# Patient Record
Sex: MALE | Race: WHITE | NOT HISPANIC OR LATINO | Employment: OTHER | URBAN - METROPOLITAN AREA
[De-identification: names, ages, dates, MRNs, and addresses within clinical notes are randomized per-mention and may not be internally consistent; named-entity substitution may affect disease eponyms.]

---

## 2017-01-01 ENCOUNTER — APPOINTMENT (OUTPATIENT)
Dept: LAB | Facility: HOSPITAL | Age: 72
End: 2017-01-01

## 2017-01-01 ENCOUNTER — APPOINTMENT (OUTPATIENT)
Dept: ONCOLOGY | Facility: HOSPITAL | Age: 72
End: 2017-01-01

## 2017-01-01 ENCOUNTER — APPOINTMENT (OUTPATIENT)
Dept: GENERAL RADIOLOGY | Facility: HOSPITAL | Age: 72
End: 2017-01-01

## 2017-01-01 ENCOUNTER — APPOINTMENT (OUTPATIENT)
Dept: CARDIOLOGY | Facility: HOSPITAL | Age: 72
End: 2017-01-01
Attending: HOSPITALIST

## 2017-01-01 ENCOUNTER — APPOINTMENT (OUTPATIENT)
Dept: CT IMAGING | Facility: HOSPITAL | Age: 72
End: 2017-01-01

## 2017-01-01 ENCOUNTER — APPOINTMENT (OUTPATIENT)
Dept: GENERAL RADIOLOGY | Facility: HOSPITAL | Age: 72
End: 2017-01-01
Attending: INTERNAL MEDICINE

## 2017-01-01 ENCOUNTER — APPOINTMENT (OUTPATIENT)
Dept: ULTRASOUND IMAGING | Facility: HOSPITAL | Age: 72
End: 2017-01-01

## 2017-01-01 ENCOUNTER — HOSPITAL ENCOUNTER (INPATIENT)
Facility: HOSPITAL | Age: 72
LOS: 23 days | Discharge: HOSPICE | End: 2018-01-18
Attending: EMERGENCY MEDICINE | Admitting: HOSPITALIST

## 2017-01-01 ENCOUNTER — HOSPITAL ENCOUNTER (INPATIENT)
Facility: HOSPITAL | Age: 72
LOS: 12 days | Discharge: HOSPICE | End: 2017-06-14
Attending: INTERNAL MEDICINE | Admitting: INTERNAL MEDICINE

## 2017-01-01 VITALS
DIASTOLIC BLOOD PRESSURE: 70 MMHG | OXYGEN SATURATION: 96 % | RESPIRATION RATE: 16 BRPM | BODY MASS INDEX: 39.01 KG/M2 | HEART RATE: 88 BPM | WEIGHT: 304 LBS | HEIGHT: 74 IN | SYSTOLIC BLOOD PRESSURE: 136 MMHG | TEMPERATURE: 98.5 F

## 2017-01-01 VITALS
SYSTOLIC BLOOD PRESSURE: 106 MMHG | WEIGHT: 304.9 LBS | HEIGHT: 74 IN | OXYGEN SATURATION: 95 % | HEART RATE: 89 BPM | DIASTOLIC BLOOD PRESSURE: 52 MMHG | TEMPERATURE: 99.2 F | BODY MASS INDEX: 39.13 KG/M2 | RESPIRATION RATE: 12 BRPM

## 2017-01-01 DIAGNOSIS — N49.2 CELLULITIS, SCROTUM: Primary | ICD-10-CM

## 2017-01-01 DIAGNOSIS — J90 PLEURAL EFFUSION ON LEFT: ICD-10-CM

## 2017-01-01 DIAGNOSIS — R73.9 HYPERGLYCEMIA: ICD-10-CM

## 2017-01-01 LAB
ABO + RH BLD: NORMAL
ABO + RH BLD: NORMAL
ALBUMIN SERPL-MCNC: 2.7 G/DL (ref 3.5–5.2)
ALBUMIN/GLOB SERPL: 0.5 G/DL
ALP SERPL-CCNC: 205 U/L (ref 39–117)
ALT SERPL W P-5'-P-CCNC: 14 U/L (ref 1–41)
ANION GAP SERPL CALCULATED.3IONS-SCNC: 10 MMOL/L
ANION GAP SERPL CALCULATED.3IONS-SCNC: 10.3 MMOL/L
ANION GAP SERPL CALCULATED.3IONS-SCNC: 11.1 MMOL/L
ANION GAP SERPL CALCULATED.3IONS-SCNC: 11.5 MMOL/L
ANION GAP SERPL CALCULATED.3IONS-SCNC: 11.5 MMOL/L
ANION GAP SERPL CALCULATED.3IONS-SCNC: 12.2 MMOL/L
ANION GAP SERPL CALCULATED.3IONS-SCNC: 13 MMOL/L
ANION GAP SERPL CALCULATED.3IONS-SCNC: 13.2 MMOL/L
ANION GAP SERPL CALCULATED.3IONS-SCNC: 9.8 MMOL/L
APPEARANCE FLD: CLEAR
APTT PPP: 38.9 SECONDS (ref 22.7–35.4)
AST SERPL-CCNC: 31 U/L (ref 1–40)
BACTERIA SPEC AEROBE CULT: ABNORMAL
BACTERIA SPEC AEROBE CULT: NORMAL
BACTERIA SPEC AEROBE CULT: NORMAL
BACTERIA UR QL AUTO: ABNORMAL /HPF
BASOPHILS # BLD AUTO: 0.02 10*3/MM3 (ref 0–0.2)
BASOPHILS # BLD AUTO: 0.03 10*3/MM3 (ref 0–0.2)
BASOPHILS # BLD AUTO: 0.05 10*3/MM3 (ref 0–0.2)
BASOPHILS NFR BLD AUTO: 0.2 % (ref 0–1.5)
BASOPHILS NFR BLD AUTO: 0.3 % (ref 0–1.5)
BASOPHILS NFR BLD AUTO: 0.3 % (ref 0–1.5)
BASOPHILS NFR BLD AUTO: 0.4 % (ref 0–1.5)
BASOPHILS NFR BLD AUTO: 0.5 % (ref 0–1.5)
BH BB BLOOD EXPIRATION DATE: NORMAL
BH BB BLOOD EXPIRATION DATE: NORMAL
BH BB BLOOD TYPE BARCODE: 6200
BH BB BLOOD TYPE BARCODE: 8400
BH BB DISPENSE STATUS: NORMAL
BH BB DISPENSE STATUS: NORMAL
BH BB PRODUCT CODE: NORMAL
BH BB PRODUCT CODE: NORMAL
BH BB UNIT NUMBER: NORMAL
BH BB UNIT NUMBER: NORMAL
BH CV LOW VAS LEFT COMMON FEMORAL SPONT: 1
BH CV LOW VAS LEFT DISTAL FEMORAL SPONT: 1
BH CV LOW VAS LEFT POPLITEAL SPONT: 1
BH CV LOW VAS RIGHT COMMON FEMORAL SPONT: 1
BH CV LOW VAS RIGHT DISTAL FEMORAL SPONT: 1
BH CV LOW VAS RIGHT MID FEMORAL SPONT: 1
BH CV LOW VAS RIGHT POPLITEAL SPONT: 1
BH CV LOW VAS RIGHT PROXIMAL FEMORAL SPONT: 1
BH CV LOWER VASCULAR LEFT COMMON FEMORAL AUGMENT: NORMAL
BH CV LOWER VASCULAR LEFT COMMON FEMORAL COMPETENT: NORMAL
BH CV LOWER VASCULAR LEFT COMMON FEMORAL COMPRESS: NORMAL
BH CV LOWER VASCULAR LEFT COMMON FEMORAL SPONT: NORMAL
BH CV LOWER VASCULAR LEFT DISTAL FEMORAL COMPRESS: NORMAL
BH CV LOWER VASCULAR LEFT DISTAL FEMORAL SPONT: NORMAL
BH CV LOWER VASCULAR LEFT DISTAL FEMORAL THROMBUS: NORMAL
BH CV LOWER VASCULAR LEFT GASTRONEMIUS COMPRESS: NORMAL
BH CV LOWER VASCULAR LEFT GREATER SAPH AK COMPRESS: NORMAL
BH CV LOWER VASCULAR LEFT GREATER SAPH BK COMPRESS: NORMAL
BH CV LOWER VASCULAR LEFT LESSER SAPH COMPRESS: NORMAL
BH CV LOWER VASCULAR LEFT MID FEMORAL AUGMENT: NORMAL
BH CV LOWER VASCULAR LEFT MID FEMORAL COMPETENT: NORMAL
BH CV LOWER VASCULAR LEFT MID FEMORAL COMPRESS: NORMAL
BH CV LOWER VASCULAR LEFT MID FEMORAL SPONT: NORMAL
BH CV LOWER VASCULAR LEFT MID FEMORAL THROMBUS: NORMAL
BH CV LOWER VASCULAR LEFT PERONEAL COMPRESS: NORMAL
BH CV LOWER VASCULAR LEFT POPLITEAL AUGMENT: NORMAL
BH CV LOWER VASCULAR LEFT POPLITEAL COMPETENT: NORMAL
BH CV LOWER VASCULAR LEFT POPLITEAL COMPRESS: NORMAL
BH CV LOWER VASCULAR LEFT POPLITEAL SPONT: NORMAL
BH CV LOWER VASCULAR LEFT POPLITEAL THROMBUS: NORMAL
BH CV LOWER VASCULAR LEFT POSTERIOR TIBIAL COMPRESS: NORMAL
BH CV LOWER VASCULAR LEFT PROXIMAL FEMORAL COMPRESS: NORMAL
BH CV LOWER VASCULAR LEFT SAPHENOFEMORAL JUNCTION COMPRESS: NORMAL
BH CV LOWER VASCULAR LEFT SAPHENOFEMORAL JUNCTION SPONT: NORMAL
BH CV LOWER VASCULAR RIGHT COMMON FEMORAL AUGMENT: NORMAL
BH CV LOWER VASCULAR RIGHT COMMON FEMORAL COMPETENT: NORMAL
BH CV LOWER VASCULAR RIGHT COMMON FEMORAL COMPRESS: NORMAL
BH CV LOWER VASCULAR RIGHT COMMON FEMORAL SPONT: NORMAL
BH CV LOWER VASCULAR RIGHT DISTAL FEMORAL AUGMENT: NORMAL
BH CV LOWER VASCULAR RIGHT DISTAL FEMORAL COMPETENT: NORMAL
BH CV LOWER VASCULAR RIGHT DISTAL FEMORAL COMPRESS: NORMAL
BH CV LOWER VASCULAR RIGHT DISTAL FEMORAL SPONT: NORMAL
BH CV LOWER VASCULAR RIGHT GASTRONEMIUS COMPRESS: NORMAL
BH CV LOWER VASCULAR RIGHT GREATER SAPH AK COMPRESS: NORMAL
BH CV LOWER VASCULAR RIGHT GREATER SAPH BK COMPRESS: NORMAL
BH CV LOWER VASCULAR RIGHT LESSER SAPH COMPRESS: NORMAL
BH CV LOWER VASCULAR RIGHT MID FEMORAL AUGMENT: NORMAL
BH CV LOWER VASCULAR RIGHT MID FEMORAL COMPETENT: NORMAL
BH CV LOWER VASCULAR RIGHT MID FEMORAL COMPRESS: NORMAL
BH CV LOWER VASCULAR RIGHT MID FEMORAL SPONT: NORMAL
BH CV LOWER VASCULAR RIGHT MID FEMORAL THROMBUS: NORMAL
BH CV LOWER VASCULAR RIGHT PERONEAL COMPRESS: NORMAL
BH CV LOWER VASCULAR RIGHT POPLITEAL AUGMENT: NORMAL
BH CV LOWER VASCULAR RIGHT POPLITEAL COMPETENT: NORMAL
BH CV LOWER VASCULAR RIGHT POPLITEAL COMPRESS: NORMAL
BH CV LOWER VASCULAR RIGHT POPLITEAL SPONT: NORMAL
BH CV LOWER VASCULAR RIGHT POSTERIOR TIBIAL COMPRESS: NORMAL
BH CV LOWER VASCULAR RIGHT PROXIMAL FEMORAL COMPRESS: NORMAL
BH CV LOWER VASCULAR RIGHT PROXIMAL FEMORAL SPONT: NORMAL
BH CV LOWER VASCULAR RIGHT PROXIMAL FEMORAL THROMBUS: NORMAL
BH CV LOWER VASCULAR RIGHT SAPHENOFEMORAL JUNCTION COMPRESS: NORMAL
BH CV LOWER VASCULAR RIGHT SAPHENOFEMORAL JUNCTION SPONT: NORMAL
BILIRUB SERPL-MCNC: 3.3 MG/DL (ref 0.1–1.2)
BILIRUB UR QL STRIP: NEGATIVE
BUN BLD-MCNC: 16 MG/DL (ref 8–23)
BUN BLD-MCNC: 20 MG/DL (ref 8–23)
BUN BLD-MCNC: 38 MG/DL (ref 8–23)
BUN BLD-MCNC: 38 MG/DL (ref 8–23)
BUN BLD-MCNC: 46 MG/DL (ref 8–23)
BUN BLD-MCNC: 46 MG/DL (ref 8–23)
BUN BLD-MCNC: 47 MG/DL (ref 8–23)
BUN BLD-MCNC: 48 MG/DL (ref 8–23)
BUN BLD-MCNC: 56 MG/DL (ref 8–23)
BUN/CREAT SERPL: 25 (ref 7–25)
BUN/CREAT SERPL: 28.6 (ref 7–25)
BUN/CREAT SERPL: 38.9 (ref 7–25)
BUN/CREAT SERPL: 43 (ref 7–25)
BUN/CREAT SERPL: 43.1 (ref 7–25)
BUN/CREAT SERPL: 48 (ref 7–25)
BUN/CREAT SERPL: 48.1 (ref 7–25)
BUN/CREAT SERPL: 48.7 (ref 7–25)
BUN/CREAT SERPL: 49.5 (ref 7–25)
CALCIUM SPEC-SCNC: 7.8 MG/DL (ref 8.6–10.5)
CALCIUM SPEC-SCNC: 7.9 MG/DL (ref 8.6–10.5)
CALCIUM SPEC-SCNC: 7.9 MG/DL (ref 8.6–10.5)
CALCIUM SPEC-SCNC: 8.1 MG/DL (ref 8.6–10.5)
CALCIUM SPEC-SCNC: 8.3 MG/DL (ref 8.6–10.5)
CALCIUM SPEC-SCNC: 8.3 MG/DL (ref 8.6–10.5)
CALCIUM SPEC-SCNC: 8.6 MG/DL (ref 8.6–10.5)
CHLORIDE SERPL-SCNC: 87 MMOL/L (ref 98–107)
CHLORIDE SERPL-SCNC: 87 MMOL/L (ref 98–107)
CHLORIDE SERPL-SCNC: 88 MMOL/L (ref 98–107)
CHLORIDE SERPL-SCNC: 88 MMOL/L (ref 98–107)
CHLORIDE SERPL-SCNC: 90 MMOL/L (ref 98–107)
CHLORIDE SERPL-SCNC: 91 MMOL/L (ref 98–107)
CHLORIDE SERPL-SCNC: 95 MMOL/L (ref 98–107)
CHLORIDE SERPL-SCNC: 96 MMOL/L (ref 98–107)
CHLORIDE SERPL-SCNC: 98 MMOL/L (ref 98–107)
CLARITY UR: ABNORMAL
CO2 SERPL-SCNC: 20.8 MMOL/L (ref 22–29)
CO2 SERPL-SCNC: 21.5 MMOL/L (ref 22–29)
CO2 SERPL-SCNC: 25.8 MMOL/L (ref 22–29)
CO2 SERPL-SCNC: 27 MMOL/L (ref 22–29)
CO2 SERPL-SCNC: 27 MMOL/L (ref 22–29)
CO2 SERPL-SCNC: 28.2 MMOL/L (ref 22–29)
CO2 SERPL-SCNC: 28.5 MMOL/L (ref 22–29)
CO2 SERPL-SCNC: 29.7 MMOL/L (ref 22–29)
CO2 SERPL-SCNC: 30.9 MMOL/L (ref 22–29)
COLOR FLD: YELLOW
COLOR UR: ABNORMAL
CREAT BLD-MCNC: 0.64 MG/DL (ref 0.76–1.27)
CREAT BLD-MCNC: 0.7 MG/DL (ref 0.76–1.27)
CREAT BLD-MCNC: 0.78 MG/DL (ref 0.76–1.27)
CREAT BLD-MCNC: 0.79 MG/DL (ref 0.76–1.27)
CREAT BLD-MCNC: 0.93 MG/DL (ref 0.76–1.27)
CREAT BLD-MCNC: 1 MG/DL (ref 0.76–1.27)
CREAT BLD-MCNC: 1.07 MG/DL (ref 0.76–1.27)
CREAT BLD-MCNC: 1.09 MG/DL (ref 0.76–1.27)
CREAT BLD-MCNC: 1.44 MG/DL (ref 0.76–1.27)
D-LACTATE SERPL-SCNC: 1.3 MMOL/L (ref 0.5–2)
D-LACTATE SERPL-SCNC: 2.2 MMOL/L (ref 0.5–2)
D-LACTATE SERPL-SCNC: 2.3 MMOL/L (ref 0.5–2)
DEPRECATED RDW RBC AUTO: 63.8 FL (ref 37–54)
DEPRECATED RDW RBC AUTO: 63.9 FL (ref 37–54)
DEPRECATED RDW RBC AUTO: 64.6 FL (ref 37–54)
DEPRECATED RDW RBC AUTO: 65.1 FL (ref 37–54)
DEPRECATED RDW RBC AUTO: 67.4 FL (ref 37–54)
DEPRECATED RDW RBC AUTO: 70 FL (ref 37–54)
EOSINOPHIL # BLD AUTO: 0.03 10*3/MM3 (ref 0–0.7)
EOSINOPHIL # BLD AUTO: 0.04 10*3/MM3 (ref 0–0.7)
EOSINOPHIL # BLD AUTO: 0.15 10*3/MM3 (ref 0–0.7)
EOSINOPHIL # BLD AUTO: 0.17 10*3/MM3 (ref 0–0.7)
EOSINOPHIL # BLD AUTO: 0.26 10*3/MM3 (ref 0–0.7)
EOSINOPHIL NFR BLD AUTO: 0.4 % (ref 0.3–6.2)
EOSINOPHIL NFR BLD AUTO: 0.4 % (ref 0.3–6.2)
EOSINOPHIL NFR BLD AUTO: 1.5 % (ref 0.3–6.2)
EOSINOPHIL NFR BLD AUTO: 1.7 % (ref 0.3–6.2)
EOSINOPHIL NFR BLD AUTO: 2.2 % (ref 0.3–6.2)
ERYTHROCYTE [DISTWIDTH] IN BLOOD BY AUTOMATED COUNT: 16.4 % (ref 11.5–14.5)
ERYTHROCYTE [DISTWIDTH] IN BLOOD BY AUTOMATED COUNT: 16.5 % (ref 11.5–14.5)
ERYTHROCYTE [DISTWIDTH] IN BLOOD BY AUTOMATED COUNT: 16.6 % (ref 11.5–14.5)
ERYTHROCYTE [DISTWIDTH] IN BLOOD BY AUTOMATED COUNT: 16.7 % (ref 11.5–14.5)
ERYTHROCYTE [DISTWIDTH] IN BLOOD BY AUTOMATED COUNT: 17.3 % (ref 11.5–14.5)
ERYTHROCYTE [DISTWIDTH] IN BLOOD BY AUTOMATED COUNT: 20.6 % (ref 11.5–14.5)
GFR SERPL CREATININE-BSD FRML MDRD: 111 ML/MIN/1.73
GFR SERPL CREATININE-BSD FRML MDRD: 124 ML/MIN/1.73
GFR SERPL CREATININE-BSD FRML MDRD: 48 ML/MIN/1.73
GFR SERPL CREATININE-BSD FRML MDRD: 67 ML/MIN/1.73
GFR SERPL CREATININE-BSD FRML MDRD: 68 ML/MIN/1.73
GFR SERPL CREATININE-BSD FRML MDRD: 74 ML/MIN/1.73
GFR SERPL CREATININE-BSD FRML MDRD: 80 ML/MIN/1.73
GFR SERPL CREATININE-BSD FRML MDRD: 97 ML/MIN/1.73
GFR SERPL CREATININE-BSD FRML MDRD: 98 ML/MIN/1.73
GLOBULIN UR ELPH-MCNC: 5.7 GM/DL
GLUCOSE BLD-MCNC: 133 MG/DL (ref 65–99)
GLUCOSE BLD-MCNC: 137 MG/DL (ref 65–99)
GLUCOSE BLD-MCNC: 170 MG/DL (ref 65–99)
GLUCOSE BLD-MCNC: 195 MG/DL (ref 65–99)
GLUCOSE BLD-MCNC: 202 MG/DL (ref 65–99)
GLUCOSE BLD-MCNC: 328 MG/DL (ref 65–99)
GLUCOSE BLD-MCNC: 412 MG/DL (ref 65–99)
GLUCOSE BLD-MCNC: 576 MG/DL (ref 65–99)
GLUCOSE BLD-MCNC: 93 MG/DL (ref 65–99)
GLUCOSE BLDC GLUCOMTR-MCNC: 106 MG/DL (ref 70–130)
GLUCOSE BLDC GLUCOMTR-MCNC: 124 MG/DL (ref 70–130)
GLUCOSE BLDC GLUCOMTR-MCNC: 126 MG/DL (ref 70–130)
GLUCOSE BLDC GLUCOMTR-MCNC: 135 MG/DL (ref 70–130)
GLUCOSE BLDC GLUCOMTR-MCNC: 142 MG/DL (ref 70–130)
GLUCOSE BLDC GLUCOMTR-MCNC: 143 MG/DL (ref 70–130)
GLUCOSE BLDC GLUCOMTR-MCNC: 146 MG/DL (ref 70–130)
GLUCOSE BLDC GLUCOMTR-MCNC: 148 MG/DL (ref 70–130)
GLUCOSE BLDC GLUCOMTR-MCNC: 149 MG/DL (ref 70–130)
GLUCOSE BLDC GLUCOMTR-MCNC: 156 MG/DL (ref 70–130)
GLUCOSE BLDC GLUCOMTR-MCNC: 166 MG/DL (ref 70–130)
GLUCOSE BLDC GLUCOMTR-MCNC: 166 MG/DL (ref 70–130)
GLUCOSE BLDC GLUCOMTR-MCNC: 169 MG/DL (ref 70–130)
GLUCOSE BLDC GLUCOMTR-MCNC: 171 MG/DL (ref 70–130)
GLUCOSE BLDC GLUCOMTR-MCNC: 175 MG/DL (ref 70–130)
GLUCOSE BLDC GLUCOMTR-MCNC: 178 MG/DL (ref 70–130)
GLUCOSE BLDC GLUCOMTR-MCNC: 181 MG/DL (ref 70–130)
GLUCOSE BLDC GLUCOMTR-MCNC: 185 MG/DL (ref 70–130)
GLUCOSE BLDC GLUCOMTR-MCNC: 193 MG/DL (ref 70–130)
GLUCOSE BLDC GLUCOMTR-MCNC: 198 MG/DL (ref 70–130)
GLUCOSE BLDC GLUCOMTR-MCNC: 203 MG/DL (ref 70–130)
GLUCOSE BLDC GLUCOMTR-MCNC: 207 MG/DL (ref 70–130)
GLUCOSE BLDC GLUCOMTR-MCNC: 214 MG/DL (ref 70–130)
GLUCOSE BLDC GLUCOMTR-MCNC: 221 MG/DL (ref 70–130)
GLUCOSE BLDC GLUCOMTR-MCNC: 242 MG/DL (ref 70–130)
GLUCOSE BLDC GLUCOMTR-MCNC: 329 MG/DL (ref 70–130)
GLUCOSE BLDC GLUCOMTR-MCNC: 413 MG/DL (ref 70–130)
GLUCOSE BLDC GLUCOMTR-MCNC: 479 MG/DL (ref 70–130)
GLUCOSE BLDC GLUCOMTR-MCNC: 501 MG/DL (ref 70–130)
GLUCOSE FLD-MCNC: 510 MG/DL
GLUCOSE UR STRIP-MCNC: ABNORMAL MG/DL
HBA1C MFR BLD: 10.8 % (ref 4.8–5.6)
HCT VFR BLD AUTO: 27.9 % (ref 40.4–52.2)
HCT VFR BLD AUTO: 28.5 % (ref 40.4–52.2)
HCT VFR BLD AUTO: 28.7 % (ref 40.4–52.2)
HCT VFR BLD AUTO: 29.1 % (ref 40.4–52.2)
HCT VFR BLD AUTO: 29.9 % (ref 40.4–52.2)
HCT VFR BLD AUTO: 30.9 % (ref 40.4–52.2)
HCT VFR BLD AUTO: 31.1 % (ref 40.4–52.2)
HGB BLD-MCNC: 10 G/DL (ref 13.7–17.6)
HGB BLD-MCNC: 10 G/DL (ref 13.7–17.6)
HGB BLD-MCNC: 9.2 G/DL (ref 13.7–17.6)
HGB BLD-MCNC: 9.5 G/DL (ref 13.7–17.6)
HGB BLD-MCNC: 9.6 G/DL (ref 13.7–17.6)
HGB BLD-MCNC: 9.6 G/DL (ref 13.7–17.6)
HGB BLD-MCNC: 9.8 G/DL (ref 13.7–17.6)
HGB UR QL STRIP.AUTO: ABNORMAL
HOLD SPECIMEN: NORMAL
HYALINE CASTS UR QL AUTO: ABNORMAL /LPF
IMM GRANULOCYTES # BLD: 0 10*3/MM3 (ref 0–0.03)
IMM GRANULOCYTES # BLD: 0.02 10*3/MM3 (ref 0–0.03)
IMM GRANULOCYTES # BLD: 0.03 10*3/MM3 (ref 0–0.03)
IMM GRANULOCYTES NFR BLD: 0 % (ref 0–0.5)
IMM GRANULOCYTES NFR BLD: 0.2 % (ref 0–0.5)
IMM GRANULOCYTES NFR BLD: 0.4 % (ref 0–0.5)
INR PPP: 1.36 (ref 0.9–1.1)
INR PPP: 1.66 (ref 0.9–1.1)
KETONES UR QL STRIP: NEGATIVE
LAB AP CASE REPORT: NORMAL
LDH FLD-CCNC: 66 U/L
LDH SERPL-CCNC: 214 U/L (ref 135–225)
LEUKOCYTE ESTERASE UR QL STRIP.AUTO: ABNORMAL
LYMPHOCYTES # BLD AUTO: 1.14 10*3/MM3 (ref 0.9–4.8)
LYMPHOCYTES # BLD AUTO: 1.15 10*3/MM3 (ref 0.9–4.8)
LYMPHOCYTES # BLD AUTO: 1.17 10*3/MM3 (ref 0.9–4.8)
LYMPHOCYTES # BLD AUTO: 1.23 10*3/MM3 (ref 0.9–4.8)
LYMPHOCYTES # BLD AUTO: 1.61 10*3/MM3 (ref 0.9–4.8)
LYMPHOCYTES NFR BLD AUTO: 11.5 % (ref 19.6–45.3)
LYMPHOCYTES NFR BLD AUTO: 12 % (ref 19.6–45.3)
LYMPHOCYTES NFR BLD AUTO: 12.2 % (ref 19.6–45.3)
LYMPHOCYTES NFR BLD AUTO: 13.8 % (ref 19.6–45.3)
LYMPHOCYTES NFR BLD AUTO: 13.9 % (ref 19.6–45.3)
LYMPHOCYTES NFR FLD MANUAL: 42 %
Lab: NORMAL
MACROCYTES BLD QL SMEAR: NORMAL
MCH RBC QN AUTO: 33.6 PG (ref 27–32.7)
MCH RBC QN AUTO: 34.6 PG (ref 27–32.7)
MCH RBC QN AUTO: 34.8 PG (ref 27–32.7)
MCH RBC QN AUTO: 34.8 PG (ref 27–32.7)
MCH RBC QN AUTO: 35 PG (ref 27–32.7)
MCH RBC QN AUTO: 35.3 PG (ref 27–32.7)
MCHC RBC AUTO-ENTMCNC: 32.1 G/DL (ref 32.6–36.4)
MCHC RBC AUTO-ENTMCNC: 32.2 G/DL (ref 32.6–36.4)
MCHC RBC AUTO-ENTMCNC: 32.4 G/DL (ref 32.6–36.4)
MCHC RBC AUTO-ENTMCNC: 32.8 G/DL (ref 32.6–36.4)
MCHC RBC AUTO-ENTMCNC: 33 G/DL (ref 32.6–36.4)
MCHC RBC AUTO-ENTMCNC: 34.4 G/DL (ref 32.6–36.4)
MCV RBC AUTO: 106 FL (ref 79.8–96.2)
MCV RBC AUTO: 107 FL (ref 79.8–96.2)
MCV RBC AUTO: 107.7 FL (ref 79.8–96.2)
MCV RBC AUTO: 107.9 FL (ref 79.8–96.2)
MCV RBC AUTO: 108.7 FL (ref 79.8–96.2)
MCV RBC AUTO: 97.6 FL (ref 79.8–96.2)
MONOCYTES # BLD AUTO: 0.67 10*3/MM3 (ref 0.2–1.2)
MONOCYTES # BLD AUTO: 0.8 10*3/MM3 (ref 0.2–1.2)
MONOCYTES # BLD AUTO: 1.02 10*3/MM3 (ref 0.2–1.2)
MONOCYTES # BLD AUTO: 1.19 10*3/MM3 (ref 0.2–1.2)
MONOCYTES # BLD AUTO: 1.27 10*3/MM3 (ref 0.2–1.2)
MONOCYTES NFR BLD AUTO: 10.3 % (ref 5–12)
MONOCYTES NFR BLD AUTO: 11 % (ref 5–12)
MONOCYTES NFR BLD AUTO: 12.4 % (ref 5–12)
MONOCYTES NFR BLD AUTO: 7.9 % (ref 5–12)
MONOCYTES NFR BLD AUTO: 8 % (ref 5–12)
MONOCYTES NFR FLD: 4 %
MONOS+MACROS NFR FLD: 4 %
MRSA SPEC QL CULT: NORMAL
NEUTROPHILS # BLD AUTO: 6.55 10*3/MM3 (ref 1.9–8.1)
NEUTROPHILS # BLD AUTO: 7.08 10*3/MM3 (ref 1.9–8.1)
NEUTROPHILS # BLD AUTO: 7.48 10*3/MM3 (ref 1.9–8.1)
NEUTROPHILS # BLD AUTO: 7.88 10*3/MM3 (ref 1.9–8.1)
NEUTROPHILS # BLD AUTO: 8.49 10*3/MM3 (ref 1.9–8.1)
NEUTROPHILS NFR BLD AUTO: 73.2 % (ref 42.7–76)
NEUTROPHILS NFR BLD AUTO: 73.2 % (ref 42.7–76)
NEUTROPHILS NFR BLD AUTO: 76.1 % (ref 42.7–76)
NEUTROPHILS NFR BLD AUTO: 77.1 % (ref 42.7–76)
NEUTROPHILS NFR BLD AUTO: 78.5 % (ref 42.7–76)
NEUTROPHILS NFR FLD MANUAL: 50 %
NITRITE UR QL STRIP: NEGATIVE
NRBC BLD MANUAL-RTO: 0 /100 WBC (ref 0–0)
OSMOLALITY SERPL: 290 MOSM/KG (ref 280–301)
OSMOLALITY UR: 383 MOSM/KG
PATH REPORT.FINAL DX SPEC: NORMAL
PATH REPORT.GROSS SPEC: NORMAL
PH FLD: 7.5 [PH]
PH UR STRIP.AUTO: <=5 [PH] (ref 5–8)
PLAT MORPH BLD: NORMAL
PLATELET # BLD AUTO: 113 10*3/MM3 (ref 140–500)
PLATELET # BLD AUTO: 114 10*3/MM3 (ref 140–500)
PLATELET # BLD AUTO: 115 10*3/MM3 (ref 140–500)
PLATELET # BLD AUTO: 116 10*3/MM3 (ref 140–500)
PLATELET # BLD AUTO: 119 10*3/MM3 (ref 140–500)
PLATELET # BLD AUTO: 98 10*3/MM3 (ref 140–500)
PMV BLD AUTO: 10.4 FL (ref 6–12)
PMV BLD AUTO: 10.5 FL (ref 6–12)
PMV BLD AUTO: 10.6 FL (ref 6–12)
PMV BLD AUTO: 10.9 FL (ref 6–12)
PMV BLD AUTO: 11.6 FL (ref 6–12)
PMV BLD AUTO: 9.6 FL (ref 6–12)
POTASSIUM BLD-SCNC: 3.1 MMOL/L (ref 3.5–5.2)
POTASSIUM BLD-SCNC: 3.5 MMOL/L (ref 3.5–5.2)
POTASSIUM BLD-SCNC: 3.7 MMOL/L (ref 3.5–5.2)
POTASSIUM BLD-SCNC: 3.8 MMOL/L (ref 3.5–5.2)
POTASSIUM BLD-SCNC: 4 MMOL/L (ref 3.5–5.2)
POTASSIUM BLD-SCNC: 4.1 MMOL/L (ref 3.5–5.2)
POTASSIUM BLD-SCNC: 4.6 MMOL/L (ref 3.5–5.2)
PROCALCITONIN SERPL-MCNC: 0.19 NG/ML (ref 0.1–0.25)
PROT FLD-MCNC: 2.2 G/DL
PROT SERPL-MCNC: 8.4 G/DL (ref 6–8.5)
PROT UR QL STRIP: NEGATIVE
PROTHROMBIN TIME: 16.3 SECONDS (ref 11.7–14.2)
PROTHROMBIN TIME: 19.1 SECONDS (ref 11.7–14.2)
RBC # BLD AUTO: 2.66 10*6/MM3 (ref 4.6–6)
RBC # BLD AUTO: 2.72 10*6/MM3 (ref 4.6–6)
RBC # BLD AUTO: 2.82 10*6/MM3 (ref 4.6–6)
RBC # BLD AUTO: 2.86 10*6/MM3 (ref 4.6–6)
RBC # BLD AUTO: 2.86 10*6/MM3 (ref 4.6–6)
RBC # BLD AUTO: 2.87 10*6/MM3 (ref 4.6–6)
RBC # FLD AUTO: 861 /MM3
RBC # UR: ABNORMAL /HPF
REF LAB TEST METHOD: ABNORMAL
SODIUM BLD-SCNC: 126 MMOL/L (ref 136–145)
SODIUM BLD-SCNC: 127 MMOL/L (ref 136–145)
SODIUM BLD-SCNC: 128 MMOL/L (ref 136–145)
SODIUM BLD-SCNC: 128 MMOL/L (ref 136–145)
SODIUM BLD-SCNC: 129 MMOL/L (ref 136–145)
SODIUM BLD-SCNC: 129 MMOL/L (ref 136–145)
SODIUM BLD-SCNC: 131 MMOL/L (ref 136–145)
SODIUM BLD-SCNC: 131 MMOL/L (ref 136–145)
SODIUM BLD-SCNC: 133 MMOL/L (ref 136–145)
SODIUM UR-SCNC: <20 MMOL/L
SP GR FLD: 1.02
SP GR UR STRIP: 1.03 (ref 1–1.03)
SQUAMOUS #/AREA URNS HPF: ABNORMAL /HPF
UNIT  ABO: NORMAL
UNIT  ABO: NORMAL
UNIT  RH: NORMAL
UNIT  RH: NORMAL
UROBILINOGEN UR QL STRIP: ABNORMAL
VANCOMYCIN SERPL-MCNC: 22.7 MCG/ML (ref 5–40)
VANCOMYCIN SERPL-MCNC: 22.9 MCG/ML (ref 5–40)
VANCOMYCIN TROUGH SERPL-MCNC: 26.5 MCG/ML (ref 5–20)
VANCOMYCIN TROUGH SERPL-MCNC: 26.9 MCG/ML (ref 5–20)
WBC # FLD: 149 /MM3
WBC MORPH BLD: NORMAL
WBC NRBC COR # BLD: 10.03 10*3/MM3 (ref 4.5–10.7)
WBC NRBC COR # BLD: 10.22 10*3/MM3 (ref 4.5–10.7)
WBC NRBC COR # BLD: 11.59 10*3/MM3 (ref 4.5–10.7)
WBC NRBC COR # BLD: 6.69 10*3/MM3 (ref 4.5–10.7)
WBC NRBC COR # BLD: 8.48 10*3/MM3 (ref 4.5–10.7)
WBC NRBC COR # BLD: 9.31 10*3/MM3 (ref 4.5–10.7)
WBC UR QL AUTO: ABNORMAL /HPF
YEAST URNS QL MICRO: ABNORMAL /HPF

## 2017-01-01 PROCEDURE — 87186 SC STD MICRODIL/AGAR DIL: CPT | Performed by: EMERGENCY MEDICINE

## 2017-01-01 PROCEDURE — 89051 BODY FLUID CELL COUNT: CPT | Performed by: INTERNAL MEDICINE

## 2017-01-01 PROCEDURE — B543ZZA ULTRASONOGRAPHY OF RIGHT JUGULAR VEINS, GUIDANCE: ICD-10-PCS | Performed by: INTERNAL MEDICINE

## 2017-01-01 PROCEDURE — 25010000002 PIPERACILLIN SOD-TAZOBACTAM PER 1 G: Performed by: HOSPITALIST

## 2017-01-01 PROCEDURE — 87186 SC STD MICRODIL/AGAR DIL: CPT | Performed by: UROLOGY

## 2017-01-01 PROCEDURE — 87075 CULTR BACTERIA EXCEPT BLOOD: CPT | Performed by: INTERNAL MEDICINE

## 2017-01-01 PROCEDURE — 85007 BL SMEAR W/DIFF WBC COUNT: CPT | Performed by: EMERGENCY MEDICINE

## 2017-01-01 PROCEDURE — 25010000002 HYDROMORPHONE PER 4 MG: Performed by: INTERNAL MEDICINE

## 2017-01-01 PROCEDURE — 97162 PT EVAL MOD COMPLEX 30 MIN: CPT

## 2017-01-01 PROCEDURE — 84300 ASSAY OF URINE SODIUM: CPT | Performed by: INTERNAL MEDICINE

## 2017-01-01 PROCEDURE — 81001 URINALYSIS AUTO W/SCOPE: CPT | Performed by: EMERGENCY MEDICINE

## 2017-01-01 PROCEDURE — 82962 GLUCOSE BLOOD TEST: CPT

## 2017-01-01 PROCEDURE — 85025 COMPLETE CBC W/AUTO DIFF WBC: CPT | Performed by: EMERGENCY MEDICINE

## 2017-01-01 PROCEDURE — 71250 CT THORAX DX C-: CPT

## 2017-01-01 PROCEDURE — 87015 SPECIMEN INFECT AGNT CONCNTJ: CPT | Performed by: INTERNAL MEDICINE

## 2017-01-01 PROCEDURE — 87040 BLOOD CULTURE FOR BACTERIA: CPT | Performed by: EMERGENCY MEDICINE

## 2017-01-01 PROCEDURE — 85018 HEMOGLOBIN: CPT | Performed by: SURGERY

## 2017-01-01 PROCEDURE — 80202 ASSAY OF VANCOMYCIN: CPT | Performed by: HOSPITALIST

## 2017-01-01 PROCEDURE — 74176 CT ABD & PELVIS W/O CONTRAST: CPT

## 2017-01-01 PROCEDURE — 83605 ASSAY OF LACTIC ACID: CPT | Performed by: INTERNAL MEDICINE

## 2017-01-01 PROCEDURE — 80048 BASIC METABOLIC PNL TOTAL CA: CPT | Performed by: INTERNAL MEDICINE

## 2017-01-01 PROCEDURE — 83605 ASSAY OF LACTIC ACID: CPT | Performed by: EMERGENCY MEDICINE

## 2017-01-01 PROCEDURE — 25010000002 PIPERACILLIN SOD-TAZOBACTAM PER 1 G: Performed by: EMERGENCY MEDICINE

## 2017-01-01 PROCEDURE — 0W9B3ZZ DRAINAGE OF LEFT PLEURAL CAVITY, PERCUTANEOUS APPROACH: ICD-10-PCS | Performed by: RADIOLOGY

## 2017-01-01 PROCEDURE — 85610 PROTHROMBIN TIME: CPT | Performed by: EMERGENCY MEDICINE

## 2017-01-01 PROCEDURE — 63710000001 INSULIN ASPART PER 5 UNITS: Performed by: HOSPITALIST

## 2017-01-01 PROCEDURE — 85014 HEMATOCRIT: CPT | Performed by: SURGERY

## 2017-01-01 PROCEDURE — 97110 THERAPEUTIC EXERCISES: CPT

## 2017-01-01 PROCEDURE — 88112 CYTOPATH CELL ENHANCE TECH: CPT | Performed by: INTERNAL MEDICINE

## 2017-01-01 PROCEDURE — 85025 COMPLETE CBC W/AUTO DIFF WBC: CPT | Performed by: HOSPITALIST

## 2017-01-01 PROCEDURE — 84315 BODY FLUID SPECIFIC GRAVITY: CPT | Performed by: INTERNAL MEDICINE

## 2017-01-01 PROCEDURE — 25010000002 VANCOMYCIN 10 G RECONSTITUTED SOLUTION: Performed by: HOSPITALIST

## 2017-01-01 PROCEDURE — 25010000002 HYDROMORPHONE PER 4 MG: Performed by: HOSPITALIST

## 2017-01-01 PROCEDURE — 73070 X-RAY EXAM OF ELBOW: CPT

## 2017-01-01 PROCEDURE — 88305 TISSUE EXAM BY PATHOLOGIST: CPT | Performed by: INTERNAL MEDICINE

## 2017-01-01 PROCEDURE — 63710000001 INSULIN DETEMER PER 5 UNITS: Performed by: HOSPITALIST

## 2017-01-01 PROCEDURE — 83615 LACTATE (LD) (LDH) ENZYME: CPT | Performed by: INTERNAL MEDICINE

## 2017-01-01 PROCEDURE — 63710000001 INSULIN ASPART PER 5 UNITS: Performed by: INTERNAL MEDICINE

## 2017-01-01 PROCEDURE — 87086 URINE CULTURE/COLONY COUNT: CPT | Performed by: UROLOGY

## 2017-01-01 PROCEDURE — 83986 ASSAY PH BODY FLUID NOS: CPT | Performed by: INTERNAL MEDICINE

## 2017-01-01 PROCEDURE — 73030 X-RAY EXAM OF SHOULDER: CPT

## 2017-01-01 PROCEDURE — 76942 ECHO GUIDE FOR BIOPSY: CPT

## 2017-01-01 PROCEDURE — 93976 VASCULAR STUDY: CPT

## 2017-01-01 PROCEDURE — G8978 MOBILITY CURRENT STATUS: HCPCS

## 2017-01-01 PROCEDURE — 63710000001 INSULIN REGULAR HUMAN PER 5 UNITS: Performed by: EMERGENCY MEDICINE

## 2017-01-01 PROCEDURE — 70450 CT HEAD/BRAIN W/O DYE: CPT

## 2017-01-01 PROCEDURE — 84157 ASSAY OF PROTEIN OTHER: CPT | Performed by: INTERNAL MEDICINE

## 2017-01-01 PROCEDURE — 99232 SBSQ HOSP IP/OBS MODERATE 35: CPT | Performed by: INTERNAL MEDICINE

## 2017-01-01 PROCEDURE — 25010000002 VANCOMYCIN 10 G RECONSTITUTED SOLUTION: Performed by: INTERNAL MEDICINE

## 2017-01-01 PROCEDURE — 25010000002 ONDANSETRON PER 1 MG: Performed by: HOSPITALIST

## 2017-01-01 PROCEDURE — 85610 PROTHROMBIN TIME: CPT | Performed by: INTERNAL MEDICINE

## 2017-01-01 PROCEDURE — 71020 HC CHEST PA AND LATERAL: CPT

## 2017-01-01 PROCEDURE — 85027 COMPLETE CBC AUTOMATED: CPT | Performed by: INTERNAL MEDICINE

## 2017-01-01 PROCEDURE — 85025 COMPLETE CBC W/AUTO DIFF WBC: CPT | Performed by: INTERNAL MEDICINE

## 2017-01-01 PROCEDURE — 05HM33Z INSERTION OF INFUSION DEVICE INTO RIGHT INTERNAL JUGULAR VEIN, PERCUTANEOUS APPROACH: ICD-10-PCS | Performed by: INTERNAL MEDICINE

## 2017-01-01 PROCEDURE — 83935 ASSAY OF URINE OSMOLALITY: CPT | Performed by: INTERNAL MEDICINE

## 2017-01-01 PROCEDURE — 87081 CULTURE SCREEN ONLY: CPT | Performed by: HOSPITALIST

## 2017-01-01 PROCEDURE — 80053 COMPREHEN METABOLIC PANEL: CPT | Performed by: EMERGENCY MEDICINE

## 2017-01-01 PROCEDURE — 93970 EXTREMITY STUDY: CPT

## 2017-01-01 PROCEDURE — 87086 URINE CULTURE/COLONY COUNT: CPT | Performed by: EMERGENCY MEDICINE

## 2017-01-01 PROCEDURE — 87206 SMEAR FLUORESCENT/ACID STAI: CPT | Performed by: INTERNAL MEDICINE

## 2017-01-01 PROCEDURE — 99231 SBSQ HOSP IP/OBS SF/LOW 25: CPT | Performed by: INTERNAL MEDICINE

## 2017-01-01 PROCEDURE — 83930 ASSAY OF BLOOD OSMOLALITY: CPT | Performed by: INTERNAL MEDICINE

## 2017-01-01 PROCEDURE — 80202 ASSAY OF VANCOMYCIN: CPT | Performed by: INTERNAL MEDICINE

## 2017-01-01 PROCEDURE — 83036 HEMOGLOBIN GLYCOSYLATED A1C: CPT | Performed by: HOSPITALIST

## 2017-01-01 PROCEDURE — 25010000002 VANCOMYCIN 10 G RECONSTITUTED SOLUTION: Performed by: EMERGENCY MEDICINE

## 2017-01-01 PROCEDURE — 87205 SMEAR GRAM STAIN: CPT | Performed by: INTERNAL MEDICINE

## 2017-01-01 PROCEDURE — 85730 THROMBOPLASTIN TIME PARTIAL: CPT | Performed by: SURGERY

## 2017-01-01 PROCEDURE — 80048 BASIC METABOLIC PNL TOTAL CA: CPT | Performed by: HOSPITALIST

## 2017-01-01 PROCEDURE — 36415 COLL VENOUS BLD VENIPUNCTURE: CPT | Performed by: EMERGENCY MEDICINE

## 2017-01-01 PROCEDURE — 87116 MYCOBACTERIA CULTURE: CPT | Performed by: INTERNAL MEDICINE

## 2017-01-01 PROCEDURE — 82945 GLUCOSE OTHER FLUID: CPT | Performed by: INTERNAL MEDICINE

## 2017-01-01 PROCEDURE — 84145 PROCALCITONIN (PCT): CPT | Performed by: EMERGENCY MEDICINE

## 2017-01-01 PROCEDURE — 76870 US EXAM SCROTUM: CPT

## 2017-01-01 PROCEDURE — 87070 CULTURE OTHR SPECIMN AEROBIC: CPT | Performed by: INTERNAL MEDICINE

## 2017-01-01 PROCEDURE — 99284 EMERGENCY DEPT VISIT MOD MDM: CPT

## 2017-01-01 PROCEDURE — G8979 MOBILITY GOAL STATUS: HCPCS

## 2017-01-01 PROCEDURE — G8980 MOBILITY D/C STATUS: HCPCS

## 2017-01-01 RX ORDER — DIPHENOXYLATE HYDROCHLORIDE AND ATROPINE SULFATE 2.5; .025 MG/1; MG/1
1 TABLET ORAL
Status: DISCONTINUED | OUTPATIENT
Start: 2017-01-01 | End: 2017-01-01 | Stop reason: HOSPADM

## 2017-01-01 RX ORDER — LORAZEPAM 2 MG/ML
1 INJECTION INTRAMUSCULAR
Status: CANCELLED | OUTPATIENT
Start: 2017-01-01 | End: 2017-01-01

## 2017-01-01 RX ORDER — HYDROMORPHONE HYDROCHLORIDE 1 MG/ML
0.5 INJECTION, SOLUTION INTRAMUSCULAR; INTRAVENOUS; SUBCUTANEOUS
Status: DISCONTINUED | OUTPATIENT
Start: 2017-01-01 | End: 2017-01-01

## 2017-01-01 RX ORDER — FLUCONAZOLE 150 MG/1
150 TABLET ORAL EVERY 24 HOURS
Status: DISCONTINUED | OUTPATIENT
Start: 2017-01-01 | End: 2017-01-01

## 2017-01-01 RX ORDER — ONDANSETRON 4 MG/1
4 TABLET, FILM COATED ORAL EVERY 6 HOURS PRN
Status: DISCONTINUED | OUTPATIENT
Start: 2017-01-01 | End: 2018-01-01 | Stop reason: HOSPADM

## 2017-01-01 RX ORDER — LORAZEPAM 2 MG/ML
2 CONCENTRATE ORAL
Status: CANCELLED | OUTPATIENT
Start: 2017-01-01 | End: 2017-01-01

## 2017-01-01 RX ORDER — ONDANSETRON 4 MG/1
4 TABLET, FILM COATED ORAL EVERY 6 HOURS PRN
Status: DISCONTINUED | OUTPATIENT
Start: 2017-01-01 | End: 2017-01-01 | Stop reason: HOSPADM

## 2017-01-01 RX ORDER — CASTOR OIL AND BALSAM, PERU 788; 87 MG/G; MG/G
OINTMENT TOPICAL EVERY 12 HOURS SCHEDULED
Status: DISCONTINUED | OUTPATIENT
Start: 2017-01-01 | End: 2017-01-01 | Stop reason: HOSPADM

## 2017-01-01 RX ORDER — LORAZEPAM 2 MG/ML
0.5 INJECTION INTRAMUSCULAR
Status: DISCONTINUED | OUTPATIENT
Start: 2017-01-01 | End: 2017-01-01 | Stop reason: HOSPADM

## 2017-01-01 RX ORDER — LORAZEPAM 2 MG/ML
2 CONCENTRATE ORAL
Status: DISCONTINUED | OUTPATIENT
Start: 2017-01-01 | End: 2017-01-01 | Stop reason: HOSPADM

## 2017-01-01 RX ORDER — CARVEDILOL 3.12 MG/1
3.12 TABLET ORAL EVERY 12 HOURS SCHEDULED
Status: DISCONTINUED | OUTPATIENT
Start: 2017-01-01 | End: 2017-01-01

## 2017-01-01 RX ORDER — ONDANSETRON 4 MG/1
4 TABLET, ORALLY DISINTEGRATING ORAL EVERY 6 HOURS PRN
Status: DISCONTINUED | OUTPATIENT
Start: 2017-01-01 | End: 2018-01-01 | Stop reason: HOSPADM

## 2017-01-01 RX ORDER — DOCUSATE SODIUM 100 MG/1
100 CAPSULE, LIQUID FILLED ORAL 2 TIMES DAILY
Status: DISCONTINUED | OUTPATIENT
Start: 2017-01-01 | End: 2018-01-01 | Stop reason: HOSPADM

## 2017-01-01 RX ORDER — DOCUSATE SODIUM 100 MG/1
100 CAPSULE, LIQUID FILLED ORAL DAILY
Status: DISCONTINUED | OUTPATIENT
Start: 2017-01-01 | End: 2017-01-01

## 2017-01-01 RX ORDER — HYDROMORPHONE HCL 110MG/55ML
2 PATIENT CONTROLLED ANALGESIA SYRINGE INTRAVENOUS
Status: CANCELLED | OUTPATIENT
Start: 2017-01-01 | End: 2017-01-01

## 2017-01-01 RX ORDER — GLYCOPYRROLATE 0.2 MG/ML
0.2 INJECTION INTRAMUSCULAR; INTRAVENOUS
Status: CANCELLED | OUTPATIENT
Start: 2017-01-01

## 2017-01-01 RX ORDER — LORAZEPAM 1 MG/1
2 TABLET ORAL
Status: CANCELLED | OUTPATIENT
Start: 2017-01-01 | End: 2017-01-01

## 2017-01-01 RX ORDER — GLYCOPYRROLATE 0.2 MG/ML
0.4 INJECTION INTRAMUSCULAR; INTRAVENOUS
Status: DISCONTINUED | OUTPATIENT
Start: 2017-01-01 | End: 2017-01-01 | Stop reason: HOSPADM

## 2017-01-01 RX ORDER — GABAPENTIN 300 MG/1
300 CAPSULE ORAL NIGHTLY
Status: DISCONTINUED | OUTPATIENT
Start: 2017-01-01 | End: 2017-01-01

## 2017-01-01 RX ORDER — OXYCODONE HYDROCHLORIDE AND ACETAMINOPHEN 5; 325 MG/1; MG/1
1 TABLET ORAL EVERY 4 HOURS PRN
Status: CANCELLED | OUTPATIENT
Start: 2017-01-01 | End: 2017-01-01

## 2017-01-01 RX ORDER — GABAPENTIN 300 MG/1
300 CAPSULE ORAL 2 TIMES DAILY PRN
Status: DISCONTINUED | OUTPATIENT
Start: 2017-01-01 | End: 2018-01-01 | Stop reason: HOSPADM

## 2017-01-01 RX ORDER — SODIUM CHLORIDE 0.9 % (FLUSH) 0.9 %
10 SYRINGE (ML) INJECTION AS NEEDED
Status: DISCONTINUED | OUTPATIENT
Start: 2017-01-01 | End: 2017-01-01 | Stop reason: HOSPADM

## 2017-01-01 RX ORDER — FERROUS GLUCONATE 324(37.5)
324 TABLET ORAL DAILY
COMMUNITY
End: 2018-01-01 | Stop reason: HOSPADM

## 2017-01-01 RX ORDER — ACETAMINOPHEN 160 MG/5ML
650 SOLUTION ORAL EVERY 4 HOURS PRN
Status: DISCONTINUED | OUTPATIENT
Start: 2017-01-01 | End: 2017-01-01 | Stop reason: HOSPADM

## 2017-01-01 RX ORDER — GABAPENTIN 300 MG/1
300 CAPSULE ORAL EVERY 12 HOURS SCHEDULED
Status: DISCONTINUED | OUTPATIENT
Start: 2017-01-01 | End: 2017-01-01

## 2017-01-01 RX ORDER — LORAZEPAM 2 MG/ML
2 CONCENTRATE ORAL
Status: ACTIVE | OUTPATIENT
Start: 2017-01-01 | End: 2017-01-01

## 2017-01-01 RX ORDER — LORAZEPAM 2 MG/ML
2 INJECTION INTRAMUSCULAR
Status: CANCELLED | OUTPATIENT
Start: 2017-01-01 | End: 2017-01-01

## 2017-01-01 RX ORDER — PHENAZOPYRIDINE HYDROCHLORIDE 200 MG/1
200 TABLET, FILM COATED ORAL 3 TIMES DAILY PRN
Status: DISCONTINUED | OUTPATIENT
Start: 2017-01-01 | End: 2017-01-01 | Stop reason: HOSPADM

## 2017-01-01 RX ORDER — MIRTAZAPINE 15 MG/1
15 TABLET, FILM COATED ORAL DAILY
Status: DISCONTINUED | OUTPATIENT
Start: 2017-01-01 | End: 2018-01-01 | Stop reason: HOSPADM

## 2017-01-01 RX ORDER — NALOXONE HCL 0.4 MG/ML
0.4 VIAL (ML) INJECTION
Status: DISCONTINUED | OUTPATIENT
Start: 2017-01-01 | End: 2017-01-01

## 2017-01-01 RX ORDER — SENNOSIDES 8.6 MG
2 TABLET ORAL DAILY
Status: DISCONTINUED | OUTPATIENT
Start: 2017-01-01 | End: 2017-01-01

## 2017-01-01 RX ORDER — LORAZEPAM 2 MG/ML
2 INJECTION INTRAMUSCULAR
Status: ACTIVE | OUTPATIENT
Start: 2017-01-01 | End: 2017-01-01

## 2017-01-01 RX ORDER — FENTANYL CITRATE 50 UG/ML
50 INJECTION, SOLUTION INTRAMUSCULAR; INTRAVENOUS
Status: DISCONTINUED | OUTPATIENT
Start: 2017-01-01 | End: 2017-01-01 | Stop reason: HOSPADM

## 2017-01-01 RX ORDER — SODIUM CHLORIDE 0.9 % (FLUSH) 0.9 %
10 SYRINGE (ML) INJECTION AS NEEDED
Status: DISCONTINUED | OUTPATIENT
Start: 2017-01-01 | End: 2018-01-01 | Stop reason: HOSPADM

## 2017-01-01 RX ORDER — ACETAMINOPHEN 650 MG/1
650 SUPPOSITORY RECTAL EVERY 4 HOURS PRN
Status: DISCONTINUED | OUTPATIENT
Start: 2017-01-01 | End: 2018-01-01 | Stop reason: HOSPADM

## 2017-01-01 RX ORDER — LORAZEPAM 2 MG/ML
1 CONCENTRATE ORAL
Status: CANCELLED | OUTPATIENT
Start: 2017-01-01 | End: 2017-01-01

## 2017-01-01 RX ORDER — SODIUM CHLORIDE 0.9 % (FLUSH) 0.9 %
1-10 SYRINGE (ML) INJECTION AS NEEDED
Status: CANCELLED | OUTPATIENT
Start: 2017-01-01

## 2017-01-01 RX ORDER — ACETAMINOPHEN 650 MG/1
650 SUPPOSITORY RECTAL EVERY 4 HOURS PRN
Status: CANCELLED | OUTPATIENT
Start: 2017-01-01

## 2017-01-01 RX ORDER — HYDROMORPHONE HCL 110MG/55ML
1.5 PATIENT CONTROLLED ANALGESIA SYRINGE INTRAVENOUS
Status: DISCONTINUED | OUTPATIENT
Start: 2017-01-01 | End: 2017-01-01 | Stop reason: HOSPADM

## 2017-01-01 RX ORDER — LORAZEPAM 0.5 MG/1
0.5 TABLET ORAL
Status: DISCONTINUED | OUTPATIENT
Start: 2017-01-01 | End: 2017-01-01 | Stop reason: HOSPADM

## 2017-01-01 RX ORDER — SENNOSIDES 8.6 MG
2 TABLET ORAL 2 TIMES DAILY
Status: DISCONTINUED | OUTPATIENT
Start: 2017-01-01 | End: 2018-01-01 | Stop reason: HOSPADM

## 2017-01-01 RX ORDER — HYDROMORPHONE HCL 110MG/55ML
1.5 PATIENT CONTROLLED ANALGESIA SYRINGE INTRAVENOUS
Status: CANCELLED | OUTPATIENT
Start: 2017-01-01 | End: 2017-01-01

## 2017-01-01 RX ORDER — OXYCODONE HYDROCHLORIDE AND ACETAMINOPHEN 5; 325 MG/1; MG/1
1 TABLET ORAL EVERY 4 HOURS PRN
Status: ACTIVE | OUTPATIENT
Start: 2017-01-01 | End: 2017-01-01

## 2017-01-01 RX ORDER — LOPERAMIDE HYDROCHLORIDE 2 MG/1
2 CAPSULE ORAL 4 TIMES DAILY PRN
Status: DISCONTINUED | OUTPATIENT
Start: 2017-01-01 | End: 2017-01-01 | Stop reason: HOSPADM

## 2017-01-01 RX ORDER — BUMETANIDE 2 MG/1
2 TABLET ORAL 2 TIMES DAILY
Status: DISCONTINUED | OUTPATIENT
Start: 2017-01-01 | End: 2017-01-01

## 2017-01-01 RX ORDER — SODIUM CHLORIDE 0.9 % (FLUSH) 0.9 %
1-10 SYRINGE (ML) INJECTION AS NEEDED
Status: DISCONTINUED | OUTPATIENT
Start: 2017-01-01 | End: 2017-01-01 | Stop reason: HOSPADM

## 2017-01-01 RX ORDER — HYDROMORPHONE HCL 110MG/55ML
2 PATIENT CONTROLLED ANALGESIA SYRINGE INTRAVENOUS
Status: ACTIVE | OUTPATIENT
Start: 2017-01-01 | End: 2017-01-01

## 2017-01-01 RX ORDER — ACETAMINOPHEN 325 MG/1
650 TABLET ORAL EVERY 4 HOURS PRN
Status: DISCONTINUED | OUTPATIENT
Start: 2017-01-01 | End: 2018-01-01 | Stop reason: HOSPADM

## 2017-01-01 RX ORDER — LIDOCAINE HYDROCHLORIDE 10 MG/ML
20 INJECTION, SOLUTION INFILTRATION; PERINEURAL ONCE
Status: COMPLETED | OUTPATIENT
Start: 2017-01-01 | End: 2017-01-01

## 2017-01-01 RX ORDER — SENNOSIDES 8.6 MG
2 TABLET ORAL DAILY
COMMUNITY

## 2017-01-01 RX ORDER — HYDROMORPHONE HYDROCHLORIDE 2 MG/1
1 TABLET ORAL EVERY 4 HOURS PRN
COMMUNITY

## 2017-01-01 RX ORDER — HYDROMORPHONE HCL 110MG/55ML
1.5 PATIENT CONTROLLED ANALGESIA SYRINGE INTRAVENOUS
Status: ACTIVE | OUTPATIENT
Start: 2017-01-01 | End: 2017-01-01

## 2017-01-01 RX ORDER — MIRTAZAPINE 15 MG/1
15 TABLET, FILM COATED ORAL NIGHTLY
Status: DISCONTINUED | OUTPATIENT
Start: 2017-01-01 | End: 2017-01-01

## 2017-01-01 RX ORDER — LORAZEPAM 1 MG/1
1 TABLET ORAL
Status: CANCELLED | OUTPATIENT
Start: 2017-01-01 | End: 2017-01-01

## 2017-01-01 RX ORDER — HYDROMORPHONE HCL 110MG/55ML
2 PATIENT CONTROLLED ANALGESIA SYRINGE INTRAVENOUS
Status: DISPENSED | OUTPATIENT
Start: 2017-01-01 | End: 2017-01-01

## 2017-01-01 RX ORDER — LORAZEPAM 2 MG/ML
2 INJECTION INTRAMUSCULAR
Status: DISCONTINUED | OUTPATIENT
Start: 2017-01-01 | End: 2017-01-01 | Stop reason: HOSPADM

## 2017-01-01 RX ORDER — VENLAFAXINE HYDROCHLORIDE 75 MG/1
75 CAPSULE, EXTENDED RELEASE ORAL DAILY
Status: DISCONTINUED | OUTPATIENT
Start: 2017-01-01 | End: 2018-01-01 | Stop reason: HOSPADM

## 2017-01-01 RX ORDER — LORAZEPAM 1 MG/1
1 TABLET ORAL
Status: DISCONTINUED | OUTPATIENT
Start: 2017-01-01 | End: 2017-01-01 | Stop reason: HOSPADM

## 2017-01-01 RX ORDER — POTASSIUM CHLORIDE 750 MG/1
10 CAPSULE, EXTENDED RELEASE ORAL DAILY
Status: DISCONTINUED | OUTPATIENT
Start: 2017-01-01 | End: 2017-01-01 | Stop reason: HOSPADM

## 2017-01-01 RX ORDER — GLYCOPYRROLATE 0.2 MG/ML
0.2 INJECTION INTRAMUSCULAR; INTRAVENOUS
Status: DISCONTINUED | OUTPATIENT
Start: 2017-01-01 | End: 2017-01-01 | Stop reason: HOSPADM

## 2017-01-01 RX ORDER — LORAZEPAM 2 MG/ML
0.5 INJECTION INTRAMUSCULAR
Status: CANCELLED | OUTPATIENT
Start: 2017-01-01 | End: 2017-01-01

## 2017-01-01 RX ORDER — LORAZEPAM 2 MG/ML
1 INJECTION INTRAMUSCULAR
Status: DISCONTINUED | OUTPATIENT
Start: 2017-01-01 | End: 2017-01-01 | Stop reason: HOSPADM

## 2017-01-01 RX ORDER — L.ACID,PARA/B.BIFIDUM/S.THERM 8B CELL
1 CAPSULE ORAL DAILY
Status: DISCONTINUED | OUTPATIENT
Start: 2017-01-01 | End: 2018-01-01 | Stop reason: HOSPADM

## 2017-01-01 RX ORDER — CARVEDILOL 6.25 MG/1
6.25 TABLET ORAL EVERY 12 HOURS SCHEDULED
Status: DISCONTINUED | OUTPATIENT
Start: 2017-01-01 | End: 2017-01-01 | Stop reason: HOSPADM

## 2017-01-01 RX ORDER — ACETAMINOPHEN 650 MG/1
650 SUPPOSITORY RECTAL EVERY 4 HOURS PRN
Status: DISCONTINUED | OUTPATIENT
Start: 2017-01-01 | End: 2017-01-01 | Stop reason: HOSPADM

## 2017-01-01 RX ORDER — GLYCOPYRROLATE 0.2 MG/ML
0.4 INJECTION INTRAMUSCULAR; INTRAVENOUS
Status: CANCELLED | OUTPATIENT
Start: 2017-01-01

## 2017-01-01 RX ORDER — LORAZEPAM 2 MG/ML
1 CONCENTRATE ORAL
Status: DISCONTINUED | OUTPATIENT
Start: 2017-01-01 | End: 2017-01-01 | Stop reason: HOSPADM

## 2017-01-01 RX ORDER — FENTANYL CITRATE 50 UG/ML
25 INJECTION, SOLUTION INTRAMUSCULAR; INTRAVENOUS
Status: DISCONTINUED | OUTPATIENT
Start: 2017-01-01 | End: 2017-01-01 | Stop reason: HOSPADM

## 2017-01-01 RX ORDER — DOCUSATE SODIUM 100 MG/1
100 CAPSULE, LIQUID FILLED ORAL DAILY
COMMUNITY

## 2017-01-01 RX ORDER — OXYCODONE HYDROCHLORIDE AND ACETAMINOPHEN 5; 325 MG/1; MG/1
1-2 TABLET ORAL EVERY 6 HOURS PRN
Qty: 4 TABLET | Refills: 0 | Status: SHIPPED | OUTPATIENT
Start: 2017-01-01 | End: 2017-01-01 | Stop reason: ALTCHOICE

## 2017-01-01 RX ORDER — LORAZEPAM 2 MG/ML
1 CONCENTRATE ORAL
Status: ACTIVE | OUTPATIENT
Start: 2017-01-01 | End: 2017-01-01

## 2017-01-01 RX ORDER — BUMETANIDE 1 MG/1
1 TABLET ORAL 2 TIMES DAILY
Status: DISCONTINUED | OUTPATIENT
Start: 2017-01-01 | End: 2017-01-01 | Stop reason: HOSPADM

## 2017-01-01 RX ORDER — FLUCONAZOLE 100 MG/1
100 TABLET ORAL DAILY
Status: DISCONTINUED | OUTPATIENT
Start: 2017-01-01 | End: 2017-01-01 | Stop reason: HOSPADM

## 2017-01-01 RX ORDER — SODIUM CHLORIDE 9 MG/ML
125 INJECTION, SOLUTION INTRAVENOUS CONTINUOUS
Status: DISCONTINUED | OUTPATIENT
Start: 2017-01-01 | End: 2017-01-01

## 2017-01-01 RX ORDER — ONDANSETRON 2 MG/ML
4 INJECTION INTRAMUSCULAR; INTRAVENOUS EVERY 6 HOURS PRN
Status: CANCELLED | OUTPATIENT
Start: 2017-01-01

## 2017-01-01 RX ORDER — SODIUM CHLORIDE 0.9 % (FLUSH) 0.9 %
1-10 SYRINGE (ML) INJECTION AS NEEDED
Status: DISCONTINUED | OUTPATIENT
Start: 2017-01-01 | End: 2018-01-01

## 2017-01-01 RX ORDER — PANTOPRAZOLE SODIUM 40 MG/1
40 TABLET, DELAYED RELEASE ORAL DAILY
COMMUNITY

## 2017-01-01 RX ORDER — LORAZEPAM 2 MG/ML
1 INJECTION INTRAMUSCULAR
Status: ACTIVE | OUTPATIENT
Start: 2017-01-01 | End: 2017-01-01

## 2017-01-01 RX ORDER — PHENAZOPYRIDINE HYDROCHLORIDE 200 MG/1
200 TABLET, FILM COATED ORAL 3 TIMES DAILY PRN
Qty: 30 TABLET | Refills: 0 | Status: SHIPPED | OUTPATIENT
Start: 2017-01-01 | End: 2017-01-01

## 2017-01-01 RX ORDER — BISACODYL 10 MG
10 SUPPOSITORY, RECTAL RECTAL DAILY
Status: DISCONTINUED | OUTPATIENT
Start: 2017-01-01 | End: 2017-01-01 | Stop reason: HOSPADM

## 2017-01-01 RX ORDER — LOPERAMIDE HYDROCHLORIDE 2 MG/1
2 CAPSULE ORAL 4 TIMES DAILY PRN
Status: CANCELLED | OUTPATIENT
Start: 2017-01-01

## 2017-01-01 RX ORDER — DEXTROSE MONOHYDRATE 25 G/50ML
25 INJECTION, SOLUTION INTRAVENOUS
Status: DISCONTINUED | OUTPATIENT
Start: 2017-01-01 | End: 2018-01-01 | Stop reason: HOSPADM

## 2017-01-01 RX ORDER — DIPHENOXYLATE HYDROCHLORIDE AND ATROPINE SULFATE 2.5; .025 MG/1; MG/1
1 TABLET ORAL
Status: CANCELLED | OUTPATIENT
Start: 2017-01-01

## 2017-01-01 RX ORDER — BUMETANIDE 0.25 MG/ML
1 INJECTION INTRAMUSCULAR; INTRAVENOUS EVERY 12 HOURS
Status: DISCONTINUED | OUTPATIENT
Start: 2017-01-01 | End: 2018-01-01

## 2017-01-01 RX ORDER — LORAZEPAM 2 MG/ML
0.5 CONCENTRATE ORAL
Status: ACTIVE | OUTPATIENT
Start: 2017-01-01 | End: 2017-01-01

## 2017-01-01 RX ORDER — BISACODYL 10 MG
10 SUPPOSITORY, RECTAL RECTAL DAILY
Qty: 10 SUPPOSITORY | Refills: 0 | Status: SHIPPED | OUTPATIENT
Start: 2017-01-01 | End: 2017-01-01 | Stop reason: ALTCHOICE

## 2017-01-01 RX ORDER — ACETAMINOPHEN 325 MG/1
650 TABLET ORAL EVERY 4 HOURS PRN
Status: DISCONTINUED | OUTPATIENT
Start: 2017-01-01 | End: 2017-01-01 | Stop reason: HOSPADM

## 2017-01-01 RX ORDER — LORAZEPAM 2 MG/ML
0.5 CONCENTRATE ORAL
Status: CANCELLED | OUTPATIENT
Start: 2017-01-01 | End: 2017-01-01

## 2017-01-01 RX ORDER — NICOTINE POLACRILEX 4 MG
15 LOZENGE BUCCAL
Status: DISCONTINUED | OUTPATIENT
Start: 2017-01-01 | End: 2018-01-01 | Stop reason: HOSPADM

## 2017-01-01 RX ORDER — ACETAMINOPHEN 500 MG
500-1000 TABLET ORAL EVERY 6 HOURS PRN
COMMUNITY

## 2017-01-01 RX ORDER — SODIUM CHLORIDE 0.9 % (FLUSH) 0.9 %
10 SYRINGE (ML) INJECTION AS NEEDED
Status: CANCELLED | OUTPATIENT
Start: 2017-01-01

## 2017-01-01 RX ORDER — LORAZEPAM 1 MG/1
2 TABLET ORAL
Status: ACTIVE | OUTPATIENT
Start: 2017-01-01 | End: 2017-01-01

## 2017-01-01 RX ORDER — HYDROMORPHONE HCL 110MG/55ML
2 PATIENT CONTROLLED ANALGESIA SYRINGE INTRAVENOUS
Status: DISCONTINUED | OUTPATIENT
Start: 2017-01-01 | End: 2017-01-01 | Stop reason: HOSPADM

## 2017-01-01 RX ORDER — BUMETANIDE 1 MG/1
1 TABLET ORAL 2 TIMES DAILY
Qty: 60 TABLET | Refills: 0 | Status: SHIPPED | OUTPATIENT
Start: 2017-01-01 | End: 2017-01-01 | Stop reason: ALTCHOICE

## 2017-01-01 RX ORDER — ONDANSETRON 4 MG/1
4 TABLET, ORALLY DISINTEGRATING ORAL EVERY 6 HOURS PRN
Status: DISCONTINUED | OUTPATIENT
Start: 2017-01-01 | End: 2017-01-01 | Stop reason: HOSPADM

## 2017-01-01 RX ORDER — ONDANSETRON 4 MG/1
4 TABLET, FILM COATED ORAL EVERY 6 HOURS PRN
Status: CANCELLED | OUTPATIENT
Start: 2017-01-01

## 2017-01-01 RX ORDER — ONDANSETRON 2 MG/ML
4 INJECTION INTRAMUSCULAR; INTRAVENOUS EVERY 6 HOURS PRN
Status: DISCONTINUED | OUTPATIENT
Start: 2017-01-01 | End: 2018-01-01 | Stop reason: HOSPADM

## 2017-01-01 RX ORDER — VENLAFAXINE HYDROCHLORIDE 75 MG/1
75 CAPSULE, EXTENDED RELEASE ORAL NIGHTLY
Status: DISCONTINUED | OUTPATIENT
Start: 2017-01-01 | End: 2017-01-01 | Stop reason: HOSPADM

## 2017-01-01 RX ORDER — LORAZEPAM 0.5 MG/1
0.5 TABLET ORAL
Status: ACTIVE | OUTPATIENT
Start: 2017-01-01 | End: 2017-01-01

## 2017-01-01 RX ORDER — PANTOPRAZOLE SODIUM 40 MG/1
40 TABLET, DELAYED RELEASE ORAL EVERY MORNING
Status: DISCONTINUED | OUTPATIENT
Start: 2017-01-01 | End: 2018-01-01 | Stop reason: HOSPADM

## 2017-01-01 RX ORDER — LORAZEPAM 1 MG/1
2 TABLET ORAL
Status: DISCONTINUED | OUTPATIENT
Start: 2017-01-01 | End: 2017-01-01 | Stop reason: HOSPADM

## 2017-01-01 RX ORDER — LORAZEPAM 2 MG/ML
0.5 CONCENTRATE ORAL
Status: DISCONTINUED | OUTPATIENT
Start: 2017-01-01 | End: 2017-01-01 | Stop reason: HOSPADM

## 2017-01-01 RX ORDER — SODIUM CHLORIDE 0.9 % (FLUSH) 0.9 %
1-10 SYRINGE (ML) INJECTION AS NEEDED
Status: DISCONTINUED | OUTPATIENT
Start: 2017-01-01 | End: 2018-01-01 | Stop reason: HOSPADM

## 2017-01-01 RX ORDER — ONDANSETRON 4 MG/1
4 TABLET, ORALLY DISINTEGRATING ORAL EVERY 6 HOURS PRN
Status: CANCELLED | OUTPATIENT
Start: 2017-01-01

## 2017-01-01 RX ORDER — ACETAMINOPHEN 325 MG/1
650 TABLET ORAL EVERY 4 HOURS PRN
Status: CANCELLED | OUTPATIENT
Start: 2017-01-01

## 2017-01-01 RX ORDER — HYDROMORPHONE HYDROCHLORIDE 2 MG/1
1 TABLET ORAL EVERY 4 HOURS PRN
Status: DISCONTINUED | OUTPATIENT
Start: 2017-01-01 | End: 2018-01-01

## 2017-01-01 RX ORDER — ONDANSETRON 2 MG/ML
4 INJECTION INTRAMUSCULAR; INTRAVENOUS EVERY 6 HOURS PRN
Status: DISCONTINUED | OUTPATIENT
Start: 2017-01-01 | End: 2017-01-01 | Stop reason: HOSPADM

## 2017-01-01 RX ORDER — LORAZEPAM 2 MG/ML
0.5 INJECTION INTRAMUSCULAR
Status: ACTIVE | OUTPATIENT
Start: 2017-01-01 | End: 2017-01-01

## 2017-01-01 RX ORDER — ACETAMINOPHEN 160 MG/5ML
650 SOLUTION ORAL EVERY 4 HOURS PRN
Status: CANCELLED | OUTPATIENT
Start: 2017-01-01

## 2017-01-01 RX ORDER — ACETAMINOPHEN 160 MG/5ML
650 SOLUTION ORAL EVERY 4 HOURS PRN
Status: DISCONTINUED | OUTPATIENT
Start: 2017-01-01 | End: 2018-01-01 | Stop reason: HOSPADM

## 2017-01-01 RX ORDER — VENLAFAXINE HYDROCHLORIDE 75 MG/1
75 CAPSULE, EXTENDED RELEASE ORAL DAILY
Status: ON HOLD | COMMUNITY
End: 2018-01-01

## 2017-01-01 RX ORDER — LORAZEPAM 1 MG/1
1 TABLET ORAL
Status: ACTIVE | OUTPATIENT
Start: 2017-01-01 | End: 2017-01-01

## 2017-01-01 RX ORDER — LORAZEPAM 0.5 MG/1
0.5 TABLET ORAL
Status: CANCELLED | OUTPATIENT
Start: 2017-01-01 | End: 2017-01-01

## 2017-01-01 RX ADMIN — ACETAMINOPHEN 650 MG: 325 TABLET, FILM COATED ORAL at 00:59

## 2017-01-01 RX ADMIN — VANCOMYCIN HYDROCHLORIDE 750 MG: 100 INJECTION, POWDER, LYOPHILIZED, FOR SOLUTION INTRAVENOUS at 09:24

## 2017-01-01 RX ADMIN — LIDOCAINE HYDROCHLORIDE 17 ML: 10 INJECTION, SOLUTION INFILTRATION; PERINEURAL at 09:50

## 2017-01-01 RX ADMIN — TAZOBACTAM SODIUM AND PIPERACILLIN SODIUM 3.38 G: 375; 3 INJECTION, SOLUTION INTRAVENOUS at 20:03

## 2017-01-01 RX ADMIN — HYDROMORPHONE HYDROCHLORIDE 2 MG: 2 INJECTION, SOLUTION INTRAMUSCULAR; INTRAVENOUS; SUBCUTANEOUS at 12:35

## 2017-01-01 RX ADMIN — HYDROMORPHONE HYDROCHLORIDE 1 MG: 1 INJECTION, SOLUTION INTRAMUSCULAR; INTRAVENOUS; SUBCUTANEOUS at 10:02

## 2017-01-01 RX ADMIN — GLYCOPYRROLATE 0.4 MG: 0.2 INJECTION, SOLUTION INTRAMUSCULAR; INTRAVENOUS at 15:29

## 2017-01-01 RX ADMIN — CARVEDILOL 3.12 MG: 3.12 TABLET, FILM COATED ORAL at 08:31

## 2017-01-01 RX ADMIN — VENLAFAXINE HYDROCHLORIDE 75 MG: 75 CAPSULE, EXTENDED RELEASE ORAL at 22:31

## 2017-01-01 RX ADMIN — VANCOMYCIN HYDROCHLORIDE 750 MG: 100 INJECTION, POWDER, LYOPHILIZED, FOR SOLUTION INTRAVENOUS at 21:58

## 2017-01-01 RX ADMIN — DOCUSATE SODIUM 100 MG: 100 CAPSULE, LIQUID FILLED ORAL at 09:00

## 2017-01-01 RX ADMIN — VENLAFAXINE HYDROCHLORIDE 75 MG: 75 CAPSULE, EXTENDED RELEASE ORAL at 21:37

## 2017-01-01 RX ADMIN — MICONAZOLE NITRATE: 2 POWDER TOPICAL at 09:14

## 2017-01-01 RX ADMIN — FLUCONAZOLE 150 MG: 150 TABLET ORAL at 22:32

## 2017-01-01 RX ADMIN — BUMETANIDE 1 MG: 0.25 INJECTION INTRAMUSCULAR; INTRAVENOUS at 13:02

## 2017-01-01 RX ADMIN — CARVEDILOL 3.12 MG: 3.12 TABLET, FILM COATED ORAL at 21:01

## 2017-01-01 RX ADMIN — GLYCOPYRROLATE 0.4 MG: 0.2 INJECTION, SOLUTION INTRAMUSCULAR; INTRAVENOUS at 10:46

## 2017-01-01 RX ADMIN — GLYCOPYRROLATE 0.4 MG: 0.2 INJECTION, SOLUTION INTRAMUSCULAR; INTRAVENOUS at 16:14

## 2017-01-01 RX ADMIN — BUMETANIDE 1 MG: 2 TABLET ORAL at 10:06

## 2017-01-01 RX ADMIN — GLYCOPYRROLATE 0.4 MG: 0.2 INJECTION, SOLUTION INTRAMUSCULAR; INTRAVENOUS at 21:47

## 2017-01-01 RX ADMIN — VENLAFAXINE HYDROCHLORIDE 75 MG: 75 CAPSULE, EXTENDED RELEASE ORAL at 20:19

## 2017-01-01 RX ADMIN — HYDROMORPHONE HYDROCHLORIDE 2 MG: 2 INJECTION, SOLUTION INTRAMUSCULAR; INTRAVENOUS; SUBCUTANEOUS at 14:58

## 2017-01-01 RX ADMIN — HYDROMORPHONE HYDROCHLORIDE 1 MG: 1 INJECTION, SOLUTION INTRAMUSCULAR; INTRAVENOUS; SUBCUTANEOUS at 18:35

## 2017-01-01 RX ADMIN — MICONAZOLE NITRATE: 2 POWDER TOPICAL at 09:26

## 2017-01-01 RX ADMIN — MICONAZOLE NITRATE: 2 POWDER TOPICAL at 11:20

## 2017-01-01 RX ADMIN — INSULIN ASPART 2 UNITS: 100 INJECTION, SOLUTION INTRAVENOUS; SUBCUTANEOUS at 18:36

## 2017-01-01 RX ADMIN — GABAPENTIN 300 MG: 300 CAPSULE ORAL at 00:21

## 2017-01-01 RX ADMIN — HYDROMORPHONE HYDROCHLORIDE 1 MG: 1 INJECTION, SOLUTION INTRAMUSCULAR; INTRAVENOUS; SUBCUTANEOUS at 13:03

## 2017-01-01 RX ADMIN — POTASSIUM CHLORIDE 10 MEQ: 750 CAPSULE, EXTENDED RELEASE ORAL at 10:06

## 2017-01-01 RX ADMIN — DOCUSATE SODIUM 100 MG: 100 CAPSULE, LIQUID FILLED ORAL at 09:05

## 2017-01-01 RX ADMIN — GABAPENTIN 300 MG: 300 CAPSULE ORAL at 11:18

## 2017-01-01 RX ADMIN — MICONAZOLE NITRATE: 2 POWDER TOPICAL at 21:30

## 2017-01-01 RX ADMIN — HYDROMORPHONE HYDROCHLORIDE 2 MG: 2 INJECTION, SOLUTION INTRAMUSCULAR; INTRAVENOUS; SUBCUTANEOUS at 12:29

## 2017-01-01 RX ADMIN — GABAPENTIN 300 MG: 300 CAPSULE ORAL at 21:37

## 2017-01-01 RX ADMIN — PANTOPRAZOLE SODIUM 40 MG: 40 TABLET, DELAYED RELEASE ORAL at 06:36

## 2017-01-01 RX ADMIN — INSULIN DETEMIR 15 UNITS: 100 INJECTION, SOLUTION SUBCUTANEOUS at 21:51

## 2017-01-01 RX ADMIN — Medication 10 ML: at 09:25

## 2017-01-01 RX ADMIN — HYDROMORPHONE HYDROCHLORIDE 1 MG: 1 INJECTION, SOLUTION INTRAMUSCULAR; INTRAVENOUS; SUBCUTANEOUS at 02:21

## 2017-01-01 RX ADMIN — MICONAZOLE NITRATE: 2 POWDER TOPICAL at 17:46

## 2017-01-01 RX ADMIN — CASTOR OIL AND BALSAM, PERU: 788; 87 OINTMENT TOPICAL at 09:12

## 2017-01-01 RX ADMIN — TAMSULOSIN HYDROCHLORIDE 0.4 MG: 0.4 CAPSULE ORAL at 09:09

## 2017-01-01 RX ADMIN — PANTOPRAZOLE SODIUM 40 MG: 40 TABLET, DELAYED RELEASE ORAL at 06:00

## 2017-01-01 RX ADMIN — VENLAFAXINE HYDROCHLORIDE 75 MG: 75 CAPSULE, EXTENDED RELEASE ORAL at 20:35

## 2017-01-01 RX ADMIN — INSULIN ASPART 3 UNITS: 100 INJECTION, SOLUTION INTRAVENOUS; SUBCUTANEOUS at 21:52

## 2017-01-01 RX ADMIN — INSULIN DETEMIR 15 UNITS: 100 INJECTION, SOLUTION SUBCUTANEOUS at 09:06

## 2017-01-01 RX ADMIN — FLUCONAZOLE 100 MG: 100 TABLET ORAL at 21:18

## 2017-01-01 RX ADMIN — BUMETANIDE 2 MG: 2 TABLET ORAL at 14:29

## 2017-01-01 RX ADMIN — GLYCOPYRROLATE 0.4 MG: 0.2 INJECTION, SOLUTION INTRAMUSCULAR; INTRAVENOUS at 17:35

## 2017-01-01 RX ADMIN — MICONAZOLE NITRATE: 2 POWDER TOPICAL at 10:37

## 2017-01-01 RX ADMIN — VANCOMYCIN HYDROCHLORIDE 750 MG: 100 INJECTION, POWDER, LYOPHILIZED, FOR SOLUTION INTRAVENOUS at 21:36

## 2017-01-01 RX ADMIN — CASTOR OIL AND BALSAM, PERU: 788; 87 OINTMENT TOPICAL at 17:46

## 2017-01-01 RX ADMIN — HYDROMORPHONE HYDROCHLORIDE 1 MG: 2 TABLET ORAL at 16:48

## 2017-01-01 RX ADMIN — CASTOR OIL AND BALSAM, PERU: 788; 87 OINTMENT TOPICAL at 09:14

## 2017-01-01 RX ADMIN — INSULIN ASPART 15 UNITS: 100 INJECTION, SOLUTION INTRAVENOUS; SUBCUTANEOUS at 07:14

## 2017-01-01 RX ADMIN — INSULIN DETEMIR 15 UNITS: 100 INJECTION, SOLUTION SUBCUTANEOUS at 08:25

## 2017-01-01 RX ADMIN — BUMETANIDE 2 MG: 2 TABLET ORAL at 11:22

## 2017-01-01 RX ADMIN — FLUCONAZOLE 150 MG: 150 TABLET ORAL at 20:32

## 2017-01-01 RX ADMIN — MIRTAZAPINE 15 MG: 15 TABLET, FILM COATED ORAL at 08:28

## 2017-01-01 RX ADMIN — PANTOPRAZOLE SODIUM 40 MG: 40 TABLET, DELAYED RELEASE ORAL at 06:24

## 2017-01-01 RX ADMIN — INSULIN ASPART 14 UNITS: 100 INJECTION, SOLUTION INTRAVENOUS; SUBCUTANEOUS at 12:51

## 2017-01-01 RX ADMIN — INSULIN DETEMIR 15 UNITS: 100 INJECTION, SOLUTION SUBCUTANEOUS at 09:01

## 2017-01-01 RX ADMIN — INSULIN ASPART 2 UNITS: 100 INJECTION, SOLUTION INTRAVENOUS; SUBCUTANEOUS at 09:05

## 2017-01-01 RX ADMIN — HUMAN INSULIN 10 UNITS: 100 INJECTION, SOLUTION SUBCUTANEOUS at 20:50

## 2017-01-01 RX ADMIN — CASTOR OIL AND BALSAM, PERU: 788; 87 OINTMENT TOPICAL at 09:21

## 2017-01-01 RX ADMIN — FLUCONAZOLE 150 MG: 150 TABLET ORAL at 21:37

## 2017-01-01 RX ADMIN — MICONAZOLE NITRATE: 2 POWDER TOPICAL at 21:37

## 2017-01-01 RX ADMIN — GLYCOPYRROLATE 0.4 MG: 0.2 INJECTION, SOLUTION INTRAMUSCULAR; INTRAVENOUS at 20:32

## 2017-01-01 RX ADMIN — HYDROMORPHONE HYDROCHLORIDE 2 MG: 2 INJECTION, SOLUTION INTRAMUSCULAR; INTRAVENOUS; SUBCUTANEOUS at 01:05

## 2017-01-01 RX ADMIN — HYDROMORPHONE HYDROCHLORIDE 1 MG: 1 INJECTION, SOLUTION INTRAMUSCULAR; INTRAVENOUS; SUBCUTANEOUS at 15:29

## 2017-01-01 RX ADMIN — MAGNESIUM HYDROXIDE 10 ML: 2400 SUSPENSION ORAL at 06:40

## 2017-01-01 RX ADMIN — TAZOBACTAM SODIUM AND PIPERACILLIN SODIUM 4.5 G: 500; 4 INJECTION, SOLUTION INTRAVENOUS at 20:54

## 2017-01-01 RX ADMIN — HYDROMORPHONE HYDROCHLORIDE 1 MG: 1 INJECTION, SOLUTION INTRAMUSCULAR; INTRAVENOUS; SUBCUTANEOUS at 05:01

## 2017-01-01 RX ADMIN — HYDROMORPHONE HYDROCHLORIDE 2 MG: 2 INJECTION, SOLUTION INTRAMUSCULAR; INTRAVENOUS; SUBCUTANEOUS at 03:02

## 2017-01-01 RX ADMIN — BISACODYL 10 MG: 10 SUPPOSITORY RECTAL at 17:57

## 2017-01-01 RX ADMIN — GLYCOPYRROLATE 0.4 MG: 0.2 INJECTION, SOLUTION INTRAMUSCULAR; INTRAVENOUS at 10:37

## 2017-01-01 RX ADMIN — FLUCONAZOLE 100 MG: 100 TABLET ORAL at 10:06

## 2017-01-01 RX ADMIN — MICONAZOLE NITRATE: 2 POWDER TOPICAL at 20:53

## 2017-01-01 RX ADMIN — VENLAFAXINE HYDROCHLORIDE 75 MG: 75 CAPSULE, EXTENDED RELEASE ORAL at 22:52

## 2017-01-01 RX ADMIN — POTASSIUM CHLORIDE 10 MEQ: 750 CAPSULE, EXTENDED RELEASE ORAL at 11:21

## 2017-01-01 RX ADMIN — MAGNESIUM HYDROXIDE 10 ML: 2400 SUSPENSION ORAL at 10:08

## 2017-01-01 RX ADMIN — GLYCOPYRROLATE 0.2 MG: 0.2 INJECTION, SOLUTION INTRAMUSCULAR; INTRAVENOUS at 09:11

## 2017-01-01 RX ADMIN — CASTOR OIL AND BALSAM, PERU: 788; 87 OINTMENT TOPICAL at 08:04

## 2017-01-01 RX ADMIN — MAGNESIUM HYDROXIDE 10 ML: 2400 SUSPENSION ORAL at 11:24

## 2017-01-01 RX ADMIN — BUMETANIDE 1 MG: 0.25 INJECTION INTRAMUSCULAR; INTRAVENOUS at 23:28

## 2017-01-01 RX ADMIN — CASTOR OIL AND BALSAM, PERU: 788; 87 OINTMENT TOPICAL at 21:34

## 2017-01-01 RX ADMIN — CASTOR OIL AND BALSAM, PERU: 788; 87 OINTMENT TOPICAL at 12:51

## 2017-01-01 RX ADMIN — VANCOMYCIN HYDROCHLORIDE 1500 MG: 10 INJECTION, POWDER, LYOPHILIZED, FOR SOLUTION INTRAVENOUS at 01:10

## 2017-01-01 RX ADMIN — GLYCOPYRROLATE 0.4 MG: 0.2 INJECTION, SOLUTION INTRAMUSCULAR; INTRAVENOUS at 10:03

## 2017-01-01 RX ADMIN — CARVEDILOL 6.25 MG: 6.25 TABLET, FILM COATED ORAL at 21:18

## 2017-01-01 RX ADMIN — SENNOSIDES 17.2 MG: 8.6 TABLET, FILM COATED ORAL at 22:34

## 2017-01-01 RX ADMIN — HYDROMORPHONE HYDROCHLORIDE 1 MG: 2 TABLET ORAL at 09:09

## 2017-01-01 RX ADMIN — INSULIN DETEMIR 15 UNITS: 100 INJECTION, SOLUTION SUBCUTANEOUS at 20:59

## 2017-01-01 RX ADMIN — HYDROMORPHONE HYDROCHLORIDE 2 MG: 2 INJECTION, SOLUTION INTRAMUSCULAR; INTRAVENOUS; SUBCUTANEOUS at 23:01

## 2017-01-01 RX ADMIN — VANCOMYCIN HYDROCHLORIDE 1500 MG: 10 INJECTION, POWDER, LYOPHILIZED, FOR SOLUTION INTRAVENOUS at 13:36

## 2017-01-01 RX ADMIN — CASTOR OIL AND BALSAM, PERU: 788; 87 OINTMENT TOPICAL at 20:53

## 2017-01-01 RX ADMIN — GLYCOPYRROLATE 0.4 MG: 0.2 INJECTION, SOLUTION INTRAMUSCULAR; INTRAVENOUS at 17:56

## 2017-01-01 RX ADMIN — HYDROMORPHONE HYDROCHLORIDE 1 MG: 1 INJECTION, SOLUTION INTRAMUSCULAR; INTRAVENOUS; SUBCUTANEOUS at 20:57

## 2017-01-01 RX ADMIN — DOCUSATE SODIUM 100 MG: 100 CAPSULE, LIQUID FILLED ORAL at 09:25

## 2017-01-01 RX ADMIN — GLYCOPYRROLATE 0.2 MG: 0.2 INJECTION, SOLUTION INTRAMUSCULAR; INTRAVENOUS at 00:34

## 2017-01-01 RX ADMIN — FLUCONAZOLE 150 MG: 150 TABLET ORAL at 22:57

## 2017-01-01 RX ADMIN — HYDROMORPHONE HYDROCHLORIDE 1 MG: 1 INJECTION, SOLUTION INTRAMUSCULAR; INTRAVENOUS; SUBCUTANEOUS at 06:13

## 2017-01-01 RX ADMIN — VENLAFAXINE HYDROCHLORIDE 75 MG: 75 CAPSULE, EXTENDED RELEASE ORAL at 22:37

## 2017-01-01 RX ADMIN — HYDROMORPHONE HYDROCHLORIDE 1 MG: 2 TABLET ORAL at 00:21

## 2017-01-01 RX ADMIN — MICONAZOLE NITRATE: 2 POWDER TOPICAL at 21:34

## 2017-01-01 RX ADMIN — HYDROMORPHONE HYDROCHLORIDE 2 MG: 2 INJECTION, SOLUTION INTRAMUSCULAR; INTRAVENOUS; SUBCUTANEOUS at 21:08

## 2017-01-01 RX ADMIN — MICONAZOLE NITRATE: 2 POWDER TOPICAL at 08:04

## 2017-01-01 RX ADMIN — HYDROMORPHONE HYDROCHLORIDE 1 MG: 2 TABLET ORAL at 01:21

## 2017-01-01 RX ADMIN — MICONAZOLE NITRATE: 2 POWDER TOPICAL at 22:20

## 2017-01-01 RX ADMIN — INSULIN ASPART 2 UNITS: 100 INJECTION, SOLUTION INTRAVENOUS; SUBCUTANEOUS at 09:09

## 2017-01-01 RX ADMIN — GLYCOPYRROLATE 0.4 MG: 0.2 INJECTION, SOLUTION INTRAMUSCULAR; INTRAVENOUS at 12:35

## 2017-01-01 RX ADMIN — CASTOR OIL AND BALSAM, PERU: 788; 87 OINTMENT TOPICAL at 09:26

## 2017-01-01 RX ADMIN — HYDROMORPHONE HYDROCHLORIDE 1 MG: 1 INJECTION, SOLUTION INTRAMUSCULAR; INTRAVENOUS; SUBCUTANEOUS at 03:02

## 2017-01-01 RX ADMIN — BUMETANIDE 1 MG: 0.25 INJECTION INTRAMUSCULAR; INTRAVENOUS at 23:42

## 2017-01-01 RX ADMIN — GLYCOPYRROLATE 0.2 MG: 0.2 INJECTION, SOLUTION INTRAMUSCULAR; INTRAVENOUS at 12:28

## 2017-01-01 RX ADMIN — ONDANSETRON 4 MG: 2 INJECTION INTRAMUSCULAR; INTRAVENOUS at 05:38

## 2017-01-01 RX ADMIN — VENLAFAXINE HYDROCHLORIDE 75 MG: 75 CAPSULE, EXTENDED RELEASE ORAL at 22:57

## 2017-01-01 RX ADMIN — HYDROMORPHONE HYDROCHLORIDE 1 MG: 1 INJECTION, SOLUTION INTRAMUSCULAR; INTRAVENOUS; SUBCUTANEOUS at 16:14

## 2017-01-01 RX ADMIN — GLYCOPYRROLATE 0.4 MG: 0.2 INJECTION, SOLUTION INTRAMUSCULAR; INTRAVENOUS at 03:14

## 2017-01-01 RX ADMIN — SENNA 17.2 MG: 8.6 TABLET, COATED ORAL at 09:25

## 2017-01-01 RX ADMIN — GLYCOPYRROLATE 0.2 MG: 0.2 INJECTION, SOLUTION INTRAMUSCULAR; INTRAVENOUS at 15:59

## 2017-01-01 RX ADMIN — HYDROMORPHONE HYDROCHLORIDE 2 MG: 2 INJECTION, SOLUTION INTRAMUSCULAR; INTRAVENOUS; SUBCUTANEOUS at 09:36

## 2017-01-01 RX ADMIN — CASTOR OIL AND BALSAM, PERU: 788; 87 OINTMENT TOPICAL at 21:28

## 2017-01-01 RX ADMIN — OXYCODONE HYDROCHLORIDE AND ACETAMINOPHEN 1 TABLET: 5; 325 TABLET ORAL at 10:44

## 2017-01-01 RX ADMIN — Medication 1 CAPSULE: at 20:45

## 2017-01-01 RX ADMIN — HYDROMORPHONE HYDROCHLORIDE 1 MG: 2 TABLET ORAL at 23:42

## 2017-01-01 RX ADMIN — GLYCOPYRROLATE 0.4 MG: 0.2 INJECTION, SOLUTION INTRAMUSCULAR; INTRAVENOUS at 13:04

## 2017-01-01 RX ADMIN — TAZOBACTAM SODIUM AND PIPERACILLIN SODIUM 3.38 G: 375; 3 INJECTION, SOLUTION INTRAVENOUS at 12:27

## 2017-01-01 RX ADMIN — HYDROMORPHONE HYDROCHLORIDE 1 MG: 1 INJECTION, SOLUTION INTRAMUSCULAR; INTRAVENOUS; SUBCUTANEOUS at 10:10

## 2017-01-01 RX ADMIN — Medication 1 CAPSULE: at 09:05

## 2017-01-01 RX ADMIN — SENNA 17.2 MG: 8.6 TABLET, COATED ORAL at 09:05

## 2017-01-01 RX ADMIN — POTASSIUM CHLORIDE 10 MEQ: 750 CAPSULE, EXTENDED RELEASE ORAL at 13:03

## 2017-01-01 RX ADMIN — TAZOBACTAM SODIUM AND PIPERACILLIN SODIUM 3.38 G: 375; 3 INJECTION, SOLUTION INTRAVENOUS at 20:19

## 2017-01-01 RX ADMIN — VENLAFAXINE HYDROCHLORIDE 75 MG: 75 CAPSULE, EXTENDED RELEASE ORAL at 20:53

## 2017-01-01 RX ADMIN — HYDROMORPHONE HYDROCHLORIDE 2 MG: 2 INJECTION, SOLUTION INTRAMUSCULAR; INTRAVENOUS; SUBCUTANEOUS at 18:18

## 2017-01-01 RX ADMIN — CASTOR OIL AND BALSAM, PERU 1 APPLICATOR: 788; 87 OINTMENT TOPICAL at 11:20

## 2017-01-01 RX ADMIN — MICONAZOLE NITRATE: 2 POWDER TOPICAL at 16:27

## 2017-01-01 RX ADMIN — GLYCOPYRROLATE 0.4 MG: 0.2 INJECTION, SOLUTION INTRAMUSCULAR; INTRAVENOUS at 14:58

## 2017-01-01 RX ADMIN — CARVEDILOL 6.25 MG: 6.25 TABLET, FILM COATED ORAL at 10:06

## 2017-01-01 RX ADMIN — TAZOBACTAM SODIUM AND PIPERACILLIN SODIUM 3.38 G: 375; 3 INJECTION, SOLUTION INTRAVENOUS at 19:57

## 2017-01-01 RX ADMIN — POTASSIUM CHLORIDE 20 MEQ: 1.5 POWDER, FOR SOLUTION ORAL at 09:09

## 2017-01-01 RX ADMIN — CASTOR OIL AND BALSAM, PERU: 788; 87 OINTMENT TOPICAL at 08:30

## 2017-01-01 RX ADMIN — INSULIN ASPART 5 UNITS: 100 INJECTION, SOLUTION INTRAVENOUS; SUBCUTANEOUS at 18:47

## 2017-01-01 RX ADMIN — HYDROMORPHONE HYDROCHLORIDE 1 MG: 1 INJECTION, SOLUTION INTRAMUSCULAR; INTRAVENOUS; SUBCUTANEOUS at 18:10

## 2017-01-01 RX ADMIN — HYDROMORPHONE HYDROCHLORIDE 1 MG: 2 TABLET ORAL at 09:47

## 2017-01-01 RX ADMIN — GLYCOPYRROLATE 0.4 MG: 0.2 INJECTION, SOLUTION INTRAMUSCULAR; INTRAVENOUS at 21:01

## 2017-01-01 RX ADMIN — BUMETANIDE 2 MG: 2 TABLET ORAL at 21:01

## 2017-01-01 RX ADMIN — CASTOR OIL AND BALSAM, PERU: 788; 87 OINTMENT TOPICAL at 05:17

## 2017-01-01 RX ADMIN — GABAPENTIN 300 MG: 300 CAPSULE ORAL at 07:46

## 2017-01-01 RX ADMIN — MICONAZOLE NITRATE: 2 POWDER TOPICAL at 12:51

## 2017-01-01 RX ADMIN — ACETAMINOPHEN 325 MG: 325 TABLET ORAL at 03:27

## 2017-01-01 RX ADMIN — GABAPENTIN 300 MG: 300 CAPSULE ORAL at 08:27

## 2017-01-01 RX ADMIN — GLYCOPYRROLATE 0.2 MG: 0.2 INJECTION, SOLUTION INTRAMUSCULAR; INTRAVENOUS at 05:03

## 2017-01-01 RX ADMIN — MIRTAZAPINE 15 MG: 15 TABLET, FILM COATED ORAL at 09:10

## 2017-01-01 RX ADMIN — INSULIN ASPART 7 UNITS: 100 INJECTION, SOLUTION INTRAVENOUS; SUBCUTANEOUS at 11:32

## 2017-01-01 RX ADMIN — HYDROMORPHONE HYDROCHLORIDE 1 MG: 2 TABLET ORAL at 07:46

## 2017-01-01 RX ADMIN — Medication 1 CAPSULE: at 08:28

## 2017-01-01 RX ADMIN — GABAPENTIN 300 MG: 300 CAPSULE ORAL at 23:01

## 2017-01-01 RX ADMIN — HYDROMORPHONE HYDROCHLORIDE 1 MG: 1 INJECTION, SOLUTION INTRAMUSCULAR; INTRAVENOUS; SUBCUTANEOUS at 10:46

## 2017-01-01 RX ADMIN — GABAPENTIN 300 MG: 300 CAPSULE ORAL at 00:59

## 2017-01-01 RX ADMIN — TAZOBACTAM SODIUM AND PIPERACILLIN SODIUM 3.38 G: 375; 3 INJECTION, SOLUTION INTRAVENOUS at 11:46

## 2017-01-01 RX ADMIN — PANTOPRAZOLE SODIUM 40 MG: 40 TABLET, DELAYED RELEASE ORAL at 06:32

## 2017-01-01 RX ADMIN — HYDROMORPHONE HYDROCHLORIDE 1 MG: 2 TABLET ORAL at 22:36

## 2017-01-01 RX ADMIN — CARVEDILOL 6.25 MG: 6.25 TABLET, FILM COATED ORAL at 20:35

## 2017-01-01 RX ADMIN — HYDROMORPHONE HYDROCHLORIDE 0.5 MG: 1 INJECTION, SOLUTION INTRAMUSCULAR; INTRAVENOUS; SUBCUTANEOUS at 16:23

## 2017-01-01 RX ADMIN — BUMETANIDE 2 MG: 2 TABLET ORAL at 08:31

## 2017-01-01 RX ADMIN — GABAPENTIN 300 MG: 300 CAPSULE ORAL at 17:34

## 2017-01-01 RX ADMIN — MICONAZOLE NITRATE: 2 POWDER TOPICAL at 08:31

## 2017-01-01 RX ADMIN — PANTOPRAZOLE SODIUM 40 MG: 40 TABLET, DELAYED RELEASE ORAL at 05:38

## 2017-01-01 RX ADMIN — VENLAFAXINE HYDROCHLORIDE 75 MG: 75 CAPSULE, EXTENDED RELEASE ORAL at 21:18

## 2017-01-01 RX ADMIN — GLYCOPYRROLATE 0.4 MG: 0.2 INJECTION, SOLUTION INTRAMUSCULAR; INTRAVENOUS at 05:10

## 2017-01-01 RX ADMIN — CARVEDILOL 6.25 MG: 6.25 TABLET, FILM COATED ORAL at 11:21

## 2017-01-01 RX ADMIN — VENLAFAXINE HYDROCHLORIDE 75 MG: 75 CAPSULE, EXTENDED RELEASE ORAL at 21:34

## 2017-01-01 RX ADMIN — GLYCOPYRROLATE 0.4 MG: 0.2 INJECTION, SOLUTION INTRAMUSCULAR; INTRAVENOUS at 18:10

## 2017-01-01 RX ADMIN — PANTOPRAZOLE SODIUM 40 MG: 40 TABLET, DELAYED RELEASE ORAL at 06:25

## 2017-01-01 RX ADMIN — TAZOBACTAM SODIUM AND PIPERACILLIN SODIUM 3.38 G: 375; 3 INJECTION, SOLUTION INTRAVENOUS at 21:36

## 2017-01-01 RX ADMIN — HYDROMORPHONE HYDROCHLORIDE 1 MG: 2 TABLET ORAL at 14:47

## 2017-01-01 RX ADMIN — MIRTAZAPINE 15 MG: 15 TABLET, FILM COATED ORAL at 21:37

## 2017-01-01 RX ADMIN — SENNA 17.2 MG: 8.6 TABLET, COATED ORAL at 11:17

## 2017-01-01 RX ADMIN — INSULIN ASPART 3 UNITS: 100 INJECTION, SOLUTION INTRAVENOUS; SUBCUTANEOUS at 12:49

## 2017-01-01 RX ADMIN — MICONAZOLE NITRATE: 2 POWDER TOPICAL at 21:01

## 2017-01-01 RX ADMIN — HYDROMORPHONE HYDROCHLORIDE 1 MG: 1 INJECTION, SOLUTION INTRAMUSCULAR; INTRAVENOUS; SUBCUTANEOUS at 10:45

## 2017-01-01 RX ADMIN — MICONAZOLE NITRATE: 2 POWDER TOPICAL at 21:28

## 2017-01-01 RX ADMIN — HYDROMORPHONE HYDROCHLORIDE 2 MG: 2 INJECTION, SOLUTION INTRAMUSCULAR; INTRAVENOUS; SUBCUTANEOUS at 06:51

## 2017-01-01 RX ADMIN — CASTOR OIL AND BALSAM, PERU: 788; 87 OINTMENT TOPICAL at 22:21

## 2017-01-01 RX ADMIN — CASTOR OIL AND BALSAM, PERU: 788; 87 OINTMENT TOPICAL at 16:27

## 2017-01-01 RX ADMIN — HYDROMORPHONE HYDROCHLORIDE 2 MG: 2 INJECTION, SOLUTION INTRAMUSCULAR; INTRAVENOUS; SUBCUTANEOUS at 21:46

## 2017-01-01 RX ADMIN — HYDROMORPHONE HYDROCHLORIDE 1 MG: 1 INJECTION, SOLUTION INTRAMUSCULAR; INTRAVENOUS; SUBCUTANEOUS at 17:56

## 2017-01-01 RX ADMIN — INSULIN ASPART 2 UNITS: 100 INJECTION, SOLUTION INTRAVENOUS; SUBCUTANEOUS at 11:48

## 2017-01-01 RX ADMIN — INSULIN DETEMIR 15 UNITS: 100 INJECTION, SOLUTION SUBCUTANEOUS at 12:37

## 2017-01-01 RX ADMIN — INSULIN ASPART 2 UNITS: 100 INJECTION, SOLUTION INTRAVENOUS; SUBCUTANEOUS at 20:47

## 2017-01-01 RX ADMIN — MAGNESIUM HYDROXIDE 10 ML: 2400 SUSPENSION ORAL at 08:25

## 2017-01-01 RX ADMIN — HYDROMORPHONE HYDROCHLORIDE 1 MG: 1 INJECTION, SOLUTION INTRAMUSCULAR; INTRAVENOUS; SUBCUTANEOUS at 10:31

## 2017-01-01 RX ADMIN — HYDROMORPHONE HYDROCHLORIDE 1 MG: 1 INJECTION, SOLUTION INTRAMUSCULAR; INTRAVENOUS; SUBCUTANEOUS at 22:55

## 2017-01-01 RX ADMIN — FLUCONAZOLE 150 MG: 150 TABLET ORAL at 20:59

## 2017-01-01 RX ADMIN — HYDROMORPHONE HYDROCHLORIDE 1 MG: 1 INJECTION, SOLUTION INTRAMUSCULAR; INTRAVENOUS; SUBCUTANEOUS at 20:35

## 2017-01-01 RX ADMIN — GABAPENTIN 300 MG: 300 CAPSULE ORAL at 09:46

## 2017-01-01 RX ADMIN — CASTOR OIL AND BALSAM, PERU: 788; 87 OINTMENT TOPICAL at 21:30

## 2017-01-01 RX ADMIN — MICONAZOLE NITRATE: 2 POWDER TOPICAL at 05:17

## 2017-01-01 RX ADMIN — HYDROMORPHONE HYDROCHLORIDE 2 MG: 2 INJECTION, SOLUTION INTRAMUSCULAR; INTRAVENOUS; SUBCUTANEOUS at 08:05

## 2017-01-01 RX ADMIN — HYDROMORPHONE HYDROCHLORIDE 1 MG: 2 TABLET ORAL at 21:38

## 2017-01-01 RX ADMIN — TAZOBACTAM SODIUM AND PIPERACILLIN SODIUM 3.38 G: 375; 3 INJECTION, SOLUTION INTRAVENOUS at 12:49

## 2017-01-01 RX ADMIN — HYDROMORPHONE HYDROCHLORIDE 1 MG: 1 INJECTION, SOLUTION INTRAMUSCULAR; INTRAVENOUS; SUBCUTANEOUS at 03:14

## 2017-01-01 RX ADMIN — GLYCOPYRROLATE 0.4 MG: 0.2 INJECTION, SOLUTION INTRAMUSCULAR; INTRAVENOUS at 10:10

## 2017-01-01 RX ADMIN — SENNA 17.2 MG: 8.6 TABLET, COATED ORAL at 09:00

## 2017-01-01 RX ADMIN — HYDROMORPHONE HYDROCHLORIDE 0.5 MG: 1 INJECTION, SOLUTION INTRAMUSCULAR; INTRAVENOUS; SUBCUTANEOUS at 12:50

## 2017-01-01 RX ADMIN — GLYCOPYRROLATE 0.4 MG: 0.2 INJECTION, SOLUTION INTRAMUSCULAR; INTRAVENOUS at 23:01

## 2017-01-01 RX ADMIN — MAGNESIUM HYDROXIDE 10 ML: 2400 SUSPENSION ORAL at 17:16

## 2017-01-01 RX ADMIN — MIRTAZAPINE 15 MG: 15 TABLET, FILM COATED ORAL at 23:00

## 2017-01-01 RX ADMIN — MICONAZOLE NITRATE: 2 POWDER TOPICAL at 22:58

## 2017-01-01 RX ADMIN — GLYCOPYRROLATE 0.2 MG: 0.2 INJECTION, SOLUTION INTRAMUSCULAR; INTRAVENOUS at 06:56

## 2017-01-01 RX ADMIN — BUMETANIDE 1 MG: 0.25 INJECTION INTRAMUSCULAR; INTRAVENOUS at 12:49

## 2017-01-01 RX ADMIN — GLYCOPYRROLATE 0.4 MG: 0.2 INJECTION, SOLUTION INTRAMUSCULAR; INTRAVENOUS at 13:39

## 2017-01-01 RX ADMIN — MICONAZOLE NITRATE: 2 POWDER TOPICAL at 09:21

## 2017-01-01 RX ADMIN — GLYCOPYRROLATE 0.4 MG: 0.2 INJECTION, SOLUTION INTRAMUSCULAR; INTRAVENOUS at 18:36

## 2017-01-01 RX ADMIN — CASTOR OIL AND BALSAM, PERU: 788; 87 OINTMENT TOPICAL at 20:36

## 2017-01-01 RX ADMIN — CARVEDILOL 3.12 MG: 3.12 TABLET, FILM COATED ORAL at 14:29

## 2017-01-01 RX ADMIN — DOCUSATE SODIUM 100 MG: 100 CAPSULE, LIQUID FILLED ORAL at 08:27

## 2017-01-01 RX ADMIN — HYDROMORPHONE HYDROCHLORIDE 1 MG: 1 INJECTION, SOLUTION INTRAMUSCULAR; INTRAVENOUS; SUBCUTANEOUS at 22:19

## 2017-01-01 RX ADMIN — HYDROMORPHONE HYDROCHLORIDE 1 MG: 2 TABLET ORAL at 20:44

## 2017-01-01 RX ADMIN — TAZOBACTAM SODIUM AND PIPERACILLIN SODIUM 3.38 G: 375; 3 INJECTION, SOLUTION INTRAVENOUS at 04:35

## 2017-01-01 RX ADMIN — INSULIN ASPART 3 UNITS: 100 INJECTION, SOLUTION INTRAVENOUS; SUBCUTANEOUS at 12:51

## 2017-01-01 RX ADMIN — VANCOMYCIN HYDROCHLORIDE 2250 MG: 10 INJECTION, POWDER, LYOPHILIZED, FOR SOLUTION INTRAVENOUS at 21:34

## 2017-01-01 RX ADMIN — VENLAFAXINE HYDROCHLORIDE 75 MG: 75 CAPSULE, EXTENDED RELEASE ORAL at 20:32

## 2017-01-01 RX ADMIN — SENNA 17.2 MG: 8.6 TABLET, COATED ORAL at 08:28

## 2017-01-01 RX ADMIN — FLUCONAZOLE 150 MG: 150 TABLET ORAL at 03:41

## 2017-01-01 RX ADMIN — VENLAFAXINE HYDROCHLORIDE 75 MG: 75 CAPSULE, EXTENDED RELEASE ORAL at 03:42

## 2017-01-01 RX ADMIN — MICONAZOLE NITRATE: 2 POWDER TOPICAL at 20:36

## 2017-01-01 RX ADMIN — HYDROMORPHONE HYDROCHLORIDE 0.5 MG: 1 INJECTION, SOLUTION INTRAMUSCULAR; INTRAVENOUS; SUBCUTANEOUS at 07:15

## 2017-01-01 RX ADMIN — MICONAZOLE NITRATE: 2 POWDER TOPICAL at 21:18

## 2017-01-01 RX ADMIN — GLYCOPYRROLATE 0.4 MG: 0.2 INJECTION, SOLUTION INTRAMUSCULAR; INTRAVENOUS at 02:21

## 2017-01-01 RX ADMIN — HYDROMORPHONE HYDROCHLORIDE 2 MG: 2 INJECTION, SOLUTION INTRAMUSCULAR; INTRAVENOUS; SUBCUTANEOUS at 20:32

## 2017-01-01 RX ADMIN — HYDROMORPHONE HYDROCHLORIDE 1 MG: 1 INJECTION, SOLUTION INTRAMUSCULAR; INTRAVENOUS; SUBCUTANEOUS at 13:39

## 2017-01-01 RX ADMIN — BUMETANIDE 1 MG: 2 TABLET ORAL at 17:46

## 2017-01-01 RX ADMIN — MAGNESIUM HYDROXIDE 10 ML: 2400 SUSPENSION ORAL at 08:37

## 2017-01-01 RX ADMIN — MAGNESIUM HYDROXIDE 10 ML: 2400 SUSPENSION ORAL at 11:20

## 2017-01-01 RX ADMIN — Medication 0.02 MCG/KG/MIN: at 00:40

## 2017-01-01 RX ADMIN — TAZOBACTAM SODIUM AND PIPERACILLIN SODIUM 3.38 G: 375; 3 INJECTION, SOLUTION INTRAVENOUS at 05:27

## 2017-01-01 RX ADMIN — GABAPENTIN 300 MG: 300 CAPSULE ORAL at 20:19

## 2017-01-01 RX ADMIN — CASTOR OIL AND BALSAM, PERU: 788; 87 OINTMENT TOPICAL at 21:01

## 2017-01-01 RX ADMIN — GLYCOPYRROLATE 0.2 MG: 0.2 INJECTION, SOLUTION INTRAMUSCULAR; INTRAVENOUS at 21:27

## 2017-01-01 RX ADMIN — TAZOBACTAM SODIUM AND PIPERACILLIN SODIUM 3.38 G: 375; 3 INJECTION, SOLUTION INTRAVENOUS at 19:49

## 2017-01-01 RX ADMIN — TAZOBACTAM SODIUM AND PIPERACILLIN SODIUM 3.38 G: 375; 3 INJECTION, SOLUTION INTRAVENOUS at 03:58

## 2017-01-01 RX ADMIN — CASTOR OIL AND BALSAM, PERU: 788; 87 OINTMENT TOPICAL at 21:18

## 2017-01-01 RX ADMIN — HYDROMORPHONE HYDROCHLORIDE 1 MG: 1 INJECTION, SOLUTION INTRAMUSCULAR; INTRAVENOUS; SUBCUTANEOUS at 17:35

## 2017-01-01 RX ADMIN — VENLAFAXINE HYDROCHLORIDE 75 MG: 75 CAPSULE, EXTENDED RELEASE ORAL at 20:45

## 2017-01-01 RX ADMIN — VENLAFAXINE HYDROCHLORIDE 75 MG: 75 CAPSULE, EXTENDED RELEASE ORAL at 21:01

## 2017-01-01 RX ADMIN — HYDROMORPHONE HYDROCHLORIDE 2 MG: 2 INJECTION, SOLUTION INTRAMUSCULAR; INTRAVENOUS; SUBCUTANEOUS at 17:56

## 2017-01-01 RX ADMIN — DOCUSATE SODIUM 100 MG: 100 CAPSULE, LIQUID FILLED ORAL at 11:17

## 2017-01-01 RX ADMIN — TAZOBACTAM SODIUM AND PIPERACILLIN SODIUM 3.38 G: 375; 3 INJECTION, SOLUTION INTRAVENOUS at 05:31

## 2017-01-01 RX ADMIN — VENLAFAXINE HYDROCHLORIDE 75 MG: 75 CAPSULE, EXTENDED RELEASE ORAL at 23:40

## 2017-01-01 RX ADMIN — MICONAZOLE NITRATE: 2 POWDER TOPICAL at 09:18

## 2017-01-01 RX ADMIN — OXYCODONE HYDROCHLORIDE AND ACETAMINOPHEN 1 TABLET: 5; 325 TABLET ORAL at 03:27

## 2017-01-01 RX ADMIN — SODIUM CHLORIDE 3270 ML: 9 INJECTION, SOLUTION INTRAVENOUS at 20:52

## 2017-01-01 RX ADMIN — TAZOBACTAM SODIUM AND PIPERACILLIN SODIUM 3.38 G: 375; 3 INJECTION, SOLUTION INTRAVENOUS at 13:02

## 2017-01-01 RX ADMIN — HYDROMORPHONE HYDROCHLORIDE 1 MG: 1 INJECTION, SOLUTION INTRAMUSCULAR; INTRAVENOUS; SUBCUTANEOUS at 09:12

## 2017-01-01 RX ADMIN — GLYCOPYRROLATE 0.4 MG: 0.2 INJECTION, SOLUTION INTRAMUSCULAR; INTRAVENOUS at 20:35

## 2017-01-01 RX ADMIN — CASTOR OIL AND BALSAM, PERU: 788; 87 OINTMENT TOPICAL at 10:37

## 2017-01-01 RX ADMIN — GLYCOPYRROLATE 0.2 MG: 0.2 INJECTION, SOLUTION INTRAMUSCULAR; INTRAVENOUS at 22:18

## 2017-01-01 RX ADMIN — DOCUSATE SODIUM 100 MG: 100 CAPSULE, LIQUID FILLED ORAL at 22:35

## 2017-01-01 RX ADMIN — BUMETANIDE 2 MG: 2 TABLET ORAL at 19:04

## 2017-01-01 RX ADMIN — ACETAMINOPHEN 325 MG: 325 TABLET ORAL at 10:51

## 2017-01-01 RX ADMIN — CASTOR OIL AND BALSAM, PERU: 788; 87 OINTMENT TOPICAL at 22:57

## 2017-01-01 RX ADMIN — CASTOR OIL AND BALSAM, PERU: 788; 87 OINTMENT TOPICAL at 21:37

## 2017-01-04 ENCOUNTER — CLINICAL SUPPORT (OUTPATIENT)
Dept: ONCOLOGY | Facility: HOSPITAL | Age: 72
End: 2017-01-04

## 2017-01-04 ENCOUNTER — OFFICE VISIT (OUTPATIENT)
Dept: FAMILY MEDICINE CLINIC | Facility: CLINIC | Age: 72
End: 2017-01-04

## 2017-01-04 ENCOUNTER — ANTICOAGULATION VISIT (OUTPATIENT)
Dept: LAB | Facility: HOSPITAL | Age: 72
End: 2017-01-04

## 2017-01-04 VITALS
SYSTOLIC BLOOD PRESSURE: 124 MMHG | HEART RATE: 85 BPM | RESPIRATION RATE: 18 BRPM | DIASTOLIC BLOOD PRESSURE: 90 MMHG | TEMPERATURE: 98 F | OXYGEN SATURATION: 98 % | HEIGHT: 74 IN

## 2017-01-04 DIAGNOSIS — D68.51 FACTOR V LEIDEN MUTATION (HCC): ICD-10-CM

## 2017-01-04 DIAGNOSIS — I50.9 CHF, STAGE C (HCC): ICD-10-CM

## 2017-01-04 DIAGNOSIS — D68.61 ANTIPHOSPHOLIPID ANTIBODY WITH HYPERCOAGULABLE STATE (HCC): ICD-10-CM

## 2017-01-04 DIAGNOSIS — I48.20 CHRONIC ATRIAL FIBRILLATION (HCC): ICD-10-CM

## 2017-01-04 DIAGNOSIS — D61.818 PANCYTOPENIA (HCC): ICD-10-CM

## 2017-01-04 DIAGNOSIS — Z79.4 TYPE 2 DIABETES MELLITUS TREATED WITH INSULIN (HCC): Primary | ICD-10-CM

## 2017-01-04 DIAGNOSIS — E11.9 TYPE 2 DIABETES MELLITUS TREATED WITH INSULIN (HCC): Primary | ICD-10-CM

## 2017-01-04 DIAGNOSIS — D69.6 THROMBOCYTOPENIA (HCC): Primary | ICD-10-CM

## 2017-01-04 DIAGNOSIS — I50.33 ACUTE ON CHRONIC DIASTOLIC CONGESTIVE HEART FAILURE (HCC): ICD-10-CM

## 2017-01-04 DIAGNOSIS — E78.00 HYPERCHOLESTEROLEMIA: ICD-10-CM

## 2017-01-04 PROBLEM — K55.21 AVM (ARTERIOVENOUS MALFORMATION) OF COLON WITH HEMORRHAGE: Status: ACTIVE | Noted: 2017-01-04

## 2017-01-04 LAB
BASOPHILS # BLD AUTO: 0.03 10*3/MM3 (ref 0–0.1)
BASOPHILS NFR BLD AUTO: 0.7 % (ref 0–1.1)
DEPRECATED RDW RBC AUTO: 77.6 FL (ref 37–49)
EOSINOPHIL # BLD AUTO: 0.2 10*3/MM3 (ref 0–0.36)
EOSINOPHIL NFR BLD AUTO: 4.9 % (ref 1–5)
ERYTHROCYTE [DISTWIDTH] IN BLOOD BY AUTOMATED COUNT: 21.1 % (ref 11.7–14.5)
HCT VFR BLD AUTO: 24.7 % (ref 40–49)
HGB BLD-MCNC: 8.1 G/DL (ref 13.5–16.5)
IMM GRANULOCYTES # BLD: 0.01 10*3/MM3 (ref 0–0.03)
IMM GRANULOCYTES NFR BLD: 0.2 % (ref 0–0.5)
INR PPP: 3.8 (ref 0.9–1.1)
LYMPHOCYTES # BLD AUTO: 1.1 10*3/MM3 (ref 1–3.5)
LYMPHOCYTES NFR BLD AUTO: 27.2 % (ref 20–49)
MCH RBC QN AUTO: 32.8 PG (ref 27–33)
MCHC RBC AUTO-ENTMCNC: 32.8 G/DL (ref 32–35)
MCV RBC AUTO: 100 FL (ref 83–97)
MONOCYTES # BLD AUTO: 0.55 10*3/MM3 (ref 0.25–0.8)
MONOCYTES NFR BLD AUTO: 13.6 % (ref 4–12)
NEUTROPHILS # BLD AUTO: 2.16 10*3/MM3 (ref 1.5–7)
NEUTROPHILS NFR BLD AUTO: 53.4 % (ref 39–75)
NRBC BLD MANUAL-RTO: 0 /100 WBC (ref 0–0)
PLATELET # BLD AUTO: 92 10*3/MM3 (ref 150–375)
PMV BLD AUTO: 10.8 FL (ref 8.9–12.1)
PROTHROMBIN TIME: 45 SECONDS (ref 11–13.5)
RBC # BLD AUTO: 2.47 10*6/MM3 (ref 4.3–5.5)
WBC NRBC COR # BLD: 4.05 10*3/MM3 (ref 4–10)

## 2017-01-04 PROCEDURE — 99214 OFFICE O/P EST MOD 30 MIN: CPT | Performed by: INTERNAL MEDICINE

## 2017-01-04 PROCEDURE — 85610 PROTHROMBIN TIME: CPT | Performed by: INTERNAL MEDICINE

## 2017-01-04 PROCEDURE — 85025 COMPLETE CBC W/AUTO DIFF WBC: CPT | Performed by: INTERNAL MEDICINE

## 2017-01-04 PROCEDURE — 36415 COLL VENOUS BLD VENIPUNCTURE: CPT | Performed by: INTERNAL MEDICINE

## 2017-01-04 NOTE — MR AVS SNAPSHOT
Emil Pulido   1/4/2017 2:45 PM   Office Visit    Dept Phone:  [phone]   Encounter #:  [id number]    Provider:  Marcus Avery MD   Department:  [facility]                Your Full Care Plan              Today's Medication Changes          These changes are accurate as of: 1/4/17  4:13 PM.  If you have any questions, ask your nurse or doctor.               Medication(s)that have changed:     * warfarin 2.5 MG tablet   Commonly known as:  COUMADIN   Take 2 tablets by mouth Daily for 30 days. Wednesday   What changed:    - how much to take  - additional instructions       * warfarin 5 MG tablet   Commonly known as:  COUMADIN   Take 5 mg by mouth Daily. Tuesday, Wednesday, Thursday, Saturday, Sunday   What changed:  Another medication with the same name was changed. Make sure you understand how and when to take each.   Changed by:  Valentino Sullivan MD       * Notice:  This list has 2 medication(s) that are the same as other medications prescribed for you. Read the directions carefully, and ask your doctor or other care provider to review them with you.               Your Updated Medication List          This list is accurate as of: 1/4/17  4:13 PM.  Always use your most recent med list.                carvedilol 6.25 MG tablet   Commonly known as:  COREG   Take 1 tablet by mouth 2 (Two) Times a Day With Meals. TAKE 1 TABLET TWICE DAILY WITH MEALS       Ferrous Gluconate 325 (36 FE) MG tablet       gabapentin 300 MG capsule   Commonly known as:  NEURONTIN       insulin aspart 100 UNIT/ML injection   Commonly known as:  novoLOG   Inject 0-7 Units under the skin 4 (Four) Times a Day Before Meals & at Bedtime.       insulin lispro protamine-insulin lispro (50-50) 100 UNIT/ML suspension injection   Commonly known as:  humaLOG 50-50       * Insulin Pen Needle 31G X 5 MM misc   Qid and qhs       * BD PEN NEEDLE ISAIAS U/F 32G X 4 MM misc   Generic drug:   Insulin Pen Needle   Use with insulin before meals and hs       mupirocin 2 % cream   Commonly known as:  BACTROBAN   Apply  topically 3 (three) times a day.       oxyCODONE-acetaminophen 5-325 MG per tablet   Commonly known as:  PERCOCET   Take 1 tablet by mouth Every 6 (Six) Hours As Needed for severe pain (7-10).       pantoprazole 40 MG EC tablet   Commonly known as:  PROTONIX   Take 1 tablet by mouth Daily for 30 days.       potassium chloride 20 MEQ CR tablet   Commonly known as:  K-DUR,KLOR-CON   Take 1 tablet by mouth 2 (Two) Times a Day for 30 days.       torsemide 20 MG tablet   Commonly known as:  DEMADEX   Take 1 tablet by mouth Daily.       venlafaxine XR 75 MG 24 hr capsule   Commonly known as:  EFFEXOR-XR       vitamin C 500 MG tablet   Commonly known as:  ASCORBIC ACID       Vitamin D3 3000 UNITS tablet       * warfarin 2.5 MG tablet   Commonly known as:  COUMADIN   Take 2 tablets by mouth Daily for 30 days. Wednesday       * warfarin 5 MG tablet   Commonly known as:  COUMADIN       * Notice:  This list has 4 medication(s) that are the same as other medications prescribed for you. Read the directions carefully, and ask your doctor or other care provider to review them with you.            You Were Diagnosed With        Codes Comments    Type 2 diabetes mellitus treated with insulin    -  Primary ICD-10-CM: E11.9, Z79.4  ICD-9-CM: 250.00, V58.67     CHF, stage C     ICD-10-CM: I50.9  ICD-9-CM: 428.0     Acute on chronic diastolic congestive heart failure     ICD-10-CM: I50.33  ICD-9-CM: 428.33, 428.0     Chronic atrial fibrillation     ICD-10-CM: I48.2  ICD-9-CM: 427.31     Antiphospholipid antibody with hypercoagulable state     ICD-10-CM: D68.61  ICD-9-CM: 289.81     Hypercholesterolemia     ICD-10-CM: E78.00  ICD-9-CM: 272.0     Factor V Leiden mutation     ICD-10-CM: D68.51  ICD-9-CM: 289.81       Instructions     None    Patient Instructions History      Upcoming Appointments     Visit Type Date  Time Department    COAG RN APPT 1/4/2017 11:00 AM [facility] OP INFU CBC [facility]    ANTICOAGULATION 1/4/2017 10:30 AM [facility] ONC CBC LAB [facility]    OFFICE VISIT 1/4/2017  2:45 PM [facility]    ANTICOAGULATION 1/11/2017  2:20 PM [facility] ONC CBC LAB [facility]    COAG RN APPT 1/11/2017  2:45 PM [facility] OP INFU CBC [facility]    ANTICOAGULATION 1/19/2017  9:20 AM [facility] ONC CBC LAB [facility]    FOLLOW UP 1 UNIT 1/19/2017 10:00 AM [facility] ONC CBC [place]    SAME DAY 1/31/2017  1:15 PM [facility]    FOLLOW UP 3/10/2017 11:00 AM [facility] [place]      MyChart Signup     Our records indicate that you have declined [facility] Jellit signup. If you would like to sign up for Jellit, please email [email or web address] or call [phone] to obtain an activation code.             Other Info from Your Visit           Your Appointments     Jan 11, 2017  2:20 PM EST   Anticoagulation with LAB CHAIR [facility] ONCOLOGY CBC LAB ([place])    [address] [address] [phone]            Jan 11, 2017  2:45 PM EST   COAGULATION with COAG RN CBC [facility] INFUSION CBC ([place])    [address] [address] [phone]            Jan 19, 2017  9:20 AM EST   Anticoagulation with LAB CHAIR 1 [facility] ONCOLOGY CBC LAB ([place])    [address] [facility] [address]  [place] [address] [phone]            Jan 19, 2017 10:00 AM EST   FOLLOW UP with Ilya Ambrocio MD   [facility] CBC GROUP: CONSULTANTS IN BLOOD DISORDERS AND CANCER ([facility])    [address] [facility] [address]  [place] [address] [phone]            Jan 31, 2017  1:15 PM EST   Same Day with Marcus Avery MD   [facility] FAMILY AND INTERNAL MED (--)    [address] [place] [address] [phone]              Allergies     Morphine      Morphine And Related  Nausea Only      Reason for Visit      "Follow-up second hospital F/U GI bleed      Vital Signs     Blood Pressure Pulse Temperature Respirations Height Oxygen Saturation    124/90 (BP Location: Left arm, Patient Position: Sitting, Cuff Size: Large Adult) 85 98 °F (36.7 °C) (Oral) 18 74\" (188 cm) 98%    Smoking Status                   Former Smoker           Problems and Diagnoses Noted     Heart failure    Antiphospholipid antibody with hypercoagulable state    Vascular disorder of intestine    Atrial fibrillation (irregular heartbeat)    Factor V Leiden mutation    High cholesterol    Type 2 diabetes mellitus treated with insulin      No Longer an Issue     Heart failure        "

## 2017-01-04 NOTE — PROGRESS NOTES
PT'S INR 3.8 TODAY. INR RANGE IS 2-2.5. PT DENIES ANY BLEEDING. CBC ALSO REVIEWED WITH PT. HGB 8.1. PT DENIES WANTING A BLOOD TRANSFUSION. D/W DR. CHOWDHURY. PER DR. CHOWDHURY, HAVE PT HOLD TODAYS DOSE AND TOMORROWS DOSE OF COUMADIN AND THEN RESTART ON FRI TAKING 2.5MG FRI, SAT AND MON AND 5MG TUES. WE WILL RECHECK HIM NEXT WEEK. INFORMED PT AND WIFE AND THEY V/U. COPY OF DOSING INSTRUCTIONS GIVEN TO PT. PT SENT TO APT DESK TO MAKE APT.

## 2017-01-04 NOTE — PROGRESS NOTES
Subjective   Emli Pulido is a 71 y.o. male.     History of Present Illness   Patient was seen today for evaluation of a wheelchair seat.  He's had numerous strokes and has a hemiparesis also has fractured humerus degenerative changes lumbar cervical spine as Leiden factor V and lupus anticoagulant.  Is confined to his wheelchair and able ambulate he definitely needs the proper seat    's also been recently discharged from hospital for lower GI bleed from AVM:.  He is required to take his Coumadin because of his coagulation status.  His pro time is being checked by the hematology group at [facility].  He will follow-up in our office in approximately one month.    Much of this encounter note is an electronic transcription/translation of spoken language to printed text.  The electronic translation of spoken language may permit erroneous, or at times, nonsensical words or phrases to be inadvertently transcribed.  Although I have reviewed the note for such errors, some may still exist.  The following portions of the patient's history were reviewed and updated as appropriate: allergies, current medications, past family history, past medical history, past social history, past surgical history and problem list.    Review of Systems   Constitutional: Negative for fatigue and fever.   HENT: Positive for congestion. Negative for trouble swallowing.    Eyes: Negative for discharge and visual disturbance.   Respiratory: Negative for choking and shortness of breath.    Cardiovascular: Negative for chest pain and palpitations.   Gastrointestinal: Negative for abdominal pain and blood in stool.   Endocrine: Negative.    Genitourinary: Negative for genital sores and hematuria.   Musculoskeletal: Negative for gait problem and joint swelling.   Skin: Negative for color change, pallor, rash and wound.   Allergic/Immunologic: Positive for environmental allergies. Negative for immunocompromised state.   Neurological: Negative for  facial asymmetry and speech difficulty.   Psychiatric/Behavioral: Negative for hallucinations and suicidal ideas.       Objective   Physical Exam   Constitutional: He is oriented to person, place, and time. He appears well-developed and well-nourished.   HENT:   Head: Normocephalic.   Eyes: Conjunctivae are normal. Pupils are equal, round, and reactive to light.   Neck: Normal range of motion. Neck supple.   Cardiovascular: Normal rate, regular rhythm and normal heart sounds.    Pulmonary/Chest: Effort normal and breath sounds normal.   Abdominal: Soft. Bowel sounds are normal.   Musculoskeletal: He exhibits edema, tenderness and deformity.   Neurological: He is alert and oriented to person, place, and time. He displays abnormal reflex. A cranial nerve deficit is present. He exhibits abnormal muscle tone. Coordination abnormal.   Skin: Skin is warm and dry.   Psychiatric: He has a normal mood and affect. His behavior is normal. Judgment and thought content normal.   Nursing note and vitals reviewed.      Assessment/Plan   Problems Addressed this Visit        Cardiovascular and Mediastinum    Acute on chronic diastolic congestive heart failure    Chronic atrial fibrillation    RESOLVED: CHF, stage C       Endocrine    Type 2 diabetes mellitus treated with insulin - Primary       Immune and Lymphatic    Antiphospholipid antibody with hypercoagulable state       Hematopoietic and Hemostatic    Factor V Leiden mutation       Other    Hypercholesterolemia

## 2017-01-11 ENCOUNTER — APPOINTMENT (OUTPATIENT)
Dept: ONCOLOGY | Facility: HOSPITAL | Age: 72
End: 2017-01-11

## 2017-01-11 ENCOUNTER — ANTICOAGULATION VISIT (OUTPATIENT)
Dept: LAB | Facility: HOSPITAL | Age: 72
End: 2017-01-11

## 2017-01-11 ENCOUNTER — APPOINTMENT (OUTPATIENT)
Dept: LAB | Facility: HOSPITAL | Age: 72
End: 2017-01-11

## 2017-01-11 ENCOUNTER — CLINICAL SUPPORT (OUTPATIENT)
Dept: ONCOLOGY | Facility: HOSPITAL | Age: 72
End: 2017-01-11

## 2017-01-11 DIAGNOSIS — D68.61 ANTIPHOSPHOLIPID ANTIBODY WITH HYPERCOAGULABLE STATE (HCC): ICD-10-CM

## 2017-01-11 DIAGNOSIS — D69.6 THROMBOCYTOPENIA (HCC): ICD-10-CM

## 2017-01-11 LAB
BASOPHILS # BLD AUTO: 0.02 10*3/MM3 (ref 0–0.1)
BASOPHILS NFR BLD AUTO: 0.4 % (ref 0–1.1)
DEPRECATED RDW RBC AUTO: 77.7 FL (ref 37–49)
EOSINOPHIL # BLD AUTO: 0.22 10*3/MM3 (ref 0–0.36)
EOSINOPHIL NFR BLD AUTO: 4.9 % (ref 1–5)
ERYTHROCYTE [DISTWIDTH] IN BLOOD BY AUTOMATED COUNT: 20.9 % (ref 11.7–14.5)
HCT VFR BLD AUTO: 26.3 % (ref 40–49)
HGB BLD-MCNC: 8.8 G/DL (ref 13.5–16.5)
IMM GRANULOCYTES # BLD: 0.03 10*3/MM3 (ref 0–0.03)
IMM GRANULOCYTES NFR BLD: 0.7 % (ref 0–0.5)
INR PPP: 2.1 (ref 0.9–1.1)
LYMPHOCYTES # BLD AUTO: 1.3 10*3/MM3 (ref 1–3.5)
LYMPHOCYTES NFR BLD AUTO: 28.8 % (ref 20–49)
MCH RBC QN AUTO: 33.5 PG (ref 27–33)
MCHC RBC AUTO-ENTMCNC: 33.5 G/DL (ref 32–35)
MCV RBC AUTO: 100 FL (ref 83–97)
MONOCYTES # BLD AUTO: 0.55 10*3/MM3 (ref 0.25–0.8)
MONOCYTES NFR BLD AUTO: 12.2 % (ref 4–12)
NEUTROPHILS # BLD AUTO: 2.39 10*3/MM3 (ref 1.5–7)
NEUTROPHILS NFR BLD AUTO: 53 % (ref 39–75)
NRBC BLD MANUAL-RTO: 0 /100 WBC (ref 0–0)
PLATELET # BLD AUTO: 94 10*3/MM3 (ref 150–375)
PMV BLD AUTO: 11.1 FL (ref 8.9–12.1)
PROTHROMBIN TIME: 25.5 SECONDS (ref 11–13.5)
RBC # BLD AUTO: 2.63 10*6/MM3 (ref 4.3–5.5)
WBC NRBC COR # BLD: 4.51 10*3/MM3 (ref 4–10)

## 2017-01-11 PROCEDURE — 36415 COLL VENOUS BLD VENIPUNCTURE: CPT | Performed by: INTERNAL MEDICINE

## 2017-01-11 PROCEDURE — 85025 COMPLETE CBC W/AUTO DIFF WBC: CPT | Performed by: INTERNAL MEDICINE

## 2017-01-11 PROCEDURE — 85610 PROTHROMBIN TIME: CPT | Performed by: INTERNAL MEDICINE

## 2017-01-11 NOTE — PROGRESS NOTES
INR 2.1. See standing stone    CBC reviewed with pt and wife. Copy of labs given to wife. WBC 4.51, HGB 8.8, platelets 92. No c/o

## 2017-01-14 RX ORDER — PANTOPRAZOLE SODIUM 40 MG/1
40 TABLET, DELAYED RELEASE ORAL DAILY
Qty: 30 TABLET | Refills: 3 | Status: SHIPPED | OUTPATIENT
Start: 2017-01-14 | End: 2017-02-13

## 2017-01-19 ENCOUNTER — APPOINTMENT (OUTPATIENT)
Dept: LAB | Facility: HOSPITAL | Age: 72
End: 2017-01-19

## 2017-01-19 ENCOUNTER — APPOINTMENT (OUTPATIENT)
Dept: ONCOLOGY | Facility: CLINIC | Age: 72
End: 2017-01-19

## 2017-01-26 ENCOUNTER — ANTICOAGULATION VISIT (OUTPATIENT)
Dept: LAB | Facility: HOSPITAL | Age: 72
End: 2017-01-26

## 2017-01-26 ENCOUNTER — OFFICE VISIT (OUTPATIENT)
Dept: ONCOLOGY | Facility: CLINIC | Age: 72
End: 2017-01-26

## 2017-01-26 VITALS
SYSTOLIC BLOOD PRESSURE: 122 MMHG | RESPIRATION RATE: 18 BRPM | OXYGEN SATURATION: 98 % | HEART RATE: 81 BPM | DIASTOLIC BLOOD PRESSURE: 60 MMHG | TEMPERATURE: 98.4 F | HEIGHT: 74 IN

## 2017-01-26 DIAGNOSIS — D61.818 PANCYTOPENIA (HCC): ICD-10-CM

## 2017-01-26 DIAGNOSIS — D68.61 ANTIPHOSPHOLIPID ANTIBODY WITH HYPERCOAGULABLE STATE (HCC): Primary | ICD-10-CM

## 2017-01-26 DIAGNOSIS — D50.0 IRON DEFICIENCY ANEMIA DUE TO CHRONIC BLOOD LOSS: ICD-10-CM

## 2017-01-26 LAB
BASOPHILS # BLD AUTO: 0.03 10*3/MM3 (ref 0–0.1)
BASOPHILS NFR BLD AUTO: 0.6 % (ref 0–1.1)
DEPRECATED RDW RBC AUTO: 71.2 FL (ref 37–49)
EOSINOPHIL # BLD AUTO: 0.28 10*3/MM3 (ref 0–0.36)
EOSINOPHIL NFR BLD AUTO: 6.1 % (ref 1–5)
ERYTHROCYTE [DISTWIDTH] IN BLOOD BY AUTOMATED COUNT: 19 % (ref 11.7–14.5)
HCT VFR BLD AUTO: 25.9 % (ref 40–49)
HGB BLD-MCNC: 8.6 G/DL (ref 13.5–16.5)
IMM GRANULOCYTES # BLD: 0.05 10*3/MM3 (ref 0–0.03)
IMM GRANULOCYTES NFR BLD: 1.1 % (ref 0–0.5)
INR PPP: 2.2 (ref 0.9–1.1)
LYMPHOCYTES # BLD AUTO: 1.28 10*3/MM3 (ref 1–3.5)
LYMPHOCYTES NFR BLD AUTO: 27.7 % (ref 20–49)
MCH RBC QN AUTO: 33.9 PG (ref 27–33)
MCHC RBC AUTO-ENTMCNC: 33.2 G/DL (ref 32–35)
MCV RBC AUTO: 102 FL (ref 83–97)
MONOCYTES # BLD AUTO: 0.42 10*3/MM3 (ref 0.25–0.8)
MONOCYTES NFR BLD AUTO: 9.1 % (ref 4–12)
NEUTROPHILS # BLD AUTO: 2.56 10*3/MM3 (ref 1.5–7)
NEUTROPHILS NFR BLD AUTO: 55.4 % (ref 39–75)
NRBC BLD MANUAL-RTO: 0 /100 WBC (ref 0–0)
PLATELET # BLD AUTO: 78 10*3/MM3 (ref 150–375)
PMV BLD AUTO: 11.7 FL (ref 8.9–12.1)
PROTHROMBIN TIME: 26.1 SECONDS (ref 11–13.5)
RBC # BLD AUTO: 2.54 10*6/MM3 (ref 4.3–5.5)
WBC NRBC COR # BLD: 4.62 10*3/MM3 (ref 4–10)

## 2017-01-26 PROCEDURE — 85610 PROTHROMBIN TIME: CPT

## 2017-01-26 PROCEDURE — 36415 COLL VENOUS BLD VENIPUNCTURE: CPT

## 2017-01-26 PROCEDURE — 85025 COMPLETE CBC W/AUTO DIFF WBC: CPT

## 2017-01-26 PROCEDURE — 99213 OFFICE O/P EST LOW 20 MIN: CPT | Performed by: INTERNAL MEDICINE

## 2017-01-26 RX ORDER — TAMSULOSIN HYDROCHLORIDE 0.4 MG/1
CAPSULE ORAL
COMMUNITY
Start: 2017-01-06 | End: 2017-01-26

## 2017-01-26 NOTE — PROGRESS NOTES
REASONS FOR FOLLOW-UP   1.  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  Multifactorial anemia/thrombocytopenia   3.  Monoclonal gammopathy of undetermined significance: This was further evaluated with a bone        marrow biopsy from the right iliac crest showing a normal cellular marrow with trilineage         hematopoiesis no evidence of myelodysplastic syndrome or plasma cell dyscrasia and markedly        increased storage iron with sideroblastic iron suggestive of chronic disease. [place] red stains were                   negative for amyloid deposition and he also had a fat pad biopsy negative for amyloid deposition.    4.  Recurrent GI bleeding  (most recently Dec 2016--ascending colonic ulcer)      History of Present Illness    Mr. Pulido returns today doing reasonably well.  He has noted no recent blood in the stool and his hemoglobin has been stable between 8.5 and 9.0.  He has chronic fatigue.  He has mild orthopnea and lower extremity swelling which are stable.  Complains of occasional hard stool.  His performance status is 3 and essentially total care of his wife.    Past Medical History, Past Surgical History, Social History, Family History have been reviewed and are without significant changes except as mentioned.    Review of Systems   Constitutional: Positive for fatigue. Negative for activity change, fever and unexpected weight change.   Respiratory: Negative for chest tightness and shortness of breath.    Cardiovascular: Negative for chest pain and leg swelling.   Gastrointestinal: Positive for constipation. Negative for abdominal distention, abdominal pain, anal bleeding, blood in stool and nausea.   Endocrine: Negative for polyuria.   Genitourinary: Negative for frequency and hematuria.   Musculoskeletal: Positive for arthralgias.  "  Skin: Negative for pallor and rash.   Neurological: Positive for headaches. Negative for seizures and light-headedness.   Hematological: Does not bruise/bleed easily.      A comprehensive 14 point review of systems was performed and was negative except as mentioned.    Medications:  The current medication list was reviewed in the EMR    ALLERGIES:    Allergies   Allergen Reactions   • Morphine    • Morphine And Related Nausea Only       Objective      Vitals:    01/26/17 1003   BP: 122/60   Pulse: 81   Resp: 18   Temp: 98.4 °F (36.9 °C)   TempSrc: Oral   SpO2: 98%   Weight: Comment: PT CANNOT WEIGH, IN MOTORIZED CHAIR.   Height: 74\" (188 cm)   PainSc:   5     Current Status 1/26/2017   ECOG score 3       Physical Exam   Constitutional: He appears well-developed.   Somewhat chronically ill-appearing; seated in motorized scooter   Cardiovascular: Normal rate and regular rhythm.    Murmur heard.  Pulmonary/Chest: Effort normal and breath sounds normal. No respiratory distress. He has no wheezes.   Abdominal: Soft.   Musculoskeletal: He exhibits no edema.   Limited ROM   Lymphadenopathy:     He has no cervical adenopathy.   Skin: Skin is warm and dry. No rash noted.          RECENT LABS:  Hematology WBC   Date Value Ref Range Status   01/26/2017 4.62 4.00 - 10.00 10*3/mm3 Final   11/17/2015 8.58 4.50 - 10.70 K/Cumm Final     RBC   Date Value Ref Range Status   01/26/2017 2.54 (L) 4.30 - 5.50 10*6/mm3 Final   11/17/2015 3.14 (L) 4.60 - 6.00 Million Final     HEMOGLOBIN   Date Value Ref Range Status   01/26/2017 8.6 (L) 13.5 - 16.5 g/dL Final   11/17/2015 10.5 (L) 13.7 - 17.6 g/dL Final     HEMATOCRIT   Date Value Ref Range Status   01/26/2017 25.9 (L) 40.0 - 49.0 % Final   03/21/2016 23.9 (L) 37.5 - 51.0 % Final     PLATELETS   Date Value Ref Range Status   01/26/2017 78 (L) 150 - 375 10*3/mm3 Final   11/17/2015 117 (L) 140 - 500 K/Cumm Final      INR 2.2    Assessment/Plan     1.  Antiphospholipid antibody syndrome on " chronic anticoagulation with goal INR 2.0-2.5.  The patient has experienced GI bleeding with higher INR in the past.  Antiplatelets are currently on hold because of GI bleeding.  He does have history of recurrent DVT, cerebrovascular accidents, and thrombocytopenia as a result of his APLS.  INR today therapeutic 2.2 and we will continue with no dose adjustments and INR check every 2 weeks.    2.  Chronic anemia and thrombocytopenia multifactorial secondary to chronic disease state, antiphospholipid antibody syndrome, GI bleeding etc.  His CBC including hemoglobin and platelets were stable today.  I have requested a  CBC every 2 weeks for monitoring given his history of GI blood loss.    3.  History of MGUS with negative bone marrow biopsy October 2015 showing no plasmacytosis fibrosis or myelodysplastic changes.  He had increased sideroblastic iron of chronic disease state.  Paraprotein studies stable august 2016.      4.  I recommended a stool softener for constipation    5.  He will follow-up every 2 weeks with nurse review for CBC and INR adjustment and three-month M.D. visit                1/26/2017      CC:

## 2017-02-08 ENCOUNTER — ANTICOAGULATION VISIT (OUTPATIENT)
Dept: LAB | Facility: HOSPITAL | Age: 72
End: 2017-02-08

## 2017-02-08 ENCOUNTER — CLINICAL SUPPORT (OUTPATIENT)
Dept: ONCOLOGY | Facility: HOSPITAL | Age: 72
End: 2017-02-08

## 2017-02-08 DIAGNOSIS — D50.0 IRON DEFICIENCY ANEMIA DUE TO CHRONIC BLOOD LOSS: ICD-10-CM

## 2017-02-08 DIAGNOSIS — D61.818 PANCYTOPENIA (HCC): ICD-10-CM

## 2017-02-08 DIAGNOSIS — D68.61 ANTIPHOSPHOLIPID ANTIBODY WITH HYPERCOAGULABLE STATE (HCC): ICD-10-CM

## 2017-02-08 LAB
BASOPHILS # BLD AUTO: 0.03 10*3/MM3 (ref 0–0.1)
BASOPHILS NFR BLD AUTO: 0.8 % (ref 0–1.1)
DEPRECATED RDW RBC AUTO: 66.2 FL (ref 37–49)
EOSINOPHIL # BLD AUTO: 0.17 10*3/MM3 (ref 0–0.36)
EOSINOPHIL NFR BLD AUTO: 4.3 % (ref 1–5)
ERYTHROCYTE [DISTWIDTH] IN BLOOD BY AUTOMATED COUNT: 17.3 % (ref 11.7–14.5)
HCT VFR BLD AUTO: 25 % (ref 40–49)
HGB BLD-MCNC: 8.2 G/DL (ref 13.5–16.5)
IMM GRANULOCYTES # BLD: 0.08 10*3/MM3 (ref 0–0.03)
IMM GRANULOCYTES NFR BLD: 2 % (ref 0–0.5)
INR PPP: 1.9 (ref 0.9–1.1)
LYMPHOCYTES # BLD AUTO: 1.27 10*3/MM3 (ref 1–3.5)
LYMPHOCYTES NFR BLD AUTO: 31.9 % (ref 20–49)
MCH RBC QN AUTO: 33.7 PG (ref 27–33)
MCHC RBC AUTO-ENTMCNC: 32.8 G/DL (ref 32–35)
MCV RBC AUTO: 102.9 FL (ref 83–97)
MONOCYTES # BLD AUTO: 0.48 10*3/MM3 (ref 0.25–0.8)
MONOCYTES NFR BLD AUTO: 12.1 % (ref 4–12)
NEUTROPHILS # BLD AUTO: 1.95 10*3/MM3 (ref 1.5–7)
NEUTROPHILS NFR BLD AUTO: 48.9 % (ref 39–75)
NRBC BLD MANUAL-RTO: 0.5 /100 WBC (ref 0–0)
PLATELET # BLD AUTO: 76 10*3/MM3 (ref 150–375)
PMV BLD AUTO: 11.9 FL (ref 8.9–12.1)
PROTHROMBIN TIME: 22.7 SECONDS (ref 11–13.5)
RBC # BLD AUTO: 2.43 10*6/MM3 (ref 4.3–5.5)
WBC NRBC COR # BLD: 3.98 10*3/MM3 (ref 4–10)

## 2017-02-08 PROCEDURE — 36415 COLL VENOUS BLD VENIPUNCTURE: CPT

## 2017-02-08 PROCEDURE — 85610 PROTHROMBIN TIME: CPT

## 2017-02-08 PROCEDURE — 85025 COMPLETE CBC W/AUTO DIFF WBC: CPT

## 2017-02-08 NOTE — PROGRESS NOTES
Pt's INR 1.9 today. Pt confirms previous dosing. No missed doses or new medication. Placed in SS, per SS, pt should take same dosing of 5mg Wed, 2.5mg all other days. Reviewed this with Dr. Ambrocio who agreed with dosing.     CBC reviewed with pt and wife. Hgb 8.2 today. Pt denies any increased SOA or fatigue. Denies wanting a blood transfusion. Reviewed with Dr. Ambrocio. No new orders.    Pt would like us to refer him to MD for his arthritis. Per Dr. Ambrocio, pt needs to be referred through his PCP. Informed pt and he v/u.

## 2017-02-22 ENCOUNTER — TELEPHONE (OUTPATIENT)
Dept: ONCOLOGY | Facility: HOSPITAL | Age: 72
End: 2017-02-22

## 2017-02-22 ENCOUNTER — ANTICOAGULATION VISIT (OUTPATIENT)
Dept: LAB | Facility: HOSPITAL | Age: 72
End: 2017-02-22

## 2017-02-22 ENCOUNTER — CLINICAL SUPPORT (OUTPATIENT)
Dept: ONCOLOGY | Facility: HOSPITAL | Age: 72
End: 2017-02-22

## 2017-02-22 DIAGNOSIS — D50.0 IRON DEFICIENCY ANEMIA DUE TO CHRONIC BLOOD LOSS: ICD-10-CM

## 2017-02-22 DIAGNOSIS — D61.818 PANCYTOPENIA (HCC): ICD-10-CM

## 2017-02-22 DIAGNOSIS — D68.61 ANTIPHOSPHOLIPID ANTIBODY WITH HYPERCOAGULABLE STATE (HCC): ICD-10-CM

## 2017-02-22 LAB
BASOPHILS # BLD AUTO: 0.03 10*3/MM3 (ref 0–0.1)
BASOPHILS NFR BLD AUTO: 0.7 % (ref 0–1.1)
DEPRECATED RDW RBC AUTO: 65 FL (ref 37–49)
EOSINOPHIL # BLD AUTO: 0.16 10*3/MM3 (ref 0–0.36)
EOSINOPHIL NFR BLD AUTO: 3.7 % (ref 1–5)
ERYTHROCYTE [DISTWIDTH] IN BLOOD BY AUTOMATED COUNT: 17.1 % (ref 11.7–14.5)
HCT VFR BLD AUTO: 26.3 % (ref 40–49)
HGB BLD-MCNC: 8.4 G/DL (ref 13.5–16.5)
IMM GRANULOCYTES # BLD: 0.06 10*3/MM3 (ref 0–0.03)
IMM GRANULOCYTES NFR BLD: 1.4 % (ref 0–0.5)
INR PPP: 2.1 (ref 0.9–1.1)
LYMPHOCYTES # BLD AUTO: 1.15 10*3/MM3 (ref 1–3.5)
LYMPHOCYTES NFR BLD AUTO: 26.9 % (ref 20–49)
MCH RBC QN AUTO: 33.2 PG (ref 27–33)
MCHC RBC AUTO-ENTMCNC: 31.9 G/DL (ref 32–35)
MCV RBC AUTO: 104 FL (ref 83–97)
MONOCYTES # BLD AUTO: 0.54 10*3/MM3 (ref 0.25–0.8)
MONOCYTES NFR BLD AUTO: 12.6 % (ref 4–12)
NEUTROPHILS # BLD AUTO: 2.33 10*3/MM3 (ref 1.5–7)
NEUTROPHILS NFR BLD AUTO: 54.7 % (ref 39–75)
NRBC BLD MANUAL-RTO: 0 /100 WBC (ref 0–0)
PLATELET # BLD AUTO: 76 10*3/MM3 (ref 150–375)
PMV BLD AUTO: 11.5 FL (ref 8.9–12.1)
PROTHROMBIN TIME: 24.8 SECONDS (ref 11–13.5)
RBC # BLD AUTO: 2.53 10*6/MM3 (ref 4.3–5.5)
WBC NRBC COR # BLD: 4.27 10*3/MM3 (ref 4–10)

## 2017-02-22 PROCEDURE — 36415 COLL VENOUS BLD VENIPUNCTURE: CPT

## 2017-02-22 PROCEDURE — 85025 COMPLETE CBC W/AUTO DIFF WBC: CPT

## 2017-02-22 PROCEDURE — 85610 PROTHROMBIN TIME: CPT

## 2017-02-22 NOTE — TELEPHONE ENCOUNTER
Patient had asked earlier if we could cut his appts back to monthly.  Per Dr. Ambrocio, he needs to keep his appts at every 2 weeks for now due to his many complications with GI bleed and being on coumadin.  Called and left message for patient with info.

## 2017-02-22 NOTE — PROGRESS NOTES
INR 2.1 today.  No changes per SS.  Hgb 8.4.  Transfusion not needed.  Platelets 76k.  Patient does not report any changes in status or bleeding.  He is requesting to return on a monthly basis rather than every 2 weeks.  He feels like this is a waste of time because we usually don't have to change anything.  Message sent to Dr. Ambrocio and will wait for his answer.

## 2017-03-01 ENCOUNTER — TELEPHONE (OUTPATIENT)
Dept: FAMILY MEDICINE CLINIC | Facility: CLINIC | Age: 72
End: 2017-03-01

## 2017-03-01 RX ORDER — OXYCODONE HYDROCHLORIDE AND ACETAMINOPHEN 5; 325 MG/1; MG/1
1 TABLET ORAL EVERY 6 HOURS PRN
Qty: 30 TABLET | Refills: 0 | Status: SHIPPED | OUTPATIENT
Start: 2017-03-01 | End: 2017-04-06

## 2017-03-02 RX ORDER — CARVEDILOL 6.25 MG/1
TABLET ORAL
Qty: 180 TABLET | Refills: 0 | Status: SHIPPED | OUTPATIENT
Start: 2017-03-02 | End: 2017-01-01 | Stop reason: ALTCHOICE

## 2017-03-08 ENCOUNTER — CLINICAL SUPPORT (OUTPATIENT)
Dept: ONCOLOGY | Facility: HOSPITAL | Age: 72
End: 2017-03-08

## 2017-03-08 ENCOUNTER — ANTICOAGULATION VISIT (OUTPATIENT)
Dept: LAB | Facility: HOSPITAL | Age: 72
End: 2017-03-08

## 2017-03-08 DIAGNOSIS — D68.61 ANTIPHOSPHOLIPID ANTIBODY WITH HYPERCOAGULABLE STATE (HCC): ICD-10-CM

## 2017-03-08 DIAGNOSIS — D50.0 IRON DEFICIENCY ANEMIA DUE TO CHRONIC BLOOD LOSS: ICD-10-CM

## 2017-03-08 DIAGNOSIS — D61.818 PANCYTOPENIA (HCC): ICD-10-CM

## 2017-03-08 LAB
BASOPHILS # BLD AUTO: 0.03 10*3/MM3 (ref 0–0.1)
BASOPHILS NFR BLD AUTO: 0.8 % (ref 0–1.1)
DEPRECATED RDW RBC AUTO: 65.3 FL (ref 37–49)
EOSINOPHIL # BLD AUTO: 0.2 10*3/MM3 (ref 0–0.36)
EOSINOPHIL NFR BLD AUTO: 5.6 % (ref 1–5)
ERYTHROCYTE [DISTWIDTH] IN BLOOD BY AUTOMATED COUNT: 17.5 % (ref 11.7–14.5)
HCT VFR BLD AUTO: 26.1 % (ref 40–49)
HGB BLD-MCNC: 8.6 G/DL (ref 13.5–16.5)
IMM GRANULOCYTES # BLD: 0.02 10*3/MM3 (ref 0–0.03)
IMM GRANULOCYTES NFR BLD: 0.6 % (ref 0–0.5)
INR PPP: 2.5 (ref 0.9–1.1)
LYMPHOCYTES # BLD AUTO: 1.21 10*3/MM3 (ref 1–3.5)
LYMPHOCYTES NFR BLD AUTO: 34 % (ref 20–49)
MCH RBC QN AUTO: 33.5 PG (ref 27–33)
MCHC RBC AUTO-ENTMCNC: 33 G/DL (ref 32–35)
MCV RBC AUTO: 101.6 FL (ref 83–97)
MONOCYTES # BLD AUTO: 0.48 10*3/MM3 (ref 0.25–0.8)
MONOCYTES NFR BLD AUTO: 13.5 % (ref 4–12)
NEUTROPHILS # BLD AUTO: 1.62 10*3/MM3 (ref 1.5–7)
NEUTROPHILS NFR BLD AUTO: 45.5 % (ref 39–75)
NRBC BLD MANUAL-RTO: 0 /100 WBC (ref 0–0)
PLATELET # BLD AUTO: 79 10*3/MM3 (ref 150–375)
PMV BLD AUTO: 11.1 FL (ref 8.9–12.1)
PROTHROMBIN TIME: 29.4 SECONDS (ref 11–13.5)
RBC # BLD AUTO: 2.57 10*6/MM3 (ref 4.3–5.5)
WBC NRBC COR # BLD: 3.56 10*3/MM3 (ref 4–10)

## 2017-03-08 PROCEDURE — 85025 COMPLETE CBC W/AUTO DIFF WBC: CPT

## 2017-03-08 PROCEDURE — 36415 COLL VENOUS BLD VENIPUNCTURE: CPT

## 2017-03-08 PROCEDURE — 85610 PROTHROMBIN TIME: CPT

## 2017-03-08 NOTE — PROGRESS NOTES
Pt's INR 2.5 today. Pt confirmed previous dosing. Placed in SS and he will now stay on the same dosing as previously prescribed, 5mg Wed, 2.5mg all other days.     Pt complained of periodic nose bleeds. They are easy to stop and do not happen very often. Pt also had a blood vessel in his eye that burst over a week ago. His left eye still appears red with some mild bruising on the outer corner. Pt's wife states it is looking better. No vision changes reported.    CBC reviewed with pt. Hgb improved to 8.6, Plts improved to 79K. Advised pt to call us if his nose bleeds become more frequent or he cannot get them to stop easily. Also, he should call us if his eye becomes more reddened, bruised or his vision becomes blurry. He v/u. Reviewed this with Chen Mccann NP who agreed with recommendations.

## 2017-03-09 ENCOUNTER — TELEPHONE (OUTPATIENT)
Dept: FAMILY MEDICINE CLINIC | Facility: CLINIC | Age: 72
End: 2017-03-09

## 2017-03-09 NOTE — TELEPHONE ENCOUNTER
Pt wife informed we did receive the forms and I faxed them over today 3-9      ----- Message from Ivelisse Cruz sent at 3/9/2017 10:08 AM EST -----  Contact: [phone]  PT IS TRYING TO SEE IF YOU RECEIVED A FORM FOR A POWERCHAIR. PLEASE CALL

## 2017-03-10 ENCOUNTER — OFFICE VISIT (OUTPATIENT)
Dept: CARDIOLOGY | Facility: CLINIC | Age: 72
End: 2017-03-10

## 2017-03-10 VITALS — DIASTOLIC BLOOD PRESSURE: 70 MMHG | SYSTOLIC BLOOD PRESSURE: 138 MMHG | HEART RATE: 78 BPM | HEIGHT: 74 IN

## 2017-03-10 DIAGNOSIS — I48.20 CHRONIC ATRIAL FIBRILLATION (HCC): Primary | ICD-10-CM

## 2017-03-10 PROCEDURE — 99214 OFFICE O/P EST MOD 30 MIN: CPT | Performed by: INTERNAL MEDICINE

## 2017-03-10 PROCEDURE — 93000 ELECTROCARDIOGRAM COMPLETE: CPT | Performed by: INTERNAL MEDICINE

## 2017-03-10 RX ORDER — TORSEMIDE 20 MG/1
20 TABLET ORAL 2 TIMES DAILY
Qty: 60 TABLET | Refills: 3 | Status: ON HOLD | OUTPATIENT
Start: 2017-03-10 | End: 2017-03-24

## 2017-03-10 NOTE — PROGRESS NOTES
Emil Pulido  1945  Date of Office Visit: 03/10/2017  Encounter Provider: Otto Harris MD  Place of Service: [facility] CARDIOLOGY    CHIEF COMPLAINT:   1. Persistent atrial fibrillation.  2. Hypertension.  3. Chronic diastolic heart failure.  4. Hypercoagulable state.  5. Left ventricular hypertrophy.  6. Moderate tricuspid valve regurgitation.    History of Present Illness:  Dr. Avery,   I had the pleasure of seeing Mr. Pulido back in follow-up today. As you well know, he is a 72 y.o. male with a documented history of persistent atrial fibrillation, chronic diastolic heart failure, CVA, hypertension, diabetes, moderate tricuspid regurgitation, DVT, angiodysplasia s/p clips and hypercoagulable state/APS.  He had issues in December with GI bleeding and required transfusion along with cauterization.  Since that time his hematocrit has been stable and he has continued on Coumadin therapy. About two weeks ago he ran out of his torsemide therapy and instead of giving us a call for refills, he actually switched over to Bumex therapy.  He states that this does not work as well for h im.  During that period of time he has noticed increased lower extremity edema to where it is moderate to severe in intensity.  He also is having occasional orthopnea and has scrotal swelling.  He feels like his breathing is worse than it has been prior.      In addition, his wife states that when he takes his medications in the morning, within one hour he will fall asleep and she feels this is secondary to his carvedilol therapy.  He takes his Neurontin along with his Effexor at night and this would not be playing a significant role in her opinion.      No chest pain.  No bright red blood per stool or melena.               Review of Systems   Constitution: Positive for malaise/fatigue. Negative for fever and weakness.   HENT: Positive for headaches, hearing loss and nosebleeds. Negative for  sore throat.    Eyes: Negative for blurred vision and double vision.   Cardiovascular: Negative for chest pain, claudication, palpitations and syncope.   Respiratory: Negative for cough, shortness of breath and snoring.    Endocrine: Negative for cold intolerance, heat intolerance and polydipsia.   Skin: Negative for itching, poor wound healing and rash.   Musculoskeletal: Negative for joint pain, joint swelling, muscle weakness and myalgias.   Gastrointestinal: Negative for abdominal pain, melena, nausea and vomiting.   Neurological: Negative for light-headedness, loss of balance, seizures and vertigo.   Psychiatric/Behavioral: Positive for depression. Negative for altered mental status.          Past Medical History   Diagnosis Date   • Antiphospholipid antibody with hypercoagulable state    • Arthritis    • Atrial fibrillation    • Back pain    • Carotid artery disease    • Cellulitis 09/2015     Left leg   • CHF (congestive heart failure)    • Deep vein thrombosis of lower extremity    • Diabetes mellitus    • Eye abnormalities      pt has bleeding behind eyes   • Factor 5 Leiden mutation, heterozygous    • Hematoma      LARGE THIGH HEMATOMA   • History of transfusion    • Hypertension    • Hypertensive heart disease    • Monoclonal gammopathy of undetermined significance    • Peripheral vascular disease    • Sick sinus syndrome    • Sleep apnea    • Stroke 08/2015       The following portions of the patient's history were reviewed and updated as appropriate: Social history , Family history and Surgical history     Current Outpatient Prescriptions on File Prior to Visit   Medication Sig Dispense Refill   • carvedilol (COREG) 6.25 MG tablet TAKE ONE TABLET BY MOUTH TWICE A DAY WITH MEALS 180 tablet 0   • Cholecalciferol (VITAMIN D3) 3000 UNITS tablet Take  by mouth Daily.     • Ferrous Gluconate 325 (36 FE) MG tablet Take 300 mg by mouth Daily.     • gabapentin (NEURONTIN) 300 MG capsule Take 300 mg by mouth  "Every Night.     • insulin lispro protamine-insulin lispro (HumaLOG 50-50) (50-50) 100 UNIT/ML suspension injection Inject 40 Units under the skin 2 (Two) Times a Day With Meals. 40 units at bedtime     • mupirocin (BACTROBAN) 2 % cream Apply  topically 3 (three) times a day. 30 g 3   • oxyCODONE-acetaminophen (PERCOCET) 5-325 MG per tablet Take 1 tablet by mouth Every 6 (Six) Hours As Needed for severe pain (7-10). 30 tablet 0   • potassium chloride (K-DUR,KLOR-CON) 20 MEQ CR tablet Take 1 tablet by mouth 2 (Two) Times a Day for 30 days. 60 tablet 3   • torsemide (DEMADEX) 20 MG tablet Take 1 tablet by mouth Daily. 60 tablet 0   • venlafaxine XR (EFFEXOR-XR) 75 MG 24 hr capsule Take 75 mg by mouth Every Night.     • vitamin C (ASCORBIC ACID) 500 MG tablet Take 500 mg by mouth daily.     • warfarin (COUMADIN) 2.5 MG tablet Take 2 tablets by mouth Daily for 30 days. Wednesday (Patient taking differently: Take 2.5 mg by mouth Daily. Hold Wed and Thur- Fri and Sat 2.5 Sunday 5mg- Monday 2.5mg Tuesday 5mg) 30 tablet 0   • warfarin (COUMADIN) 5 MG tablet Take 5 mg by mouth Daily. Tuesday, Wednesday, Thursday, Saturday, Sunday       No current facility-administered medications on file prior to visit.        Allergies   Allergen Reactions   • Morphine    • Morphine And Related Nausea Only       Vitals:    03/10/17 1118   Height: 74\" (188 cm)     Physical Exam   Constitutional: He is oriented to person, place, and time. He appears well-developed and well-nourished.   In wheelchair   HENT:   Head: Normocephalic and atraumatic.   Eyes: Conjunctivae and EOM are normal. No scleral icterus.   Neck: Normal range of motion. Neck supple. Normal carotid pulses, no hepatojugular reflux and no JVD present. Carotid bruit is not present. No tracheal deviation present. No thyromegaly present.   Cardiovascular: Normal rate.  An irregularly irregular rhythm present. Exam reveals no gallop and no friction rub.    No murmur heard.  Pulses:   "     Carotid pulses are 2+ on the right side, and 2+ on the left side.       Radial pulses are 2+ on the right side, and 2+ on the left side.        Femoral pulses are 2+ on the right side, and 2+ on the left side.       Dorsalis pedis pulses are 2+ on the right side, and 2+ on the left side.        Posterior tibial pulses are 2+ on the right side, and 2+ on the left side.   Pulmonary/Chest: Breath sounds normal. No respiratory distress. He has no decreased breath sounds. He has no wheezes. He has no rhonchi. He has no rales. He exhibits no tenderness.   Abdominal: Soft. Bowel sounds are normal. He exhibits no distension. There is no tenderness. There is no rebound.   Musculoskeletal: He exhibits edema (2+ bilateral LE). He exhibits no deformity.   Neurological: He is alert and oriented to person, place, and time. He has normal strength. No sensory deficit.   Skin: No rash noted. No erythema.   Psychiatric: He has a normal mood and affect. His behavior is normal.       No components found for: CBC  No results found for: CMP  No components found for: LIPID  No results found for: BMP      ECG 12 Lead  Date/Time: 3/10/2017 11:52 AM  Performed by: JONATHAN MONGE  Authorized by: JONATHAN MONGE   Comparison: compared with previous ECG from 12/9/2016  Similar to previous ECG  Rhythm: atrial fibrillation  Clinical impression: low voltage               Echocardiogram 11/17/16:  · Left ventricular function is normal. Calculated EF = 44.8%. Estimated EF was in disagreement with the calculated EF. Estimated EF appears to be in the range of 51 - 55%. Normal left ventricular cavity size and wall thickness noted. All left ventricular wall segments contract normally. Left ventricular diastolic function was unable to be assessed. Elevated left atrial pressure. There is no evidence of a left ventricular thrombus present.  · Saline test results are negative.  · Moderate tricuspid valve regurgitation is present. Estimated  right ventricular systolic pressure from tricuspid regurgitation is mildly elevated (35-45 mmHg).  · There is a trivial pericardial effusion adjacent to the right atrium.      DISCUSSION/SUMMARY  72-year-old gentleman with a medical history as documented above including chronic atrial fibrillation, chronic diastolic heart failure, CVA, hypercoagulable state, hypertension, moderate tricuspid valve regurgitation, bilateral chronic DVTs and venous insufficiency, along with prior GI bleeds with AVM who presents back for f/u. His afib is rate controlled and he continues on Coumadin.  He has not been on his torsemide therapy as prescribed and is now mildly volume overloaded.        1.  Chronic diastolic heart failure.   -Continue with blood pressure and ventricular rate control. No change in that management  -Overall poor oral oral now volume overloaded.  I have recommended torsemide twice a day for the first week and then giving me a call.  Will likely move back to once daily at that time.  -He should also follow up with his renal physician.  -Wrap legs daily along with elevation.  -I would not use his legs solely as our evaluation for volume overload. He has bilateral DVTs throughout his legs from femoral down and he will always have chronic lower extremity edema due to  that.   2. Atrial fibrillation; chronic. Continue coumadin therapy per CBC.  I will decrease his carvedilol to 3.125 mg in the morning and continued to 6.25 mg in the evening.  Hopefully this will help with his fatigue.  3. Deep venous thrombosis bilaterally. Continue compression stockings and is on anticoagulation due to his hypercoagulable state and previous DVTs.       Atrial Fibrillation and Atrial Flutter  Assessment  • The patient has persistent atrial fibrillation  • This is non-valvular in etiology  • The patient's CHADS2-VASc score is 5  • A QIB2EL3-KOCs score of 2 or more is considered a high risk for a thromboembolic event  • Warfarin  prescribed    Plan  • Continue in atrial fibrillation with rate control  • Continue warfarin for antithrombotic therapy, bleeding issues discussed  • Continue beta blocker for rate control

## 2017-03-17 ENCOUNTER — APPOINTMENT (OUTPATIENT)
Dept: GENERAL RADIOLOGY | Facility: HOSPITAL | Age: 72
End: 2017-03-17

## 2017-03-17 ENCOUNTER — HOSPITAL ENCOUNTER (INPATIENT)
Facility: HOSPITAL | Age: 72
LOS: 7 days | Discharge: NURSING, REHAB OR LONG-TERM CARE | End: 2017-03-24
Attending: EMERGENCY MEDICINE | Admitting: INTERNAL MEDICINE

## 2017-03-17 ENCOUNTER — TELEPHONE (OUTPATIENT)
Dept: CARDIOLOGY | Facility: CLINIC | Age: 72
End: 2017-03-17

## 2017-03-17 DIAGNOSIS — I50.9 CONGESTIVE HEART FAILURE, UNSPECIFIED CONGESTIVE HEART FAILURE CHRONICITY, UNSPECIFIED CONGESTIVE HEART FAILURE TYPE: Primary | ICD-10-CM

## 2017-03-17 LAB
ALBUMIN SERPL-MCNC: 3.2 G/DL (ref 3.5–5.2)
ALBUMIN/GLOB SERPL: 0.6 G/DL
ALP SERPL-CCNC: 252 U/L (ref 39–117)
ALT SERPL W P-5'-P-CCNC: 15 U/L (ref 1–41)
ANION GAP SERPL CALCULATED.3IONS-SCNC: 11 MMOL/L
AST SERPL-CCNC: 35 U/L (ref 1–40)
BASOPHILS # BLD AUTO: 0.03 10*3/MM3 (ref 0–0.2)
BASOPHILS NFR BLD AUTO: 0.7 % (ref 0–1.5)
BILIRUB SERPL-MCNC: 2.2 MG/DL (ref 0.1–1.2)
BUN BLD-MCNC: 33 MG/DL (ref 8–23)
BUN/CREAT SERPL: 40.2 (ref 7–25)
CALCIUM SPEC-SCNC: 8.9 MG/DL (ref 8.6–10.5)
CHLORIDE SERPL-SCNC: 102 MMOL/L (ref 98–107)
CO2 SERPL-SCNC: 25 MMOL/L (ref 22–29)
CREAT BLD-MCNC: 0.82 MG/DL (ref 0.76–1.27)
DEPRECATED RDW RBC AUTO: 69 FL (ref 37–54)
EOSINOPHIL # BLD AUTO: 0.22 10*3/MM3 (ref 0–0.7)
EOSINOPHIL NFR BLD AUTO: 5 % (ref 0.3–6.2)
ERYTHROCYTE [DISTWIDTH] IN BLOOD BY AUTOMATED COUNT: 18.3 % (ref 11.5–14.5)
GFR SERPL CREATININE-BSD FRML MDRD: 93 ML/MIN/1.73
GLOBULIN UR ELPH-MCNC: 5 GM/DL
GLUCOSE BLD-MCNC: 229 MG/DL (ref 65–99)
HCT VFR BLD AUTO: 25.9 % (ref 40.4–52.2)
HGB BLD-MCNC: 8.4 G/DL (ref 13.7–17.6)
HOLD SPECIMEN: NORMAL
HOLD SPECIMEN: NORMAL
IMM GRANULOCYTES # BLD: 0 10*3/MM3 (ref 0–0.03)
IMM GRANULOCYTES NFR BLD: 0 % (ref 0–0.5)
INR PPP: 2.86 (ref 0.9–1.1)
LYMPHOCYTES # BLD AUTO: 0.94 10*3/MM3 (ref 0.9–4.8)
LYMPHOCYTES NFR BLD AUTO: 21.5 % (ref 19.6–45.3)
MAGNESIUM SERPL-MCNC: 1.9 MG/DL (ref 1.6–2.4)
MCH RBC QN AUTO: 33.3 PG (ref 27–32.7)
MCHC RBC AUTO-ENTMCNC: 32.4 G/DL (ref 32.6–36.4)
MCV RBC AUTO: 102.8 FL (ref 79.8–96.2)
MONOCYTES # BLD AUTO: 0.56 10*3/MM3 (ref 0.2–1.2)
MONOCYTES NFR BLD AUTO: 12.8 % (ref 5–12)
NEUTROPHILS # BLD AUTO: 2.62 10*3/MM3 (ref 1.9–8.1)
NEUTROPHILS NFR BLD AUTO: 60 % (ref 42.7–76)
NT-PROBNP SERPL-MCNC: 3080 PG/ML (ref 5–900)
PLATELET # BLD AUTO: 80 10*3/MM3 (ref 140–500)
PMV BLD AUTO: 11.3 FL (ref 6–12)
POTASSIUM BLD-SCNC: 4.3 MMOL/L (ref 3.5–5.2)
PROT SERPL-MCNC: 8.2 G/DL (ref 6–8.5)
PROTHROMBIN TIME: 29.1 SECONDS (ref 11.7–14.2)
RBC # BLD AUTO: 2.52 10*6/MM3 (ref 4.6–6)
SODIUM BLD-SCNC: 138 MMOL/L (ref 136–145)
TROPONIN T SERPL-MCNC: 0.04 NG/ML (ref 0–0.03)
WBC NRBC COR # BLD: 4.37 10*3/MM3 (ref 4.5–10.7)
WHOLE BLOOD HOLD SPECIMEN: NORMAL
WHOLE BLOOD HOLD SPECIMEN: NORMAL

## 2017-03-17 PROCEDURE — 83735 ASSAY OF MAGNESIUM: CPT | Performed by: PHYSICIAN ASSISTANT

## 2017-03-17 PROCEDURE — 85610 PROTHROMBIN TIME: CPT | Performed by: PHYSICIAN ASSISTANT

## 2017-03-17 PROCEDURE — 84484 ASSAY OF TROPONIN QUANT: CPT | Performed by: PHYSICIAN ASSISTANT

## 2017-03-17 PROCEDURE — 99285 EMERGENCY DEPT VISIT HI MDM: CPT

## 2017-03-17 PROCEDURE — 83880 ASSAY OF NATRIURETIC PEPTIDE: CPT | Performed by: PHYSICIAN ASSISTANT

## 2017-03-17 PROCEDURE — 93005 ELECTROCARDIOGRAM TRACING: CPT | Performed by: PHYSICIAN ASSISTANT

## 2017-03-17 PROCEDURE — 85610 PROTHROMBIN TIME: CPT | Performed by: INTERNAL MEDICINE

## 2017-03-17 PROCEDURE — 99222 1ST HOSP IP/OBS MODERATE 55: CPT | Performed by: INTERNAL MEDICINE

## 2017-03-17 PROCEDURE — 80053 COMPREHEN METABOLIC PANEL: CPT | Performed by: PHYSICIAN ASSISTANT

## 2017-03-17 PROCEDURE — 71010 HC CHEST PA OR AP: CPT

## 2017-03-17 PROCEDURE — 85025 COMPLETE CBC W/AUTO DIFF WBC: CPT | Performed by: PHYSICIAN ASSISTANT

## 2017-03-17 RX ORDER — POTASSIUM CHLORIDE 750 MG/1
40 CAPSULE, EXTENDED RELEASE ORAL AS NEEDED
Status: DISCONTINUED | OUTPATIENT
Start: 2017-03-17 | End: 2017-03-24 | Stop reason: HOSPADM

## 2017-03-17 RX ORDER — METOLAZONE 5 MG/1
5 TABLET ORAL ONCE
Status: DISCONTINUED | OUTPATIENT
Start: 2017-03-18 | End: 2017-03-18

## 2017-03-17 RX ORDER — POTASSIUM CHLORIDE 1.5 G/1.77G
40 POWDER, FOR SOLUTION ORAL AS NEEDED
Status: DISCONTINUED | OUTPATIENT
Start: 2017-03-17 | End: 2017-03-17 | Stop reason: RX

## 2017-03-17 RX ORDER — MAGNESIUM SULFATE HEPTAHYDRATE 40 MG/ML
2 INJECTION, SOLUTION INTRAVENOUS AS NEEDED
Status: DISCONTINUED | OUTPATIENT
Start: 2017-03-17 | End: 2017-03-24 | Stop reason: HOSPADM

## 2017-03-17 RX ORDER — MAGNESIUM SULFATE HEPTAHYDRATE 40 MG/ML
4 INJECTION, SOLUTION INTRAVENOUS AS NEEDED
Status: DISCONTINUED | OUTPATIENT
Start: 2017-03-17 | End: 2017-03-24 | Stop reason: HOSPADM

## 2017-03-17 RX ORDER — BUMETANIDE 0.25 MG/ML
1 INJECTION INTRAMUSCULAR; INTRAVENOUS ONCE
Status: COMPLETED | OUTPATIENT
Start: 2017-03-17 | End: 2017-03-18

## 2017-03-17 RX ORDER — SODIUM CHLORIDE 0.9 % (FLUSH) 0.9 %
10 SYRINGE (ML) INJECTION AS NEEDED
Status: DISCONTINUED | OUTPATIENT
Start: 2017-03-17 | End: 2017-03-24 | Stop reason: HOSPADM

## 2017-03-17 RX ORDER — LOSARTAN POTASSIUM 25 MG/1
12.5 TABLET ORAL
Status: DISCONTINUED | OUTPATIENT
Start: 2017-03-18 | End: 2017-03-24 | Stop reason: HOSPADM

## 2017-03-17 RX ORDER — BUMETANIDE 0.25 MG/ML
2 INJECTION INTRAMUSCULAR; INTRAVENOUS 2 TIMES DAILY
Status: DISCONTINUED | OUTPATIENT
Start: 2017-03-18 | End: 2017-03-18

## 2017-03-17 NOTE — ED PROVIDER NOTES
I supervised care provided by the midlevel provider.    We have discussed this patient's history, physical exam, and treatment plan.   I have reviewed the note and personally saw and examined the patient and agree with the plan of care.    Pt has h/o CHF for which he is on Torsemide. Pt presents to the ED c/o dyspnea onset about 1 month ago, which has been progressively worsening over the past several days. Pt now has BLE edema and scrotal swelling. Pt has taken diuretics without significant relief. Pt denies CP and abd pain. Per family, pt is seen by a hematologist due to factor V leiden. On physical exam, heart is irregularly irregular. 3+ BLE edema. BNP is 3080. CXR shows vascular congestion.       Dr. Harris - cardiologist   Dr. Ambrocio - hematologist      EKG:           EKG time: 15:56  Rhythm/Rate: A-Fib rate 62  P waves and ND: None present  QRS, axis: Normal QRS   ST and T waves: T wave inversions in II, III, aVF, V5, V6     Interpreted Contemporaneously by me, independently viewed  No old EKG available for comparison          PROGRESS NOTES:    6:51 PM: Discussed case with Dr. Theodore, cardiologist. He will consult.     6:57 PM: Discussed case with Dr. Maldonado, hospitalist. He will admit pt to a telemetry bed.         DISPOSITION: Pt admitted to telemetry.    ADMISSION    Discussed treatment plan and reason for admission with pt/family and admitting physician.  Pt/family voiced understanding of the plan for admission for further testing/treatment as needed.         11:30 PM  Latest vital signs   BP- 140/82 HR- 63 Temp- 98.2 °F (36.8 °C) (Oral) O2 sat- 95%    Final diagnoses:   Congestive heart failure, unspecified congestive heart failure chronicity, unspecified congestive heart failure type           Documentation assistance provided by Michelle Coon. Information recorded by the scribe was done at my direction and has been verified and validated by me.     Entered by Michelle Coon, acting as scribe for  Dr. Tess MD.                 Michelle Coon  03/17/17 1721       Reyes Pulido MD  03/17/17 7936

## 2017-03-17 NOTE — TELEPHONE ENCOUNTER
Pt's wife called. They were calling back about how he was doing 1 week after you increased his torsemide. She said that he was still swollen and wasn't really able to urinate and was getting more short of breath. So she took him in to the ER here at [facility]. She called and wanted to let you know. She can be reached at #[phone] if need be.    Joellen

## 2017-03-17 NOTE — ED NOTES
Pt reports SOA for 1 month. Yesterday his bed that has an automatic head elevating device broke. Pt states he had to sleep flat which increased his SOA.     Imani Miranda RN  03/17/17 4113

## 2017-03-17 NOTE — H&P
Name: Emil Pulido ADMIT: 3/17/2017   : 1945  PCP: Marcus Avery MD    MRN: [id number] LOS: 0 days   AGE/SEX: 72 y.o. male  ROOM: [facility]/     Chief Complaint   Patient presents with   • Shortness of Breath       Subjective   Patient is a 72 y.o. male presents with the following...    History of Present Illness  The patient is a 72-year-old male a previous history for type 2 diabetes, hypercoagulable state with antiphospholipid and factor V Leiden on chronic Coumadin therapy, previous DVTs, persistent atrial fibrillation previous stroke with residual bilateral leg weakness and left arm weakness who is bedbound, diastolic heart failure who presented with worsening shortness of breath and lower extremity edema over the last 1 week.  He does not weigh himself since he is bedbound.  His torsemide dose has been increased by his cardiologist, Dr. Harris, but he still is retaining fluid.  His BNP was elevated as well as troponin in the emergency room.  Chest x-ray showed vascular congestion and he was admitted to our service for further care and management.  Past Medical History   Diagnosis Date   • Antiphospholipid antibody with hypercoagulable state    • Arthritis    • Atrial fibrillation    • Back pain    • Cancer    • Carotid artery disease    • Cellulitis 2015     Left leg   • CHF (congestive heart failure)    • Deep vein thrombosis of lower extremity    • Diabetes mellitus    • Eye abnormalities      pt has bleeding behind eyes   • Factor 5 Leiden mutation, heterozygous    • Hematoma      LARGE THIGH HEMATOMA   • History of transfusion    • Hypertension    • Hypertensive heart disease    • Peripheral vascular disease    • Sick sinus syndrome    • Sleep apnea    • Stroke 2015     Past Surgical History   Procedure Laterality Date   • Replacement total knee bilateral     • Other surgical history       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   • Colonoscopy     • Upper gastrointestinal  endoscopy     • Back surgery     • Eye surgery     • Fracture surgery       left arm   • Colonoscopy N/A 10/4/2016     Procedure: COLONOSCOPY to cecum endo clip x2;  Surgeon: Leoncio Strong MD;  Location: [facility] ENDOSCOPY;  Service:    • Joint replacement       elbow replacement, right rotater cuff surgery   • Colonoscopy N/A 12/12/2016     Procedure: COLONOSCOPY into cecum with Resolution clip x 2 and epi 1:20,000 injection;  Surgeon: Leoncio Strong MD;  Location: [facility] ENDOSCOPY;  Service:      Family History   Problem Relation Age of Onset   • No Known Problems Mother    • Heart disease Father    • No Known Problems Sister    • No Known Problems Brother    • No Known Problems Maternal Aunt    • Hyperlipidemia Maternal Uncle    • Cancer Paternal Aunt    • No Known Problems Paternal Uncle    • No Known Problems Maternal Grandmother    • No Known Problems Maternal Grandfather    • No Known Problems Paternal Grandmother    • No Known Problems Paternal Grandfather    • Diabetes Cousin      Social History   Substance Use Topics   • Smoking status: Former Smoker     Packs/day: 3.00     Years: 21.00     Types: Cigarettes   • Smokeless tobacco: None      Comment: quit at age  37   • Alcohol use Yes      Comment: rarely /  no caffine use     Prescriptions Prior to Admission   Medication Sig Dispense Refill Last Dose   • carvedilol (COREG) 6.25 MG tablet TAKE ONE TABLET BY MOUTH TWICE A DAY WITH MEALS 180 tablet 0 Taking   • Cholecalciferol (VITAMIN D3) 3000 UNITS tablet Take  by mouth Daily.   Taking   • Ferrous Gluconate 325 (36 FE) MG tablet Take 300 mg by mouth Daily.   Taking   • gabapentin (NEURONTIN) 300 MG capsule Take 300 mg by mouth Every Night.   Taking   • insulin lispro protamine-insulin lispro (HumaLOG 50-50) (50-50) 100 UNIT/ML suspension injection Inject 40 Units under the skin 2 (Two) Times a Day With Meals. 40 units at bedtime   Taking   • oxyCODONE-acetaminophen (PERCOCET) 5-325 MG per tablet Take  1 tablet by mouth Every 6 (Six) Hours As Needed for severe pain (7-10). 30 tablet 0 Taking   • torsemide (DEMADEX) 20 MG tablet Take 1 tablet by mouth 2 (Two) Times a Day. 60 tablet 3    • venlafaxine XR (EFFEXOR-XR) 75 MG 24 hr capsule Take 75 mg by mouth Every Night.   Taking   • vitamin C (ASCORBIC ACID) 500 MG tablet Take 500 mg by mouth daily.   Taking   • warfarin (COUMADIN) 5 MG tablet Take 5 mg by mouth 1 (One) Time Per Week. wednesday   Taking   • potassium chloride (K-DUR,KLOR-CON) 20 MEQ CR tablet Take 1 tablet by mouth 2 (Two) Times a Day for 30 days. 60 tablet 3 Taking   • warfarin (COUMADIN) 2.5 MG tablet Take 2 tablets by mouth Daily for 30 days. Wednesday (Patient taking differently: Take 2.5 mg by mouth Daily. Every day but Wednesday.) 30 tablet 0 Taking     Allergies:  Morphine and Morphine and related    Review of Systems   Constitutional: Positive for activity change and fatigue. Negative for appetite change and fever.   HENT: Negative for sore throat and trouble swallowing.    Eyes: Negative for pain and visual disturbance.   Respiratory: Positive for cough and shortness of breath. Negative for chest tightness.    Cardiovascular: Positive for leg swelling. Negative for chest pain and palpitations.   Gastrointestinal: Positive for nausea. Negative for abdominal pain, constipation, diarrhea and vomiting.   Endocrine:        Positive for diabetes but negative for thyroid disease   Genitourinary: Negative for hematuria.   Musculoskeletal: Positive for arthralgias and back pain. Negative for neck pain and neck stiffness.   Skin: Negative for rash and wound.   Neurological: Positive for dizziness, weakness and headaches. Negative for syncope and light-headedness.        Intermittent dizziness and headaches.  Chronic bilateral leg weakness and left arm weakness from the stroke   Hematological: Negative for adenopathy. Does not bruise/bleed easily.   Psychiatric/Behavioral: Negative for agitation,  behavioral problems and confusion.        Objective    Vital Signs  Temp:  [97.5 °F (36.4 °C)-98.2 °F (36.8 °C)] 98.2 °F (36.8 °C)  Heart Rate:  [58-80] 63  Resp:  [16] 16  BP: ()/(52-84) 140/82  SpO2:  [94 %-99 %] 95 %  on   ;   O2 Device: room air  Body mass index is 33.91 kg/(m^2).    Physical Exam   Constitutional: He is oriented to person, place, and time. He appears well-developed and well-nourished.   Appears stated age, edentulous, some temporal wasting   HENT:   Head: Normocephalic and atraumatic.   Mouth/Throat: Oropharynx is clear and moist. No oropharyngeal exudate.   Eyes: Conjunctivae and EOM are normal. Pupils are equal, round, and reactive to light. No scleral icterus.   Neck: Normal range of motion. Neck supple. JVD present. No thyromegaly present.   Cardiovascular: Normal rate and normal heart sounds.  An irregularly irregular rhythm present. Exam reveals no gallop and no friction rub.    No murmur heard.  Pulmonary/Chest: Effort normal and breath sounds normal. No stridor. No respiratory distress.   Bilateral crackles   Abdominal: Soft. Bowel sounds are normal. He exhibits no distension. There is no tenderness. There is no rebound and no guarding.   Musculoskeletal: He exhibits edema. He exhibits no tenderness.   2-3+ lower extremity edema   Lymphadenopathy:     He has no cervical adenopathy.   Neurological: He is alert and oriented to person, place, and time. No cranial nerve deficit.   Skin: Skin is warm and dry.   Psychiatric: He has a normal mood and affect. His behavior is normal.       Results Review:   I reviewed the patient's new clinical results.    Assessment/Plan     Principal Problem:    Acute on chronic diastolic congestive heart failure  Active Problems:    Iron deficiency anemia due to chronic blood loss    Type 2 diabetes mellitus treated with insulin    Antiphospholipid antibody with hypercoagulable state    Pancytopenia    Benign hypertension    Long term current use of  anticoagulant    Chronic atrial fibrillation      Assessment & Plan    The patient has been admitted to our service for acute on chronic diastolic heart failure.  His cardiologist has been consulted and he will be placed on Bumex drip and given a dose of metolazone tomorrow.  Need to watch his electrolytes very closely given the high doses of diuretics.  He has failed outpatient therapy with oral diuretics.  Medically, I will continue insulin for his type 2 diabetes.  He has pancytopenia and iron deficiency anemia all appear to be stable at this time.  In addition he has chronic elevation of his alkaline phosphatase and total bilirubin which also appears to be stable at this time.  His INR is therapeutic for his atrial fibrillation and antiphospholipid and factor V Leiden.  We will of course continue his warfarin therapy.  For his hypertension continues her medications and losartan has been added by his cardiologist.  He has chronic low back pain, hip pain and leg pain and I will continue his home medications.  Further care and management of this patient's condition and as his clinical course unfolds    I discussed the patients findings and my recommendations with patient and nursing staff.          Aroldo Maldonado MD  [facility]  03/17/17  11:56 PM

## 2017-03-17 NOTE — ED PROVIDER NOTES
EMERGENCY DEPARTMENT ENCOUNTER    CHIEF COMPLAINT  Chief Complaint: SOA  History given by:Patient  History limited by:Nothing  Room Number: 06/06  PMD: Marcus Avery MD,  (Cardiology), Dr.Danny Guerra (Nephrologist)       HPI:  Pt is a 72 y.o. male who presents via EMS with SOA for the past month which recently worsened over the past week. The patient notes that his bed recently broke which elevates his head and he's experienced orthopnea secondary to laying flat. The patient recently had his Torsemide increased, although he has still been retaining fluids. No other complaints at this time. Patient's Spouse reports that he does not wear oxygen at home.       Duration: One month  Timing:Constant  Location:Chest  Radiation:None  Quality:Loss of air  Intensity/Severity:Moderate  Progression:No Change  Associated Symptoms:SOA  Aggravating Factors:Laying flat, exertion  Alleviating Factors:None  Previous Episodes:Yes, h/o COPD   Treatment before arrival:None    MEDICAL RECORD REVIEW  H/o Afib on coumadin, HTN, DM on insulin, pancytopenia, Factor 5, CHF, pervious DVT. Does not list COPD but he is a former smoker.     PAST MEDICAL HISTORY  Active Ambulatory Problems     Diagnosis Date Noted   • Iron deficiency anemia due to chronic blood loss 03/08/2016   • Acute on chronic diastolic congestive heart failure 03/18/2016   • Bilateral leg edema 03/18/2016   • Right leg pain 03/18/2016   • Type 2 diabetes mellitus treated with insulin 03/18/2016   • Antiphospholipid antibody with hypercoagulable state 03/18/2016   • Subconjunctival hemorrhage of right eye 03/18/2016   • Thrombocytopenia 03/18/2016   • History of stroke with residual deficit 03/20/2016   • Pancytopenia 03/21/2016   • Iron deficiency anemia 04/13/2016   • Vitamin D deficiency 01/31/2014   • History of knee surgery 08/02/2013   • Pseudarthrosis after fusion or arthrodesis 10/06/2015   • Acute posthemorrhagic anemia 10/07/2015   • Peripheral neuropathic  pain 01/31/2014   • Fracture of distal end of humerus 08/02/2013   • Hypercholesterolemia 11/07/2013   • Benign hypertension 11/07/2013   • Late effects of cerebrovascular disease 10/07/2015   • Factor V Leiden mutation 10/07/2015   • Edema 01/31/2014   • Depression 01/31/2014   • Degeneration of intervertebral disc of lumbar region 01/31/2014   • Degeneration of intervertebral disc of cervical region 01/31/2014   • Chronic pain 04/23/2014   • Long term current use of anticoagulant 10/07/2015   • Cellulitis 04/23/2014   • Hematochezia 01/10/2016   • Chronic atrial fibrillation 11/07/2013   • Acute deep venous thrombosis 04/23/2014   • Acute on chronic diastolic heart failure 11/14/2016   • Thrombocytopenia 12/18/2016   • AVM (arteriovenous malformation) of colon with hemorrhage 01/04/2017     Resolved Ambulatory Problems     Diagnosis Date Noted   • CHF, stage C 03/18/2016   • Neuropathy 04/09/2014   • Diabetes mellitus 04/09/2014   • Lower GI bleed 12/17/2016     Past Medical History   Diagnosis Date   • Arthritis    • Atrial fibrillation    • Back pain    • Carotid artery disease    • CHF (congestive heart failure)    • Deep vein thrombosis of lower extremity    • Eye abnormalities    • Factor 5 Leiden mutation, heterozygous    • Hematoma    • History of transfusion    • Hypertension    • Hypertensive heart disease    • Monoclonal gammopathy of undetermined significance    • Peripheral vascular disease    • Sick sinus syndrome    • Sleep apnea    • Stroke 08/2015       PAST SURGICAL HISTORY  Past Surgical History   Procedure Laterality Date   • Replacement total knee bilateral     • Other surgical history       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   • Colonoscopy     • Upper gastrointestinal endoscopy     • Back surgery     • Eye surgery     • Fracture surgery       left arm   • Colonoscopy N/A 10/4/2016     Procedure: COLONOSCOPY to cecum endo clip x2;  Surgeon: Leoncio Strong MD;  Location: [facility]  ENDOSCOPY;  Service:    • Joint replacement       elbow replacement, right rotater cuff surgery   • Colonoscopy N/A 12/12/2016     Procedure: COLONOSCOPY into cecum with Resolution clip x 2 and epi 1:20,000 injection;  Surgeon: Leoncio Strong MD;  Location: [facility] ENDOSCOPY;  Service:        FAMILY HISTORY  Family History   Problem Relation Age of Onset   • No Known Problems Mother    • Heart disease Father    • No Known Problems Sister    • No Known Problems Brother    • No Known Problems Maternal Aunt    • Hyperlipidemia Maternal Uncle    • Cancer Paternal Aunt    • No Known Problems Paternal Uncle    • No Known Problems Maternal Grandmother    • No Known Problems Maternal Grandfather    • No Known Problems Paternal Grandmother    • No Known Problems Paternal Grandfather    • Diabetes Cousin        SOCIAL HISTORY  Social History     Social History   • Marital status:      Spouse name: Juliet   • Number of children: N/A   • Years of education: High School     Occupational History   •  Retired     Social History Main Topics   • Smoking status: Former Smoker     Packs/day: 3.00     Years: 21.00     Types: Cigarettes   • Smokeless tobacco: Not on file      Comment: quit at age  37   • Alcohol use Yes      Comment: rarely /  no caffine use   • Drug use: No   • Sexual activity: Defer     Other Topics Concern   • Not on file     Social History Narrative       ALLERGIES  Morphine and Morphine and related    REVIEW OF SYSTEMS  Review of Systems   Constitutional: Negative for chills.   HENT: Negative for congestion, rhinorrhea and sore throat.    Eyes: Negative for pain.   Respiratory: Positive for shortness of breath. Negative for cough and wheezing.    Cardiovascular: Negative for chest pain, palpitations and leg swelling.   Gastrointestinal: Negative for abdominal pain, diarrhea, nausea and vomiting.   Genitourinary: Negative for difficulty urinating, dysuria, flank pain and frequency.    Musculoskeletal: Negative for arthralgias, myalgias, neck pain and neck stiffness.   Skin: Negative for rash.   Neurological: Negative for dizziness, speech difficulty, weakness, light-headedness, numbness and headaches.   Psychiatric/Behavioral: Negative.    All other systems reviewed and are negative.      PHYSICAL EXAM  ED Triage Vitals   Temp Heart Rate Resp BP SpO2   03/17/17 1531 03/17/17 1531 03/17/17 1531 03/17/17 1531 03/17/17 1531   97.5 °F (36.4 °C) 68 16 135/67 99 %      Temp src Heart Rate Source Patient Position BP Location FiO2 (%)   -- -- -- -- --              Physical Exam   Constitutional: He is oriented to person, place, and time and well-developed, well-nourished, and in no distress. No distress.   HENT:   Head: Normocephalic and atraumatic.   Mouth/Throat: Oropharynx is clear and moist.   Eyes: EOM are normal. Pupils are equal, round, and reactive to light.   Neck: Normal range of motion. Neck supple.   Cardiovascular: Normal rate and normal heart sounds.  An irregularly irregular rhythm present.   Rate 60-70's   Pulmonary/Chest: Effort normal and breath sounds normal. No respiratory distress. He has no wheezes. He exhibits no tenderness.   Abdominal: Soft. He exhibits no distension. There is no tenderness. There is no rebound and no guarding.   Musculoskeletal: Normal range of motion. He exhibits no edema.   Lymphadenopathy:     He has no cervical adenopathy.   Neurological: He is alert and oriented to person, place, and time.   Skin: Skin is warm and dry. No rash noted. No pallor.   2+ pitting edema BLE   Psychiatric: Mood, memory, affect and judgment normal.   Nursing note and vitals reviewed.      LAB RESULTS  Recent Results (from the past 24 hour(s))   Comprehensive Metabolic Panel    Collection Time: 03/17/17  4:27 PM   Result Value Ref Range    Glucose 229 (H) 65 - 99 mg/dL    BUN 33 (H) 8 - 23 mg/dL    Creatinine 0.82 0.76 - 1.27 mg/dL    Sodium 138 136 - 145 mmol/L    Potassium 4.3  3.5 - 5.2 mmol/L    Chloride 102 98 - 107 mmol/L    CO2 25.0 22.0 - 29.0 mmol/L    Calcium 8.9 8.6 - 10.5 mg/dL    Total Protein 8.2 6.0 - 8.5 g/dL    Albumin 3.20 (L) 3.50 - 5.20 g/dL    ALT (SGPT) 15 1 - 41 U/L    AST (SGOT) 35 1 - 40 U/L    Alkaline Phosphatase 252 (H) 39 - 117 U/L    Total Bilirubin 2.2 (H) 0.1 - 1.2 mg/dL    eGFR Non African Amer 93 >60 mL/min/1.73    Globulin 5.0 gm/dL    A/G Ratio 0.6 g/dL    BUN/Creatinine Ratio 40.2 (H) 7.0 - 25.0    Anion Gap 11.0 mmol/L   Troponin    Collection Time: 03/17/17  4:27 PM   Result Value Ref Range    Troponin T 0.042 (H) 0.000 - 0.030 ng/mL   Protime-INR    Collection Time: 03/17/17  4:27 PM   Result Value Ref Range    Protime 29.1 (H) 11.7 - 14.2 Seconds    INR 2.86 (H) 0.90 - 1.10   BNP    Collection Time: 03/17/17  4:27 PM   Result Value Ref Range    proBNP 3080.0 (H) 5.0 - 900.0 pg/mL   CBC Auto Differential    Collection Time: 03/17/17  4:27 PM   Result Value Ref Range    WBC 4.37 (L) 4.50 - 10.70 10*3/mm3    RBC 2.52 (L) 4.60 - 6.00 10*6/mm3    Hemoglobin 8.4 (L) 13.7 - 17.6 g/dL    Hematocrit 25.9 (L) 40.4 - 52.2 %    .8 (H) 79.8 - 96.2 fL    MCH 33.3 (H) 27.0 - 32.7 pg    MCHC 32.4 (L) 32.6 - 36.4 g/dL    RDW 18.3 (H) 11.5 - 14.5 %    RDW-SD 69.0 (H) 37.0 - 54.0 fl    MPV 11.3 6.0 - 12.0 fL    Platelets 80 (L) 140 - 500 10*3/mm3    Neutrophil % 60.0 42.7 - 76.0 %    Lymphocyte % 21.5 19.6 - 45.3 %    Monocyte % 12.8 (H) 5.0 - 12.0 %    Eosinophil % 5.0 0.3 - 6.2 %    Basophil % 0.7 0.0 - 1.5 %    Immature Grans % 0.0 0.0 - 0.5 %    Neutrophils, Absolute 2.62 1.90 - 8.10 10*3/mm3    Lymphocytes, Absolute 0.94 0.90 - 4.80 10*3/mm3    Monocytes, Absolute 0.56 0.20 - 1.20 10*3/mm3    Eosinophils, Absolute 0.22 0.00 - 0.70 10*3/mm3    Basophils, Absolute 0.03 0.00 - 0.20 10*3/mm3    Immature Grans, Absolute 0.00 0.00 - 0.03 10*3/mm3   Magnesium    Collection Time: 03/17/17  4:27 PM   Result Value Ref Range    Magnesium 1.9 1.6 - 2.4 mg/dL       I  "ordered the above labs and reviewed the results    RADIOLOGY  XR Chest 1 View   Final Result   Small left basilar atelectasis/infiltrate, follow-up x-ray   suggested. COPD change. Cardiomegaly and pulmonary vascular congestion.       This report was finalized on 3/17/2017 4:09 PM by Dr. Kye Olmos MD.            I ordered the above noted radiological studies and reviewed the images on the PACS system.      COURSE & MEDICAL DECISION MAKING  Pertinent Labs and Imaging studies that were ordered and reviewed are noted above.  Results were reviewed/discussed with the patient and they were also made aware of online assess.  Pt also made aware that some labs, such as cultures, will not be resulted during ER visit and follow up with PMD is necessary.       PROGRESS AND CONSULTS    Progress Notes:    16:10 O2 97 % RA    16:55 Reviewed Labs, and her hemoglobin 8.4. Reviewed old labs and this value is consistent with her previous hemoglobin levels.     17:28 Reviewed pt's history and workup with Dr. Pulido.  After a bedside evaluation; Dr Pulido agrees with the plan of care    17:51 Discussed case with  (Cardiology). Reviewed history, exam, results and treatments.  Discussed concerns and plan of care. He would like me to admit the patient to medicine.     18:13 Discussed case with Dr Maldonado. Reviewed history, exam, results and treatments.  Discussed concerns and plan of care. He would like me to consult Cardiology for admission.     18:30 Care turned over to  pending admission discussion with Dr. Theodore.      MEDICATIONS GIVEN IN ER  Medications   sodium chloride 0.9 % flush 10 mL (not administered)       Visit Vitals   • /67   • Pulse 80   • Temp 97.5 °F (36.4 °C)   • Resp 16   • Ht 72\" (182.9 cm)   • Wt 250 lb (113 kg)   • SpO2 95%   • BMI 33.91 kg/m2       DIAGNOSIS  Final diagnoses:   None       I personally scribed for Hailey Gregorio PA-C on 3/17/2017 at 6:15 PM.  " Electronically signed by Antony Kpalan on 3/17/2017 at time 6:15 PM            Antony Kaplan  03/17/17 1831       Hailey Gregorio PA-C  03/17/17 1832

## 2017-03-18 LAB
ALBUMIN SERPL-MCNC: 3 G/DL (ref 3.5–5.2)
ALBUMIN/GLOB SERPL: 0.6 G/DL
ALP SERPL-CCNC: 257 U/L (ref 39–117)
ALT SERPL W P-5'-P-CCNC: 17 U/L (ref 1–41)
ANION GAP SERPL CALCULATED.3IONS-SCNC: 9.8 MMOL/L
AST SERPL-CCNC: 33 U/L (ref 1–40)
BASOPHILS # BLD AUTO: 0.02 10*3/MM3 (ref 0–0.2)
BASOPHILS NFR BLD AUTO: 0.5 % (ref 0–1.5)
BILIRUB SERPL-MCNC: 2.5 MG/DL (ref 0.1–1.2)
BUN BLD-MCNC: 28 MG/DL (ref 8–23)
BUN/CREAT SERPL: 35.9 (ref 7–25)
CALCIUM SPEC-SCNC: 8.7 MG/DL (ref 8.6–10.5)
CHLORIDE SERPL-SCNC: 100 MMOL/L (ref 98–107)
CO2 SERPL-SCNC: 24.2 MMOL/L (ref 22–29)
CREAT BLD-MCNC: 0.78 MG/DL (ref 0.76–1.27)
DEPRECATED RDW RBC AUTO: 68.5 FL (ref 37–54)
EOSINOPHIL # BLD AUTO: 0.21 10*3/MM3 (ref 0–0.7)
EOSINOPHIL NFR BLD AUTO: 5.6 % (ref 0.3–6.2)
ERYTHROCYTE [DISTWIDTH] IN BLOOD BY AUTOMATED COUNT: 18.4 % (ref 11.5–14.5)
GFR SERPL CREATININE-BSD FRML MDRD: 98 ML/MIN/1.73
GLOBULIN UR ELPH-MCNC: 5 GM/DL
GLUCOSE BLD-MCNC: 224 MG/DL (ref 65–99)
GLUCOSE BLDC GLUCOMTR-MCNC: 120 MG/DL (ref 70–130)
GLUCOSE BLDC GLUCOMTR-MCNC: 200 MG/DL (ref 70–130)
GLUCOSE BLDC GLUCOMTR-MCNC: 231 MG/DL (ref 70–130)
GLUCOSE BLDC GLUCOMTR-MCNC: 98 MG/DL (ref 70–130)
HCT VFR BLD AUTO: 26.2 % (ref 40.4–52.2)
HGB BLD-MCNC: 8.4 G/DL (ref 13.7–17.6)
IMM GRANULOCYTES # BLD: 0 10*3/MM3 (ref 0–0.03)
IMM GRANULOCYTES NFR BLD: 0 % (ref 0–0.5)
INR PPP: 2.81 (ref 0.9–1.1)
INR PPP: 2.86 (ref 0.9–1.1)
LYMPHOCYTES # BLD AUTO: 1.08 10*3/MM3 (ref 0.9–4.8)
LYMPHOCYTES NFR BLD AUTO: 28.6 % (ref 19.6–45.3)
MAGNESIUM SERPL-MCNC: 1.8 MG/DL (ref 1.6–2.4)
MCH RBC QN AUTO: 32.7 PG (ref 27–32.7)
MCHC RBC AUTO-ENTMCNC: 32.1 G/DL (ref 32.6–36.4)
MCV RBC AUTO: 101.9 FL (ref 79.8–96.2)
MONOCYTES # BLD AUTO: 0.46 10*3/MM3 (ref 0.2–1.2)
MONOCYTES NFR BLD AUTO: 12.2 % (ref 5–12)
NEUTROPHILS # BLD AUTO: 2 10*3/MM3 (ref 1.9–8.1)
NEUTROPHILS NFR BLD AUTO: 53.1 % (ref 42.7–76)
PHOSPHATE SERPL-MCNC: 3 MG/DL (ref 2.5–4.5)
PLATELET # BLD AUTO: 76 10*3/MM3 (ref 140–500)
PMV BLD AUTO: 11.2 FL (ref 6–12)
POTASSIUM BLD-SCNC: 3.9 MMOL/L (ref 3.5–5.2)
PROT SERPL-MCNC: 8 G/DL (ref 6–8.5)
PROTHROMBIN TIME: 28.7 SECONDS (ref 11.7–14.2)
PROTHROMBIN TIME: 29.1 SECONDS (ref 11.7–14.2)
RBC # BLD AUTO: 2.57 10*6/MM3 (ref 4.6–6)
SODIUM BLD-SCNC: 134 MMOL/L (ref 136–145)
WBC NRBC COR # BLD: 3.77 10*3/MM3 (ref 4.5–10.7)

## 2017-03-18 PROCEDURE — 85025 COMPLETE CBC W/AUTO DIFF WBC: CPT | Performed by: INTERNAL MEDICINE

## 2017-03-18 PROCEDURE — 63710000001 INSULIN ASPART PER 5 UNITS: Performed by: INTERNAL MEDICINE

## 2017-03-18 PROCEDURE — 84100 ASSAY OF PHOSPHORUS: CPT | Performed by: INTERNAL MEDICINE

## 2017-03-18 PROCEDURE — 83735 ASSAY OF MAGNESIUM: CPT | Performed by: INTERNAL MEDICINE

## 2017-03-18 PROCEDURE — 82962 GLUCOSE BLOOD TEST: CPT

## 2017-03-18 PROCEDURE — 85610 PROTHROMBIN TIME: CPT | Performed by: INTERNAL MEDICINE

## 2017-03-18 PROCEDURE — 99232 SBSQ HOSP IP/OBS MODERATE 35: CPT | Performed by: INTERNAL MEDICINE

## 2017-03-18 PROCEDURE — 80053 COMPREHEN METABOLIC PANEL: CPT | Performed by: INTERNAL MEDICINE

## 2017-03-18 RX ORDER — OXYCODONE HYDROCHLORIDE AND ACETAMINOPHEN 5; 325 MG/1; MG/1
1 TABLET ORAL EVERY 6 HOURS PRN
Status: DISCONTINUED | OUTPATIENT
Start: 2017-03-17 | End: 2017-03-24 | Stop reason: HOSPADM

## 2017-03-18 RX ORDER — CARVEDILOL 6.25 MG/1
6.25 TABLET ORAL EVERY 12 HOURS SCHEDULED
Status: DISCONTINUED | OUTPATIENT
Start: 2017-03-18 | End: 2017-03-18

## 2017-03-18 RX ORDER — CARVEDILOL 6.25 MG/1
6.25 TABLET ORAL EVERY 12 HOURS SCHEDULED
Status: DISCONTINUED | OUTPATIENT
Start: 2017-03-18 | End: 2017-03-24 | Stop reason: HOSPADM

## 2017-03-18 RX ORDER — VENLAFAXINE HYDROCHLORIDE 75 MG/1
75 CAPSULE, EXTENDED RELEASE ORAL NIGHTLY
Status: DISCONTINUED | OUTPATIENT
Start: 2017-03-18 | End: 2017-03-24 | Stop reason: HOSPADM

## 2017-03-18 RX ORDER — POTASSIUM CHLORIDE 750 MG/1
20 CAPSULE, EXTENDED RELEASE ORAL DAILY
Status: DISCONTINUED | OUTPATIENT
Start: 2017-03-18 | End: 2017-03-19

## 2017-03-18 RX ORDER — DEXTROSE MONOHYDRATE 25 G/50ML
25 INJECTION, SOLUTION INTRAVENOUS
Status: DISCONTINUED | OUTPATIENT
Start: 2017-03-18 | End: 2017-03-24 | Stop reason: HOSPADM

## 2017-03-18 RX ORDER — FERROUS GLUCONATE 324(37.5)
300 TABLET ORAL DAILY
Status: DISCONTINUED | OUTPATIENT
Start: 2017-03-18 | End: 2017-03-18 | Stop reason: CLARIF

## 2017-03-18 RX ORDER — INSULIN ASPART 100 [IU]/ML
40 INJECTION, SUSPENSION SUBCUTANEOUS 2 TIMES DAILY WITH MEALS
Status: DISCONTINUED | OUTPATIENT
Start: 2017-03-18 | End: 2017-03-21

## 2017-03-18 RX ORDER — SODIUM CHLORIDE 0.9 % (FLUSH) 0.9 %
1-10 SYRINGE (ML) INJECTION AS NEEDED
Status: DISCONTINUED | OUTPATIENT
Start: 2017-03-18 | End: 2017-03-24 | Stop reason: HOSPADM

## 2017-03-18 RX ORDER — WARFARIN SODIUM 5 MG/1
5 TABLET ORAL WEEKLY
Status: DISCONTINUED | OUTPATIENT
Start: 2017-03-18 | End: 2017-03-19

## 2017-03-18 RX ORDER — WARFARIN SODIUM 2.5 MG/1
2.5 TABLET ORAL
Status: DISCONTINUED | OUTPATIENT
Start: 2017-03-18 | End: 2017-03-19

## 2017-03-18 RX ORDER — GABAPENTIN 300 MG/1
300 CAPSULE ORAL NIGHTLY
Status: DISCONTINUED | OUTPATIENT
Start: 2017-03-18 | End: 2017-03-24 | Stop reason: HOSPADM

## 2017-03-18 RX ORDER — FERROUS GLUCONATE 324(37.5)
324 TABLET ORAL DAILY
Status: DISCONTINUED | OUTPATIENT
Start: 2017-03-18 | End: 2017-03-21 | Stop reason: CLARIF

## 2017-03-18 RX ORDER — ONDANSETRON 2 MG/ML
4 INJECTION INTRAMUSCULAR; INTRAVENOUS EVERY 6 HOURS PRN
Status: DISCONTINUED | OUTPATIENT
Start: 2017-03-18 | End: 2017-03-24 | Stop reason: HOSPADM

## 2017-03-18 RX ORDER — NICOTINE POLACRILEX 4 MG
15 LOZENGE BUCCAL
Status: DISCONTINUED | OUTPATIENT
Start: 2017-03-18 | End: 2017-03-24 | Stop reason: HOSPADM

## 2017-03-18 RX ADMIN — INSULIN ASPART 3 UNITS: 100 INJECTION, SOLUTION INTRAVENOUS; SUBCUTANEOUS at 11:38

## 2017-03-18 RX ADMIN — VENLAFAXINE HYDROCHLORIDE 75 MG: 75 CAPSULE, EXTENDED RELEASE ORAL at 02:58

## 2017-03-18 RX ADMIN — FERROUS GLUCONATE TAB 324 MG (37.5 MG ELEMENTAL IRON) 324 MG: 324 (37.5 FE) TAB at 08:01

## 2017-03-18 RX ADMIN — WARFARIN SODIUM 5 MG: 5 TABLET ORAL at 08:01

## 2017-03-18 RX ADMIN — GABAPENTIN 300 MG: 300 CAPSULE ORAL at 21:14

## 2017-03-18 RX ADMIN — INSULIN ASPART 40 UNITS: 100 INJECTION, SUSPENSION SUBCUTANEOUS at 09:13

## 2017-03-18 RX ADMIN — LOSARTAN POTASSIUM 12.5 MG: 25 TABLET, FILM COATED ORAL at 08:01

## 2017-03-18 RX ADMIN — OXYCODONE HYDROCHLORIDE AND ACETAMINOPHEN 1 TABLET: 5; 325 TABLET ORAL at 00:45

## 2017-03-18 RX ADMIN — VENLAFAXINE HYDROCHLORIDE 75 MG: 75 CAPSULE, EXTENDED RELEASE ORAL at 21:14

## 2017-03-18 RX ADMIN — BUMETANIDE 1 MG/HR: 0.25 INJECTION INTRAMUSCULAR; INTRAVENOUS at 13:58

## 2017-03-18 RX ADMIN — BUMETANIDE 1 MG: 0.25 INJECTION, SOLUTION INTRAMUSCULAR; INTRAVENOUS at 02:58

## 2017-03-18 RX ADMIN — BUMETANIDE 1 MG/HR: 0.25 INJECTION INTRAMUSCULAR; INTRAVENOUS at 21:14

## 2017-03-18 RX ADMIN — GABAPENTIN 300 MG: 300 CAPSULE ORAL at 02:58

## 2017-03-18 RX ADMIN — BUMETANIDE 1 MG/HR: 0.25 INJECTION INTRAMUSCULAR; INTRAVENOUS at 00:04

## 2017-03-18 RX ADMIN — POTASSIUM CHLORIDE 20 MEQ: 750 CAPSULE, EXTENDED RELEASE ORAL at 08:00

## 2017-03-18 RX ADMIN — CARVEDILOL 6.25 MG: 6.25 TABLET, FILM COATED ORAL at 02:58

## 2017-03-18 RX ADMIN — CARVEDILOL 6.25 MG: 6.25 TABLET, FILM COATED ORAL at 21:14

## 2017-03-18 NOTE — CONSULTS
Date of Consultation: 17    Referral Provider: Aroldo Maldonado, *     Reason for Consultation: Acute on chronic systolic and diastolic CHF    Encounter Provider: Lalo Theodore MD    Group of Service: [facility]     Patient Name: Emil Pulido    :1945    Chief complaint:  Shortness of breath, edema    History of Present Illness:      This is a very pleasant 72-year-old white male who is normally followed by Dr. Harris in our group.  He has a history of a cardiomyopathy with an ejection fraction of 45% in 2016.  He also has chronic systolic and diastolic congestive heart failure.  He does have multiple other medical issues, including a hypercoagulable state with antiphospholipid antibody syndrome and being a heterozygote for factor V Leiden.  He has had multiple DVTs in the past, and is anticoagulated with Coumadin.  He also has persistent atrial fibrillation for which she takes Coumadin.  He has had GI bleeding in the past, and has chronic anemia, although this has been fairly stable recently.    The patient presented with significant weight gain, edema, and shortness of breath over the last week.  He states that his torsemide basically had stopped working despite increasing the dose.  He was urinating very little around the time that he was admitted for the last several days.  In the emergency department, he is grossly volume overloaded with significant lower extremity edema and abdominal edema.  He has had no significant chest discomfort.  He remains in rate controlled atrial fibrillation.  His INR is therapeutic.    Past Medical History   Diagnosis Date   • Antiphospholipid antibody with hypercoagulable state    • Arthritis    • Atrial fibrillation    • Back pain    • Cancer    • Carotid artery disease    • Cellulitis 2015     Left leg   • CHF (congestive heart failure)    • Deep vein thrombosis of lower extremity    • Diabetes mellitus    • Eye  abnormalities      pt has bleeding behind eyes   • Factor 5 Leiden mutation, heterozygous    • Hematoma      LARGE THIGH HEMATOMA   • History of transfusion    • Hypertension    • Hypertensive heart disease    • Peripheral vascular disease    • Sick sinus syndrome    • Sleep apnea    • Stroke 08/2015         Past Surgical History   Procedure Laterality Date   • Replacement total knee bilateral     • Other surgical history       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   • Colonoscopy     • Upper gastrointestinal endoscopy     • Back surgery     • Eye surgery     • Fracture surgery       left arm   • Colonoscopy N/A 10/4/2016     Procedure: COLONOSCOPY to cecum endo clip x2;  Surgeon: Leoncio Strong MD;  Location: [facility] ENDOSCOPY;  Service:    • Joint replacement       elbow replacement, right rotater cuff surgery   • Colonoscopy N/A 12/12/2016     Procedure: COLONOSCOPY into cecum with Resolution clip x 2 and epi 1:20,000 injection;  Surgeon: Leoncio Strong MD;  Location: [facility] ENDOSCOPY;  Service:          Allergies   Allergen Reactions   • Morphine    • Morphine And Related Nausea Only         No current facility-administered medications on file prior to encounter.      Current Outpatient Prescriptions on File Prior to Encounter   Medication Sig Dispense Refill   • carvedilol (COREG) 6.25 MG tablet TAKE ONE TABLET BY MOUTH TWICE A DAY WITH MEALS 180 tablet 0   • Cholecalciferol (VITAMIN D3) 3000 UNITS tablet Take  by mouth Daily.     • Ferrous Gluconate 325 (36 FE) MG tablet Take 300 mg by mouth Daily.     • gabapentin (NEURONTIN) 300 MG capsule Take 300 mg by mouth Every Night.     • insulin lispro protamine-insulin lispro (HumaLOG 50-50) (50-50) 100 UNIT/ML suspension injection Inject 40 Units under the skin 2 (Two) Times a Day With Meals. 40 units at bedtime     • oxyCODONE-acetaminophen (PERCOCET) 5-325 MG per tablet Take 1 tablet by mouth Every 6 (Six) Hours As Needed for severe pain (7-10). 30  tablet 0   • torsemide (DEMADEX) 20 MG tablet Take 1 tablet by mouth 2 (Two) Times a Day. 60 tablet 3   • venlafaxine XR (EFFEXOR-XR) 75 MG 24 hr capsule Take 75 mg by mouth Every Night.     • vitamin C (ASCORBIC ACID) 500 MG tablet Take 500 mg by mouth daily.     • warfarin (COUMADIN) 5 MG tablet Take 5 mg by mouth 1 (One) Time Per Week. wednesday     • potassium chloride (K-DUR,KLOR-CON) 20 MEQ CR tablet Take 1 tablet by mouth 2 (Two) Times a Day for 30 days. 60 tablet 3   • warfarin (COUMADIN) 2.5 MG tablet Take 2 tablets by mouth Daily for 30 days. Wednesday (Patient taking differently: Take 2.5 mg by mouth Daily. Every day but Wednesday.) 30 tablet 0         Social History     Social History   • Marital status:      Spouse name: Juliet   • Number of children: N/A   • Years of education: High School     Occupational History   •  Retired     Social History Main Topics   • Smoking status: Former Smoker     Packs/day: 3.00     Years: 21.00     Types: Cigarettes   • Smokeless tobacco: Not on file      Comment: quit at age  37   • Alcohol use Yes      Comment: rarely /  no caffine use   • Drug use: No   • Sexual activity: Defer     Other Topics Concern   • Not on file     Social History Narrative         Family History   Problem Relation Age of Onset   • No Known Problems Mother    • Heart disease Father    • No Known Problems Sister    • No Known Problems Brother    • No Known Problems Maternal Aunt    • Hyperlipidemia Maternal Uncle    • Cancer Paternal Aunt    • No Known Problems Paternal Uncle    • No Known Problems Maternal Grandmother    • No Known Problems Maternal Grandfather    • No Known Problems Paternal Grandmother    • No Known Problems Paternal Grandfather    • Diabetes Cousin          REVIEW OF SYSTEMS:   12 point ROS was performed and is negative except as outlined in HPI    REVIEW OF SYSTEMS:   CONSTITUTIONAL: No fever, chills.  HEENT: No visual changes or hearing changes.  SKIN: No  "rash.   RESPIRATORY: No cough or hemoptysis.  GASTROINTESTINAL: No abdominal pain, melena, nausea, or vomiting.  GENITOURINARY: No dysuria or frequency.  NEUROLOGICAL: No numbness or tingling in the extremities.   MUSCULOSKELETAL: No new joint pain.    HEMATOLOGIC: No bleeding or bruising.   LYMPHATICS: No enlarged nodes.   PSYCHIATRIC: No severe depression or anxiety.    ENDOCRINOLOGIC: No heat or cold intolerance.      Objective:     Vitals:    03/17/17 2003 03/17/17 2033 03/17/17 2033 03/17/17 2235   BP: 128/74 111/84 111/84 140/82   BP Location:    Right arm   Patient Position:    Lying   Pulse: 58 59 63 63   Resp:   16 16   Temp:    98.2 °F (36.8 °C)   TempSrc:    Oral   SpO2: 94%  97% 95%   Weight:       Height:         Body mass index is 33.91 kg/(m^2).  Flowsheet Rows         First Filed Value    Admission Height  72\" (182.9 cm) Documented at 03/17/2017 1531    Admission Weight  250 lb (113 kg) Documented at 03/17/2017 1531           General:    No acute distress, alert and oriented x4, pleasant. Obese.                   Head:    Normocephalic, atraumatic.   Eyes:          Conjunctivae and sclerae normal, no icterus, PERRLA   Throat:   No oral lesions, no thrush, oral mucosa moist.    Neck:   Supple, trachea midline.   Lungs:     Decreased breath sounds bilaterally    Heart:    Irregularly irregular, normal rate, II/VI YULIA   Abdomen:     Soft, non-tender, distended with fluid wave, positive bowel sounds.    Extremities:   3+ edema lowers extremities   Pulses:   Pulses palpable and equal bilaterally.    Skin:   No bleeding or rash.   Neuro:   Non-focal.  Moves all extremities well.    Psychiatric:   Normal mood and affect.     Lab Review:                  Results from last 7 days  Lab Units 03/17/17  1627   SODIUM mmol/L 138   POTASSIUM mmol/L 4.3   CHLORIDE mmol/L 102   TOTAL CO2 mmol/L 25.0   BUN mg/dL 33*   CREATININE mg/dL 0.82   GLUCOSE mg/dL 229*   CALCIUM mg/dL 8.9       Results from last 7 days  Lab " Units 03/17/17  1627   TROPONIN T ng/mL 0.042*       Results from last 7 days  Lab Units 03/17/17  1627   WBC 10*3/mm3 4.37*   HEMOGLOBIN g/dL 8.4*   HEMATOCRIT % 25.9*   PLATELETS 10*3/mm3 80*       Results from last 7 days  Lab Units 03/17/17  1627   INR  2.86*           Results from last 7 days  Lab Units 03/17/17  1627   MAGNESIUM mg/dL 1.9           EKG (reviewed by me personally):  Atrial fibrillation with controlled response, diffuse non-specific ST-T wave changes.      Assessment:   1. Acute on chronic systolic and diastolic CHF  2. Cardiomyopathy - EF 45% by echo 11/17/16  3. Moderate Tricuspid regurgitation  4. Antiphospholipid antibody syndrome and factor V Leiden heterozygote with prior DVT  5. Persistent atrial fibrillation  6. Morbid obesity  7. Diabetes  8. Chronic anemia (multifactorial)    9. Prior GI bleeding  10. Prior stroke  11. Hypertension     Plan:        The patient is grossly volume overloaded.  Again, his home diuretics of stop working essentially.  I estimate that he has at least 30-50 pounds of fluid.  I feel that he is going to require a significant amount of diuresis, and I am going to start him on a Bumex drip at this point.  I will also give him a dose of metolazone in the morning.  His potassium will need to be monitored closely.  He will continue on his Coreg, and I will add losartan.  He is anticoagulated with Coumadin for his hypercoagulable state, prior DVT's, and persistent atrial fibrillation.  He is stable from this standpoint.  His last ejection fraction was 45% in November 2016.  I do not feel that repeating an echo at this point changes clinical management.  Further plans will be made pending his response to diuresis.    Thank you very much for this consult.    Genaro Theodore M.D.

## 2017-03-18 NOTE — PROGRESS NOTES
Name: Emil Pulido ADMIT: 3/17/2017   : 1945  PCP: Marcus Avery MD    MRN: [id number] LOS: 1 days   AGE/SEX: 72 y.o. male  ROOM: [facility]/1   Cc- LE edema  Subjective   Urinating more  SOA better  Still with LE edema  On bumex gtt    ROS  No f/c  No n/v    Objective   Vital Signs  Temp:  [97.5 °F (36.4 °C)-98.3 °F (36.8 °C)] 98.3 °F (36.8 °C)  Heart Rate:  [58-80] 69  Resp:  [16] 16  BP: ()/(52-86) 135/86  SpO2:  [94 %-99 %] 99 %  on   ;   O2 Device: room air  Body mass index is 33.91 kg/(m^2).    Alert, chronically ill  Supple, +jvd  Soft, nt  Irregular, distant heart sounds  Decreased bs at bases  2+ LE edema, no clubbing or cyanosis  A and O x 3    ObjectiveResults Review:       I reviewed the patient's new clinical results.    Results from last 7 days  Lab Units 17  0509 17  1627   WBC 10*3/mm3 3.77* 4.37*   HEMOGLOBIN g/dL 8.4* 8.4*   PLATELETS 10*3/mm3 76* 80*     Results from last 7 days  Lab Units 17  0509 17  1627   SODIUM mmol/L 134* 138   POTASSIUM mmol/L 3.9 4.3   CHLORIDE mmol/L 100 102   TOTAL CO2 mmol/L 24.2 25.0   BUN mg/dL 28* 33*   CREATININE mg/dL 0.78 0.82   GLUCOSE mg/dL 224* 229*   Estimated Creatinine Clearance: 111.6 mL/min (by C-G formula based on Cr of 0.78).  Results from last 7 days  Lab Units 17  0509 17  1627   CALCIUM mg/dL 8.7 8.9   ALBUMIN g/dL 3.00* 3.20*   MAGNESIUM mg/dL 1.8 1.9   PHOSPHORUS mg/dL 3.0  --      Protime-INR [[id number]] (Abnormal) Collected: 17 050        Lab Status: Final result Specimen: Blood Updated: 17 0536        Protime 29.1 (H) Seconds         INR 2.86 (H)       XR Chest 1 View [[id number]] Not Reviewed        Order Status: Completed Collected: 17 1607        Updated: 17 1612       Narrative:         XR CHEST 1 VW-     HISTORY: Male who is 72 years-old,  short of breath     TECHNIQUE: Frontal and lateral views of the chest     COMPARISON: 2016     FINDINGS: Heart is mildly  enlarged. Pulmonary vasculature is slightly  congested. Left hemidiaphragm is partly obscured, suggesting small left  basilar atelectasis or infiltrate. No pleural effusion or pneumothorax.  Mild pulmonary hyperinflation suggests COPD. Otherwise stable.          Impression:         Small left basilar atelectasis/infiltrate, follow-up x-ray  suggested. COPD change. Cardiomegaly and pulmonary vascular congestion.            carvedilol 6.25 mg Oral Q12H   ferrous gluconate 324 mg Oral Daily   gabapentin 300 mg Oral Nightly   insulin aspart 0-7 Units Subcutaneous 4x Daily AC & at Bedtime   insulin lispro protamine-insulin lispro 40 Units Subcutaneous BID With Meals   losartan 12.5 mg Oral Q24H   potassium chloride 20 mEq Oral Daily   venlafaxine XR 75 mg Oral Nightly   warfarin 2.5 mg Oral Daily   warfarin 5 mg Oral Weekly       bumetanide (BUMEX) infusion 1 mg/hr Last Rate: 1 mg/hr (03/18/17 0004)   Diet Regular; Cardiac, Consistent Carbohydrate, Renal      Assessment/Plan   Assessment:     Active Hospital Problems (** Indicates Principal Problem)    Diagnosis Date Noted   • **Acute on chronic diastolic congestive heart failure [I50.33] 03/18/2016   • Pancytopenia [D61.818] 03/21/2016   • Type 2 diabetes mellitus treated with insulin [E11.9, Z79.4] 03/18/2016   • Antiphospholipid antibody with hypercoagulable state [D68.61] 03/18/2016   • Iron deficiency anemia due to chronic blood loss [D50.0] 03/08/2016   • Long term current use of anticoagulant [Z79.01] 10/07/2015   • Benign hypertension [I10] 11/07/2013   • Chronic atrial fibrillation [I48.2] 11/07/2013      Resolved Hospital Problems    Diagnosis Date Noted Date Resolved   No resolved problems to display.       Plan:   - Bumex gtt, closely monitor electrolytes  - Continue insulin for DM, closely monitoring BG  - Chronic pancytopenia- monitor  - Continue his chronic anticoagulation and monitor INR    Reviewed records  Disposition  TBD.      Charlie Kramer,  MD [facility]  03/18/17  7:19 AM

## 2017-03-18 NOTE — PROGRESS NOTES
LOS: 1 day   Patient Care Team:  Marcus Avery MD as PCP - General (Internal Medicine)  Marcus Avery MD as PCP - Family Medicine  Ilya Ambrocio MD as Consulting Physician (Hematology and Oncology)  Willy Bell MD as Referring Physician (Internal Medicine)    Chief Complaint: Follow-up for acute on chronic systolic and diastolic CHF, persistent atrial fibrillation.    Interval History: Urinating well on the Bumex drip.  Still SOA and with edema.  No CP.    Vital Signs:  Temp:  [97.5 °F (36.4 °C)-98.3 °F (36.8 °C)] 98.3 °F (36.8 °C)  Heart Rate:  [58-80] 69  Resp:  [16] 16  BP: ()/(52-86) 135/86    Intake/Output Summary (Last 24 hours) at 03/18/17 0658  Last data filed at 03/17/17 1739   Gross per 24 hour   Intake      0 ml   Output    180 ml   Net   -180 ml       Physical Exam:   General Appearance:    No acute distress, alert and oriented x4   Lungs:     Clear to auscultation bilaterally     Heart:    Regular rhythm and normal rate.  No murmurs, gallops, or       rubs.   Abdomen:     Soft, non-tender, non-distended.    Extremities:   Moves all extremities well.  No clubbing, cyanosis, or edema.     Results Review:      Results from last 7 days  Lab Units 03/18/17  0509   SODIUM mmol/L 134*   POTASSIUM mmol/L 3.9   CHLORIDE mmol/L 100   TOTAL CO2 mmol/L 24.2   BUN mg/dL 28*   CREATININE mg/dL 0.78   GLUCOSE mg/dL 224*   CALCIUM mg/dL 8.7       Results from last 7 days  Lab Units 03/17/17  1627   TROPONIN T ng/mL 0.042*       Results from last 7 days  Lab Units 03/18/17  0509   WBC 10*3/mm3 3.77*   HEMOGLOBIN g/dL 8.4*   HEMATOCRIT % 26.2*   PLATELETS 10*3/mm3 76*       Results from last 7 days  Lab Units 03/18/17  0509 03/17/17  2332 03/17/17  1627   INR  2.86* 2.81* 2.86*           Results from last 7 days  Lab Units 03/18/17  0509   MAGNESIUM mg/dL 1.8           I reviewed the patient's new clinical results.        Assessment:  1. Acute on chronic systolic and diastolic CHF  2. Cardiomyopathy - EF  45% by echo 11/17/16  3. Moderate Tricuspid regurgitation  4. Antiphospholipid antibody syndrome and factor V Leiden heterozygote with prior DVT  5. Persistent atrial fibrillation  6. Morbid obesity  7. Diabetes  8. Chronic anemia (multifactorial)   9. Prior GI bleeding  10. Prior stroke  11. Hypertension     Plan:  -Continue Bumex drip at 1 mg per hour.  He is diuresing very well.  -Losartan 12.5 mg was added.  He will continue on his Coreg.  -Hemoglobin is stable, and he will continue on the Coumadin for his hypercoagulable state, prior DVTs, and persistent atrial fibrillation.  -The atrial fibrillation is rate controlled.  -He still needs extensive diuresis at this point.    Thanks    Lalo Theodore MD  03/18/17  6:58 AM

## 2017-03-19 PROBLEM — E87.6 HYPOKALEMIA: Status: ACTIVE | Noted: 2017-03-19

## 2017-03-19 LAB
ANION GAP SERPL CALCULATED.3IONS-SCNC: 11.3 MMOL/L
BASOPHILS # BLD AUTO: 0.03 10*3/MM3 (ref 0–0.2)
BASOPHILS NFR BLD AUTO: 0.7 % (ref 0–1.5)
BUN BLD-MCNC: 26 MG/DL (ref 8–23)
BUN/CREAT SERPL: 35.1 (ref 7–25)
CALCIUM SPEC-SCNC: 8.8 MG/DL (ref 8.6–10.5)
CHLORIDE SERPL-SCNC: 101 MMOL/L (ref 98–107)
CO2 SERPL-SCNC: 24.7 MMOL/L (ref 22–29)
CREAT BLD-MCNC: 0.74 MG/DL (ref 0.76–1.27)
DEPRECATED RDW RBC AUTO: 68.5 FL (ref 37–54)
EOSINOPHIL # BLD AUTO: 0.25 10*3/MM3 (ref 0–0.7)
EOSINOPHIL NFR BLD AUTO: 6.1 % (ref 0.3–6.2)
ERYTHROCYTE [DISTWIDTH] IN BLOOD BY AUTOMATED COUNT: 17.9 % (ref 11.5–14.5)
GFR SERPL CREATININE-BSD FRML MDRD: 105 ML/MIN/1.73
GLUCOSE BLD-MCNC: 112 MG/DL (ref 65–99)
GLUCOSE BLDC GLUCOMTR-MCNC: 116 MG/DL (ref 70–130)
GLUCOSE BLDC GLUCOMTR-MCNC: 180 MG/DL (ref 70–130)
GLUCOSE BLDC GLUCOMTR-MCNC: 192 MG/DL (ref 70–130)
GLUCOSE BLDC GLUCOMTR-MCNC: 192 MG/DL (ref 70–130)
HCT VFR BLD AUTO: 27.9 % (ref 40.4–52.2)
HGB BLD-MCNC: 9 G/DL (ref 13.7–17.6)
IMM GRANULOCYTES # BLD: 0 10*3/MM3 (ref 0–0.03)
IMM GRANULOCYTES NFR BLD: 0 % (ref 0–0.5)
INR PPP: 3.31 (ref 0.9–1.1)
LYMPHOCYTES # BLD AUTO: 1.26 10*3/MM3 (ref 0.9–4.8)
LYMPHOCYTES NFR BLD AUTO: 30.9 % (ref 19.6–45.3)
MAGNESIUM SERPL-MCNC: 1.9 MG/DL (ref 1.6–2.4)
MCH RBC QN AUTO: 33.8 PG (ref 27–32.7)
MCHC RBC AUTO-ENTMCNC: 32.3 G/DL (ref 32.6–36.4)
MCV RBC AUTO: 104.9 FL (ref 79.8–96.2)
MONOCYTES # BLD AUTO: 0.49 10*3/MM3 (ref 0.2–1.2)
MONOCYTES NFR BLD AUTO: 12 % (ref 5–12)
NEUTROPHILS # BLD AUTO: 2.05 10*3/MM3 (ref 1.9–8.1)
NEUTROPHILS NFR BLD AUTO: 50.3 % (ref 42.7–76)
PLATELET # BLD AUTO: 77 10*3/MM3 (ref 140–500)
PMV BLD AUTO: 10.9 FL (ref 6–12)
POTASSIUM BLD-SCNC: 3.4 MMOL/L (ref 3.5–5.2)
PROTHROMBIN TIME: 32.6 SECONDS (ref 11.7–14.2)
RBC # BLD AUTO: 2.66 10*6/MM3 (ref 4.6–6)
SODIUM BLD-SCNC: 137 MMOL/L (ref 136–145)
WBC NRBC COR # BLD: 4.08 10*3/MM3 (ref 4.5–10.7)

## 2017-03-19 PROCEDURE — 80048 BASIC METABOLIC PNL TOTAL CA: CPT | Performed by: INTERNAL MEDICINE

## 2017-03-19 PROCEDURE — 63710000001 INSULIN ASPART PER 5 UNITS: Performed by: INTERNAL MEDICINE

## 2017-03-19 PROCEDURE — 99232 SBSQ HOSP IP/OBS MODERATE 35: CPT | Performed by: INTERNAL MEDICINE

## 2017-03-19 PROCEDURE — 85610 PROTHROMBIN TIME: CPT | Performed by: INTERNAL MEDICINE

## 2017-03-19 PROCEDURE — 82962 GLUCOSE BLOOD TEST: CPT

## 2017-03-19 PROCEDURE — 83735 ASSAY OF MAGNESIUM: CPT | Performed by: INTERNAL MEDICINE

## 2017-03-19 PROCEDURE — 85025 COMPLETE CBC W/AUTO DIFF WBC: CPT | Performed by: INTERNAL MEDICINE

## 2017-03-19 RX ORDER — POTASSIUM CHLORIDE 750 MG/1
20 CAPSULE, EXTENDED RELEASE ORAL 2 TIMES DAILY WITH MEALS
Status: DISCONTINUED | OUTPATIENT
Start: 2017-03-19 | End: 2017-03-20

## 2017-03-19 RX ADMIN — VENLAFAXINE HYDROCHLORIDE 75 MG: 75 CAPSULE, EXTENDED RELEASE ORAL at 20:04

## 2017-03-19 RX ADMIN — INSULIN ASPART 2 UNITS: 100 INJECTION, SOLUTION INTRAVENOUS; SUBCUTANEOUS at 22:15

## 2017-03-19 RX ADMIN — INSULIN ASPART 40 UNITS: 100 INJECTION, SUSPENSION SUBCUTANEOUS at 17:30

## 2017-03-19 RX ADMIN — BUMETANIDE 1 MG/HR: 0.25 INJECTION INTRAMUSCULAR; INTRAVENOUS at 11:09

## 2017-03-19 RX ADMIN — CARVEDILOL 6.25 MG: 6.25 TABLET, FILM COATED ORAL at 09:10

## 2017-03-19 RX ADMIN — LOSARTAN POTASSIUM 12.5 MG: 25 TABLET, FILM COATED ORAL at 09:10

## 2017-03-19 RX ADMIN — BUMETANIDE 1 MG/HR: 0.25 INJECTION INTRAMUSCULAR; INTRAVENOUS at 20:04

## 2017-03-19 RX ADMIN — CARVEDILOL 6.25 MG: 6.25 TABLET, FILM COATED ORAL at 20:04

## 2017-03-19 RX ADMIN — INSULIN ASPART 2 UNITS: 100 INJECTION, SOLUTION INTRAVENOUS; SUBCUTANEOUS at 11:47

## 2017-03-19 RX ADMIN — INSULIN ASPART 2 UNITS: 100 INJECTION, SOLUTION INTRAVENOUS; SUBCUTANEOUS at 16:41

## 2017-03-19 RX ADMIN — INSULIN ASPART 20 UNITS: 100 INJECTION, SUSPENSION SUBCUTANEOUS at 09:09

## 2017-03-19 RX ADMIN — POTASSIUM CHLORIDE 20 MEQ: 750 CAPSULE, EXTENDED RELEASE ORAL at 09:11

## 2017-03-19 RX ADMIN — FERROUS GLUCONATE TAB 324 MG (37.5 MG ELEMENTAL IRON) 324 MG: 324 (37.5 FE) TAB at 09:13

## 2017-03-19 RX ADMIN — POTASSIUM CHLORIDE 20 MEQ: 750 CAPSULE, EXTENDED RELEASE ORAL at 17:30

## 2017-03-19 RX ADMIN — GABAPENTIN 300 MG: 300 CAPSULE ORAL at 20:04

## 2017-03-19 NOTE — PROGRESS NOTES
Hospital Follow Up        Chief Complaint: Follow-up for acute on chronic systolic and diastolic CHF, persistent atrial fibrillation.    Interval History: Breathing better but still with some orthopnea.    Objective:     Objective:  Temp:  [97.8 °F (36.6 °C)-98 °F (36.7 °C)] 98 °F (36.7 °C)  Heart Rate:  [72-84] 82  Resp:  [16-20] 18  BP: (107-142)/(62-75) 107/62     Intake/Output Summary (Last 24 hours) at 03/19/17 1428  Last data filed at 03/19/17 1403   Gross per 24 hour   Intake    340 ml   Output   2375 ml   Net  -2035 ml     Body mass index is 37.57 kg/(m^2).  Last 3 weights    03/17/17  1531 03/18/17  1802 03/19/17  0709   Weight: 250 lb (113 kg) 277 lb (126 kg) 277 lb (126 kg)     Weight change: 27 lb (12.2 kg)      Physical Exam:   General : Alert, cooperative, in no acute distress.  Neuro: alert,cooperative and oriented  Lungs: CTAB. Normal respiratory effort and rate.  CV:: Regular rate and rhythm, normal S1 and S2, no murmurs, gallops or rubs.  ABD: Soft, nontender, non-distended. positive bowel sounds  Extr: 2+ bilateral lower extremity edema     Lab Review:     Results from last 7 days  Lab Units 03/19/17  0452 03/18/17  0509 03/17/17  1627   SODIUM mmol/L 137 134* 138   POTASSIUM mmol/L 3.4* 3.9 4.3   CHLORIDE mmol/L 101 100 102   TOTAL CO2 mmol/L 24.7 24.2 25.0   BUN mg/dL 26* 28* 33*   CREATININE mg/dL 0.74* 0.78 0.82   GLUCOSE mg/dL 112* 224* 229*   CALCIUM mg/dL 8.8 8.7 8.9   AST (SGOT) U/L  --  33 35   ALT (SGPT) U/L  --  17 15       Results from last 7 days  Lab Units 03/17/17  1627   TROPONIN T ng/mL 0.042*       Results from last 7 days  Lab Units 03/19/17  0452 03/18/17  0509   WBC 10*3/mm3 4.08* 3.77*   HEMOGLOBIN g/dL 9.0* 8.4*   HEMATOCRIT % 27.9* 26.2*   PLATELETS 10*3/mm3 77* 76*       Results from last 7 days  Lab Units 03/19/17  0452 03/18/17  0509   INR  3.31* 2.86*       Results from last 7 days  Lab Units 03/19/17  0452 03/18/17  0509   MAGNESIUM mg/dL 1.9 1.8           Results  from last 7 days  Lab Units 03/17/17  1627   PROBNP pg/mL 3080.0*         I reviewed the patient's new clinical results.  I personally viewed and interpreted the patient's EKG  Current Medications:   Scheduled Meds:  carvedilol 6.25 mg Oral Q12H   ferrous gluconate 324 mg Oral Daily   gabapentin 300 mg Oral Nightly   insulin aspart 0-7 Units Subcutaneous 4x Daily AC & at Bedtime   insulin aspart prot-insulin aspart 40 Units Subcutaneous BID With Meals   losartan 12.5 mg Oral Q24H   potassium chloride 20 mEq Oral BID With Meals   venlafaxine XR 75 mg Oral Nightly     Continuous Infusions:  bumetanide (BUMEX) infusion 1 mg/hr Last Rate: 1 mg/hr (03/19/17 1109)       Allergies:  Allergies   Allergen Reactions   • Morphine    • Morphine And Related Nausea Only       Assessment/Plan:     1. Acute on chronic systolic/diastolic CHF: Improving. Diuresing on bumetadine gtt.   2. Cardiomyopathy: EF 45% by echo 11/17/16.  On carvedilol and losartan.   3. Moderate Tricuspid regurgitation  4. Antiphospholipid antibody syndrome and factor V Leiden heterozygote with prior DVT: On warfarin.  5. Persistent atrial fibrillation: On warfarin.  Rate controlled on carvedilol.   6. Morbid obesity  7. Diabetes  8. Chronic anemia (multifactorial)   9. Prior GI bleeding  10. Prior stroke  11. Hypertension: Well controlled.   12. Hypokalemia: Treated    -  Continue current management   -   Agree with holding warfarin for supratherapeutic INR    Jodi Jacobs MD  03/19/17  2:28 PM

## 2017-03-19 NOTE — PROGRESS NOTES
"      Name: Emil Pulido ADMIT: 3/17/2017   : 1945  PCP: Marcus Avery MD    MRN: [id number] LOS: 2 days   AGE/SEX: 72 y.o. male  ROOM: [facility]/1   Cc- LE edema  Subjective   Urinating  No SOA   Still with LE edema  On bumex gtt  BG ~116    ROS  No f/c  No n/v    Objective   Vital Signs  Temp:  [97.8 °F (36.6 °C)-97.9 °F (36.6 °C)] 97.8 °F (36.6 °C)  Heart Rate:  [72-84] 84  Resp:  [16-20] 18  BP: (119-142)/(62-83) 129/62  SpO2:  [93 %-96 %] 95 %  on   ;   O2 Device: room air  Body mass index is 37.57 kg/(m^2).    Alert, chronically ill  Supple, +jvd  Soft, nt  Irregular, distant heart sounds  Decreased bs at bases  2+ LE edema, no clubbing or cyanosis  A and O x 3    Objective:  Vital signs: (most recent): Blood pressure 129/62, pulse 84, temperature 97.8 °F (36.6 °C), temperature source Oral, resp. rate 18, height 72\" (182.9 cm), weight 277 lb (126 kg), SpO2 95 %.          Results Review:       I reviewed the patient's new clinical results.    Results from last 7 days  Lab Units 17  0452 17  0509 17  1627   WBC 10*3/mm3 4.08* 3.77* 4.37*   HEMOGLOBIN g/dL 9.0* 8.4* 8.4*   PLATELETS 10*3/mm3 77* 76* 80*       Results from last 7 days  Lab Units 17  0452 17  0509 17  1627   SODIUM mmol/L 137 134* 138   POTASSIUM mmol/L 3.4* 3.9 4.3   CHLORIDE mmol/L 101 100 102   TOTAL CO2 mmol/L 24.7 24.2 25.0   BUN mg/dL 26* 28* 33*   CREATININE mg/dL 0.74* 0.78 0.82   GLUCOSE mg/dL 112* 224* 229*   Estimated Creatinine Clearance: 117.9 mL/min (by C-G formula based on Cr of 0.74).    Results from last 7 days  Lab Units 17  0452 17  0509 17  1627   CALCIUM mg/dL 8.8 8.7 8.9   ALBUMIN g/dL  --  3.00* 3.20*   MAGNESIUM mg/dL 1.9 1.8 1.9   PHOSPHORUS mg/dL  --  3.0  --        Protime-INR [[id number]] (Abnormal) Collected: 17        Lab Status: Final result Specimen: Blood Updated: 17 0603        Protime 32.6 (H) Seconds         INR 3.31 (H)           XR " Chest 1 View [[id number]] Not Reviewed        Order Status: Completed Collected: 03/17/17 1607        Updated: 03/17/17 1612       Narrative:         XR CHEST 1 VW-     HISTORY: Male who is 72 years-old,  short of breath     TECHNIQUE: Frontal and lateral views of the chest     COMPARISON: 11/14/2016     FINDINGS: Heart is mildly enlarged. Pulmonary vasculature is slightly  congested. Left hemidiaphragm is partly obscured, suggesting small left  basilar atelectasis or infiltrate. No pleural effusion or pneumothorax.  Mild pulmonary hyperinflation suggests COPD. Otherwise stable.          Impression:         Small left basilar atelectasis/infiltrate, follow-up x-ray  suggested. COPD change. Cardiomegaly and pulmonary vascular congestion.            carvedilol 6.25 mg Oral Q12H   ferrous gluconate 324 mg Oral Daily   gabapentin 300 mg Oral Nightly   insulin aspart 0-7 Units Subcutaneous 4x Daily AC & at Bedtime   insulin aspart prot-insulin aspart 40 Units Subcutaneous BID With Meals   losartan 12.5 mg Oral Q24H   potassium chloride 20 mEq Oral Daily   venlafaxine XR 75 mg Oral Nightly       bumetanide (BUMEX) infusion 1 mg/hr Last Rate: 1 mg/hr (03/18/17 2114)   Diet Regular; Cardiac, Consistent Carbohydrate, Renal      Assessment/Plan   Assessment:     Active Hospital Problems (** Indicates Principal Problem)    Diagnosis Date Noted   • **Acute on chronic diastolic congestive heart failure [I50.33] 03/18/2016   • Hypokalemia [E87.6] 03/19/2017   • Pancytopenia [D61.818] 03/21/2016   • Type 2 diabetes mellitus treated with insulin [E11.9, Z79.4] 03/18/2016   • Antiphospholipid antibody with hypercoagulable state [D68.61] 03/18/2016   • Iron deficiency anemia due to chronic blood loss [D50.0] 03/08/2016   • Long term current use of anticoagulant [Z79.01] 10/07/2015   • Benign hypertension [I10] 11/07/2013   • Chronic atrial fibrillation [I48.2] 11/07/2013      Resolved Hospital Problems    Diagnosis Date Noted Date  Resolved   No resolved problems to display.       Plan:   - Bumex gtt, closely monitor electrolytes  - Continue insulin for DM, closely monitoring BG  - Chronic pancytopenia- monitor  - Hold coumadin today and monitor INR  - Replace K    D/W RN    Reviewed records  Disposition  TBD.      Charlie Kramer MD  [facility]  03/19/17  9:02 AM

## 2017-03-20 LAB
ANION GAP SERPL CALCULATED.3IONS-SCNC: 12.8 MMOL/L
BASOPHILS # BLD AUTO: 0.03 10*3/MM3 (ref 0–0.2)
BASOPHILS NFR BLD AUTO: 0.6 % (ref 0–1.5)
BUN BLD-MCNC: 24 MG/DL (ref 8–23)
BUN/CREAT SERPL: 31.6 (ref 7–25)
CALCIUM SPEC-SCNC: 8.4 MG/DL (ref 8.6–10.5)
CHLORIDE SERPL-SCNC: 99 MMOL/L (ref 98–107)
CO2 SERPL-SCNC: 26.2 MMOL/L (ref 22–29)
CREAT BLD-MCNC: 0.76 MG/DL (ref 0.76–1.27)
DEPRECATED RDW RBC AUTO: 66.5 FL (ref 37–54)
EOSINOPHIL # BLD AUTO: 0.19 10*3/MM3 (ref 0–0.7)
EOSINOPHIL NFR BLD AUTO: 4.1 % (ref 0.3–6.2)
ERYTHROCYTE [DISTWIDTH] IN BLOOD BY AUTOMATED COUNT: 18.1 % (ref 11.5–14.5)
GFR SERPL CREATININE-BSD FRML MDRD: 101 ML/MIN/1.73
GLUCOSE BLD-MCNC: 96 MG/DL (ref 65–99)
GLUCOSE BLDC GLUCOMTR-MCNC: 100 MG/DL (ref 70–130)
GLUCOSE BLDC GLUCOMTR-MCNC: 101 MG/DL (ref 70–130)
GLUCOSE BLDC GLUCOMTR-MCNC: 130 MG/DL (ref 70–130)
GLUCOSE BLDC GLUCOMTR-MCNC: 137 MG/DL (ref 70–130)
GLUCOSE BLDC GLUCOMTR-MCNC: 48 MG/DL (ref 70–130)
GLUCOSE BLDC GLUCOMTR-MCNC: 49 MG/DL (ref 70–130)
GLUCOSE BLDC GLUCOMTR-MCNC: 95 MG/DL (ref 70–130)
HCT VFR BLD AUTO: 26.8 % (ref 40.4–52.2)
HGB BLD-MCNC: 8.8 G/DL (ref 13.7–17.6)
IMM GRANULOCYTES # BLD: 0 10*3/MM3 (ref 0–0.03)
IMM GRANULOCYTES NFR BLD: 0 % (ref 0–0.5)
INR PPP: 3.32 (ref 0.9–1.1)
LYMPHOCYTES # BLD AUTO: 1.28 10*3/MM3 (ref 0.9–4.8)
LYMPHOCYTES NFR BLD AUTO: 27.6 % (ref 19.6–45.3)
MAGNESIUM SERPL-MCNC: 1.7 MG/DL (ref 1.6–2.4)
MAGNESIUM SERPL-MCNC: 1.8 MG/DL (ref 1.6–2.4)
MCH RBC QN AUTO: 33.3 PG (ref 27–32.7)
MCHC RBC AUTO-ENTMCNC: 32.8 G/DL (ref 32.6–36.4)
MCV RBC AUTO: 101.5 FL (ref 79.8–96.2)
MONOCYTES # BLD AUTO: 0.63 10*3/MM3 (ref 0.2–1.2)
MONOCYTES NFR BLD AUTO: 13.6 % (ref 5–12)
NEUTROPHILS # BLD AUTO: 2.51 10*3/MM3 (ref 1.9–8.1)
NEUTROPHILS NFR BLD AUTO: 54.1 % (ref 42.7–76)
PLATELET # BLD AUTO: 85 10*3/MM3 (ref 140–500)
PMV BLD AUTO: 11.4 FL (ref 6–12)
POTASSIUM BLD-SCNC: 3.5 MMOL/L (ref 3.5–5.2)
POTASSIUM BLD-SCNC: 3.5 MMOL/L (ref 3.5–5.2)
PROTHROMBIN TIME: 32.7 SECONDS (ref 11.7–14.2)
RBC # BLD AUTO: 2.64 10*6/MM3 (ref 4.6–6)
SODIUM BLD-SCNC: 138 MMOL/L (ref 136–145)
TROPONIN T SERPL-MCNC: 0.05 NG/ML (ref 0–0.03)
WBC NRBC COR # BLD: 4.64 10*3/MM3 (ref 4.5–10.7)

## 2017-03-20 PROCEDURE — 93005 ELECTROCARDIOGRAM TRACING: CPT | Performed by: INTERNAL MEDICINE

## 2017-03-20 PROCEDURE — 63710000001 INSULIN ASPART PER 5 UNITS: Performed by: INTERNAL MEDICINE

## 2017-03-20 PROCEDURE — 84132 ASSAY OF SERUM POTASSIUM: CPT | Performed by: INTERNAL MEDICINE

## 2017-03-20 PROCEDURE — 93010 ELECTROCARDIOGRAM REPORT: CPT | Performed by: INTERNAL MEDICINE

## 2017-03-20 PROCEDURE — 85610 PROTHROMBIN TIME: CPT | Performed by: INTERNAL MEDICINE

## 2017-03-20 PROCEDURE — 85025 COMPLETE CBC W/AUTO DIFF WBC: CPT | Performed by: INTERNAL MEDICINE

## 2017-03-20 PROCEDURE — 83735 ASSAY OF MAGNESIUM: CPT | Performed by: INTERNAL MEDICINE

## 2017-03-20 PROCEDURE — 84484 ASSAY OF TROPONIN QUANT: CPT | Performed by: INTERNAL MEDICINE

## 2017-03-20 PROCEDURE — 80048 BASIC METABOLIC PNL TOTAL CA: CPT | Performed by: INTERNAL MEDICINE

## 2017-03-20 PROCEDURE — 82962 GLUCOSE BLOOD TEST: CPT

## 2017-03-20 PROCEDURE — 99232 SBSQ HOSP IP/OBS MODERATE 35: CPT | Performed by: INTERNAL MEDICINE

## 2017-03-20 RX ORDER — POTASSIUM CHLORIDE 750 MG/1
40 CAPSULE, EXTENDED RELEASE ORAL 2 TIMES DAILY WITH MEALS
Status: DISCONTINUED | OUTPATIENT
Start: 2017-03-21 | End: 2017-03-24 | Stop reason: HOSPADM

## 2017-03-20 RX ORDER — ALPRAZOLAM 0.5 MG/1
0.5 TABLET ORAL 3 TIMES DAILY PRN
Status: DISCONTINUED | OUTPATIENT
Start: 2017-03-20 | End: 2017-03-24 | Stop reason: HOSPADM

## 2017-03-20 RX ORDER — POTASSIUM CHLORIDE 750 MG/1
20 CAPSULE, EXTENDED RELEASE ORAL ONCE
Status: COMPLETED | OUTPATIENT
Start: 2017-03-21 | End: 2017-03-20

## 2017-03-20 RX ADMIN — DEXTROSE MONOHYDRATE 25 G: 25 INJECTION, SOLUTION INTRAVENOUS at 21:36

## 2017-03-20 RX ADMIN — OXYCODONE HYDROCHLORIDE AND ACETAMINOPHEN 1 TABLET: 5; 325 TABLET ORAL at 15:38

## 2017-03-20 RX ADMIN — GABAPENTIN 300 MG: 300 CAPSULE ORAL at 20:15

## 2017-03-20 RX ADMIN — BUMETANIDE 1 MG/HR: 0.25 INJECTION INTRAMUSCULAR; INTRAVENOUS at 05:19

## 2017-03-20 RX ADMIN — BUMETANIDE 1 MG/HR: 0.25 INJECTION INTRAMUSCULAR; INTRAVENOUS at 15:17

## 2017-03-20 RX ADMIN — POTASSIUM CHLORIDE 20 MEQ: 750 CAPSULE, EXTENDED RELEASE ORAL at 18:16

## 2017-03-20 RX ADMIN — FERROUS GLUCONATE TAB 324 MG (37.5 MG ELEMENTAL IRON) 324 MG: 324 (37.5 FE) TAB at 09:15

## 2017-03-20 RX ADMIN — INSULIN ASPART 40 UNITS: 100 INJECTION, SUSPENSION SUBCUTANEOUS at 08:40

## 2017-03-20 RX ADMIN — ALPRAZOLAM 0.5 MG: 0.5 TABLET ORAL at 20:15

## 2017-03-20 RX ADMIN — POTASSIUM CHLORIDE 20 MEQ: 750 CAPSULE, EXTENDED RELEASE ORAL at 23:25

## 2017-03-20 RX ADMIN — CARVEDILOL 6.25 MG: 6.25 TABLET, FILM COATED ORAL at 20:15

## 2017-03-20 RX ADMIN — VENLAFAXINE HYDROCHLORIDE 75 MG: 75 CAPSULE, EXTENDED RELEASE ORAL at 20:15

## 2017-03-20 RX ADMIN — LOSARTAN POTASSIUM 12.5 MG: 25 TABLET, FILM COATED ORAL at 08:44

## 2017-03-20 RX ADMIN — CARVEDILOL 6.25 MG: 6.25 TABLET, FILM COATED ORAL at 08:45

## 2017-03-20 RX ADMIN — MAGNESIUM SULFATE HEPTAHYDRATE 1 G: 1 INJECTION, SOLUTION INTRAVENOUS at 23:25

## 2017-03-20 RX ADMIN — INSULIN ASPART 40 UNITS: 100 INJECTION, SUSPENSION SUBCUTANEOUS at 18:16

## 2017-03-20 RX ADMIN — POTASSIUM CHLORIDE 20 MEQ: 750 CAPSULE, EXTENDED RELEASE ORAL at 08:45

## 2017-03-20 NOTE — PROGRESS NOTES
"      Name: Emil Pulido ADMIT: 3/17/2017   : 1945  PCP: Marcus Avery MD    MRN: [id number] LOS: 3 days   AGE/SEX: 72 y.o. male  ROOM: [facility]   Cc- LE edema  Subjective   Urinating  No SOA   Still with LE edema  On bumex gtt  BG ~130-137  Some leaking around condom catheter  Pt is frustrated about medical conditions but denies any suicidal ideation    ROS  No f/c  No n/v    Objective   Vital Signs  Temp:  [98.1 °F (36.7 °C)-98.6 °F (37 °C)] 98.6 °F (37 °C)  Heart Rate:  [81-86] 82  Resp:  [16-20] 18  BP: (107-134)/(51-63) 129/56  SpO2:  [92 %-98 %] 96 %  on   ;   O2 Device: room air  Body mass index is 36.94 kg/(m^2).    Alert, chronically ill  Supple, +jvd  Soft, nt  Condom catheter  Irregular, distant heart sounds  Decreased bs at bases  2+ LE edema, no clubbing or cyanosis  A and O x 3    Objective:  Vital signs: (most recent): Blood pressure 129/56, pulse 82, temperature 98.6 °F (37 °C), temperature source Oral, resp. rate 18, height 72\" (182.9 cm), weight 272 lb 6.4 oz (124 kg), SpO2 96 %.          Results Review:       I reviewed the patient's new clinical results.    Results from last 7 days  Lab Units 17  0517  04517  05017  1627   WBC 10*3/mm3 4.64 4.08* 3.77* 4.37*   HEMOGLOBIN g/dL 8.8* 9.0* 8.4* 8.4*   PLATELETS 10*3/mm3 85* 77* 76* 80*       Results from last 7 days  Lab Units 17  0544 17  0452 17  05017  1627   SODIUM mmol/L 138 137 134* 138   POTASSIUM mmol/L 3.5 3.4* 3.9 4.3   CHLORIDE mmol/L 99 101 100 102   TOTAL CO2 mmol/L 26.2 24.7 24.2 25.0   BUN mg/dL 24* 26* 28* 33*   CREATININE mg/dL 0.76 0.74* 0.78 0.82   GLUCOSE mg/dL 96 112* 224* 229*   Estimated Creatinine Clearance: 116.9 mL/min (by C-G formula based on Cr of 0.76).    Results from last 7 days  Lab Units 17  0544 17  0452 17  0509 17  1627   CALCIUM mg/dL 8.4* 8.8 8.7 8.9   ALBUMIN g/dL  --   --  3.00* 3.20*   MAGNESIUM mg/dL 1.8 1.9 1.8 1.9 "   PHOSPHORUS mg/dL  --   --  3.0  --        Protime-INR [[id number]] (Abnormal) Collected: 03/20/17 0544        Lab Status: Final result Specimen: Blood Updated: 03/20/17 0651        Protime 32.7 (H) Seconds         INR 3.32 (H)             XR Chest 1 View [[id number]] Not Reviewed        Order Status: Completed Collected: 03/17/17 1607        Updated: 03/17/17 1612       Narrative:         XR CHEST 1 VW-     HISTORY: Male who is 72 years-old,  short of breath     TECHNIQUE: Frontal and lateral views of the chest     COMPARISON: 11/14/2016     FINDINGS: Heart is mildly enlarged. Pulmonary vasculature is slightly  congested. Left hemidiaphragm is partly obscured, suggesting small left  basilar atelectasis or infiltrate. No pleural effusion or pneumothorax.  Mild pulmonary hyperinflation suggests COPD. Otherwise stable.          Impression:         Small left basilar atelectasis/infiltrate, follow-up x-ray  suggested. COPD change. Cardiomegaly and pulmonary vascular congestion.            carvedilol 6.25 mg Oral Q12H   ferrous gluconate 324 mg Oral Daily   gabapentin 300 mg Oral Nightly   insulin aspart 0-7 Units Subcutaneous 4x Daily AC & at Bedtime   insulin aspart prot-insulin aspart 40 Units Subcutaneous BID With Meals   losartan 12.5 mg Oral Q24H   potassium chloride 20 mEq Oral BID With Meals   venlafaxine XR 75 mg Oral Nightly       bumetanide (BUMEX) infusion 1 mg/hr Last Rate: 1 mg/hr (03/20/17 1517)   Diet Regular; Cardiac, Consistent Carbohydrate, Renal      Assessment/Plan   Assessment:     Active Hospital Problems (** Indicates Principal Problem)    Diagnosis Date Noted   • **Acute on chronic diastolic congestive heart failure [I50.33] 03/18/2016   • Hypokalemia [E87.6] 03/19/2017   • Pancytopenia [D61.818] 03/21/2016   • Type 2 diabetes mellitus treated with insulin [E11.9, Z79.4] 03/18/2016   • Antiphospholipid antibody with hypercoagulable state [D68.61] 03/18/2016   • Iron deficiency anemia due to chronic  blood loss [D50.0] 03/08/2016   • Long term current use of anticoagulant [Z79.01] 10/07/2015   • Benign hypertension [I10] 11/07/2013   • Chronic atrial fibrillation [I48.2] 11/07/2013      Resolved Hospital Problems    Diagnosis Date Noted Date Resolved   No resolved problems to display.       Plan:   - Bumex gtt, closely monitor electrolytes  - Continue insulin for DM, closely monitoring BG  - Chronic pancytopenia- monitor  - Hold coumadin today and monitor INR  - Replace K as needed and scheduled k as well    D/W RN    Thanks to specialists  Disposition  TBD.      Charlie Kramer MD  [facility]  03/20/17  6:44 PM

## 2017-03-20 NOTE — PROGRESS NOTES
Discharge Planning Assessment  [facility]     Patient Name: Emil Pulido  MRN: [id number]  Today's Date: 3/20/2017    Admit Date: 3/17/2017          Discharge Needs Assessment       03/20/17 1045    Living Environment    Lives With spouse    Living Arrangements house    Provides Primary Care For no one    Primary Care Provided By spouse/significant other    Living Arrangement Comments Pt lives with wife (Juliet) in a one and half story home.  DARRON Cruz    Discharge Needs Assessment    Concerns To Be Addressed basic needs concerns    Anticipated Changes Related to Illness none    Equipment Currently Used at Home cane, quad;cane, straight;commode;glucometer;walker, rolling;wheelchair, motorized    Equipment Needed After Discharge cane, quad;cane, straight;commode;glucometer;walker, rolling;other (see comments)   Power Chair    Transportation Available car;family or friend will provide    Discharge Disposition skilled nursing facility            Discharge Plan       03/20/17 1049    Case Management/Social Work Plan    Plan Plan home with HH or SNF @ [facility] based on bed availability or another SNF.  MAGGIE Cruz    Additional Comments FACE SHEET VERIFIED/  IM LETTER SIGNED.  Spoke to pt at bedside.  Pt states has  a straight cane, four prong cane, rolling walker, BSC, glucometer. and power chair for home use.  Pt gets prescriptions from [facility] & [facility] mail order. Pt is not current with HH.  Pt has been in [facility] for skilled care and states would return.  Jackie [phone]) called and states beds are tight  and pt will need second choice.  Plan HH or SNF if needed.  MAGGIE Cruz        Discharge Placement     No information found                Demographic Summary       03/20/17 1043    Referral Information    Admission Type inpatient    Arrived From home or self-care    Primary Care Physician Information    Name Dr. Marcus Avery    Phone [phone]            Functional Status        03/20/17 1045    Functional Status Current    Ambulation 3-->assistive equipment and person    Transferring 3-->assistive equipment and person    Toileting 3-->assistive equipment and person    Bathing 2-->assistive person    Dressing 2-->assistive person    Eating 2-->assistive person    Communication 0-->understands/communicates without difficulty            Psychosocial     None            Abuse/Neglect     None            Legal     None            Substance Abuse     None            Patient Forms     None          Kristin More, RN

## 2017-03-20 NOTE — PROGRESS NOTES
Continued Stay Note  [facility]     Patient Name: Emil Pulido  MRN: [id number]  Today's Date: 3/20/2017    Admit Date: 3/17/2017          Discharge Plan       03/20/17 1049    Case Management/Social Work Plan    Plan Plan home with HH or SNF @ [facility] based on bed availability or another SNF.  MAGGIE Cruz    Additional Comments FACE SHEET VERIFIED/  IM LETTER SIGNED.  Spoke to pt at bedside.  Pt states has  a straight cane, four prong cane, rolling walker, BSC, glucometer. and power chair for home use.  Pt gets prescriptions from [facility] & [facility] mail order. Pt is not current with HH.  Pt has been in [facility] for skilled care and states would return.  Jackie [phone]) called and states beds are tight  and pt will need second choice.  Plan HH or SNF if needed.  MAGGIE Cruz              Discharge Codes     None            Kristin More RN

## 2017-03-20 NOTE — PROGRESS NOTES
"Patient Care Team:  Marcus Avery MD as PCP - General (Internal Medicine)  Marcus Avery MD as PCP - Family Medicine  Ilya Ambrocio MD as Consulting Physician (Hematology and Oncology)  Willy Bell MD as Referring Physician (Internal Medicine)    Chief Complaint:f/u acute on chronic combined systolic and diastolic CHF    Interval History:   Diuresing well. No new complaints.     Objective   Vital Signs  Temp:  [98 °F (36.7 °C)-98.6 °F (37 °C)] 98.2 °F (36.8 °C)  Heart Rate:  [81-86] 81  Resp:  [16-20] 20  BP: (107-134)/(51-63) 134/63    Intake/Output Summary (Last 24 hours) at 03/20/17 0858  Last data filed at 03/20/17 0519   Gross per 24 hour   Intake    100 ml   Output   2400 ml   Net  -2300 ml     Flowsheet Rows         First Filed Value    Admission Height  72\" (182.9 cm) Documented at 03/17/2017 1531    Admission Weight  250 lb (113 kg) Documented at 03/17/2017 1531          General Appearance:    Alert, cooperative, in no acute distress   Head:    Normocephalic, without obvious abnormality, atraumatic       Neck:   No adenopathy, supple, no thyromegaly, no carotid bruit, no    JVD   Lungs:     Clear to auscultation bilaterally, no wheezes, rales, or     rhonchi    Heart:    Normal rate, regular rhythm,  No murmur, rub, or gallop   Chest Wall:    No abnormalities observed   Abdomen:     Normal bowel sounds, soft, non-tender, non-distended,            no rebound tenderness   Extremities:   2+ bilateral LE edema.    Pulses:   Pulses palpable and equal bilaterally   Skin:   No bleeding or rash               carvedilol 6.25 mg Oral Q12H   ferrous gluconate 324 mg Oral Daily   gabapentin 300 mg Oral Nightly   insulin aspart 0-7 Units Subcutaneous 4x Daily AC & at Bedtime   insulin aspart prot-insulin aspart 40 Units Subcutaneous BID With Meals   losartan 12.5 mg Oral Q24H   potassium chloride 20 mEq Oral BID With Meals   venlafaxine XR 75 mg Oral Nightly         bumetanide (BUMEX) infusion 1 mg/hr Last " Rate: 1 mg/hr (03/20/17 0519)       Results Review:      Results from last 7 days  Lab Units 03/20/17  0544   SODIUM mmol/L 138   POTASSIUM mmol/L 3.5   CHLORIDE mmol/L 99   TOTAL CO2 mmol/L 26.2   BUN mg/dL 24*   CREATININE mg/dL 0.76   GLUCOSE mg/dL 96   CALCIUM mg/dL 8.4*       Results from last 7 days  Lab Units 03/17/17  1627   TROPONIN T ng/mL 0.042*       Results from last 7 days  Lab Units 03/20/17  0544   WBC 10*3/mm3 4.64   HEMOGLOBIN g/dL 8.8*   HEMATOCRIT % 26.8*   PLATELETS 10*3/mm3 85*       Results from last 7 days  Lab Units 03/20/17  0544 03/19/17  0452 03/18/17  0509   INR  3.32* 3.31* 2.86*           Results from last 7 days  Lab Units 03/20/17  0544   MAGNESIUM mg/dL 1.8           I reviewed the patient's new clinical results.  I personally viewed and interpreted the patient's EKG/Telemetry data        Assessment/Plan   1. Acute on chronic systolic/diastolic CHF: Improving. Diuresing on bumex gtt.   2. Cardiomyopathy: EF 45% by echo 11/17/16. On carvedilol and losartan.   3. Moderate Tricuspid regurgitation  4. Antiphospholipid antibody syndrome and factor V Leiden heterozygote with prior DVT: On warfarin.  5. Persistent atrial fibrillation: On warfarin. Rate controlled on carvedilol.   6. Morbid obesity  7. Diabetes  8. Chronic anemia (multifactorial)   9. Prior GI bleeding  10. Prior stroke  11. Hypertension: Well controlled.      - Continue current management   - Agree with holding warfarin for supratherapeutic INR  -Continue bumex gtt.

## 2017-03-21 PROBLEM — E11.649 TYPE 2 DIABETES MELLITUS WITH HYPOGLYCEMIA (HCC): Status: ACTIVE | Noted: 2017-03-21

## 2017-03-21 LAB
ANION GAP SERPL CALCULATED.3IONS-SCNC: 9.8 MMOL/L
BASOPHILS # BLD AUTO: 0.02 10*3/MM3 (ref 0–0.2)
BASOPHILS NFR BLD AUTO: 0.5 % (ref 0–1.5)
BUN BLD-MCNC: 22 MG/DL (ref 8–23)
BUN/CREAT SERPL: 28.9 (ref 7–25)
CALCIUM SPEC-SCNC: 8.5 MG/DL (ref 8.6–10.5)
CHLORIDE SERPL-SCNC: 97 MMOL/L (ref 98–107)
CO2 SERPL-SCNC: 28.2 MMOL/L (ref 22–29)
CREAT BLD-MCNC: 0.76 MG/DL (ref 0.76–1.27)
DEPRECATED RDW RBC AUTO: 67.3 FL (ref 37–54)
EOSINOPHIL # BLD AUTO: 0.15 10*3/MM3 (ref 0–0.7)
EOSINOPHIL NFR BLD AUTO: 3.4 % (ref 0.3–6.2)
ERYTHROCYTE [DISTWIDTH] IN BLOOD BY AUTOMATED COUNT: 17.6 % (ref 11.5–14.5)
GFR SERPL CREATININE-BSD FRML MDRD: 101 ML/MIN/1.73
GLUCOSE BLD-MCNC: 130 MG/DL (ref 65–99)
GLUCOSE BLDC GLUCOMTR-MCNC: 150 MG/DL (ref 70–130)
GLUCOSE BLDC GLUCOMTR-MCNC: 168 MG/DL (ref 70–130)
GLUCOSE BLDC GLUCOMTR-MCNC: 184 MG/DL (ref 70–130)
GLUCOSE BLDC GLUCOMTR-MCNC: 204 MG/DL (ref 70–130)
HCT VFR BLD AUTO: 30.1 % (ref 40.4–52.2)
HGB BLD-MCNC: 9.5 G/DL (ref 13.7–17.6)
IMM GRANULOCYTES # BLD: 0 10*3/MM3 (ref 0–0.03)
IMM GRANULOCYTES NFR BLD: 0 % (ref 0–0.5)
INR PPP: 2.74 (ref 0.9–1.1)
LYMPHOCYTES # BLD AUTO: 1.01 10*3/MM3 (ref 0.9–4.8)
LYMPHOCYTES NFR BLD AUTO: 22.7 % (ref 19.6–45.3)
MAGNESIUM SERPL-MCNC: 2 MG/DL (ref 1.6–2.4)
MCH RBC QN AUTO: 33.1 PG (ref 27–32.7)
MCHC RBC AUTO-ENTMCNC: 31.6 G/DL (ref 32.6–36.4)
MCV RBC AUTO: 104.9 FL (ref 79.8–96.2)
MONOCYTES # BLD AUTO: 0.55 10*3/MM3 (ref 0.2–1.2)
MONOCYTES NFR BLD AUTO: 12.4 % (ref 5–12)
NEUTROPHILS # BLD AUTO: 2.71 10*3/MM3 (ref 1.9–8.1)
NEUTROPHILS NFR BLD AUTO: 61 % (ref 42.7–76)
PLATELET # BLD AUTO: 81 10*3/MM3 (ref 140–500)
PMV BLD AUTO: 11.6 FL (ref 6–12)
POTASSIUM BLD-SCNC: 4 MMOL/L (ref 3.5–5.2)
PROTHROMBIN TIME: 28.1 SECONDS (ref 11.7–14.2)
RBC # BLD AUTO: 2.87 10*6/MM3 (ref 4.6–6)
SODIUM BLD-SCNC: 135 MMOL/L (ref 136–145)
WBC NRBC COR # BLD: 4.44 10*3/MM3 (ref 4.5–10.7)

## 2017-03-21 PROCEDURE — 63710000001 INSULIN ASPART PER 5 UNITS: Performed by: INTERNAL MEDICINE

## 2017-03-21 PROCEDURE — 25010000002 MAGNESIUM SULFATE IN D5W 1G/100ML (PREMIX) 10-5 MG/ML-% SOLUTION: Performed by: INTERNAL MEDICINE

## 2017-03-21 PROCEDURE — 80048 BASIC METABOLIC PNL TOTAL CA: CPT | Performed by: INTERNAL MEDICINE

## 2017-03-21 PROCEDURE — 83735 ASSAY OF MAGNESIUM: CPT | Performed by: INTERNAL MEDICINE

## 2017-03-21 PROCEDURE — 85610 PROTHROMBIN TIME: CPT | Performed by: INTERNAL MEDICINE

## 2017-03-21 PROCEDURE — 82962 GLUCOSE BLOOD TEST: CPT

## 2017-03-21 PROCEDURE — 99232 SBSQ HOSP IP/OBS MODERATE 35: CPT | Performed by: INTERNAL MEDICINE

## 2017-03-21 PROCEDURE — 85025 COMPLETE CBC W/AUTO DIFF WBC: CPT | Performed by: INTERNAL MEDICINE

## 2017-03-21 RX ORDER — FERROUS SULFATE 325(65) MG
325 TABLET ORAL
Status: DISCONTINUED | OUTPATIENT
Start: 2017-03-21 | End: 2017-03-24 | Stop reason: HOSPADM

## 2017-03-21 RX ORDER — SENNA AND DOCUSATE SODIUM 50; 8.6 MG/1; MG/1
2 TABLET, FILM COATED ORAL NIGHTLY
Status: DISCONTINUED | OUTPATIENT
Start: 2017-03-21 | End: 2017-03-22

## 2017-03-21 RX ORDER — INSULIN ASPART 100 [IU]/ML
20 INJECTION, SUSPENSION SUBCUTANEOUS 2 TIMES DAILY WITH MEALS
Status: DISCONTINUED | OUTPATIENT
Start: 2017-03-22 | End: 2017-03-23

## 2017-03-21 RX ORDER — WARFARIN SODIUM 2 MG/1
2 TABLET ORAL
Status: DISCONTINUED | OUTPATIENT
Start: 2017-03-21 | End: 2017-03-24 | Stop reason: HOSPADM

## 2017-03-21 RX ADMIN — GABAPENTIN 300 MG: 300 CAPSULE ORAL at 20:02

## 2017-03-21 RX ADMIN — BUMETANIDE 1 MG/HR: 0.25 INJECTION INTRAMUSCULAR; INTRAVENOUS at 10:47

## 2017-03-21 RX ADMIN — LOSARTAN POTASSIUM 12.5 MG: 25 TABLET, FILM COATED ORAL at 08:34

## 2017-03-21 RX ADMIN — BUMETANIDE 1 MG/HR: 0.25 INJECTION INTRAMUSCULAR; INTRAVENOUS at 01:06

## 2017-03-21 RX ADMIN — CARVEDILOL 6.25 MG: 6.25 TABLET, FILM COATED ORAL at 08:33

## 2017-03-21 RX ADMIN — OXYCODONE HYDROCHLORIDE AND ACETAMINOPHEN 1 TABLET: 5; 325 TABLET ORAL at 06:24

## 2017-03-21 RX ADMIN — DOCUSATE SODIUM,SENNOSIDES 2 TABLET: 50; 8.6 TABLET, FILM COATED ORAL at 20:02

## 2017-03-21 RX ADMIN — OXYCODONE HYDROCHLORIDE AND ACETAMINOPHEN 1 TABLET: 5; 325 TABLET ORAL at 12:27

## 2017-03-21 RX ADMIN — INSULIN ASPART 20 UNITS: 100 INJECTION, SUSPENSION SUBCUTANEOUS at 08:36

## 2017-03-21 RX ADMIN — POTASSIUM CHLORIDE 40 MEQ: 750 CAPSULE, EXTENDED RELEASE ORAL at 18:06

## 2017-03-21 RX ADMIN — INSULIN ASPART 30 UNITS: 100 INJECTION, SUSPENSION SUBCUTANEOUS at 18:06

## 2017-03-21 RX ADMIN — WARFARIN SODIUM 2 MG: 2 TABLET ORAL at 22:11

## 2017-03-21 RX ADMIN — FERROUS SULFATE TAB 325 MG (65 MG ELEMENTAL FE) 325 MG: 325 (65 FE) TAB at 08:35

## 2017-03-21 RX ADMIN — CARVEDILOL 6.25 MG: 6.25 TABLET, FILM COATED ORAL at 20:02

## 2017-03-21 RX ADMIN — VENLAFAXINE HYDROCHLORIDE 75 MG: 75 CAPSULE, EXTENDED RELEASE ORAL at 20:02

## 2017-03-21 RX ADMIN — POTASSIUM CHLORIDE 40 MEQ: 750 CAPSULE, EXTENDED RELEASE ORAL at 08:35

## 2017-03-21 RX ADMIN — BUMETANIDE 1 MG/HR: 0.25 INJECTION INTRAMUSCULAR; INTRAVENOUS at 20:03

## 2017-03-21 NOTE — PROGRESS NOTES
"      Name: Emil Pulido ADMIT: 3/17/2017   : 1945  PCP: Marcus Avery MD    MRN: [id number] LOS: 4 days   AGE/SEX: 72 y.o. male  ROOM: [facility]   Cc- LE edema  Subjective   Urinating  No SOA   Still with LE edema  On bumex gtt  Hypoglycemic last night to 40s  Improved with treatment   this afternoon  Didn't have much of dinner last night    ROS  No f/c  No n/v  No skin changes  Chronically in wheelchair but does some transfers    Objective   Vital Signs  Temp:  [97.9 °F (36.6 °C)-98.2 °F (36.8 °C)] 97.9 °F (36.6 °C)  Heart Rate:  [58-94] 80  Resp:  [16-20] 18  BP: ()/(48-87) 113/62  SpO2:  [96 %-97 %] 96 %  on   ;   O2 Device: room air  Body mass index is 36.89 kg/(m^2).    Alert, chronically ill  Supple, +jvd  Soft, nt  Irregular, distant heart sounds  Decreased bs at bases  2+ LE edema, no clubbing or cyanosis  A and O x 3    Objective:  Vital signs: (most recent): Blood pressure 113/62, pulse 80, temperature 97.9 °F (36.6 °C), temperature source Oral, resp. rate 18, height 72\" (182.9 cm), weight 272 lb (123 kg), SpO2 96 %.          Results Review:       I reviewed the patient's new clinical results.    Results from last 7 days  Lab Units 17  0544 17  0452 17  0509 17  1627   WBC 10*3/mm3 4.44* 4.64 4.08* 3.77* 4.37*   HEMOGLOBIN g/dL 9.5* 8.8* 9.0* 8.4* 8.4*   PLATELETS 10*3/mm3 81* 85* 77* 76* 80*       Results from last 7 days  Lab Units 17  2044 17  0544 17  0452 17  0509 17  1627   SODIUM mmol/L 135*  --  138 137 134* 138   POTASSIUM mmol/L 4.0 3.5 3.5 3.4* 3.9 4.3   CHLORIDE mmol/L 97*  --  99 101 100 102   TOTAL CO2 mmol/L 28.2  --  26.2 24.7 24.2 25.0   BUN mg/dL 22  --  24* 26* 28* 33*   CREATININE mg/dL 0.76  --  0.76 0.74* 0.78 0.82   GLUCOSE mg/dL 130*  --  96 112* 224* 229*   Estimated Creatinine Clearance: 116.4 mL/min (by C-G formula based on Cr of 0.76).    Results from last 7 days  Lab Units " 03/21/17  0458 03/20/17  2044 03/20/17  0544 03/19/17  0452 03/18/17  0509 03/17/17  1627   CALCIUM mg/dL 8.5*  --  8.4* 8.8 8.7 8.9   ALBUMIN g/dL  --   --   --   --  3.00* 3.20*   MAGNESIUM mg/dL 2.0 1.7 1.8 1.9 1.8 1.9   PHOSPHORUS mg/dL  --   --   --   --  3.0  --        Protime-INR [[id number]] (Abnormal) Collected: 03/21/17 0458        Lab Status: Final result Specimen: Blood Updated: 03/21/17 0623        Protime 28.1 (H) Seconds         INR 2.74 (H)             XR Chest 1 View [[id number]] Not Reviewed        Order Status: Completed Collected: 03/17/17 1607        Updated: 03/17/17 1612       Narrative:         XR CHEST 1 VW-     HISTORY: Male who is 72 years-old,  short of breath     TECHNIQUE: Frontal and lateral views of the chest     COMPARISON: 11/14/2016     FINDINGS: Heart is mildly enlarged. Pulmonary vasculature is slightly  congested. Left hemidiaphragm is partly obscured, suggesting small left  basilar atelectasis or infiltrate. No pleural effusion or pneumothorax.  Mild pulmonary hyperinflation suggests COPD. Otherwise stable.          Impression:         Small left basilar atelectasis/infiltrate, follow-up x-ray  suggested. COPD change. Cardiomegaly and pulmonary vascular congestion.            carvedilol 6.25 mg Oral Q12H   ferrous sulfate 325 mg Oral Daily With Breakfast   gabapentin 300 mg Oral Nightly   insulin aspart 0-7 Units Subcutaneous 4x Daily AC & at Bedtime   insulin aspart prot-insulin aspart 40 Units Subcutaneous BID With Meals   losartan 12.5 mg Oral Q24H   potassium chloride 40 mEq Oral BID With Meals   venlafaxine XR 75 mg Oral Nightly       bumetanide (BUMEX) infusion 1 mg/hr Last Rate: 1 mg/hr (03/21/17 1047)   Diet Regular; Cardiac, Consistent Carbohydrate, Renal      Assessment/Plan   Assessment:     Active Hospital Problems (** Indicates Principal Problem)    Diagnosis Date Noted   • **Acute on chronic diastolic congestive heart failure [I50.33] 03/18/2016   • Type 2 diabetes  mellitus with hypoglycemia [E11.649] 03/21/2017   • Hypokalemia [E87.6] 03/19/2017   • Pancytopenia [D61.818] 03/21/2016   • Type 2 diabetes mellitus treated with insulin [E11.9, Z79.4] 03/18/2016   • Antiphospholipid antibody with hypercoagulable state [D68.61] 03/18/2016   • Iron deficiency anemia due to chronic blood loss [D50.0] 03/08/2016   • Long term current use of anticoagulant [Z79.01] 10/07/2015   • Benign hypertension [I10] 11/07/2013   • Chronic atrial fibrillation [I48.2] 11/07/2013      Resolved Hospital Problems    Diagnosis Date Noted Date Resolved   No resolved problems to display.       Plan:   - Bumex gtt, closely monitor electrolytes  - Decrease insulin with hypoglycemia-- from 40 units 70/30 BID to 20units BID, closely monitor BG  - Chronic pancytopenia- monitor  - Continue coumadin today and monitor INR  - Replace K as needed and scheduled k as well    D/W RN  D/W Family at bedside    Thanks to specialists  Disposition  TBD.      Charlie Kramer MD  [facility]  03/21/17  6:21 PM

## 2017-03-21 NOTE — PROGRESS NOTES
"Patient Care Team:  Marcus Avery MD as PCP - General (Internal Medicine)  Marcus Avery MD as PCP - Family Medicine  Ilya Ambrocio MD as Consulting Physician (Hematology and Oncology)  Willy Bell MD as Referring Physician (Internal Medicine)    Chief Complaint: Follow-up acute on chronic systolic and diastolic heart failure    Interval History:   Diuresing well. Sleepy today. Follows commands.     Objective   Vital Signs  Temp:  [97.9 °F (36.6 °C)-98.2 °F (36.8 °C)] 97.9 °F (36.6 °C)  Heart Rate:  [58-94] 80  Resp:  [16-20] 18  BP: ()/(48-87) 113/62    Intake/Output Summary (Last 24 hours) at 03/21/17 1557  Last data filed at 03/21/17 1404   Gross per 24 hour   Intake    840 ml   Output   3075 ml   Net  -2235 ml     Flowsheet Rows         First Filed Value    Admission Height  72\" (182.9 cm) Documented at 03/17/2017 1531    Admission Weight  250 lb (113 kg) Documented at 03/17/2017 1531          General Appearance:    Alert, cooperative, in no acute distress   Head:    Normocephalic, without obvious abnormality, atraumatic       Neck:   No adenopathy, supple, no thyromegaly, no carotid bruit, no    JVD   Lungs:     Clear to auscultation bilaterally, no wheezes, rales, or     rhonchi    Heart:    Normal rate, regular rhythm,  No murmur, rub, or gallop   Chest Wall:    No abnormalities observed   Abdomen:     Normal bowel sounds, soft, non-tender, non-distended,            no rebound tenderness   Extremities:  1+ bilateral lower extremity edema   Pulses:   Pulses palpable and equal bilaterally   Skin:   No bleeding or rash     carvedilol 6.25 mg Oral Q12H   ferrous sulfate 325 mg Oral Daily With Breakfast   gabapentin 300 mg Oral Nightly   insulin aspart 0-7 Units Subcutaneous 4x Daily AC & at Bedtime   insulin aspart prot-insulin aspart 40 Units Subcutaneous BID With Meals   losartan 12.5 mg Oral Q24H   potassium chloride 40 mEq Oral BID With Meals   venlafaxine XR 75 mg Oral Nightly "         bumetanide (BUMEX) infusion 1 mg/hr Last Rate: 1 mg/hr (03/21/17 1047)       Results Review:      Results from last 7 days  Lab Units 03/21/17  0458   SODIUM mmol/L 135*   POTASSIUM mmol/L 4.0   CHLORIDE mmol/L 97*   TOTAL CO2 mmol/L 28.2   BUN mg/dL 22   CREATININE mg/dL 0.76   GLUCOSE mg/dL 130*   CALCIUM mg/dL 8.5*       Results from last 7 days  Lab Units 03/20/17  2044 03/17/17  1627   TROPONIN T ng/mL 0.052* 0.042*       Results from last 7 days  Lab Units 03/21/17  0458   WBC 10*3/mm3 4.44*   HEMOGLOBIN g/dL 9.5*   HEMATOCRIT % 30.1*   PLATELETS 10*3/mm3 81*       Results from last 7 days  Lab Units 03/21/17  0458 03/20/17  0544 03/19/17  0452   INR  2.74* 3.32* 3.31*           Results from last 7 days  Lab Units 03/21/17  0458   MAGNESIUM mg/dL 2.0           I reviewed the patient's new clinical results.  I personally viewed and interpreted the patient's EKG/Telemetry data        Assessment/Plan   1. Acute on chronic systolic/diastolic CHF: Improving. Diuresing on bumex gtt.   2. Cardiomyopathy: EF 45% by echo 11/17/16. On carvedilol and losartan.   3. Moderate Tricuspid regurgitation  4. Antiphospholipid antibody syndrome and factor V Leiden heterozygote with prior DVT: On warfarin.  5. Persistent atrial fibrillation: On warfarin. Rate controlled on carvedilol.   6. Morbid obesity  7. Diabetes  8. Chronic anemia (multifactorial)   9. Prior GI bleeding  10. Prior stroke  11. Hypertension: Well controlled.   12: Thrombocytopenia      -Continue bumex gtt.   -Restart Coumadin at 2 mg daily  -Encouraged strict I/O. If patient can't use urinal, he will need kamara

## 2017-03-21 NOTE — NURSING NOTE
Upon initial assessment pt reported not feeling right. Telemetry monitor showed PVC's and 3-4 beat runs of SVT vs NSVT. STAT labs and EKG ordered. Dr. Cali notified of low side results. 1 gram IV mag given. 20 meq's potassium given. Pt blood glucose also low. Pt able to swallow so orange juice given. Pt sugar remained low. Amp of D50 administered. Will continue to monitor pt sugar throughout shift.

## 2017-03-22 ENCOUNTER — APPOINTMENT (OUTPATIENT)
Dept: ONCOLOGY | Facility: HOSPITAL | Age: 72
End: 2017-03-22

## 2017-03-22 ENCOUNTER — APPOINTMENT (OUTPATIENT)
Dept: LAB | Facility: HOSPITAL | Age: 72
End: 2017-03-22

## 2017-03-22 PROBLEM — K59.00 CONSTIPATION: Status: ACTIVE | Noted: 2017-03-22

## 2017-03-22 LAB
ANION GAP SERPL CALCULATED.3IONS-SCNC: 12.8 MMOL/L
ANISOCYTOSIS BLD QL: NORMAL
BASOPHILS # BLD AUTO: 0.03 10*3/MM3 (ref 0–0.2)
BASOPHILS NFR BLD AUTO: 0.7 % (ref 0–1.5)
BUN BLD-MCNC: 24 MG/DL (ref 8–23)
BUN/CREAT SERPL: 29.6 (ref 7–25)
CALCIUM SPEC-SCNC: 8.3 MG/DL (ref 8.6–10.5)
CHLORIDE SERPL-SCNC: 98 MMOL/L (ref 98–107)
CO2 SERPL-SCNC: 24.2 MMOL/L (ref 22–29)
CREAT BLD-MCNC: 0.81 MG/DL (ref 0.76–1.27)
DACRYOCYTES BLD QL SMEAR: NORMAL
DEPRECATED RDW RBC AUTO: 69.9 FL (ref 37–54)
EOSINOPHIL # BLD AUTO: 0.29 10*3/MM3 (ref 0–0.7)
EOSINOPHIL NFR BLD AUTO: 6.4 % (ref 0.3–6.2)
ERYTHROCYTE [DISTWIDTH] IN BLOOD BY AUTOMATED COUNT: 18 % (ref 11.5–14.5)
GFR SERPL CREATININE-BSD FRML MDRD: 94 ML/MIN/1.73
GLUCOSE BLD-MCNC: 83 MG/DL (ref 65–99)
GLUCOSE BLDC GLUCOMTR-MCNC: 178 MG/DL (ref 70–130)
GLUCOSE BLDC GLUCOMTR-MCNC: 200 MG/DL (ref 70–130)
GLUCOSE BLDC GLUCOMTR-MCNC: 237 MG/DL (ref 70–130)
GLUCOSE BLDC GLUCOMTR-MCNC: 79 MG/DL (ref 70–130)
HCT VFR BLD AUTO: 28.5 % (ref 40.4–52.2)
HGB BLD-MCNC: 9.1 G/DL (ref 13.7–17.6)
IMM GRANULOCYTES # BLD: 0 10*3/MM3 (ref 0–0.03)
IMM GRANULOCYTES NFR BLD: 0 % (ref 0–0.5)
INR PPP: 2.61 (ref 0.9–1.1)
LYMPHOCYTES # BLD AUTO: 1.29 10*3/MM3 (ref 0.9–4.8)
LYMPHOCYTES NFR BLD AUTO: 28.4 % (ref 19.6–45.3)
MACROCYTES BLD QL SMEAR: NORMAL
MAGNESIUM SERPL-MCNC: 2.1 MG/DL (ref 1.6–2.4)
MCH RBC QN AUTO: 33.8 PG (ref 27–32.7)
MCHC RBC AUTO-ENTMCNC: 31.9 G/DL (ref 32.6–36.4)
MCV RBC AUTO: 105.9 FL (ref 79.8–96.2)
MONOCYTES # BLD AUTO: 0.44 10*3/MM3 (ref 0.2–1.2)
MONOCYTES NFR BLD AUTO: 9.7 % (ref 5–12)
NEUTROPHILS # BLD AUTO: 2.5 10*3/MM3 (ref 1.9–8.1)
NEUTROPHILS NFR BLD AUTO: 54.8 % (ref 42.7–76)
PLAT MORPH BLD: NORMAL
PLATELET # BLD AUTO: 77 10*3/MM3 (ref 140–500)
PMV BLD AUTO: 11.4 FL (ref 6–12)
POLYCHROMASIA BLD QL SMEAR: NORMAL
POTASSIUM BLD-SCNC: 4.4 MMOL/L (ref 3.5–5.2)
PROTHROMBIN TIME: 27.1 SECONDS (ref 11.7–14.2)
RBC # BLD AUTO: 2.69 10*6/MM3 (ref 4.6–6)
SODIUM BLD-SCNC: 135 MMOL/L (ref 136–145)
WBC MORPH BLD: NORMAL
WBC NRBC COR # BLD: 4.55 10*3/MM3 (ref 4.5–10.7)

## 2017-03-22 PROCEDURE — 80048 BASIC METABOLIC PNL TOTAL CA: CPT | Performed by: INTERNAL MEDICINE

## 2017-03-22 PROCEDURE — 85007 BL SMEAR W/DIFF WBC COUNT: CPT | Performed by: INTERNAL MEDICINE

## 2017-03-22 PROCEDURE — 85025 COMPLETE CBC W/AUTO DIFF WBC: CPT | Performed by: INTERNAL MEDICINE

## 2017-03-22 PROCEDURE — 99232 SBSQ HOSP IP/OBS MODERATE 35: CPT | Performed by: INTERNAL MEDICINE

## 2017-03-22 PROCEDURE — 85610 PROTHROMBIN TIME: CPT | Performed by: INTERNAL MEDICINE

## 2017-03-22 PROCEDURE — 63710000001 INSULIN ASPART PER 5 UNITS: Performed by: INTERNAL MEDICINE

## 2017-03-22 PROCEDURE — 97163 PT EVAL HIGH COMPLEX 45 MIN: CPT

## 2017-03-22 PROCEDURE — 83735 ASSAY OF MAGNESIUM: CPT | Performed by: INTERNAL MEDICINE

## 2017-03-22 PROCEDURE — 82962 GLUCOSE BLOOD TEST: CPT

## 2017-03-22 PROCEDURE — 97110 THERAPEUTIC EXERCISES: CPT

## 2017-03-22 RX ORDER — SENNA AND DOCUSATE SODIUM 50; 8.6 MG/1; MG/1
2 TABLET, FILM COATED ORAL 2 TIMES DAILY
Status: DISCONTINUED | OUTPATIENT
Start: 2017-03-22 | End: 2017-03-24 | Stop reason: HOSPADM

## 2017-03-22 RX ADMIN — BUMETANIDE 2 MG/HR: 0.25 INJECTION INTRAMUSCULAR; INTRAVENOUS at 20:58

## 2017-03-22 RX ADMIN — GABAPENTIN 300 MG: 300 CAPSULE ORAL at 20:58

## 2017-03-22 RX ADMIN — BUMETANIDE 1 MG/HR: 0.25 INJECTION INTRAMUSCULAR; INTRAVENOUS at 02:34

## 2017-03-22 RX ADMIN — VENLAFAXINE HYDROCHLORIDE 75 MG: 75 CAPSULE, EXTENDED RELEASE ORAL at 20:58

## 2017-03-22 RX ADMIN — CARVEDILOL 6.25 MG: 6.25 TABLET, FILM COATED ORAL at 20:58

## 2017-03-22 RX ADMIN — INSULIN ASPART 2 UNITS: 100 INJECTION, SOLUTION INTRAVENOUS; SUBCUTANEOUS at 20:58

## 2017-03-22 RX ADMIN — BUMETANIDE 2 MG/HR: 0.25 INJECTION INTRAMUSCULAR; INTRAVENOUS at 10:03

## 2017-03-22 RX ADMIN — POTASSIUM CHLORIDE 40 MEQ: 750 CAPSULE, EXTENDED RELEASE ORAL at 08:09

## 2017-03-22 RX ADMIN — INSULIN ASPART 3 UNITS: 100 INJECTION, SOLUTION INTRAVENOUS; SUBCUTANEOUS at 11:33

## 2017-03-22 RX ADMIN — POTASSIUM CHLORIDE 40 MEQ: 750 CAPSULE, EXTENDED RELEASE ORAL at 17:16

## 2017-03-22 RX ADMIN — LOSARTAN POTASSIUM 12.5 MG: 25 TABLET, FILM COATED ORAL at 08:09

## 2017-03-22 RX ADMIN — BUMETANIDE 2 MG/HR: 0.25 INJECTION INTRAMUSCULAR; INTRAVENOUS at 15:18

## 2017-03-22 RX ADMIN — INSULIN ASPART 3 UNITS: 100 INJECTION, SOLUTION INTRAVENOUS; SUBCUTANEOUS at 17:14

## 2017-03-22 RX ADMIN — DOCUSATE SODIUM,SENNOSIDES 2 TABLET: 50; 8.6 TABLET, FILM COATED ORAL at 10:35

## 2017-03-22 RX ADMIN — FERROUS SULFATE TAB 325 MG (65 MG ELEMENTAL FE) 325 MG: 325 (65 FE) TAB at 08:09

## 2017-03-22 RX ADMIN — DOCUSATE SODIUM,SENNOSIDES 2 TABLET: 50; 8.6 TABLET, FILM COATED ORAL at 18:02

## 2017-03-22 RX ADMIN — INSULIN ASPART 20 UNITS: 100 INJECTION, SUSPENSION SUBCUTANEOUS at 17:14

## 2017-03-22 RX ADMIN — CARVEDILOL 6.25 MG: 6.25 TABLET, FILM COATED ORAL at 08:09

## 2017-03-22 RX ADMIN — WARFARIN SODIUM 2 MG: 2 TABLET ORAL at 18:02

## 2017-03-22 NOTE — PROGRESS NOTES
Acute Care - Physical Therapy Initial Evaluation  [facility]     Patient Name: Emil Pulido  : 1945  MRN: [id number]  Today's Date: 3/22/2017   Onset of Illness/Injury or Date of Surgery Date: 17  Date of Referral to PT: 17  Referring Physician: Williams      Admit Date: 3/17/2017     Visit Dx:    ICD-10-CM ICD-9-CM   1. Congestive heart failure, unspecified congestive heart failure chronicity, unspecified congestive heart failure type I50.9 428.0     Patient Active Problem List   Diagnosis   • Iron deficiency anemia due to chronic blood loss   • Acute on chronic diastolic congestive heart failure   • Bilateral leg edema   • Right leg pain   • Type 2 diabetes mellitus treated with insulin   • Antiphospholipid antibody with hypercoagulable state   • Subconjunctival hemorrhage of right eye   • Thrombocytopenia   • History of stroke with residual deficit   • Pancytopenia   • Iron deficiency anemia   • Vitamin D deficiency   • History of knee surgery   • Pseudarthrosis after fusion or arthrodesis   • Acute posthemorrhagic anemia   • Peripheral neuropathic pain   • Fracture of distal end of humerus   • Hypercholesterolemia   • Benign hypertension   • Late effects of cerebrovascular disease   • Factor V Leiden mutation   • Edema   • Depression   • Degeneration of intervertebral disc of lumbar region   • Degeneration of intervertebral disc of cervical region   • Chronic pain   • Long term current use of anticoagulant   • Cellulitis   • Hematochezia   • Chronic atrial fibrillation   • Acute deep venous thrombosis   • Acute on chronic diastolic heart failure   • Thrombocytopenia   • AVM (arteriovenous malformation) of colon with hemorrhage   • Congestive heart failure   • Hypokalemia   • Type 2 diabetes mellitus with hypoglycemia     Past Medical History:   Diagnosis Date   • Antiphospholipid antibody with hypercoagulable state    • Arthritis    • Atrial fibrillation    • Back pain    • Cancer    •  Carotid artery disease    • Cellulitis 09/2015    Left leg   • CHF (congestive heart failure)    • Deep vein thrombosis of lower extremity    • Diabetes mellitus    • Eye abnormalities     pt has bleeding behind eyes   • Factor 5 Leiden mutation, heterozygous    • Hematoma     LARGE THIGH HEMATOMA   • History of transfusion    • Hypertension    • Hypertensive heart disease    • Peripheral vascular disease    • Sick sinus syndrome    • Sleep apnea    • Stroke 08/2015     Past Surgical History:   Procedure Laterality Date   • BACK SURGERY     • COLONOSCOPY     • COLONOSCOPY N/A 10/4/2016    Procedure: COLONOSCOPY to cecum endo clip x2;  Surgeon: Leoncio Strong MD;  Location: [facility] ENDOSCOPY;  Service:    • COLONOSCOPY N/A 12/12/2016    Procedure: COLONOSCOPY into cecum with Resolution clip x 2 and epi 1:20,000 injection;  Surgeon: Leoncio Strong MD;  Location: [facility] ENDOSCOPY;  Service:    • EYE SURGERY     • FRACTURE SURGERY      left arm   • JOINT REPLACEMENT      elbow replacement, right rotater cuff surgery   • OTHER SURGICAL HISTORY      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   • REPLACEMENT TOTAL KNEE BILATERAL     • UPPER GASTROINTESTINAL ENDOSCOPY            PT ASSESSMENT (last 72 hours)      PT Evaluation       03/22/17 0908 03/20/17 1045    Rehab Evaluation    Document Type evaluation  -MD     Subjective Information agree to therapy;complains of;weakness;fatigue  -MD     Patient Effort, Rehab Treatment adequate  -MD     Patient Effort, Rehab Treatment Comment Pt states he does not want to sit EOB this am his real concern is his B UE weakness and wants to focus on that.  PT attempted to encourage pt multiple times to sit EOB and work on UE strengthening but pt continued to refusel  -MD     Symptoms Noted During/After Treatment none  -MD     General Information    Patient Profile Review yes  -MD     Onset of Illness/Injury or Date of Surgery Date 03/17/17  -MD     Referring Physician Williams   -MD     General Observations Pt supine in bed showing no signs of acute distress.  -MD     Precautions/Limitations fall precautions  -MD     Prior Level of Function mod assist:;transfer  -MD     Equipment Currently Used at Home power chair, (recliner lift)  -MD cane, quad;cane, straight;commode;glucometer;walker, rolling;wheelchair, motorized  -BW    Plans/Goals Discussed With patient  -MD     Living Environment    Lives With spouse  -MD spouse  -BW    Living Arrangements house  -MD house  -BW    Home Accessibility no concerns  -MD     Stair Railings at Home none  -MD     Transportation Available  car;family or friend will provide  -BW    Clinical Impression    Date of Referral to PT 03/22/17  -MD     PT Diagnosis Generalized weakness, limited B UE and LE ROM  -MD     Prognosis fair  -MD     Functional Level At Time Of Evaluation Pt unwilling to participate in sitting EOB only in UE ther ex.  -MD     Patient/Family Goals Statement To strengthen B UE.  -MD     Criteria for Skilled Therapeutic Interventions Met yes;treatment indicated  -MD     Pathology/Pathophysiology Noted (Describe Specifically for Each System) musculoskeletal  -MD     Impairments Found (describe specific impairments) gait, locomotion, and balance  -MD     Functional Limitations in Following Categories (Describe Specific Limitations) self-care  -MD     Rehab Potential fair, will monitor progress closely  -MD     Predicted Duration of Therapy Intervention (days/wks) 1 wk  -MD     Pain Assessment    Pain Assessment No/denies pain  -MD     Cognitive Assessment/Intervention    Current Cognitive/Communication Assessment functional  -MD     Orientation Status oriented x 4  -MD     Follows Commands/Answers Questions 100% of the time  -MD     Personal Safety WNL/WFL  -MD     Personal Safety Interventions fall prevention program maintained  -MD     ROM (Range of Motion)    General ROM upper extremity range of motion deficits identified;lower extremity range  of motion deficits identified  -MD     General UE Assessment    ROM other (see comments)  -MD     ROM Detail R UE ROM impaired 75% and L UE ROM impaired %  -MD     General LE Assessment    ROM other (see comments)  -MD     ROM Detail B LE ROM impaired 75%  -MD     MMT (Manual Muscle Testing)    General MMT Assessment upper extremity strength deficits identified;lower extremity strength deficits identified  -MD     Upper Extremity    Upper Ext Manual Muscle Testing other (see comments)  -MD     Upper Ext Manual Muscle Testing Detail R UE: wrist: 3/5, elbow flex/ext: 2+/5, hor. shoulder abd: 2-/5, shoulder flex: 1/5.  L UE: wrist: 2/5, elbow flex/ext: 2-/5, shoulder flex: 1/5, hor. shoulder abd: 1/5.  L  weaker when compared to R.  Pt unable to fully extend L digits.  -MD     Lower Extremity    Lower Ext Manual Muscle Testing other (see comments)  -MD     Lower Ext Manual Muscle Testing Detail R LE: grossly 2/5 w ankle pumps, knee flex/ext.  hip flex 3-/5.  L LE: ankle DF: 1/5, knee flex/ext: 2-/5 grossly, hip flex: 2+/5  -MD     Bed Mobility, Assessment/Treatment    Bed Mob, Supine to Sit, [place] not tested  -MD     Bed Mob, Sit to Supine, [place] not tested  -MD     Bed Mobility, Comment Pt refused sitting EOB at this time.  -MD     Therapy Exercises    Right Upper Extremity 10 reps;supine;elbow flexion/extension;hand pumps;shoulder abduction/adduction;shoulder extension/flexion;AAROM:  -MD     Left Upper Extremity PROM:;10 reps;supine;shoulder abduction/adduction;shoulder extension/flexion;AAROM:;elbow flexion/extension  -MD     Positioning and Restraints    Pre-Treatment Position in bed  -MD     Post Treatment Position bed  -MD     In Bed supine;call light within reach;exit alarm on  -MD       User Key  (r) = Recorded By, (t) = Taken By, (c) = Cosigned By    Initials Name Provider Type    MD Christiane Dahl, PT Physical Therapist     Kristin More, RN Case Manager          Physical Therapy  Education     Title: PT OT SLP Therapies (Active)     Topic: Physical Therapy (Active)     Point: Precautions (Active)    Learning Progress Summary    Learner Readiness Method Response Comment Documented by Status   Patient Acceptance E,D NR  MD 03/22/17 0924 Active                      User Key     Initials Effective Dates Name Provider Type Discipline    MD 12/01/15 -  Christiane Dahl, PT Physical Therapist PT                PT Recommendation and Plan  Anticipated Discharge Disposition: skilled nursing facility  Planned Therapy Interventions: bed mobility training, patient/family education, balance training, transfer training  PT Frequency: daily  Plan of Care Review  Plan Of Care Reviewed With: patient  Outcome Summary/Follow up Plan: Pt presents w increased B LE and UE weakness.  Difficult to determine other limitations due to pt reluctance to participate in sitting EOB.  PT will continue to work w pt on strengthening of B UE and LE and encourage pt to participate in sitting EOB to maintain strength and ability to transfer.          IP PT Goals       03/22/17 0921          Bed Mobility PT LTG    Bed Mobility PT LTG, Date Established 03/22/17  -MD      Bed Mobility PT LTG, Time to Achieve 1 wk  -MD      Bed Mobility PT LTG, Activity Type supine to sit/sit to supine  -MD      Bed Mobility PT LTG, [place] Level minimum assist (75% patient effort)  -MD      Bed Mobility PT Goal  LTG, Assist Device bed rails  -MD      Transfer Training PT LTG    Transfer Training PT LTG, Date Established 03/22/17  -MD      Transfer Training PT LTG, Time to Achieve 1 wk  -MD      Transfer Training PT LTG, Activity Type bed to chair /chair to bed  -MD      Transfer Training PT LTG, [place] Level moderate assist (50% patient effort);2 person assist required  -MD      Transfer Training PT LTG, Assist Device --   sliding board if appropriate  -MD      Dynamic Sitting Balance PT LTG    Dynamic Sitting Balance PT LTG, Date Established  03/22/17  -MD      Dynamic Sitting Balance PT LTG, Time to Achieve 1 wk  -MD      Dynamic Sitting Balance PT LTG, [place] Level supervision required  -MD        User Key  (r) = Recorded By, (t) = Taken By, (c) = Cosigned By    Initials Name Provider Type    MD Christiane Dahl, PT Physical Therapist                Outcome Measures       03/22/17 0900          How much help from another person do you currently need...    Turning from your back to your side while in flat bed without using bedrails? 2  -MD      Moving from lying on back to sitting on the side of a flat bed without bedrails? 2  -MD      Moving to and from a bed to a chair (including a wheelchair)? 1  -MD      Standing up from a chair using your arms (e.g., wheelchair, bedside chair)? 1  -MD      Climbing 3-5 steps with a railing? 1  -MD      To walk in hospital room? 1  -MD      AM-PAC 6 Clicks Score 8  -MD      Functional Assessment    Outcome Measure Options AM-PAC 6 Clicks Basic Mobility (PT)  -MD        User Key  (r) = Recorded By, (t) = Taken By, (c) = Cosigned By    Initials Name Provider Type    MD Christiane Dahl, PT Physical Therapist           Time Calculation:         PT Charges       03/22/17 0907          Time Calculation    Start Time 0816  -MD      Stop Time 0840  -MD      Time Calculation (min) 24 min  -MD      PT Received On 03/22/17  -MD      PT - Next Appointment 03/23/17  -MD      PT Goal Re-Cert Due Date 03/29/17  -MD        User Key  (r) = Recorded By, (t) = Taken By, (c) = Cosigned By    Initials Name Provider Type    MD Christiane Dahl, PT Physical Therapist          Therapy Charges for Today     Code Description Service Date Service Provider Modifiers Qty    36505120512 HC PT EVAL HIGH COMPLEXITY 2 3/22/2017 Christiane Dahl, PT GP 1    16319230227 HC PT THER PROC EA 15 MIN 3/22/2017 Christiane Dahl, PT GP 1          PT G-Codes  Outcome Measure Options: AM-PAC 6 Clicks Basic Mobility (PT)      Christiane Dahl PT  3/22/2017

## 2017-03-22 NOTE — PROGRESS NOTES
"      Name: Emil Pulido ADMIT: 3/17/2017   : 1945  PCP: Marcus Avery MD    MRN: [id number] LOS: 5 days   AGE/SEX: 72 y.o. male  ROOM: [facility]   Cc- LE edema  Subjective     Urinating  No SOA   Still with LE edema but improved  On bumex gtt  BG     ROS  No f/c  No n/v  No skin changes  Chronically in wheelchair but does some transfers    Objective   Vital Signs  Temp:  [96.9 °F (36.1 °C)-99.6 °F (37.6 °C)] 97.2 °F (36.2 °C)  Heart Rate:  [61-80] 61  Resp:  [18-20] 18  BP: (104-113)/(58-70) 109/70  SpO2:  [96 %] 96 %  on   ;   O2 Device: room air  Body mass index is 36.89 kg/(m^2).    Alert, chronically ill  Supple  Soft, nt  Irregular, distant heart sounds  Decreased bs at bases  2+ LE edema, no clubbing or cyanosis  A and O x 3    Objective:  Vital signs: (most recent): Blood pressure 109/70, pulse 61, temperature 97.2 °F (36.2 °C), temperature source Oral, resp. rate 18, height 72\" (182.9 cm), weight 272 lb (123 kg), SpO2 96 %.          Results Review:       I reviewed the patient's new clinical results.    Results from last 7 days  Lab Units 17  0517  0458 17  0544 17  0452 17  05017  1627   WBC 10*3/mm3 4.55 4.44* 4.64 4.08* 3.77* 4.37*   HEMOGLOBIN g/dL 9.1* 9.5* 8.8* 9.0* 8.4* 8.4*   PLATELETS 10*3/mm3 77* 81* 85* 77* 76* 80*       Results from last 7 days  Lab Units 17  0525 17  0458 17  2044 17  0544 17  0452 17  0509 17  1627   SODIUM mmol/L 135* 135*  --  138 137 134* 138   POTASSIUM mmol/L 4.4 4.0 3.5 3.5 3.4* 3.9 4.3   CHLORIDE mmol/L 98 97*  --  99 101 100 102   TOTAL CO2 mmol/L 24.2 28.2  --  26.2 24.7 24.2 25.0   BUN mg/dL 24* 22  --  24* 26* 28* 33*   CREATININE mg/dL 0.81 0.76  --  0.76 0.74* 0.78 0.82   GLUCOSE mg/dL 83 130*  --  96 112* 224* 229*   Estimated Creatinine Clearance: 115 mL/min (by C-G formula based on Cr of 0.81).    Results from last 7 days  Lab Units 17  0525 17  0458 " 03/20/17  2044 03/20/17  0544 03/19/17  0452 03/18/17  0509 03/17/17  1627   CALCIUM mg/dL 8.3* 8.5*  --  8.4* 8.8 8.7 8.9   ALBUMIN g/dL  --   --   --   --   --  3.00* 3.20*   MAGNESIUM mg/dL 2.1 2.0 1.7 1.8 1.9 1.8 1.9   PHOSPHORUS mg/dL  --   --   --   --   --  3.0  --        Protime-INR [[id number]] (Abnormal) Collected: 03/22/17 0525        Lab Status: Final result Specimen: Blood Updated: 03/22/17 0555        Protime 27.1 (H) Seconds         INR 2.61 (H)              XR Chest 1 View [[id number]] Not Reviewed        Order Status: Completed Collected: 03/17/17 1607        Updated: 03/17/17 1612       Narrative:         XR CHEST 1 VW-     HISTORY: Male who is 72 years-old,  short of breath     TECHNIQUE: Frontal and lateral views of the chest     COMPARISON: 11/14/2016     FINDINGS: Heart is mildly enlarged. Pulmonary vasculature is slightly  congested. Left hemidiaphragm is partly obscured, suggesting small left  basilar atelectasis or infiltrate. No pleural effusion or pneumothorax.  Mild pulmonary hyperinflation suggests COPD. Otherwise stable.          Impression:         Small left basilar atelectasis/infiltrate, follow-up x-ray  suggested. COPD change. Cardiomegaly and pulmonary vascular congestion.            carvedilol 6.25 mg Oral Q12H   ferrous sulfate 325 mg Oral Daily With Breakfast   gabapentin 300 mg Oral Nightly   insulin aspart 0-7 Units Subcutaneous 4x Daily AC & at Bedtime   insulin aspart prot-insulin aspart 20 Units Subcutaneous BID With Meals   losartan 12.5 mg Oral Q24H   potassium chloride 40 mEq Oral BID With Meals   sennosides-docusate sodium 2 tablet Oral Nightly   venlafaxine XR 75 mg Oral Nightly   warfarin 2 mg Oral Daily       bumetanide (BUMEX) infusion 2 mg/hr Last Rate: 2 mg/hr (03/22/17 0808)   Diet Regular; Cardiac, Consistent Carbohydrate, Renal      Assessment/Plan   Assessment:     Active Hospital Problems (** Indicates Principal Problem)    Diagnosis Date Noted   • **Acute on  chronic diastolic congestive heart failure [I50.33] 03/18/2016   • Type 2 diabetes mellitus with hypoglycemia [E11.649] 03/21/2017   • Hypokalemia [E87.6] 03/19/2017   • Pancytopenia [D61.818] 03/21/2016   • Type 2 diabetes mellitus treated with insulin [E11.9, Z79.4] 03/18/2016   • Antiphospholipid antibody with hypercoagulable state [D68.61] 03/18/2016   • Iron deficiency anemia due to chronic blood loss [D50.0] 03/08/2016   • Long term current use of anticoagulant [Z79.01] 10/07/2015   • Benign hypertension [I10] 11/07/2013   • Chronic atrial fibrillation [I48.2] 11/07/2013      Resolved Hospital Problems    Diagnosis Date Noted Date Resolved   No resolved problems to display.       Plan:   - Bumex gtt--being increased today by Cardiology, closely monitor electrolytes  - Decreased insulin with hypoglycemia-- from 40 units 70/30 BID to 20units BID, closely monitor BG  - Chronic pancytopenia- monitor  - Continue coumadin today and monitor INR  - Replace K as needed and scheduled k as well  - Increase his agents for constipation    D/W RN    Thanks to specialists  Disposition  Home in ~3 days after stabilized on po diuretics       Charlie Kramer MD  [facility]  03/22/17  9:43 AM

## 2017-03-22 NOTE — PROGRESS NOTES
"Patient Care Team:  Marcus Avery MD as PCP - General (Internal Medicine)  Marcus Avery MD as PCP - Family Medicine  Ilya Ambrocio MD as Consulting Physician (Hematology and Oncology)  Willy Bell MD as Referring Physician (Internal Medicine)    Chief Complaint: Follow-up combined acute on chronic systolic heart failure and diastolic heart failure    Interval History: Feels well. Continues to diurese  No SOA    Objective   Vital Signs  Temp:  [96.9 °F (36.1 °C)-99.6 °F (37.6 °C)] 97.2 °F (36.2 °C)  Heart Rate:  [61-94] 61  Resp:  [18-20] 18  BP: (104-122)/(58-83) 109/70    Intake/Output Summary (Last 24 hours) at 03/22/17 0730  Last data filed at 03/22/17 0506   Gross per 24 hour   Intake              990 ml   Output             1525 ml   Net             -535 ml     Flowsheet Rows         First Filed Value    Admission Height  72\" (182.9 cm) Documented at 03/17/2017 1531    Admission Weight  250 lb (113 kg) Documented at 03/17/2017 1531          General Appearance:    Alert, cooperative, in no acute distress   Head:    Normocephalic, without obvious abnormality, atraumatic       Neck:   No adenopathy, supple, no thyromegaly, no carotid bruit, no    JVD   Lungs:     Clear to auscultation bilaterally, no wheezes, rales, or     rhonchi    Heart:    Normal rate, regular rhythm,  No murmur, rub, or gallop   Chest Wall:    No abnormalities observed   Abdomen:     Normal bowel sounds, soft, non-tender, non-distended,            no rebound tenderness   Extremities:   1+ right and trace left LE edema.    Pulses:   Pulses palpable and equal bilaterally   Skin:   No bleeding or rash               carvedilol 6.25 mg Oral Q12H   ferrous sulfate 325 mg Oral Daily With Breakfast   gabapentin 300 mg Oral Nightly   insulin aspart 0-7 Units Subcutaneous 4x Daily AC & at Bedtime   insulin aspart prot-insulin aspart 20 Units Subcutaneous BID With Meals   losartan 12.5 mg Oral Q24H   potassium chloride 40 mEq Oral BID With " Meals   sennosides-docusate sodium 2 tablet Oral Nightly   venlafaxine XR 75 mg Oral Nightly   warfarin 2 mg Oral Daily         bumetanide (BUMEX) infusion 1 mg/hr Last Rate: 1 mg/hr (03/22/17 0234)       Results Review:      Results from last 7 days  Lab Units 03/22/17  0525   SODIUM mmol/L 135*   POTASSIUM mmol/L 4.4   CHLORIDE mmol/L 98   TOTAL CO2 mmol/L 24.2   BUN mg/dL 24*   CREATININE mg/dL 0.81   GLUCOSE mg/dL 83   CALCIUM mg/dL 8.3*       Results from last 7 days  Lab Units 03/20/17  2044 03/17/17  1627   TROPONIN T ng/mL 0.052* 0.042*       Results from last 7 days  Lab Units 03/22/17  0525   WBC 10*3/mm3 4.55   HEMOGLOBIN g/dL 9.1*   HEMATOCRIT % 28.5*   PLATELETS 10*3/mm3 77*       Results from last 7 days  Lab Units 03/22/17  0525 03/21/17  0458 03/20/17  0544   INR  2.61* 2.74* 3.32*           Results from last 7 days  Lab Units 03/22/17  0525   MAGNESIUM mg/dL 2.1           I reviewed the patient's new clinical results.  I personally viewed and interpreted the patient's EKG/Telemetry data       Assessment/Plan   1. Acute on chronic systolic/diastolic CHF: Improving. Diuresing on bumex gtt.   2. Cardiomyopathy: EF 45% by echo 11/17/16. On carvedilol and losartan.   3. Moderate Tricuspid regurgitation  4. Antiphospholipid antibody syndrome and factor V Leiden heterozygote with prior DVT: On warfarin.  5. Persistent atrial fibrillation: On warfarin. Rate controlled on carvedilol.   6. Morbid obesity  7. Diabetes  8. Chronic anemia (multifactorial)   9. Prior GI bleeding  10. Prior stroke  11. Hypertension: Well controlled.   12: Thrombocytopenia      -Continue Bumex gtt for now. Increase to 2mg/hr.  Would like to have great diuresis today with plan to move the pills tomorrow.  I would recommend torsemide at twice his home dose which would be 40 mg twice a day.  I would also recommend monitoring his I's and O's for 24 hours while on this altered prior to discharge  -Continue Coumadin at 2 mg  daily  -Encouraged strict I/O. If patient can't use urinal, he will need kamara

## 2017-03-23 LAB
ANION GAP SERPL CALCULATED.3IONS-SCNC: 12.7 MMOL/L
BASOPHILS # BLD AUTO: 0.02 10*3/MM3 (ref 0–0.2)
BASOPHILS NFR BLD AUTO: 0.5 % (ref 0–1.5)
BUN BLD-MCNC: 23 MG/DL (ref 8–23)
BUN/CREAT SERPL: 30.7 (ref 7–25)
CALCIUM SPEC-SCNC: 8.5 MG/DL (ref 8.6–10.5)
CHLORIDE SERPL-SCNC: 96 MMOL/L (ref 98–107)
CO2 SERPL-SCNC: 24.3 MMOL/L (ref 22–29)
CREAT BLD-MCNC: 0.75 MG/DL (ref 0.76–1.27)
DEPRECATED RDW RBC AUTO: 68.1 FL (ref 37–54)
EOSINOPHIL # BLD AUTO: 0.25 10*3/MM3 (ref 0–0.7)
EOSINOPHIL NFR BLD AUTO: 6.3 % (ref 0.3–6.2)
ERYTHROCYTE [DISTWIDTH] IN BLOOD BY AUTOMATED COUNT: 18 % (ref 11.5–14.5)
GFR SERPL CREATININE-BSD FRML MDRD: 103 ML/MIN/1.73
GLUCOSE BLD-MCNC: 263 MG/DL (ref 65–99)
GLUCOSE BLDC GLUCOMTR-MCNC: 121 MG/DL (ref 70–130)
GLUCOSE BLDC GLUCOMTR-MCNC: 133 MG/DL (ref 70–130)
GLUCOSE BLDC GLUCOMTR-MCNC: 238 MG/DL (ref 70–130)
GLUCOSE BLDC GLUCOMTR-MCNC: 283 MG/DL (ref 70–130)
HCT VFR BLD AUTO: 26.2 % (ref 40.4–52.2)
HGB BLD-MCNC: 8.5 G/DL (ref 13.7–17.6)
IMM GRANULOCYTES # BLD: 0 10*3/MM3 (ref 0–0.03)
IMM GRANULOCYTES NFR BLD: 0 % (ref 0–0.5)
INR PPP: 2.34 (ref 0.9–1.1)
LYMPHOCYTES # BLD AUTO: 1.1 10*3/MM3 (ref 0.9–4.8)
LYMPHOCYTES NFR BLD AUTO: 27.8 % (ref 19.6–45.3)
MAGNESIUM SERPL-MCNC: 2 MG/DL (ref 1.6–2.4)
MCH RBC QN AUTO: 33.3 PG (ref 27–32.7)
MCHC RBC AUTO-ENTMCNC: 32.4 G/DL (ref 32.6–36.4)
MCV RBC AUTO: 102.7 FL (ref 79.8–96.2)
MONOCYTES # BLD AUTO: 0.47 10*3/MM3 (ref 0.2–1.2)
MONOCYTES NFR BLD AUTO: 11.9 % (ref 5–12)
NEUTROPHILS # BLD AUTO: 2.11 10*3/MM3 (ref 1.9–8.1)
NEUTROPHILS NFR BLD AUTO: 53.5 % (ref 42.7–76)
PLATELET # BLD AUTO: 81 10*3/MM3 (ref 140–500)
PMV BLD AUTO: 11 FL (ref 6–12)
POTASSIUM BLD-SCNC: 4.1 MMOL/L (ref 3.5–5.2)
PROTHROMBIN TIME: 24.9 SECONDS (ref 11.7–14.2)
RBC # BLD AUTO: 2.55 10*6/MM3 (ref 4.6–6)
SODIUM BLD-SCNC: 133 MMOL/L (ref 136–145)
WBC NRBC COR # BLD: 3.95 10*3/MM3 (ref 4.5–10.7)

## 2017-03-23 PROCEDURE — 83735 ASSAY OF MAGNESIUM: CPT | Performed by: INTERNAL MEDICINE

## 2017-03-23 PROCEDURE — 99232 SBSQ HOSP IP/OBS MODERATE 35: CPT | Performed by: INTERNAL MEDICINE

## 2017-03-23 PROCEDURE — 85025 COMPLETE CBC W/AUTO DIFF WBC: CPT | Performed by: INTERNAL MEDICINE

## 2017-03-23 PROCEDURE — 63710000001 INSULIN ASPART PER 5 UNITS: Performed by: INTERNAL MEDICINE

## 2017-03-23 PROCEDURE — 97110 THERAPEUTIC EXERCISES: CPT

## 2017-03-23 PROCEDURE — 82962 GLUCOSE BLOOD TEST: CPT

## 2017-03-23 PROCEDURE — 80048 BASIC METABOLIC PNL TOTAL CA: CPT | Performed by: INTERNAL MEDICINE

## 2017-03-23 PROCEDURE — 85610 PROTHROMBIN TIME: CPT | Performed by: INTERNAL MEDICINE

## 2017-03-23 RX ORDER — INSULIN ASPART 100 [IU]/ML
30 INJECTION, SUSPENSION SUBCUTANEOUS 2 TIMES DAILY WITH MEALS
Status: DISCONTINUED | OUTPATIENT
Start: 2017-03-23 | End: 2017-03-24 | Stop reason: HOSPADM

## 2017-03-23 RX ORDER — TORSEMIDE 20 MG/1
40 TABLET ORAL 2 TIMES DAILY
Status: DISCONTINUED | OUTPATIENT
Start: 2017-03-23 | End: 2017-03-24 | Stop reason: HOSPADM

## 2017-03-23 RX ADMIN — INSULIN ASPART 20 UNITS: 100 INJECTION, SUSPENSION SUBCUTANEOUS at 09:07

## 2017-03-23 RX ADMIN — LOSARTAN POTASSIUM 12.5 MG: 25 TABLET, FILM COATED ORAL at 09:19

## 2017-03-23 RX ADMIN — FERROUS SULFATE TAB 325 MG (65 MG ELEMENTAL FE) 325 MG: 325 (65 FE) TAB at 09:19

## 2017-03-23 RX ADMIN — INSULIN ASPART 4 UNITS: 100 INJECTION, SOLUTION INTRAVENOUS; SUBCUTANEOUS at 09:06

## 2017-03-23 RX ADMIN — POTASSIUM CHLORIDE 40 MEQ: 750 CAPSULE, EXTENDED RELEASE ORAL at 09:19

## 2017-03-23 RX ADMIN — GABAPENTIN 300 MG: 300 CAPSULE ORAL at 20:43

## 2017-03-23 RX ADMIN — CARVEDILOL 6.25 MG: 6.25 TABLET, FILM COATED ORAL at 09:19

## 2017-03-23 RX ADMIN — POTASSIUM CHLORIDE 40 MEQ: 750 CAPSULE, EXTENDED RELEASE ORAL at 18:39

## 2017-03-23 RX ADMIN — WARFARIN SODIUM 2 MG: 2 TABLET ORAL at 18:45

## 2017-03-23 RX ADMIN — DOCUSATE SODIUM,SENNOSIDES 2 TABLET: 50; 8.6 TABLET, FILM COATED ORAL at 18:39

## 2017-03-23 RX ADMIN — CARVEDILOL 6.25 MG: 6.25 TABLET, FILM COATED ORAL at 20:43

## 2017-03-23 RX ADMIN — VENLAFAXINE HYDROCHLORIDE 75 MG: 75 CAPSULE, EXTENDED RELEASE ORAL at 20:44

## 2017-03-23 RX ADMIN — TORSEMIDE 40 MG: 20 TABLET ORAL at 18:39

## 2017-03-23 RX ADMIN — DOCUSATE SODIUM,SENNOSIDES 2 TABLET: 50; 8.6 TABLET, FILM COATED ORAL at 09:19

## 2017-03-23 RX ADMIN — INSULIN ASPART 30 UNITS: 100 INJECTION, SUSPENSION SUBCUTANEOUS at 18:40

## 2017-03-23 NOTE — PROGRESS NOTES
Acute Care - Physical Therapy Treatment Note  [facility]     Patient Name: Emil Pulido  : 1945  MRN: [id number]  Today's Date: 3/23/2017  Onset of Illness/Injury or Date of Surgery Date: 17  Date of Referral to PT: 17  Referring Physician: Williams    Admit Date: 3/17/2017    Visit Dx:    ICD-10-CM ICD-9-CM   1. Congestive heart failure, unspecified congestive heart failure chronicity, unspecified congestive heart failure type I50.9 428.0     Patient Active Problem List   Diagnosis   • Iron deficiency anemia due to chronic blood loss   • Acute on chronic diastolic congestive heart failure   • Bilateral leg edema   • Right leg pain   • Type 2 diabetes mellitus treated with insulin   • Antiphospholipid antibody with hypercoagulable state   • Subconjunctival hemorrhage of right eye   • Thrombocytopenia   • History of stroke with residual deficit   • Pancytopenia   • Iron deficiency anemia   • Vitamin D deficiency   • History of knee surgery   • Pseudarthrosis after fusion or arthrodesis   • Acute posthemorrhagic anemia   • Peripheral neuropathic pain   • Fracture of distal end of humerus   • Hypercholesterolemia   • Benign hypertension   • Late effects of cerebrovascular disease   • Factor V Leiden mutation   • Edema   • Depression   • Degeneration of intervertebral disc of lumbar region   • Degeneration of intervertebral disc of cervical region   • Chronic pain   • Long term current use of anticoagulant   • Cellulitis   • Hematochezia   • Chronic atrial fibrillation   • Acute deep venous thrombosis   • Acute on chronic diastolic heart failure   • Thrombocytopenia   • AVM (arteriovenous malformation) of colon with hemorrhage   • Congestive heart failure   • Hypokalemia   • Type 2 diabetes mellitus with hypoglycemia   • Constipation               Adult Rehabilitation Note       17 0816          Rehab Assessment/Intervention    Discipline physical therapy assistant  -FINN      Document Type  therapy note (daily note)  -FINN      Subjective Information agree to therapy  -FINN      Patient Effort, Rehab Treatment good  -FINN      Precautions/Limitations fall precautions  -FINN      Recorded by [FINN] Jeffery Wren PTA      Pain Assessment    Pain Assessment No/denies pain  -FINN      Recorded by [FINN] Jeffery Wren PTA      Cognitive Assessment/Intervention    Current Cognitive/Communication Assessment functional  -FINN      Orientation Status oriented x 4  -FINN      Follows Commands/Answers Questions 100% of the time  -FINN      Personal Safety WNL/WFL  -FINN      Personal Safety Interventions fall prevention program maintained;gait belt;nonskid shoes/slippers when out of bed  -FINN      Recorded by [FINN] Jeffery Wren PTA      Bed Mobility, Assessment/Treatment    Bed Mobility, Scoot/Bridge, [place] minimum assist (75% patient effort)   bed in trendelenburg, min(A) to anchor (B)feet  -FINN      Bed Mob, Supine to Sit, [place] maximum assist (25% patient effort);verbal cues required;nonverbal cues required (demo/gesture)  -FINN      Bed Mob, Sit to Supine, [place] maximum assist (25% patient effort);verbal cues required;nonverbal cues required (demo/gesture)  -FINN      Bed Mobility, Safety Issues decreased use of arms for pushing/pulling;decreased use of legs for bridging/pushing;impaired trunk control for bed mobility  -FINN      Bed Mobility, Impairments strength decreased;impaired balance;coordination impaired  -FINN      Recorded by [FINN] Jeffery Wren PTA      Motor Skills/Interventions    Additional Documentation Balance Skills Training (Group)  -FINN      Recorded by [FINN] Jeffery Wren PTA      Balance Skills Training    Sitting-Level of Assistance Contact guard;Minimum assistance  -FINN      Sitting-Balance Support Feet supported;Left upper extremity supported;Right upper extremity supported   occasional use of (B)UE to maintain balance  -FINN      Sitting-Balance Activities Trunk control activities;Lateral lean;Forward lean   -FINN      Sitting # of Minutes 10  -FINN      Recorded by [FINN] Jeffery Wren PTA      Therapy Exercises    Bilateral Lower Extremities AROM:;10 reps;ankle pumps/circles;LAQ   limited ROM  -FINN      Bilateral Upper Extremity AROM:;10 reps;shoulder extension/flexion;shoulder circles   yellow theraband: bicep curls, tricep ext,   -FINN      Recorded by [FINN] Jeffery Wren PTA      Positioning and Restraints    Pre-Treatment Position in bed  -FINN      Post Treatment Position bed  -FINN      In Bed side lying left;call light within reach;encouraged to call for assist;notified nsg  -FINN      Recorded by [FINN] Jeffery Wren PTA        User Key  (r) = Recorded By, (t) = Taken By, (c) = Cosigned By    Initials Name Effective Dates    FINN Wren PTA 04/24/15 -                 IP PT Goals       03/22/17 0921          Bed Mobility PT LTG    Bed Mobility PT LTG, Date Established 03/22/17  -MD      Bed Mobility PT LTG, Time to Achieve 1 wk  -MD      Bed Mobility PT LTG, Activity Type supine to sit/sit to supine  -MD      Bed Mobility PT LTG, [place] Level minimum assist (75% patient effort)  -MD      Bed Mobility PT Goal  LTG, Assist Device bed rails  -MD      Transfer Training PT LTG    Transfer Training PT LTG, Date Established 03/22/17  -MD      Transfer Training PT LTG, Time to Achieve 1 wk  -MD      Transfer Training PT LTG, Activity Type bed to chair /chair to bed  -MD      Transfer Training PT LTG, [place] Level moderate assist (50% patient effort);2 person assist required  -MD      Transfer Training PT LTG, Assist Device --   sliding board if appropriate  -MD      Dynamic Sitting Balance PT LTG    Dynamic Sitting Balance PT LTG, Date Established 03/22/17  -MD      Dynamic Sitting Balance PT LTG, Time to Achieve 1 wk  -MD      Dynamic Sitting Balance PT LTG, [place] Level supervision required  -MD        User Key  (r) = Recorded By, (t) = Taken By, (c) = Cosigned By    Initials Name Provider Type    MD Christiane Dahl, PT  Physical Therapist          Physical Therapy Education     Title: PT OT SLP Therapies (Active)     Topic: Physical Therapy (Done)     Point: Mobility training (Done)    Learning Progress Summary    Learner Readiness Method Response Comment Documented by Status   Patient Acceptance E VU,NR  FINN 03/23/17 0847 Done               Point: Home exercise program (Done)    Learning Progress Summary    Learner Readiness Method Response Comment Documented by Status   Patient Acceptance E VU,NR  FINN 03/23/17 0847 Done               Point: Body mechanics (Done)    Learning Progress Summary    Learner Readiness Method Response Comment Documented by Status   Patient Acceptance E VU,NR  FINN 03/23/17 0847 Done               Point: Precautions (Done)    Learning Progress Summary    Learner Readiness Method Response Comment Documented by Status   Patient Acceptance E VU,NR  FINN 03/23/17 0847 Done    Acceptance E,D NR  MD 03/22/17 0924 Active                      User Key     Initials Effective Dates Name Provider Type Discipline    MD 12/01/15 -  Christiane Dahl, PT Physical Therapist PT    FINN 04/24/15 -  Jeffery Wren, PTA Physical Therapy Assistant PT                    PT Recommendation and Plan  Anticipated Discharge Disposition: skilled nursing facility  Planned Therapy Interventions: bed mobility training, patient/family education, balance training, transfer training  PT Frequency: daily  Plan of Care Review  Plan Of Care Reviewed With: patient  Outcome Summary/Follow up Plan: Pt with improved insight and participation, willing to seat EOB to perform balance activities and ther ex.  PTA secure theraband to address pt's concerns of (B)UE weakness.  Limited ROM of all observed joint movements, especially all (L)side.           Outcome Measures       03/23/17 0800 03/22/17 0900       How much help from another person do you currently need...    Turning from your back to your side while in flat bed without using bedrails? 2  -FINN 2  -MD      Moving from lying on back to sitting on the side of a flat bed without bedrails? 2  -FINN 2  -MD     Moving to and from a bed to a chair (including a wheelchair)? 1  -FINN 1  -MD     Standing up from a chair using your arms (e.g., wheelchair, bedside chair)? 1  -FINN 1  -MD     Climbing 3-5 steps with a railing? 1  -FINN 1  -MD     To walk in hospital room? 1  -FINN 1  -MD     AM-PAC 6 Clicks Score 8  -FINN 8  -MD     Functional Assessment    Outcome Measure Options AM-PAC 6 Clicks Basic Mobility (PT)  -FINN AM-PAC 6 Clicks Basic Mobility (PT)  -MD       User Key  (r) = Recorded By, (t) = Taken By, (c) = Cosigned By    Initials Name Provider Type    MD Christiane Dahl, PT Physical Therapist    FINN Wren PTA Physical Therapy Assistant           Time Calculation:         PT Charges       03/23/17 0846          Time Calculation    Start Time 0816  -FINN      Stop Time 0840  -FINN      Time Calculation (min) 24 min  -FINN      PT Received On 03/23/17  -FINN      PT - Next Appointment 03/24/17  -FINN        User Key  (r) = Recorded By, (t) = Taken By, (c) = Cosigned By    Initials Name Provider Type    FINN Wren PTA Physical Therapy Assistant          Therapy Charges for Today     Code Description Service Date Service Provider Modifiers Qty    [id number] HC PT THER PROC EA 15 MIN 3/23/2017 Jeffery Wren PTA GP 2          PT G-Codes  Outcome Measure Options: AM-PAC 6 Clicks Basic Mobility (PT)    Jeffery Wren PTA  3/23/2017

## 2017-03-23 NOTE — PROGRESS NOTES
"  Name: Emil Pulido  ADMIT: 3/17/2017   Age/Sex: 72 y.o.male LOS:  LOS: 6 days    :    1945     ROOM: [facility]   MRN:    [id number]    PCP:    Marcus Avery MD     Subjective   No omplaints. Breathing better    Objective   Vital Signs  Temp:  [97.7 °F (36.5 °C)-98.6 °F (37 °C)] 98.4 °F (36.9 °C)  Heart Rate:  [73-78] 73  Resp:  [18] 18  BP: (101-114)/(52-59) 112/59  Body mass index is 35.05 kg/(m^2).    Objective:  General Appearance:  Comfortable and well-appearing.    Vital signs: (most recent): Blood pressure 112/59, pulse 73, temperature 98.4 °F (36.9 °C), temperature source Oral, resp. rate 18, height 72\" (182.9 cm), weight 258 lb 6.4 oz (117 kg), SpO2 96 %.  Vital signs are normal.    HEENT: Normal HEENT exam.    Lungs:  Normal effort.  There are decreased breath sounds.    Heart: Normal rate.  Regular rhythm.    Abdomen: Abdomen is soft and non-distended.  Bowel sounds are normal.   There is no abdominal tenderness.     Extremities: Normal range of motion.  There is dependent edema.    Neurological: Patient is alert and oriented to person, place and time.    Skin:  Warm and dry.  No rash.             Results Review:       I reviewed the patient's new clinical results.    Results from last 7 days  Lab Units 17  0343 03/22/17  0525 17  0458 17  0544 17  0452 17  0509 17  1627   WBC 10*3/mm3 3.95* 4.55 4.44* 4.64 4.08* 3.77* 4.37*   HEMOGLOBIN g/dL 8.5* 9.1* 9.5* 8.8* 9.0* 8.4* 8.4*   PLATELETS 10*3/mm3 81* 77* 81* 85* 77* 76* 80*     Results from last 7 days  Lab Units 17  0343 03/22/17  0525 17  0458 17  2044 17  0544 17  0452 17  0509 17  1627   SODIUM mmol/L 133* 135* 135*  --  138 137 134* 138   POTASSIUM mmol/L 4.1 4.4 4.0 3.5 3.5 3.4* 3.9 4.3   CHLORIDE mmol/L 96* 98 97*  --  99 101 100 102   TOTAL CO2 mmol/L 24.3 24.2 28.2  --  26.2 24.7 24.2 25.0   BUN mg/dL 23 24* 22  --  24* 26* 28* 33*   CREATININE mg/dL 0.75* " 0.81 0.76  --  0.76 0.74* 0.78 0.82   GLUCOSE mg/dL 263* 83 130*  --  96 112* 224* 229*   Estimated Creatinine Clearance: 113.5 mL/min (by C-G formula based on Cr of 0.75).  Results from last 7 days  Lab Units 03/23/17  0343 03/22/17  0525 03/21/17  0458 03/20/17  2044 03/20/17  0544 03/19/17  0452 03/18/17  0509 03/17/17  1627   CALCIUM mg/dL 8.5* 8.3* 8.5*  --  8.4* 8.8 8.7 8.9   ALBUMIN g/dL  --   --   --   --   --   --  3.00* 3.20*   MAGNESIUM mg/dL 2.0 2.1 2.0 1.7 1.8 1.9 1.8 1.9   PHOSPHORUS mg/dL  --   --   --   --   --   --  3.0  --          Intake/Output Summary (Last 24 hours) at 03/23/17 1204  Last data filed at 03/23/17 1035   Gross per 24 hour   Intake             1120 ml   Output             3695 ml   Net            -2575 ml     Wt Readings from Last 1 Encounters:   03/22/17 1453 258 lb 6.4 oz (117 kg)   03/21/17 0500 272 lb (123 kg)   03/20/17 0600 272 lb 6.4 oz (124 kg)   03/19/17 0709 277 lb (126 kg)   03/18/17 1802 277 lb (126 kg)   03/17/17 1531 250 lb (113 kg)     Wt Readings from Last 3 Encounters:   03/22/17 258 lb 6.4 oz (117 kg)   12/28/16 260 lb (118 kg)   12/21/16 260 lb 5.8 oz (118 kg)       RADIOLOGY  3/23/2017  Pending      carvedilol 6.25 mg Oral Q12H   ferrous sulfate 325 mg Oral Daily With Breakfast   gabapentin 300 mg Oral Nightly   insulin aspart 0-7 Units Subcutaneous 4x Daily AC & at Bedtime   insulin aspart prot-insulin aspart 20 Units Subcutaneous BID With Meals   losartan 12.5 mg Oral Q24H   potassium chloride 40 mEq Oral BID With Meals   sennosides-docusate sodium 2 tablet Oral BID   torsemide 40 mg Oral BID   venlafaxine XR 75 mg Oral Nightly   warfarin 2 mg Oral Daily      Diet Regular; Cardiac, Consistent Carbohydrate, Renal      Assessment/Plan   Assessment:   Principal Problem:    Acute on chronic diastolic congestive heart failure  Active Problems:    Iron deficiency anemia due to chronic blood loss    Type 2 diabetes mellitus treated with insulin    Antiphospholipid  antibody with hypercoagulable state    Pancytopenia    Benign hypertension    Long term current use of anticoagulant    Chronic atrial fibrillation    Hypokalemia    Type 2 diabetes mellitus with hypoglycemia    Constipation        Plan:   - Off bumex drip now and transitioned to torsemide per Cardiology recs  - inc 70/30 to 30u, no more HG episodes  - pancytopenia stable  - INR therapeutic, targeting 2-2.5 with his h/o GI bleeds    Disposition   hopefully d/c tomorrow with HH vs SNF        Otto Carmona MD  [facility]  03/23/17  12:03 PM

## 2017-03-23 NOTE — PROGRESS NOTES
"Patient Care Team:  Marcus Avery MD as PCP - General (Internal Medicine)  Marcus Avery MD as PCP - Family Medicine  Ilya Ambrocio MD as Consulting Physician (Hematology and Oncology)  Willy Bell MD as Referring Physician (Internal Medicine)    Chief Complaint: Follow-up acute on chronic systolic and diastolic heart failure    Interval History: Diuresed well. No new complaints. No SOA      Objective   Vital Signs  Temp:  [97.7 °F (36.5 °C)-98.6 °F (37 °C)] 98.4 °F (36.9 °C)  Heart Rate:  [73-78] 73  Resp:  [18] 18  BP: (101-114)/(52-59) 112/59    Intake/Output Summary (Last 24 hours) at 03/23/17 0856  Last data filed at 03/23/17 0538   Gross per 24 hour   Intake             1060 ml   Output             2725 ml   Net            -1665 ml     Flowsheet Rows         First Filed Value    Admission Height  72\" (182.9 cm) Documented at 03/17/2017 1531    Admission Weight  250 lb (113 kg) Documented at 03/17/2017 1531          General Appearance:    Alert, cooperative, in no acute distress   Head:    Normocephalic, without obvious abnormality, atraumatic       Neck:   No adenopathy, supple, no thyromegaly, no carotid bruit, no    JVD   Lungs:     Clear to auscultation bilaterally, no wheezes, rales, or     rhonchi    Heart:    Normal rate, regular rhythm,  No murmur, rub, or gallop   Chest Wall:    No abnormalities observed   Abdomen:     Normal bowel sounds, soft, non-tender, non-distended,            no rebound tenderness   Extremities:   Trace bilateral LE edema.    Pulses:   Pulses palpable and equal bilaterally   Skin:   No bleeding or rash     carvedilol 6.25 mg Oral Q12H   ferrous sulfate 325 mg Oral Daily With Breakfast   gabapentin 300 mg Oral Nightly   insulin aspart 0-7 Units Subcutaneous 4x Daily AC & at Bedtime   insulin aspart prot-insulin aspart 20 Units Subcutaneous BID With Meals   losartan 12.5 mg Oral Q24H   potassium chloride 40 mEq Oral BID With Meals   sennosides-docusate sodium 2 tablet " Oral BID   venlafaxine XR 75 mg Oral Nightly   warfarin 2 mg Oral Daily         bumetanide (BUMEX) infusion 2 mg/hr Last Rate: 2 mg/hr (03/22/17 2058)       Results Review:      Results from last 7 days  Lab Units 03/23/17  0343   SODIUM mmol/L 133*   POTASSIUM mmol/L 4.1   CHLORIDE mmol/L 96*   TOTAL CO2 mmol/L 24.3   BUN mg/dL 23   CREATININE mg/dL 0.75*   GLUCOSE mg/dL 263*   CALCIUM mg/dL 8.5*       Results from last 7 days  Lab Units 03/20/17  2044 03/17/17  1627   TROPONIN T ng/mL 0.052* 0.042*       Results from last 7 days  Lab Units 03/23/17  0343   WBC 10*3/mm3 3.95*   HEMOGLOBIN g/dL 8.5*   HEMATOCRIT % 26.2*   PLATELETS 10*3/mm3 81*       Results from last 7 days  Lab Units 03/23/17  0343 03/22/17  0525 03/21/17  0458   INR  2.34* 2.61* 2.74*           Results from last 7 days  Lab Units 03/23/17  0343   MAGNESIUM mg/dL 2.0           I reviewed the patient's new clinical results.  I personally viewed and interpreted the patient's EKG/Telemetry data        Assessment/Plan   1. Acute on chronic systolic/diastolic CHF: Improving. Diuresing on bumex gtt.   2. Cardiomyopathy: EF 45% by echo 11/17/16. On carvedilol and losartan.   3. Moderate Tricuspid regurgitation  4. Antiphospholipid antibody syndrome and factor V Leiden heterozygote with prior DVT: On warfarin.  5. Persistent atrial fibrillation: On warfarin. Rate controlled on carvedilol.   6. Morbid obesity  7. Diabetes  8. Chronic anemia (multifactorial)   9. Prior GI bleeding  10. Prior stroke  11. Hypertension: Well controlled.   12: Thrombocytopenia      -Move to Torsemide 40mg bid. Monitor I/O overnight to ensure net even to net neg.   -Continue Coumadin at 2 mg daily  -Encouraged strict I/O  -Possibly home tomorrow.

## 2017-03-24 VITALS
OXYGEN SATURATION: 95 % | HEART RATE: 72 BPM | TEMPERATURE: 98.4 F | RESPIRATION RATE: 20 BRPM | BODY MASS INDEX: 34.13 KG/M2 | WEIGHT: 252 LBS | HEIGHT: 72 IN | DIASTOLIC BLOOD PRESSURE: 67 MMHG | SYSTOLIC BLOOD PRESSURE: 153 MMHG

## 2017-03-24 DIAGNOSIS — I50.42 CHRONIC COMBINED SYSTOLIC AND DIASTOLIC HEART FAILURE (HCC): Primary | ICD-10-CM

## 2017-03-24 LAB
ANION GAP SERPL CALCULATED.3IONS-SCNC: 11.6 MMOL/L
BASOPHILS # BLD AUTO: 0.02 10*3/MM3 (ref 0–0.2)
BASOPHILS NFR BLD AUTO: 0.5 % (ref 0–1.5)
BUN BLD-MCNC: 23 MG/DL (ref 8–23)
BUN/CREAT SERPL: 30.3 (ref 7–25)
CALCIUM SPEC-SCNC: 8.7 MG/DL (ref 8.6–10.5)
CHLORIDE SERPL-SCNC: 100 MMOL/L (ref 98–107)
CO2 SERPL-SCNC: 25.4 MMOL/L (ref 22–29)
CREAT BLD-MCNC: 0.76 MG/DL (ref 0.76–1.27)
DEPRECATED RDW RBC AUTO: 67 FL (ref 37–54)
EOSINOPHIL # BLD AUTO: 0.24 10*3/MM3 (ref 0–0.7)
EOSINOPHIL NFR BLD AUTO: 6 % (ref 0.3–6.2)
ERYTHROCYTE [DISTWIDTH] IN BLOOD BY AUTOMATED COUNT: 17.7 % (ref 11.5–14.5)
GFR SERPL CREATININE-BSD FRML MDRD: 101 ML/MIN/1.73
GLUCOSE BLD-MCNC: 127 MG/DL (ref 65–99)
GLUCOSE BLDC GLUCOMTR-MCNC: 135 MG/DL (ref 70–130)
GLUCOSE BLDC GLUCOMTR-MCNC: 215 MG/DL (ref 70–130)
HCT VFR BLD AUTO: 27.6 % (ref 40.4–52.2)
HGB BLD-MCNC: 8.8 G/DL (ref 13.7–17.6)
IMM GRANULOCYTES # BLD: 0 10*3/MM3 (ref 0–0.03)
IMM GRANULOCYTES NFR BLD: 0 % (ref 0–0.5)
INR PPP: 2.25 (ref 0.9–1.1)
LYMPHOCYTES # BLD AUTO: 1.25 10*3/MM3 (ref 0.9–4.8)
LYMPHOCYTES NFR BLD AUTO: 31.4 % (ref 19.6–45.3)
MAGNESIUM SERPL-MCNC: 2 MG/DL (ref 1.6–2.4)
MCH RBC QN AUTO: 33.5 PG (ref 27–32.7)
MCHC RBC AUTO-ENTMCNC: 31.9 G/DL (ref 32.6–36.4)
MCV RBC AUTO: 104.9 FL (ref 79.8–96.2)
MONOCYTES # BLD AUTO: 0.42 10*3/MM3 (ref 0.2–1.2)
MONOCYTES NFR BLD AUTO: 10.6 % (ref 5–12)
NEUTROPHILS # BLD AUTO: 2.05 10*3/MM3 (ref 1.9–8.1)
NEUTROPHILS NFR BLD AUTO: 51.5 % (ref 42.7–76)
PLATELET # BLD AUTO: 82 10*3/MM3 (ref 140–500)
PMV BLD AUTO: 10.7 FL (ref 6–12)
POTASSIUM BLD-SCNC: 3.9 MMOL/L (ref 3.5–5.2)
PROTHROMBIN TIME: 24.1 SECONDS (ref 11.7–14.2)
RBC # BLD AUTO: 2.63 10*6/MM3 (ref 4.6–6)
SODIUM BLD-SCNC: 137 MMOL/L (ref 136–145)
WBC NRBC COR # BLD: 3.98 10*3/MM3 (ref 4.5–10.7)

## 2017-03-24 PROCEDURE — 97110 THERAPEUTIC EXERCISES: CPT

## 2017-03-24 PROCEDURE — 80048 BASIC METABOLIC PNL TOTAL CA: CPT | Performed by: INTERNAL MEDICINE

## 2017-03-24 PROCEDURE — 85610 PROTHROMBIN TIME: CPT | Performed by: INTERNAL MEDICINE

## 2017-03-24 PROCEDURE — 82962 GLUCOSE BLOOD TEST: CPT

## 2017-03-24 PROCEDURE — 63710000001 INSULIN ASPART PER 5 UNITS: Performed by: INTERNAL MEDICINE

## 2017-03-24 PROCEDURE — 99232 SBSQ HOSP IP/OBS MODERATE 35: CPT | Performed by: INTERNAL MEDICINE

## 2017-03-24 PROCEDURE — 83735 ASSAY OF MAGNESIUM: CPT | Performed by: INTERNAL MEDICINE

## 2017-03-24 PROCEDURE — 85025 COMPLETE CBC W/AUTO DIFF WBC: CPT | Performed by: INTERNAL MEDICINE

## 2017-03-24 RX ORDER — WARFARIN SODIUM 2 MG/1
2 TABLET ORAL WEEKLY
Qty: 30 TABLET | Refills: 1 | Status: SHIPPED | OUTPATIENT
Start: 2017-03-24 | End: 2017-04-12

## 2017-03-24 RX ORDER — LOSARTAN POTASSIUM 25 MG/1
12.5 TABLET ORAL
Qty: 30 TABLET | Refills: 1 | Status: SHIPPED | OUTPATIENT
Start: 2017-03-24 | End: 2017-04-27 | Stop reason: DRUGHIGH

## 2017-03-24 RX ORDER — TORSEMIDE 20 MG/1
40 TABLET ORAL 2 TIMES DAILY
Qty: 60 TABLET | Refills: 1 | Status: SHIPPED | OUTPATIENT
Start: 2017-03-24 | End: 2017-01-01 | Stop reason: HOSPADM

## 2017-03-24 RX ADMIN — INSULIN ASPART 30 UNITS: 100 INJECTION, SUSPENSION SUBCUTANEOUS at 09:22

## 2017-03-24 RX ADMIN — CARVEDILOL 6.25 MG: 6.25 TABLET, FILM COATED ORAL at 09:27

## 2017-03-24 RX ADMIN — TORSEMIDE 40 MG: 20 TABLET ORAL at 09:26

## 2017-03-24 RX ADMIN — INSULIN ASPART 3 UNITS: 100 INJECTION, SOLUTION INTRAVENOUS; SUBCUTANEOUS at 11:30

## 2017-03-24 RX ADMIN — POTASSIUM CHLORIDE 40 MEQ: 750 CAPSULE, EXTENDED RELEASE ORAL at 09:27

## 2017-03-24 RX ADMIN — DOCUSATE SODIUM,SENNOSIDES 2 TABLET: 50; 8.6 TABLET, FILM COATED ORAL at 09:28

## 2017-03-24 RX ADMIN — FERROUS SULFATE TAB 325 MG (65 MG ELEMENTAL FE) 325 MG: 325 (65 FE) TAB at 09:27

## 2017-03-24 RX ADMIN — LOSARTAN POTASSIUM 12.5 MG: 25 TABLET, FILM COATED ORAL at 09:26

## 2017-03-24 NOTE — PROGRESS NOTES
Acute Care - Physical Therapy Treatment Note  [facility]     Patient Name: Emil Pulido  : 1945  MRN: [id number]  Today's Date: 3/24/2017  Onset of Illness/Injury or Date of Surgery Date: 17  Date of Referral to PT: 17  Referring Physician: Williams    Admit Date: 3/17/2017    Visit Dx:    ICD-10-CM ICD-9-CM   1. Congestive heart failure, unspecified congestive heart failure chronicity, unspecified congestive heart failure type I50.9 428.0     Patient Active Problem List   Diagnosis   • Iron deficiency anemia due to chronic blood loss   • Acute on chronic diastolic congestive heart failure   • Bilateral leg edema   • Right leg pain   • Type 2 diabetes mellitus treated with insulin   • Antiphospholipid antibody with hypercoagulable state   • Subconjunctival hemorrhage of right eye   • Thrombocytopenia   • History of stroke with residual deficit   • Pancytopenia   • Iron deficiency anemia   • Vitamin D deficiency   • History of knee surgery   • Pseudarthrosis after fusion or arthrodesis   • Acute posthemorrhagic anemia   • Peripheral neuropathic pain   • Fracture of distal end of humerus   • Hypercholesterolemia   • Benign hypertension   • Late effects of cerebrovascular disease   • Factor V Leiden mutation   • Edema   • Depression   • Degeneration of intervertebral disc of lumbar region   • Degeneration of intervertebral disc of cervical region   • Chronic pain   • Long term current use of anticoagulant   • Cellulitis   • Hematochezia   • Chronic atrial fibrillation   • Acute deep venous thrombosis   • Acute on chronic diastolic heart failure   • Thrombocytopenia   • AVM (arteriovenous malformation) of colon with hemorrhage   • Congestive heart failure   • Hypokalemia   • Type 2 diabetes mellitus with hypoglycemia   • Constipation               Adult Rehabilitation Note       17 0839 17 0816       Rehab Assessment/Intervention    Discipline physical therapy assistant  -FINN physical  therapy assistant  -FINN     Document Type therapy note (daily note)  -FINN therapy note (daily note)  -FINN     Subjective Information agree to therapy  -FINN agree to therapy  -FINN     Patient Effort, Rehab Treatment good  -FINN good  -FINN     Precautions/Limitations fall precautions  -FINN fall precautions  -FINN     Recorded by [FINN] Jeffery Wren PTA [FINN] Jeffery Wren PTA     Pain Assessment    Pain Assessment No/denies pain  -FINN No/denies pain  -FINN     Recorded by [FINN] Jeffery Wren PTA [FINN] Jeffery Wren PTA     Cognitive Assessment/Intervention    Current Cognitive/Communication Assessment functional  -FINN functional  -FINN     Orientation Status oriented x 4  -FINN oriented x 4  -FINN     Follows Commands/Answers Questions 100% of the time  -FINN 100% of the time  -FINN     Personal Safety WNL/WFL  -FINN WNL/WFL  -FINN     Personal Safety Interventions fall prevention program maintained;gait belt;nonskid shoes/slippers when out of bed  -FINN fall prevention program maintained;gait belt;nonskid shoes/slippers when out of bed  -FINN     Recorded by [FINN] Jeffery Wren PTA [FINN] Jeffery Wren PTA     Bed Mobility, Assessment/Treatment    Bed Mobility, Scoot/Bridge, [place] minimum assist (75% patient effort)   bed in trendelenburg, min(A) to anchor (B)feet  -FINN minimum assist (75% patient effort)   bed in trendelenburg, min(A) to anchor (B)feet  -FINN     Bed Mob, Supine to Sit, [place] moderate assist (50% patient effort);verbal cues required  -FINN maximum assist (25% patient effort);verbal cues required;nonverbal cues required (demo/gesture)  -FINN     Bed Mob, Sit to Supine, [place] moderate assist (50% patient effort);verbal cues required  -FINN maximum assist (25% patient effort);verbal cues required;nonverbal cues required (demo/gesture)  -FINN     Bed Mobility, Safety Issues decreased use of arms for pushing/pulling;decreased use of legs for bridging/pushing;impaired trunk control for bed mobility  -FINN decreased use of arms for  pushing/pulling;decreased use of legs for bridging/pushing;impaired trunk control for bed mobility  -FINN     Bed Mobility, Impairments strength decreased;impaired balance;coordination impaired  -FINN strength decreased;impaired balance;coordination impaired  -FINN     Recorded by [FINN] Jeffery Wren PTA [FINN] Jeffery Wren PTA     Transfer Assessment/Treatment    Transfers, Sit-Stand [place] not tested  -FINN      Transfers, Stand-Sit [place] not tested  -FINN      Recorded by [FINN] Jeffery Wren PTA      Motor Skills/Interventions    Additional Documentation  Balance Skills Training (Group)  -FINN     Recorded by  [FINN] Jeffery Wren PTA     Balance Skills Training    Sitting-Level of Assistance Contact guard;Minimum assistance  -FINN Contact guard;Minimum assistance  -FINN     Sitting-Balance Support Feet supported;Left upper extremity supported;Right upper extremity supported   occasional use of (B)UE to maintain balance  -FINN Feet supported;Left upper extremity supported;Right upper extremity supported   occasional use of (B)UE to maintain balance  -FINN     Sitting-Balance Activities Trunk control activities;Lateral lean;Forward lean  -FINN Trunk control activities;Lateral lean;Forward lean  -FINN     Sitting # of Minutes 12  -FINN 10  -FINN     Recorded by [FINN] Jeffery Wren PTA [FINN] Jeffery Wren PTA     Therapy Exercises    Bilateral Lower Extremities AAROM:;10 reps;ankle pumps/circles;hip flexion;LAQ   limited ROM, gentle overstretch applied  -FINN AROM:;10 reps;ankle pumps/circles;LAQ   limited ROM  -FINN     Bilateral Upper Extremity AROM:;10 reps;shoulder extension/flexion;shoulder circles   green theraband: bicep curls, tricep ext,   -FINN AROM:;10 reps;shoulder extension/flexion;shoulder circles   yellow theraband: bicep curls, tricep ext,   -FINN     Recorded by [FINN] Jeffery Wren PTA [FINN] Jeffery Wren PTA     Positioning and Restraints    Pre-Treatment Position in bed  -FINN in bed  -FINN     Post Treatment Position bed  -FINN bed  -FINN      In Bed supine;call light within reach;encouraged to call for assist  -FINN side lying left;call light within reach;encouraged to call for assist;notified nsg  -FINN     Recorded by [FINN] Jeffery Wren PTA [FINN] Jeffery Wren PTA       User Key  (r) = Recorded By, (t) = Taken By, (c) = Cosigned By    Initials Name Effective Dates    FINN Wren PTA 04/24/15 -                 IP PT Goals       03/22/17 0921          Bed Mobility PT LTG    Bed Mobility PT LTG, Date Established 03/22/17  -MD      Bed Mobility PT LTG, Time to Achieve 1 wk  -MD      Bed Mobility PT LTG, Activity Type supine to sit/sit to supine  -MD      Bed Mobility PT LTG, [place] Level minimum assist (75% patient effort)  -MD      Bed Mobility PT Goal  LTG, Assist Device bed rails  -MD      Transfer Training PT LTG    Transfer Training PT LTG, Date Established 03/22/17  -MD      Transfer Training PT LTG, Time to Achieve 1 wk  -MD      Transfer Training PT LTG, Activity Type bed to chair /chair to bed  -MD      Transfer Training PT LTG, [place] Level moderate assist (50% patient effort);2 person assist required  -MD      Transfer Training PT LTG, Assist Device --   sliding board if appropriate  -MD      Dynamic Sitting Balance PT LTG    Dynamic Sitting Balance PT LTG, Date Established 03/22/17  -MD      Dynamic Sitting Balance PT LTG, Time to Achieve 1 wk  -MD      Dynamic Sitting Balance PT LTG, [place] Level supervision required  -MD        User Key  (r) = Recorded By, (t) = Taken By, (c) = Cosigned By    Initials Name Provider Type    MD Christiane Dahl, PT Physical Therapist          Physical Therapy Education     Title: PT OT SLP Therapies (Active)     Topic: Physical Therapy (Done)     Point: Mobility training (Done)    Learning Progress Summary    Learner Readiness Method Response Comment Documented by Status   Patient Acceptance E COSTA BRISENO 03/24/17 0905 Done    Acceptance E JOE SKELTON 03/23/17 0847 Done               Point: Home exercise  program (Done)    Learning Progress Summary    Learner Readiness Method Response Comment Documented by Status   Patient Acceptance E VU  FINN 03/24/17 0905 Done    Acceptance E VU,NR  FINN 03/23/17 0847 Done               Point: Body mechanics (Done)    Learning Progress Summary    Learner Readiness Method Response Comment Documented by Status   Patient Acceptance E VU  FINN 03/24/17 0905 Done    Acceptance E VU,NR  FINN 03/23/17 0847 Done               Point: Precautions (Done)    Learning Progress Summary    Learner Readiness Method Response Comment Documented by Status   Patient Acceptance E VU  FINN 03/24/17 0905 Done    Acceptance E VU,NR  FINN 03/23/17 0847 Done    Acceptance E,D NR  MD 03/22/17 0924 Active                      User Key     Initials Effective Dates Name Provider Type Discipline    MD 12/01/15 -  Christiane Dahl, PT Physical Therapist PT    FINN 04/24/15 -  Jeffery Wren, PTA Physical Therapy Assistant PT                    PT Recommendation and Plan  Anticipated Discharge Disposition: skilled nursing facility  Planned Therapy Interventions: bed mobility training, patient/family education, balance training, transfer training  PT Frequency: daily  Plan of Care Review  Plan Of Care Reviewed With: patient  Outcome Summary/Follow up Plan: Improved functional independence w/ bed mobility this date.  Pt able to tolerate increased theraband strength w/ (B)UE ther ex.  Stretching applied to (B)LE this date.  PT staff recommends d/c to [facility], for continued strength, balance, and t'yesika training prior to returning home w/ wife.           Outcome Measures       03/24/17 0900 03/23/17 0800 03/22/17 0900    How much help from another person do you currently need...    Turning from your back to your side while in flat bed without using bedrails? 2  -FINN 2  -FINN 2  -MD    Moving from lying on back to sitting on the side of a flat bed without bedrails? 2  -FINN 2  -FINN 2  -MD    Moving to and from a bed to a chair (including a wheelchair)?  1  -FINN 1  -FINN 1  -MD    Standing up from a chair using your arms (e.g., wheelchair, bedside chair)? 1  -FINN 1  -FINN 1  -MD    Climbing 3-5 steps with a railing? 1  -FINN 1  -FINN 1  -MD    To walk in hospital room? 1  -FINN 1  -FINN 1  -MD    AM-PAC 6 Clicks Score 8  -FINN 8  -FINN 8  -MD    Functional Assessment    Outcome Measure Options AM-PAC 6 Clicks Basic Mobility (PT)  -FINN AM-PAC 6 Clicks Basic Mobility (PT)  -FINN AM-PAC 6 Clicks Basic Mobility (PT)  -MD      User Key  (r) = Recorded By, (t) = Taken By, (c) = Cosigned By    Initials Name Provider Type    MD Christiane Dahl, PT Physical Therapist    FINN Wren PTA Physical Therapy Assistant           Time Calculation:         PT Charges       03/24/17 0904          Time Calculation    Start Time 0839  -FINN      Stop Time 0902  -FINN      Time Calculation (min) 23 min  -FINN      PT Received On 03/24/17  -FINN      PT - Next Appointment 03/25/17  -FINN        User Key  (r) = Recorded By, (t) = Taken By, (c) = Cosigned By    Initials Name Provider Type    FINN Wren PTA Physical Therapy Assistant          Therapy Charges for Today     Code Description Service Date Service Provider Modifiers Qty    72410287799 HC PT THER PROC EA 15 MIN 3/23/2017 Jeffery Wren PTA GP 2    02171649086 HC PT THER PROC EA 15 MIN 3/24/2017 Jeffery Wren PTA GP 2          PT G-Codes  Outcome Measure Options: AM-PAC 6 Clicks Basic Mobility (PT)    Jeffery Wren PTA  3/24/2017

## 2017-03-24 NOTE — DISCHARGE PLACEMENT REQUEST
"Laura Stauffer (72 y.o. Male)     Date of Birth Social Security Number Address Home Phone MRN    1945  [address] [facility] [address] [phone] [id number]    Mandaen Marital Status          Unknown        Admission Date Admission Type Admitting Provider Attending Provider Department, Room/Bed    3/17/17 Emergency [place], MD Kristine Johnson Christopher E, MD [address], 432/1    Discharge Date Discharge Disposition Discharge Destination                      Attending Provider: Otto Carmona MD     Allergies:  Morphine, Morphine And Related    Isolation:  None   Infection:  None   Code Status:  Conditional    Ht:  72\" (182.9 cm)   Wt:  252 lb (114 kg)    Admission Cmt:  None   Principal Problem:  Acute on chronic diastolic congestive heart failure [I50.33]                 Active Insurance as of 3/17/2017     Primary Coverage     Payor Plan Insurance Group Employer/Plan Group    MEDICARE MEDICARE A & B      Payor Plan Address Payor Plan Phone Number Effective From Effective To    [address] [phone] 6/1/2000     [place] [address]       Subscriber Name Subscriber Birth Date Member ID       LAURA STAUFFER 1945 [id number]           Secondary Coverage     Payor Plan Insurance Group Employer/Plan Group    [facility] SUPP [id number]     Payor Plan Address Payor Plan Phone Number Effective From Effective To    [address]  12/1/2016     [place] [address]       Subscriber Name Subscriber Birth Date Member ID       LAURA STAUFFER 1945 [id number]                 Emergency Contacts      (Rel.) Home Phone Work Phone Mobile Phone    Juliet Stauffer (Spouse) [phone] -- --        "

## 2017-03-24 NOTE — DISCHARGE SUMMARY
Date of Admission: 3/17/2017  Date of Discharge:  3/24/2017    PCP: Marcus Avery MD      DISCHARGE DIAGNOSIS  Principal Problem:    Acute on chronic diastolic congestive heart failure  Active Problems:    Iron deficiency anemia due to chronic blood loss    Type 2 diabetes mellitus treated with insulin    Antiphospholipid antibody with hypercoagulable state    Pancytopenia    Benign hypertension    Long term current use of anticoagulant    Chronic atrial fibrillation    Hypokalemia    Type 2 diabetes mellitus with hypoglycemia    Constipation      SECONDARY DIAGNOSES  Past Medical History:   Diagnosis Date   • Antiphospholipid antibody with hypercoagulable state    • Arthritis    • Atrial fibrillation    • Back pain    • Cancer    • Carotid artery disease    • Cellulitis 09/2015    Left leg   • CHF (congestive heart failure)    • Deep vein thrombosis of lower extremity    • Diabetes mellitus    • Eye abnormalities     pt has bleeding behind eyes   • Factor 5 Leiden mutation, heterozygous    • Hematoma     LARGE THIGH HEMATOMA   • History of transfusion    • Hypertension    • Hypertensive heart disease    • Peripheral vascular disease    • Sick sinus syndrome    • Sleep apnea    • Stroke 08/2015       CONSULTS   Consults     Date and Time Order Name Status Description    3/17/2017 8308 LHA (on-call MD unless specified) Completed     3/17/2017 0469 Cardiology (on-call MD unless specified) Completed           PROCEDURES PERFORMED  CXR:  Small left basilar atelectasis/infiltrate, follow-up x-ray suggested. COPD change. Cardiomegaly and pulmonary vascular congestion.    HOSPITAL COURSE  Patient is a 72 y.o. male presented to [facility] complaining of worsening shortness of breath and lower extremity edema .  Please see the admitting history and physical for further details. This was thought to be 2/2 acute on chronic diastolic HF exac. Cardiology was consulted and he was initially started on a  "bumex drip. He was transitioned over to Torsemide on an increased dose than he was previously on. He did well on this and was euvolemic at the time of discharge. His coumadin was continued and INR was therapeutic. His goal INR was 2-2.5 due to recurrent GI bleeds. He had a stable, chronic pancytopenia. He is to follow up with the Cardiology NP within 1 week with repeat BMP and INR before that visit.       CONDITION ON DISCHARGE  Stable.      VITAL SIGNS  /99 (BP Location: Right arm, Patient Position: Lying)  Pulse 71  Temp 98.2 °F (36.8 °C) (Oral)   Resp 20  Ht 72\" (182.9 cm)  Wt 252 lb (114 kg)  SpO2 97%  BMI 34.18 kg/m2     Objective  General Appearance: Comfortable and well-appearing.   HEENT: Normal HEENT exam.   Lungs: Normal effort. There are decreased breath sounds.   Heart: Normal rate. Regular rhythm.   Abdomen: Abdomen is soft and non-distended. Bowel sounds are normal. There is no abdominal tenderness.   Extremities: Normal range of motion. There is dependent edema.   Neurological: Patient is alert and oriented to person, place and time.   Skin: Warm and dry. No rash.     DISCHARGE DISPOSITION   Skilled Nursing Facility (DC - External)      DISCHARGE MEDICATIONS   Emil Pulido   Home Medication Instructions [id number]    Printed on:03/24/17 1225   Medication Information                      carvedilol (COREG) 6.25 MG tablet  TAKE ONE TABLET BY MOUTH TWICE A DAY WITH MEALS             Cholecalciferol (VITAMIN D3) 3000 UNITS tablet  Take  by mouth Daily.             Ferrous Gluconate 325 (36 FE) MG tablet  Take 300 mg by mouth Daily.             gabapentin (NEURONTIN) 300 MG capsule  Take 300 mg by mouth Every Night.             insulin lispro protamine-insulin lispro (humaLOG 50-50) (50-50) 100 UNIT/ML suspension injection  Inject 30 Units under the skin 2 (Two) Times a Day With Meals. 40 units at bedtime             losartan (COZAAR) 6.25 MG quarter tablet  Take 2 quarter tablet by " mouth Daily.             oxyCODONE-acetaminophen (PERCOCET) 5-325 MG per tablet  Take 1 tablet by mouth Every 6 (Six) Hours As Needed for severe pain (7-10).             potassium chloride (K-DUR,KLOR-CON) 20 MEQ CR tablet  Take 1 tablet by mouth 2 (Two) Times a Day for 30 days.             torsemide (DEMADEX) 20 MG tablet  Take 2 tablets by mouth 2 (Two) Times a Day.             venlafaxine XR (EFFEXOR-XR) 75 MG 24 hr capsule  Take 75 mg by mouth Every Night.             vitamin C (ASCORBIC ACID) 500 MG tablet  Take 500 mg by mouth daily.             warfarin (COUMADIN) 2 MG tablet  Take 1 tablet by mouth 1 (One) Time Per Week. Saturday Evenings                Future Appointments  Date Time Provider Department Center   3/29/2017 9:00 AM GINA Tinoco MGNELSON CD LCGKR None   3/31/2017 11:20 AM GINA Das PC BUECH None   4/5/2017 9:30 AM LAB CHAIR 4 [facility]   4/5/2017 10:00 AM COAG RN [facility] INFUS KRE [facility]   4/20/2017 1:30 PM LAB CHAIR 3 [facility] LAB KRES [facility]   4/20/2017 2:00 PM Ilya [facility]rocio MD [facility] [place]   6/1/2017 2:00 PM Otto Harris MD MGK CD LCGKR None     Follow-up Information     Follow up with Marcus Avery MD Follow up on 3/31/2017.    Specialty:  Internal Medicine    Why:  One week hsopital follow up 3/31/17 at 1120 am  arrive 15 min early and bring a current medication list    Contact information:    [address] [facility] [id number] [phone]          Follow up with GINA Tinoco Follow up in 1 week(s).    Specialty:  Nurse Practitioner    Contact information:    [address]  [facility] [address]  [facility] [id number] [phone]            TEST  RESULTS PENDING AT DISCHARGE         Otto Carmona MD  [facility]  03/24/17  12:29 PM      Time: greater than 30 minutes.      Dragon disclaimer:  Much of this encounter note is an electronic transcription/translation of spoken language to  printed text. The electronic translation of spoken language may permit erroneous, or at times, nonsensical words or phrases to be inadvertently transcribed; Although I have reviewed the note for such errors, some may still exist.

## 2017-03-24 NOTE — PROGRESS NOTES
[facility]    Physicians Statement of Medical Necessity for Ambulance Transportation    It is medically necessary for:    Patient Name: Emil Pulido    Insurance Information:      To be transported by ambulance:    From (if nursing facility, specify level of care: skilled, FDC, etc): [facility]    [facility]    Date of Service:3/24/17    For dialysis patients state date dialysis began: N/A    Diagnosis  ACUTE ON CHRONIC CHF, IRON DEFICIENCY ANEMIA DUE TO CHRONIC BLOOD LOSS. DMI II, PANCTYOPENIA, CHRONIC ATRIAL FIB, LONG TERM USE OF ANTICOAGULANT    Past Medical/Surgical History:  Past Medical History:   Diagnosis Date   • Antiphospholipid antibody with hypercoagulable state    • Arthritis    • Atrial fibrillation    • Back pain    • Cancer    • Carotid artery disease    • Cellulitis 09/2015    Left leg   • CHF (congestive heart failure)    • Deep vein thrombosis of lower extremity    • Diabetes mellitus    • Eye abnormalities     pt has bleeding behind eyes   • Factor 5 Leiden mutation, heterozygous    • Hematoma     LARGE THIGH HEMATOMA   • History of transfusion    • Hypertension    • Hypertensive heart disease    • Peripheral vascular disease    • Sick sinus syndrome    • Sleep apnea    • Stroke 08/2015      Past Surgical History:   Procedure Laterality Date   • BACK SURGERY     • COLONOSCOPY     • COLONOSCOPY N/A 10/4/2016    Procedure: COLONOSCOPY to cecum endo clip x2;  Surgeon: Leoncio Strong MD;  Location: [facility] ENDOSCOPY;  Service:    • COLONOSCOPY N/A 12/12/2016    Procedure: COLONOSCOPY into cecum with Resolution clip x 2 and epi 1:20,000 injection;  Surgeon: Leoncio Strong MD;  Location: [facility] ENDOSCOPY;  Service:    • EYE SURGERY     • FRACTURE SURGERY      left arm   • JOINT REPLACEMENT      elbow replacement, right rotater cuff surgery   • OTHER SURGICAL HISTORY      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

## 2017-03-24 NOTE — PROGRESS NOTES
Continued Stay Note  [facility]     Patient Name: Emil Pulido  MRN: [id number]  Today's Date: 3/24/2017    Admit Date: 3/17/2017          Discharge Plan       03/24/17 0922    Case Management/Social Work Plan    Plan Plan [facility] for skilled care.  Bed available 3/24 and 3/25.  MAGGIE Cruz    Additional Comments Per Jackie [phone]) [facility] does have a bed available if pt discharge 3/24 or 3/25 on second floor.   Packet in CCP office.  Plan [facility] for skilled care if discharged 3/24 or 3/25.   MAGGIE Cruz      03/24/17 1998    Case Management/Social Work Plan    Plan Plan [facility] or [facility] for skilled care based on bed availability.  MAGGIE Cruz    Additional Comments Message left for CCP to call pt's wife. Spoke with pt's wife (Juliet [phone]) per phone. Juliet states first choice for rehab would be [facility].  [facility]res ([phone]) called to inquire about bed availability for discharge today.  Pt's wife (Juliet) states second choice would be [facility].  Call to  Maddy  ( [phone]) for bed availability at [facility] if [facility] does not have a bed.  Plan [facility] or [facility] for skilled care based on bed availability.  MAGGIE Cruz.              Discharge Codes     None            Kristin More, MAGGIE

## 2017-03-24 NOTE — PROGRESS NOTES
"Patient Care Team:  Marcus Avery MD as PCP - General (Internal Medicine)  Marcus Avery MD as PCP - Family Medicine  Ilya Ambrocio MD as Consulting Physician (Hematology and Oncology)  Willy Bell MD as Referring Physician (Internal Medicine)    Chief Complaint: Follow-up acute on chronic systolic and diastolic heart failure    Interval History: Diuresed well on po. No complaints. INR stable      Objective   Vital Signs  Temp:  [98 °F (36.7 °C)-98.5 °F (36.9 °C)] 98.2 °F (36.8 °C)  Heart Rate:  [71-87] 71  Resp:  [18-20] 20  BP: (123-142)/(59-99) 123/99    Intake/Output Summary (Last 24 hours) at 03/24/17 0829  Last data filed at 03/24/17 0713   Gross per 24 hour   Intake              360 ml   Output             2495 ml   Net            -2135 ml     Flowsheet Rows         First Filed Value    Admission Height  72\" (182.9 cm) Documented at 03/17/2017 1531    Admission Weight  250 lb (113 kg) Documented at 03/17/2017 1531          General Appearance:    Alert, cooperative, in no acute distress   Head:    Normocephalic, without obvious abnormality, atraumatic       Neck:   No adenopathy, supple, no thyromegaly, no carotid bruit, no    JVD   Lungs:     Clear to auscultation bilaterally, no wheezes, rales, or     rhonchi    Heart:    Normal rate, regular rhythm,  No murmur, rub, or gallop   Chest Wall:    No abnormalities observed   Abdomen:     Normal bowel sounds, soft, non-tender, non-distended,            no rebound tenderness   Extremities:   Trace bilateral LE edema.    Pulses:   Pulses palpable and equal bilaterally   Skin:   No bleeding or rash       carvedilol 6.25 mg Oral Q12H   ferrous sulfate 325 mg Oral Daily With Breakfast   gabapentin 300 mg Oral Nightly   insulin aspart 0-7 Units Subcutaneous 4x Daily AC & at Bedtime   insulin aspart prot-insulin aspart 30 Units Subcutaneous BID With Meals   losartan 12.5 mg Oral Q24H   potassium chloride 40 mEq Oral BID With Meals   sennosides-docusate sodium " 2 tablet Oral BID   torsemide 40 mg Oral BID   venlafaxine XR 75 mg Oral Nightly   warfarin 2 mg Oral Daily            Results Review:      Results from last 7 days  Lab Units 03/24/17  0532   SODIUM mmol/L 137   POTASSIUM mmol/L 3.9   CHLORIDE mmol/L 100   TOTAL CO2 mmol/L 25.4   BUN mg/dL 23   CREATININE mg/dL 0.76   GLUCOSE mg/dL 127*   CALCIUM mg/dL 8.7       Results from last 7 days  Lab Units 03/20/17  2044 03/17/17  1627   TROPONIN T ng/mL 0.052* 0.042*       Results from last 7 days  Lab Units 03/24/17  0532   WBC 10*3/mm3 3.98*   HEMOGLOBIN g/dL 8.8*   HEMATOCRIT % 27.6*   PLATELETS 10*3/mm3 82*       Results from last 7 days  Lab Units 03/24/17  0532 03/23/17  0343 03/22/17  0525   INR  2.25* 2.34* 2.61*           Results from last 7 days  Lab Units 03/24/17  0532   MAGNESIUM mg/dL 2.0           I reviewed the patient's new clinical results.  I personally viewed and interpreted the patient's EKG/Telemetry data        Assessment/Plan   1. Acute on chronic systolic/diastolic CHF: Improving. Diuresing on bumex gtt.   2. Cardiomyopathy: EF 45% by echo 11/17/16. On carvedilol and losartan.   3. Moderate Tricuspid regurgitation  4. Antiphospholipid antibody syndrome and factor V Leiden heterozygote with prior DVT: On warfarin.  5. Persistent atrial fibrillation: On warfarin. Rate controlled on carvedilol.   6. Morbid obesity  7. Diabetes  8. Chronic anemia (multifactorial)   9. Prior GI bleeding  10. Prior stroke  11. Hypertension: Well controlled.   12: Thrombocytopenia      -ContinueTorsemide 40mg bid.   -Continue Coumadin at 2 mg daily  -OK to d/c with f/u with Leonie Dahl NP with labs upcoming week. See me in 3-4 weeks.

## 2017-03-25 NOTE — THERAPY DISCHARGE NOTE
Acute Care - Physical Therapy Discharge Summary  [facility]       Patient Name: Emil Pulido  : 1945  MRN: [id number]    Today's Date: 3/25/2017  Onset of Illness/Injury or Date of Surgery Date: 17    Date of Referral to PT: 17  Referring Physician: Williams      Admit Date: 3/17/2017      PT Recommendation and Plan    Visit Dx:    ICD-10-CM ICD-9-CM   1. Congestive heart failure, unspecified congestive heart failure chronicity, unspecified congestive heart failure type I50.9 428.0             Outcome Measures       17 0900 17 0800       How much help from another person do you currently need...    Turning from your back to your side while in flat bed without using bedrails? 2  -FINN 2  -FINN     Moving from lying on back to sitting on the side of a flat bed without bedrails? 2  -FINN 2  -FINN     Moving to and from a bed to a chair (including a wheelchair)? 1  -FINN 1  -FINN     Standing up from a chair using your arms (e.g., wheelchair, bedside chair)? 1  -FINN 1  -FINN     Climbing 3-5 steps with a railing? 1  -FINN 1  -FINN     To walk in hospital room? 1  -FINN 1  -FINN     AM-PAC 6 Clicks Score 8  -FINN 8  -FINN     Functional Assessment    Outcome Measure Options AM-PAC 6 Clicks Basic Mobility (PT)  -FINN AM-PAC 6 Clicks Basic Mobility (PT)  -FINN       User Key  (r) = Recorded By, (t) = Taken By, (c) = Cosigned By    Initials Name Provider Type    FINN Wren PTA Physical Therapy Assistant                      IP PT Goals       17 1713 17 0921       Bed Mobility PT LTG    Bed Mobility PT LTG, Date Established  17  -MD     Bed Mobility PT LTG, Time to Achieve  1 wk  -MD     Bed Mobility PT LTG, Activity Type  supine to sit/sit to supine  -MD     Bed Mobility PT LTG, [place] Level  minimum assist (75% patient effort)  -MD     Bed Mobility PT Goal  LTG, Assist Device  bed rails  -MD     Bed Mobility PT LTG, Date Goal Reviewed 17  -EM      Bed Mobility PT LTG, Outcome goal  not met  -EM      Bed Mobility PT LTG, Reason Goal Not Met discharged from facility  -EM      Transfer Training PT LTG    Transfer Training PT LTG, Date Established  03/22/17  -MD     Transfer Training PT LTG, Time to Achieve  1 wk  -MD     Transfer Training PT LTG, Activity Type  bed to chair /chair to bed  -MD     Transfer Training PT LTG, [place] Level  moderate assist (50% patient effort);2 person assist required  -MD     Transfer Training PT LTG, Assist Device  --   sliding board if appropriate  -MD     Transfer Training PT  LTG, Date Goal Reviewed 03/25/17  -EM      Transfer Training PT LTG, Outcome goal not met  -EM      Transfer Training PT LTG, Reason Goal Not Met discharged from facility  -EM      Dynamic Sitting Balance PT LTG    Dynamic Sitting Balance PT LTG, Date Established  03/22/17  -MD     Dynamic Sitting Balance PT LTG, Time to Achieve  1 wk  -MD     Dynamic Sitting Balance PT LTG, [place] Level  supervision required  -MD     Dynamic Sitting Balance PT LTG, Date Goal Reviewed 03/25/17  -EM      Dynamic Sitting Balance PT LTG, Outcome goal not met  -EM      Dynamic Sitting Balance PT LTG, Reason Goal Not Met discharged from facility  -EM        User Key  (r) = Recorded By, (t) = Taken By, (c) = Cosigned By    Initials Name Provider Type    MD Christiane Dahl, PT Physical Therapist    EM Emma Jo, PT Physical Therapist              PT Discharge Summary  Anticipated Discharge Disposition: skilled nursing facility  Reason for Discharge: Discharge from facility  Outcomes Achieved: Refer to plan of care for updates on goals achieved  Discharge Destination: SNF      Emma Jo, PT   3/25/2017

## 2017-03-29 ENCOUNTER — TELEPHONE (OUTPATIENT)
Dept: FAMILY MEDICINE CLINIC | Facility: CLINIC | Age: 72
End: 2017-03-29

## 2017-03-29 NOTE — TELEPHONE ENCOUNTER
Called and spoke with Juliet told her I would correct the beneficiary name on the form and fax it back      ----- Message from Ivelisse Cruz sent at 3/29/2017  9:53 AM EDT -----  Contact: [phone]  ON PT'S ORDER FOR HIS POWER CHAIR THE BENEFICIARY HAS HIS WIFE'S NAME AND IT SHOULD HAVE HIS NAME. PLEASE FIX AND FAX BACK

## 2017-04-06 ENCOUNTER — APPOINTMENT (OUTPATIENT)
Dept: CARDIOLOGY | Facility: HOSPITAL | Age: 72
End: 2017-04-06
Attending: EMERGENCY MEDICINE

## 2017-04-06 ENCOUNTER — HOSPITAL ENCOUNTER (EMERGENCY)
Facility: HOSPITAL | Age: 72
Discharge: HOME | End: 2017-04-06
Attending: EMERGENCY MEDICINE | Admitting: EMERGENCY MEDICINE

## 2017-04-06 ENCOUNTER — APPOINTMENT (OUTPATIENT)
Dept: GENERAL RADIOLOGY | Facility: HOSPITAL | Age: 72
End: 2017-04-06

## 2017-04-06 VITALS
HEIGHT: 74 IN | OXYGEN SATURATION: 96 % | SYSTOLIC BLOOD PRESSURE: 109 MMHG | DIASTOLIC BLOOD PRESSURE: 70 MMHG | WEIGHT: 261 LBS | TEMPERATURE: 95.9 F | BODY MASS INDEX: 33.5 KG/M2 | HEART RATE: 90 BPM | RESPIRATION RATE: 16 BRPM

## 2017-04-06 DIAGNOSIS — E16.2 HYPOGLYCEMIA: Primary | ICD-10-CM

## 2017-04-06 DIAGNOSIS — M79.605 PAIN OF LEFT LOWER EXTREMITY: ICD-10-CM

## 2017-04-06 DIAGNOSIS — D64.9 ANEMIA, UNSPECIFIED TYPE: Chronic | ICD-10-CM

## 2017-04-06 LAB
ALBUMIN SERPL-MCNC: 3 G/DL (ref 3.5–5.2)
ALBUMIN/GLOB SERPL: 0.6 G/DL
ALP SERPL-CCNC: 217 U/L (ref 39–117)
ALT SERPL W P-5'-P-CCNC: 15 U/L (ref 1–41)
ANION GAP SERPL CALCULATED.3IONS-SCNC: 12 MMOL/L
AST SERPL-CCNC: 30 U/L (ref 1–40)
BASOPHILS # BLD AUTO: 0.01 10*3/MM3 (ref 0–0.2)
BASOPHILS NFR BLD AUTO: 0.1 % (ref 0–1.5)
BH CV LOW VAS LEFT DISTAL FEMORAL SPONT: 1
BH CV LOW VAS LEFT MID FEMORAL SPONT: 1
BH CV LOW VAS LEFT POPLITEAL SPONT: 1
BH CV LOWER VASCULAR LEFT COMMON FEMORAL AUGMENT: NORMAL
BH CV LOWER VASCULAR LEFT COMMON FEMORAL COMPETENT: NORMAL
BH CV LOWER VASCULAR LEFT COMMON FEMORAL COMPRESS: NORMAL
BH CV LOWER VASCULAR LEFT COMMON FEMORAL PHASIC: NORMAL
BH CV LOWER VASCULAR LEFT COMMON FEMORAL SPONT: NORMAL
BH CV LOWER VASCULAR LEFT DISTAL FEMORAL COMPRESS: NORMAL
BH CV LOWER VASCULAR LEFT DISTAL FEMORAL THROMBUS: NORMAL
BH CV LOWER VASCULAR LEFT GASTRONEMIUS COMPRESS: NORMAL
BH CV LOWER VASCULAR LEFT GREATER SAPH AK COMPRESS: NORMAL
BH CV LOWER VASCULAR LEFT GREATER SAPH BK COMPRESS: NORMAL
BH CV LOWER VASCULAR LEFT MID FEMORAL AUGMENT: NORMAL
BH CV LOWER VASCULAR LEFT MID FEMORAL COMPRESS: NORMAL
BH CV LOWER VASCULAR LEFT MID FEMORAL PHASIC: NORMAL
BH CV LOWER VASCULAR LEFT MID FEMORAL SPONT: NORMAL
BH CV LOWER VASCULAR LEFT MID FEMORAL THROMBUS: NORMAL
BH CV LOWER VASCULAR LEFT PERONEAL COMPRESS: NORMAL
BH CV LOWER VASCULAR LEFT POPLITEAL AUGMENT: NORMAL
BH CV LOWER VASCULAR LEFT POPLITEAL COMPETENT: NORMAL
BH CV LOWER VASCULAR LEFT POPLITEAL COMPRESS: NORMAL
BH CV LOWER VASCULAR LEFT POPLITEAL PHASIC: NORMAL
BH CV LOWER VASCULAR LEFT POPLITEAL SPONT: NORMAL
BH CV LOWER VASCULAR LEFT POPLITEAL THROMBUS: NORMAL
BH CV LOWER VASCULAR LEFT POSTERIOR TIBIAL COMPRESS: NORMAL
BH CV LOWER VASCULAR LEFT PROXIMAL FEMORAL COMPRESS: NORMAL
BH CV LOWER VASCULAR LEFT SAPHENOFEMORAL JUNCTION AUGMENT: NORMAL
BH CV LOWER VASCULAR LEFT SAPHENOFEMORAL JUNCTION COMPETENT: NORMAL
BH CV LOWER VASCULAR LEFT SAPHENOFEMORAL JUNCTION COMPRESS: NORMAL
BH CV LOWER VASCULAR LEFT SAPHENOFEMORAL JUNCTION PHASIC: NORMAL
BH CV LOWER VASCULAR LEFT SAPHENOFEMORAL JUNCTION SPONT: NORMAL
BH CV LOWER VASCULAR RIGHT COMMON FEMORAL AUGMENT: NORMAL
BH CV LOWER VASCULAR RIGHT COMMON FEMORAL COMPETENT: NORMAL
BH CV LOWER VASCULAR RIGHT COMMON FEMORAL COMPRESS: NORMAL
BH CV LOWER VASCULAR RIGHT COMMON FEMORAL PHASIC: NORMAL
BH CV LOWER VASCULAR RIGHT COMMON FEMORAL SPONT: NORMAL
BILIRUB SERPL-MCNC: 2.2 MG/DL (ref 0.1–1.2)
BILIRUB UR QL STRIP: NEGATIVE
BUN BLD-MCNC: 31 MG/DL (ref 8–23)
BUN/CREAT SERPL: 36 (ref 7–25)
CALCIUM SPEC-SCNC: 8.8 MG/DL (ref 8.6–10.5)
CHLORIDE SERPL-SCNC: 101 MMOL/L (ref 98–107)
CLARITY UR: CLEAR
CO2 SERPL-SCNC: 25 MMOL/L (ref 22–29)
COLOR UR: YELLOW
CREAT BLD-MCNC: 0.86 MG/DL (ref 0.76–1.27)
DEPRECATED RDW RBC AUTO: 67.2 FL (ref 37–54)
EOSINOPHIL # BLD AUTO: 0.04 10*3/MM3 (ref 0–0.7)
EOSINOPHIL NFR BLD AUTO: 0.5 % (ref 0.3–6.2)
ERYTHROCYTE [DISTWIDTH] IN BLOOD BY AUTOMATED COUNT: 17.6 % (ref 11.5–14.5)
GFR SERPL CREATININE-BSD FRML MDRD: 88 ML/MIN/1.73
GLOBULIN UR ELPH-MCNC: 5 GM/DL
GLUCOSE BLD-MCNC: 152 MG/DL (ref 65–99)
GLUCOSE BLDC GLUCOMTR-MCNC: 117 MG/DL (ref 70–130)
GLUCOSE BLDC GLUCOMTR-MCNC: 155 MG/DL (ref 70–130)
GLUCOSE UR STRIP-MCNC: NEGATIVE MG/DL
HCT VFR BLD AUTO: 26.4 % (ref 40.4–52.2)
HGB BLD-MCNC: 8.4 G/DL (ref 13.7–17.6)
HGB UR QL STRIP.AUTO: NEGATIVE
IMM GRANULOCYTES # BLD: 0.02 10*3/MM3 (ref 0–0.03)
IMM GRANULOCYTES NFR BLD: 0.2 % (ref 0–0.5)
INR PPP: 2.48 (ref 0.9–1.1)
KETONES UR QL STRIP: NEGATIVE
LEUKOCYTE ESTERASE UR QL STRIP.AUTO: NEGATIVE
LYMPHOCYTES # BLD AUTO: 0.68 10*3/MM3 (ref 0.9–4.8)
LYMPHOCYTES NFR BLD AUTO: 7.8 % (ref 19.6–45.3)
MCH RBC QN AUTO: 32.8 PG (ref 27–32.7)
MCHC RBC AUTO-ENTMCNC: 31.8 G/DL (ref 32.6–36.4)
MCV RBC AUTO: 103.1 FL (ref 79.8–96.2)
MONOCYTES # BLD AUTO: 0.73 10*3/MM3 (ref 0.2–1.2)
MONOCYTES NFR BLD AUTO: 8.3 % (ref 5–12)
NEUTROPHILS # BLD AUTO: 7.29 10*3/MM3 (ref 1.9–8.1)
NEUTROPHILS NFR BLD AUTO: 83.1 % (ref 42.7–76)
NITRITE UR QL STRIP: NEGATIVE
PH UR STRIP.AUTO: <=5 [PH] (ref 5–8)
PLATELET # BLD AUTO: 82 10*3/MM3 (ref 140–500)
PMV BLD AUTO: 10.4 FL (ref 6–12)
POTASSIUM BLD-SCNC: 3.5 MMOL/L (ref 3.5–5.2)
PROT SERPL-MCNC: 8 G/DL (ref 6–8.5)
PROT UR QL STRIP: NEGATIVE
PROTHROMBIN TIME: 26 SECONDS (ref 11.7–14.2)
RBC # BLD AUTO: 2.56 10*6/MM3 (ref 4.6–6)
SODIUM BLD-SCNC: 138 MMOL/L (ref 136–145)
SP GR UR STRIP: 1.01 (ref 1–1.03)
UROBILINOGEN UR QL STRIP: NORMAL
WBC NRBC COR # BLD: 8.77 10*3/MM3 (ref 4.5–10.7)

## 2017-04-06 PROCEDURE — 80053 COMPREHEN METABOLIC PANEL: CPT | Performed by: EMERGENCY MEDICINE

## 2017-04-06 PROCEDURE — 93971 EXTREMITY STUDY: CPT

## 2017-04-06 PROCEDURE — 85025 COMPLETE CBC W/AUTO DIFF WBC: CPT | Performed by: EMERGENCY MEDICINE

## 2017-04-06 PROCEDURE — 99285 EMERGENCY DEPT VISIT HI MDM: CPT

## 2017-04-06 PROCEDURE — 82962 GLUCOSE BLOOD TEST: CPT

## 2017-04-06 PROCEDURE — 81003 URINALYSIS AUTO W/O SCOPE: CPT | Performed by: EMERGENCY MEDICINE

## 2017-04-06 PROCEDURE — 85610 PROTHROMBIN TIME: CPT | Performed by: EMERGENCY MEDICINE

## 2017-04-06 PROCEDURE — 71010 HC CHEST PA OR AP: CPT

## 2017-04-06 RX ORDER — SODIUM CHLORIDE 0.9 % (FLUSH) 0.9 %
10 SYRINGE (ML) INJECTION AS NEEDED
Status: DISCONTINUED | OUTPATIENT
Start: 2017-04-06 | End: 2017-04-06 | Stop reason: HOSPADM

## 2017-04-06 RX ORDER — OXYCODONE HYDROCHLORIDE AND ACETAMINOPHEN 5; 325 MG/1; MG/1
1-2 TABLET ORAL EVERY 6 HOURS PRN
Qty: 30 TABLET | Refills: 0 | Status: ON HOLD | OUTPATIENT
Start: 2017-04-06 | End: 2017-01-01

## 2017-04-06 RX ORDER — OXYCODONE HYDROCHLORIDE AND ACETAMINOPHEN 5; 325 MG/1; MG/1
TABLET ORAL
Status: COMPLETED
Start: 2017-04-06 | End: 2017-04-06

## 2017-04-06 RX ORDER — OXYCODONE HYDROCHLORIDE AND ACETAMINOPHEN 5; 325 MG/1; MG/1
1 TABLET ORAL ONCE
Status: COMPLETED | OUTPATIENT
Start: 2017-04-06 | End: 2017-04-06

## 2017-04-06 RX ORDER — LOPERAMIDE HYDROCHLORIDE 2 MG/1
2 CAPSULE ORAL 4 TIMES DAILY PRN
COMMUNITY
End: 2017-01-01 | Stop reason: ALTCHOICE

## 2017-04-06 RX ADMIN — OXYCODONE HYDROCHLORIDE AND ACETAMINOPHEN 1 TABLET: 5; 325 TABLET ORAL at 04:16

## 2017-04-06 NOTE — ED NOTES
Lives at [facility] for rehab due to being very weak. Is unable to walk for 3 years now. Has progressively gotten weak throughout the years.           Layla Beckham RN  04/06/17 3716

## 2017-04-06 NOTE — ED PROVIDER NOTES
EMERGENCY DEPARTMENT ENCOUNTER    CHIEF COMPLAINT  Chief Complaint: hypoglycemia  History given by: patient   History limited by: nglory  Room Number: 09/09  PMD: Marcus Avery MD      HPI:  Pt is a 72 y.o. male who presents complaining of hypoglycemia. Family states that the pt's glucose was in 300's at 20:00, and the pt was given insulin. Glucose then dropped to 39. En route to the ED, pt was given 1 amp of D50 and 2g of Glucagon. Pt was also found to be hypotensive. Family states that the pt was dx with a DVT in the RLE, but she thinks he has developed one in the LLE. Pt has a venous doppler schedule later today. Pt also reports worsening of a chronic cough, and reports a grey colored sputum.    Duration:  Prior to arrival  Onset: sudden  Timing: constant   Location: general  Radiation: n/a  Quality: glucose of 39  Intensity/Severity: moderate   Progression: resolved   Associated Symptoms: hypotension, cough  Aggravating Factors: insulin  Alleviating Factors: D50, Glucagon  Previous Episodes: hx of diabetes   Treatment before arrival: D50 and Glucagon    PAST MEDICAL HISTORY  Active Ambulatory Problems     Diagnosis Date Noted   • Iron deficiency anemia due to chronic blood loss 03/08/2016   • Acute on chronic diastolic congestive heart failure 03/18/2016   • Bilateral leg edema 03/18/2016   • Right leg pain 03/18/2016   • Type 2 diabetes mellitus treated with insulin 03/18/2016   • Antiphospholipid antibody with hypercoagulable state 03/18/2016   • Subconjunctival hemorrhage of right eye 03/18/2016   • Thrombocytopenia 03/18/2016   • History of stroke with residual deficit 03/20/2016   • Pancytopenia 03/21/2016   • Iron deficiency anemia 04/13/2016   • Vitamin D deficiency 01/31/2014   • History of knee surgery 08/02/2013   • Pseudarthrosis after fusion or arthrodesis 10/06/2015   • Acute posthemorrhagic anemia 10/07/2015   • Peripheral neuropathic pain 01/31/2014   • Fracture of distal end of humerus  08/02/2013   • Hypercholesterolemia 11/07/2013   • Benign hypertension 11/07/2013   • Late effects of cerebrovascular disease 10/07/2015   • Factor V Leiden mutation 10/07/2015   • Edema 01/31/2014   • Depression 01/31/2014   • Degeneration of intervertebral disc of lumbar region 01/31/2014   • Degeneration of intervertebral disc of cervical region 01/31/2014   • Chronic pain 04/23/2014   • Long term current use of anticoagulant 10/07/2015   • Cellulitis 04/23/2014   • Hematochezia 01/10/2016   • Chronic atrial fibrillation 11/07/2013   • Acute deep venous thrombosis 04/23/2014   • Acute on chronic diastolic heart failure 11/14/2016   • Thrombocytopenia 12/18/2016   • AVM (arteriovenous malformation) of colon with hemorrhage 01/04/2017   • Congestive heart failure 03/17/2017   • Hypokalemia 03/19/2017   • Type 2 diabetes mellitus with hypoglycemia 03/21/2017   • Constipation 03/22/2017     Resolved Ambulatory Problems     Diagnosis Date Noted   • CHF, stage C 03/18/2016   • Neuropathy 04/09/2014   • Diabetes mellitus 04/09/2014   • Lower GI bleed 12/17/2016     Past Medical History:   Diagnosis Date   • Antiphospholipid antibody with hypercoagulable state    • Arthritis    • Atrial fibrillation    • Back pain    • Cancer    • Carotid artery disease    • Cellulitis 09/2015   • CHF (congestive heart failure)    • Deep vein thrombosis of lower extremity    • Diabetes mellitus    • Eye abnormalities    • Factor 5 Leiden mutation, heterozygous    • Hematoma    • History of transfusion    • Hypertension    • Hypertensive heart disease    • Peripheral vascular disease    • Sick sinus syndrome    • Sleep apnea    • Stroke 08/2015       PAST SURGICAL HISTORY  Past Surgical History:   Procedure Laterality Date   • BACK SURGERY     • COLONOSCOPY     • COLONOSCOPY N/A 10/4/2016    Procedure: COLONOSCOPY to cecum endo clip x2;  Surgeon: Leoncio Strong MD;  Location: [facility];  Service:    • COLONOSCOPY N/A 12/12/2016     Procedure: COLONOSCOPY into cecum with Resolution clip x 2 and epi 1:20,000 injection;  Surgeon: Leoncio Strong MD;  Location: [facility] ENDOSCOPY;  Service:    • EYE SURGERY     • FRACTURE SURGERY      left arm   • JOINT REPLACEMENT      elbow replacement, right rotater cuff surgery   • OTHER SURGICAL HISTORY      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   • REPLACEMENT TOTAL KNEE BILATERAL     • UPPER GASTROINTESTINAL ENDOSCOPY         FAMILY HISTORY  Family History   Problem Relation Age of Onset   • No Known Problems Mother    • Heart disease Father    • No Known Problems Sister    • No Known Problems Brother    • No Known Problems Maternal Aunt    • Hyperlipidemia Maternal Uncle    • Cancer Paternal Aunt    • No Known Problems Paternal Uncle    • No Known Problems Maternal Grandmother    • No Known Problems Maternal Grandfather    • No Known Problems Paternal Grandmother    • No Known Problems Paternal Grandfather    • Diabetes Cousin        SOCIAL HISTORY  Social History     Social History   • Marital status:      Spouse name: Juliet   • Number of children: N/A   • Years of education: High School     Occupational History   •  Retired     Social History Main Topics   • Smoking status: Former Smoker     Packs/day: 3.00     Years: 21.00     Types: Cigarettes   • Smokeless tobacco: Not on file      Comment: quit at age  37   • Alcohol use Yes      Comment: rarely /  no caffine use   • Drug use: No   • Sexual activity: Defer     Other Topics Concern   • Not on file     Social History Narrative       ALLERGIES  Morphine and Morphine and related    REVIEW OF SYSTEMS  Review of Systems   Constitutional: Negative for chills and fever.   HENT: Negative for congestion and sore throat.    Eyes: Negative.    Respiratory: Positive for cough. Negative for shortness of breath.    Cardiovascular: Negative for chest pain and leg swelling.   Gastrointestinal: Negative for abdominal pain, diarrhea and  vomiting.   Endocrine:        Hypoglycemia   Genitourinary: Negative for difficulty urinating and dysuria.   Musculoskeletal: Negative for back pain and neck pain.   Skin: Negative for rash and wound.   Allergic/Immunologic: Negative.    Neurological: Negative for dizziness, weakness, numbness and headaches.   Psychiatric/Behavioral: Negative.    All other systems reviewed and are negative.      PHYSICAL EXAM  ED Triage Vitals   Temp Heart Rate Resp BP SpO2   04/06/17 0322 04/06/17 0322 04/06/17 0322 04/06/17 0322 04/06/17 0322   95 °F (35 °C) 63 17 110/70 97 %      Temp src Heart Rate Source Patient Position BP Location FiO2 (%)   04/06/17 0322 04/06/17 0322 -- -- --   Tympanic Monitor          Physical Exam   Constitutional: He is oriented to person, place, and time and well-developed, well-nourished, and in no distress.   HENT:   Head: Normocephalic and atraumatic.   Eyes: EOM are normal. Pupils are equal, round, and reactive to light.   Neck: Normal range of motion. Neck supple.   Cardiovascular: Normal rate, regular rhythm and normal heart sounds.    Pulmonary/Chest: Effort normal. No respiratory distress. He has no wheezes. He has rhonchi. He has no rales.   Abdominal: Soft. There is no tenderness. There is no rebound and no guarding.   Obese, ecchymosis to the anterior abd wall.   Musculoskeletal: Normal range of motion. He exhibits edema (3+ pedal ).   Neurological: He is alert and oriented to person, place, and time.   Generally weak.    Skin: Skin is warm and dry.   Psychiatric: Mood and affect normal.   Nursing note and vitals reviewed.      LAB RESULTS  Lab Results (last 24 hours)     Procedure Component Value Units Date/Time    POC Glucose Fingerstick [[id number]]  (Abnormal) Collected:  04/06/17 0334    Specimen:  Blood Updated:  04/06/17 0342     Glucose 155 (H) mg/dL     Narrative:       Meter: [id number] : [id number] Blair Rocha    CBC & Differential [[id number]] Collected:  04/06/17 0350     Specimen:  Blood Updated:  04/06/17 0406    Narrative:       The following orders were created for panel order CBC & Differential.  Procedure                               Abnormality         Status                     ---------                               -----------         ------                     CBC Auto Differential[[id number]]         Abnormal            Final result                 Please view results for these tests on the individual orders.    Comprehensive Metabolic Panel [[id number]]  (Abnormal) Collected:  04/06/17 0350    Specimen:  Blood from Arm, Right Updated:  04/06/17 0422     Glucose 152 (H) mg/dL      BUN 31 (H) mg/dL      Creatinine 0.86 mg/dL      Sodium 138 mmol/L      Potassium 3.5 mmol/L      Chloride 101 mmol/L      CO2 25.0 mmol/L      Calcium 8.8 mg/dL      Total Protein 8.0 g/dL      Albumin 3.00 (L) g/dL      ALT (SGPT) 15 U/L      AST (SGOT) 30 U/L      Alkaline Phosphatase 217 (H) U/L      Total Bilirubin 2.2 (H) mg/dL      eGFR Non African Amer 88 mL/min/1.73      Globulin 5.0 gm/dL      A/G Ratio 0.6 g/dL      BUN/Creatinine Ratio 36.0 (H)     Anion Gap 12.0 mmol/L     Protime-INR [[id number]]  (Abnormal) Collected:  04/06/17 0350    Specimen:  Blood from Arm, Right Updated:  04/06/17 0410     Protime 26.0 (H) Seconds      INR 2.48 (H)    CBC Auto Differential [[id number]]  (Abnormal) Collected:  04/06/17 0350    Specimen:  Blood from Arm, Right Updated:  04/06/17 0406     WBC 8.77 10*3/mm3      RBC 2.56 (L) 10*6/mm3      Hemoglobin 8.4 (L) g/dL      Hematocrit 26.4 (L) %      .1 (H) fL      MCH 32.8 (H) pg      MCHC 31.8 (L) g/dL      RDW 17.6 (H) %      RDW-SD 67.2 (H) fl      MPV 10.4 fL      Platelets 82 (L) 10*3/mm3      Neutrophil % 83.1 (H) %      Lymphocyte % 7.8 (L) %      Monocyte % 8.3 %      Eosinophil % 0.5 %      Basophil % 0.1 %      Immature Grans % 0.2 %      Neutrophils, Absolute 7.29 10*3/mm3      Lymphocytes, Absolute 0.68 (L) 10*3/mm3      Monocytes,  Absolute 0.73 10*3/mm3      Eosinophils, Absolute 0.04 10*3/mm3      Basophils, Absolute 0.01 10*3/mm3      Immature Grans, Absolute 0.02 10*3/mm3     Urinalysis With / Culture If Indicated [[id number]]  (Normal) Collected:  04/06/17 0351    Specimen:  Urine from Urine, Clean Catch Updated:  04/06/17 0405     Color, UA Yellow     Appearance, UA Clear     pH, UA <=5.0     Specific Gravity, UA 1.012     Glucose, UA Negative     Ketones, UA Negative     Bilirubin, UA Negative     Blood, UA Negative     Protein, UA Negative     Leuk Esterase, UA Negative     Nitrite, UA Negative     Urobilinogen, UA 1.0 E.U./dL    Narrative:       Urine microscopic not indicated.    POC Glucose Fingerstick [[id number]]  (Normal) Collected:  04/06/17 0520    Specimen:  Blood Updated:  04/06/17 0527     Glucose 117 mg/dL     Narrative:       Meter: [id number] : [id number] Bhavani Rich          I ordered the above labs and reviewed the results    RADIOLOGY  XR Chest 1 View         CXR shows cardiomegaly, increased vascular congestion, left pleural effusion. Left costophrenic angle is cut off on the film. Pleural effusion is new, otherwise unchanged from 11/14/17    I ordered the above noted radiological studies. Interpreted by radiologist. Reviewed by me in PACS.       PROCEDURES  Procedures      PROGRESS AND CONSULTS  ED Course     03:46  Labs and CXR ordered for further evaluation.     04:27  Venous doppler of the LLE ordered.     06:16  BP- 98/51 HR- 53 Temp- (S) 95.5 °F (35.3 °C) (Rectal) O2 sat- 96%  Awaiting venous doppler.     0745  Venous doppler shows old DVT, no new DVT. INR is within therapeutic range. Pt will be discharged. Pt understands and agrees with the plan, all questions answered.      MEDICAL DECISION MAKING  Results were reviewed/discussed with the patient and they were also made aware of online access. Pt also made aware that some labs, such as cultures, will not be resulted during ER visit and follow up with PMD  is necessary.     MDM  Number of Diagnoses or Management Options     Amount and/or Complexity of Data Reviewed  Clinical lab tests: ordered and reviewed (INR is 2.48  WBC is 8.77)  Tests in the radiology section of CPT®: ordered and reviewed (CXR shows cardiomegaly, increased vascular congestion, left pleural effusion. Left costophrenic angle is cut off on the film. Pleural effusion is new, otherwise unchanged from 11/14/17  )  Decide to obtain previous medical records or to obtain history from someone other than the patient: yes    Patient Progress  Patient progress: stable         DIAGNOSIS  Final diagnoses:   Hypoglycemia   Pain of left lower extremity   Anemia, unspecified type       DISPOSITION  DISCHARGE    Patient discharged in stable condition.    Reviewed implications of results, diagnosis, meds, responsibility to follow up, warning signs and symptoms of possible worsening, potential complications and reasons to return to ER.    Patient/Family voiced understanding of above instructions.    Discussed plan for discharge, as there is no emergent indication for admission.  Pt/family is agreeable and understands need for follow up and repeat testing.  Pt is aware that discharge does not mean that nothing is wrong but it indicates no emergency is present that requires admission and they must continue care with follow-up as given below or physician of their choice.     FOLLOW-UP  Marcus Avery MD  [address] [id number] [phone]    Call           Medication List      Changed          oxyCODONE-acetaminophen 5-325 MG per tablet   Commonly known as:  PERCOCET   Take 1-2 tablets by mouth Every 6 (Six) Hours As Needed for Severe Pain   (7-10).   What changed:  how much to take       warfarin 2 MG tablet   Commonly known as:  COUMADIN   Take 1 tablet by mouth 1 (One) Time Per Week. Saturday Evenings   What changed:    - when to take this  - additional instructions         Stop          losartan  6.25 MG quarter tablet   Commonly known as:  COZAAR             Latest Documented Vital Signs:  As of 7:55 AM  BP- 109/70 HR- 90 Temp- 95.9 °F (35.5 °C) (Tympanic) O2 sat- 96%    --  Documentation assistance provided by jack Aviles for Dr Rubin.  Information recorded by the [email or web address] was done at my direction and has been verified and validated by me.        Kierra Aviles  04/06/17 0719       Manas Rubin MD  04/06/17 0755

## 2017-04-07 ENCOUNTER — TELEPHONE (OUTPATIENT)
Dept: ONCOLOGY | Facility: CLINIC | Age: 72
End: 2017-04-07

## 2017-04-07 NOTE — TELEPHONE ENCOUNTER
----- Message from Hailey Gonzales sent at 4/7/2017  9:39 AM EDT -----  Contact: [phone]  Juliet wife   Pt has blood clot in leg wants to get in for followup.      Message forwarded to appt desk to schedule with MD or NP first of next week for hospital return.  Cannot fit on schedule today unless he is willing to go to [place].

## 2017-04-10 ENCOUNTER — TELEPHONE (OUTPATIENT)
Dept: SOCIAL WORK | Facility: HOSPITAL | Age: 72
End: 2017-04-10

## 2017-04-11 ENCOUNTER — TELEPHONE (OUTPATIENT)
Dept: FAMILY MEDICINE CLINIC | Facility: CLINIC | Age: 72
End: 2017-04-11

## 2017-04-12 ENCOUNTER — OFFICE VISIT (OUTPATIENT)
Dept: ONCOLOGY | Facility: CLINIC | Age: 72
End: 2017-04-12

## 2017-04-12 ENCOUNTER — ANTICOAGULATION VISIT (OUTPATIENT)
Dept: LAB | Facility: HOSPITAL | Age: 72
End: 2017-04-12

## 2017-04-12 ENCOUNTER — TELEPHONE (OUTPATIENT)
Dept: FAMILY MEDICINE CLINIC | Facility: CLINIC | Age: 72
End: 2017-04-12

## 2017-04-12 VITALS
RESPIRATION RATE: 18 BRPM | TEMPERATURE: 97.6 F | OXYGEN SATURATION: 99 % | DIASTOLIC BLOOD PRESSURE: 62 MMHG | HEART RATE: 64 BPM | SYSTOLIC BLOOD PRESSURE: 118 MMHG

## 2017-04-12 DIAGNOSIS — D61.818 PANCYTOPENIA (HCC): ICD-10-CM

## 2017-04-12 DIAGNOSIS — D68.61 ANTIPHOSPHOLIPID ANTIBODY WITH HYPERCOAGULABLE STATE (HCC): ICD-10-CM

## 2017-04-12 DIAGNOSIS — K59.00 CONSTIPATION, UNSPECIFIED CONSTIPATION TYPE: Primary | ICD-10-CM

## 2017-04-12 DIAGNOSIS — D50.0 IRON DEFICIENCY ANEMIA DUE TO CHRONIC BLOOD LOSS: ICD-10-CM

## 2017-04-12 DIAGNOSIS — R60.0 BILATERAL LEG EDEMA: ICD-10-CM

## 2017-04-12 DIAGNOSIS — E11.649 TYPE 2 DIABETES MELLITUS WITH HYPOGLYCEMIA WITHOUT COMA, UNSPECIFIED LONG TERM INSULIN USE STATUS: ICD-10-CM

## 2017-04-12 DIAGNOSIS — D69.6 THROMBOCYTOPENIA (HCC): ICD-10-CM

## 2017-04-12 LAB
BASOPHILS # BLD AUTO: 0.02 10*3/MM3 (ref 0–0.1)
BASOPHILS NFR BLD AUTO: 0.6 % (ref 0–1.1)
DEPRECATED RDW RBC AUTO: 68.1 FL (ref 37–49)
EOSINOPHIL # BLD AUTO: 0.31 10*3/MM3 (ref 0–0.36)
EOSINOPHIL NFR BLD AUTO: 8.8 % (ref 1–5)
ERYTHROCYTE [DISTWIDTH] IN BLOOD BY AUTOMATED COUNT: 17.7 % (ref 11.7–14.5)
HCT VFR BLD AUTO: 27.4 % (ref 40–49)
HGB BLD-MCNC: 9.1 G/DL (ref 13.5–16.5)
IMM GRANULOCYTES # BLD: 0.02 10*3/MM3 (ref 0–0.03)
IMM GRANULOCYTES NFR BLD: 0.6 % (ref 0–0.5)
INR PPP: 1.9 (ref 0.9–1.1)
LYMPHOCYTES # BLD AUTO: 1.07 10*3/MM3 (ref 1–3.5)
LYMPHOCYTES NFR BLD AUTO: 30.2 % (ref 20–49)
MCH RBC QN AUTO: 34.6 PG (ref 27–33)
MCHC RBC AUTO-ENTMCNC: 33.2 G/DL (ref 32–35)
MCV RBC AUTO: 104.2 FL (ref 83–97)
MONOCYTES # BLD AUTO: 0.4 10*3/MM3 (ref 0.25–0.8)
MONOCYTES NFR BLD AUTO: 11.3 % (ref 4–12)
NEUTROPHILS # BLD AUTO: 1.72 10*3/MM3 (ref 1.5–7)
NEUTROPHILS NFR BLD AUTO: 48.5 % (ref 39–75)
NRBC BLD MANUAL-RTO: 0 /100 WBC (ref 0–0)
PLATELET # BLD AUTO: 70 10*3/MM3 (ref 150–375)
PMV BLD AUTO: 11.1 FL (ref 8.9–12.1)
PROTHROMBIN TIME: 23.2 SECONDS (ref 11–13.5)
RBC # BLD AUTO: 2.63 10*6/MM3 (ref 4.3–5.5)
WBC NRBC COR # BLD: 3.54 10*3/MM3 (ref 4–10)

## 2017-04-12 PROCEDURE — 36415 COLL VENOUS BLD VENIPUNCTURE: CPT

## 2017-04-12 PROCEDURE — 85025 COMPLETE CBC W/AUTO DIFF WBC: CPT

## 2017-04-12 PROCEDURE — 99215 OFFICE O/P EST HI 40 MIN: CPT | Performed by: NURSE PRACTITIONER

## 2017-04-12 PROCEDURE — 85610 PROTHROMBIN TIME: CPT

## 2017-04-12 NOTE — PROGRESS NOTES
REASONS FOR FOLLOW-UP   1.  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  Multifactorial anemia/thrombocytopenia   3.  Monoclonal gammopathy of undetermined significance: This was further evaluated with a bone        marrow biopsy from the right iliac crest showing a normal cellular marrow with trilineage         hematopoiesis no evidence of myelodysplastic syndrome or plasma cell dyscrasia and markedly        increased storage iron with sideroblastic iron suggestive of chronic disease. [place] red stains were                   negative for amyloid deposition and he also had a fat pad biopsy negative for amyloid deposition.    4.  Recurrent GI bleeding  (most recently Dec 2016--ascending colonic ulcer)      History of Present Illness    Mr. Pulido returns today accompanied by his wife, after a visit to the emergency room for a hypoglycemic episode and left lower extremity pain.  Patient has recently been in inpatient rehabilitation to gain physical strength.  His wife explains that while he was there he did not eat dinner one evening and then was administered insulin prior to bedtime.  This then precipitated a hypoglycemic event with a blood glucose level of 39.  In the emergency department,  1 amp of D50 and 2 g of glucagon administered and blood glucose levels normalized.  He did also complain of left lower extremity pain. A lower extremity venous Doppler was performed and revealed pre-existing right lower extremity DVT but new no formation of thrombosis.  His INR at that time was therapeutic at 2.48.  Patient was discharged back to rehabilitation in stable condition.    He returns today having been discharged from rehabilitation on Saturday, April 18, 2017.  He and his wife are extremely frustrated as they feel that management of Coumadin was an  appropriate patient was receiving inpatient physical therapy.  Per the patient, he was administered 2 mg of Coumadin daily though this was not the directed dosing prior to his admission.  His INR is slightly subtherapeutic at 1.9 today.  He denies any chest pain, shortness of breath, or excessive bleeding or bruising.  Hemoglobin remains stable at 9.1.  He continues with chronic bilateral lower extremity swelling, though this is stable.  He does continue to struggle with constipation but is on a stool softener regimen for relief.  He has no other concerns today.         Past Medical History, Past Surgical History, Social History, Family History have been reviewed and are without significant changes except as mentioned.    Review of Systems   Constitutional: Positive for fatigue. Negative for activity change, fever and unexpected weight change.   Respiratory: Negative for chest tightness and shortness of breath.    Cardiovascular: Negative for chest pain and leg swelling.   Gastrointestinal: Positive for constipation. Negative for abdominal distention, abdominal pain, anal bleeding, blood in stool and nausea.   Endocrine: Negative for polyuria.   Genitourinary: Negative for frequency and hematuria.   Musculoskeletal: Positive for arthralgias.   Skin: Negative for pallor and rash.   Neurological: Negative for seizures, light-headedness and headaches.   Hematological: Does not bruise/bleed easily.      A comprehensive 14 point review of systems was performed and was negative except as mentioned.    Medications:  The current medication list was reviewed in the EMR    ALLERGIES:    Allergies   Allergen Reactions   • Morphine    • Morphine And Related Nausea Only       Objective      Vitals:    04/12/17 1057   BP: 118/62   Pulse: 64   Resp: 18   Temp: 97.6 °F (36.4 °C)   SpO2: 99%   Weight: Comment: unable to stand for wt   Height: Comment: unable to stand for ht   PainSc: 2  Comment: pain in buttocks     Current Status  4/12/2017   ECOG score 3       Physical Exam   Constitutional: He appears well-developed.   Somewhat chronically ill-appearing; seated in motorized scooter   Cardiovascular: Normal rate.    Murmur heard.  Irregular heart rate   Pulmonary/Chest: Effort normal and breath sounds normal. No respiratory distress. He has no wheezes.   Abdominal: Soft.   Musculoskeletal: He exhibits edema (1+ non-pitting, bilateral ).   Limited ROM   Lymphadenopathy:     He has no cervical adenopathy.   Skin: Skin is warm and dry. No rash noted.          RECENT LABS:  Hematology WBC   Date Value Ref Range Status   04/12/2017 3.54 (L) 4.00 - 10.00 10*3/mm3 Final     RBC   Date Value Ref Range Status   04/12/2017 2.63 (L) 4.30 - 5.50 10*6/mm3 Final     Hemoglobin   Date Value Ref Range Status   04/12/2017 9.1 (L) 13.5 - 16.5 g/dL Final     Hematocrit   Date Value Ref Range Status   04/12/2017 27.4 (L) 40.0 - 49.0 % Final     Platelets   Date Value Ref Range Status   04/12/2017 70 (L) 150 - 375 10*3/mm3 Final      INR 2.2    Assessment/Plan     1.  Antiphospholipid antibody syndrome on chronic anticoagulation with goal INR 2.0-2.5.  The patient has experienced GI bleeding with higher INR in the past.  Antiplatelets are currently on hold because of GI bleeding.  He does have history of recurrent DVT, cerebrovascular accidents, and thrombocytopenia as a result of his APLS.  INR is slightly subtherapeutic today 1.9 after a recent emergency department visit and inpatient physical therapy with reported, inconsistent Coumadin dosing.  Over the last week patient has been taking 5 mg of Coumadin on one day and 2.5 mg all other days.  We will adjust his dosing to 5 mg 2 days per week and 2.5 mg all other days.  A copy of instructions was provided to the patient.    2.  Chronic anemia and thrombocytopenia multifactorial secondary to chronic disease state, antiphospholipid antibody syndrome, GI bleeding etc.  His CBC including hemoglobin and platelets  remain stable today.  Patient will continue  CBC every 2 weeks for monitoring given his history of GI blood loss.  He will return next week to see Dr. Ambrocio with a repeat CBC and INR at that time.    3.  History of MGUS with negative bone marrow biopsy October 2015 showing no plasmacytosis fibrosis or myelodysplastic changes.  He had increased sideroblastic iron of chronic disease state.  Paraprotein studies stable august 2016.      4.  Continue stool softener regimen for constipation.    5.  Recent emergency department visit for hypoglycemia.  Patient's wife attributes hypoglycemic episode to over administration of insulin well receiving inpatient physical therapy.  He continues to monitor his glucose levels daily and self administers insulin as needed.    6.  Lateral lower extremity edema.  I've encouraged the patient to elevate his legs and to call our office if edema worsens or with development of new lower extremity pain.              4/12/2017      CC:

## 2017-04-12 NOTE — TELEPHONE ENCOUNTER
I spoke to Juliet pt wife moved up appt. To 4/27 due to andre at wheelchair place [phone]. Last face to face in Oct.was perfect-making sure to list all items pt needs for wheelchair.

## 2017-04-20 ENCOUNTER — OFFICE VISIT (OUTPATIENT)
Dept: ONCOLOGY | Facility: CLINIC | Age: 72
End: 2017-04-20

## 2017-04-20 ENCOUNTER — ANTICOAGULATION VISIT (OUTPATIENT)
Dept: LAB | Facility: HOSPITAL | Age: 72
End: 2017-04-20

## 2017-04-20 VITALS
TEMPERATURE: 97.7 F | OXYGEN SATURATION: 97 % | SYSTOLIC BLOOD PRESSURE: 130 MMHG | RESPIRATION RATE: 18 BRPM | DIASTOLIC BLOOD PRESSURE: 62 MMHG | HEART RATE: 60 BPM

## 2017-04-20 DIAGNOSIS — D50.0 IRON DEFICIENCY ANEMIA DUE TO CHRONIC BLOOD LOSS: ICD-10-CM

## 2017-04-20 DIAGNOSIS — D68.61 ANTIPHOSPHOLIPID ANTIBODY WITH HYPERCOAGULABLE STATE (HCC): ICD-10-CM

## 2017-04-20 DIAGNOSIS — D61.818 PANCYTOPENIA (HCC): ICD-10-CM

## 2017-04-20 DIAGNOSIS — D68.51 FACTOR V LEIDEN MUTATION (HCC): ICD-10-CM

## 2017-04-20 DIAGNOSIS — D61.818 PANCYTOPENIA (HCC): Primary | ICD-10-CM

## 2017-04-20 LAB
BASOPHILS # BLD AUTO: 0.04 10*3/MM3 (ref 0–0.1)
BASOPHILS NFR BLD AUTO: 1.1 % (ref 0–1.1)
DEPRECATED RDW RBC AUTO: 67.7 FL (ref 37–49)
EOSINOPHIL # BLD AUTO: 0.24 10*3/MM3 (ref 0–0.36)
EOSINOPHIL NFR BLD AUTO: 6.3 % (ref 1–5)
ERYTHROCYTE [DISTWIDTH] IN BLOOD BY AUTOMATED COUNT: 17.9 % (ref 11.7–14.5)
HCT VFR BLD AUTO: 26.7 % (ref 40–49)
HGB BLD-MCNC: 8.8 G/DL (ref 13.5–16.5)
IMM GRANULOCYTES # BLD: 0.13 10*3/MM3 (ref 0–0.03)
IMM GRANULOCYTES NFR BLD: 3.4 % (ref 0–0.5)
INR PPP: 2.2 (ref 0.9–1.1)
LYMPHOCYTES # BLD AUTO: 1.12 10*3/MM3 (ref 1–3.5)
LYMPHOCYTES NFR BLD AUTO: 29.5 % (ref 20–49)
MCH RBC QN AUTO: 34 PG (ref 27–33)
MCHC RBC AUTO-ENTMCNC: 33 G/DL (ref 32–35)
MCV RBC AUTO: 103.1 FL (ref 83–97)
MONOCYTES # BLD AUTO: 0.48 10*3/MM3 (ref 0.25–0.8)
MONOCYTES NFR BLD AUTO: 12.6 % (ref 4–12)
NEUTROPHILS # BLD AUTO: 1.79 10*3/MM3 (ref 1.5–7)
NEUTROPHILS NFR BLD AUTO: 47.1 % (ref 39–75)
NRBC BLD MANUAL-RTO: 0.5 /100 WBC (ref 0–0)
PLATELET # BLD AUTO: 70 10*3/MM3 (ref 150–375)
PMV BLD AUTO: 11.6 FL (ref 8.9–12.1)
PROTHROMBIN TIME: 26.4 SECONDS (ref 11–13.5)
RBC # BLD AUTO: 2.59 10*6/MM3 (ref 4.3–5.5)
WBC NRBC COR # BLD: 3.8 10*3/MM3 (ref 4–10)

## 2017-04-20 PROCEDURE — 99213 OFFICE O/P EST LOW 20 MIN: CPT | Performed by: INTERNAL MEDICINE

## 2017-04-20 PROCEDURE — 85610 PROTHROMBIN TIME: CPT

## 2017-04-20 PROCEDURE — 36415 COLL VENOUS BLD VENIPUNCTURE: CPT

## 2017-04-20 PROCEDURE — 85025 COMPLETE CBC W/AUTO DIFF WBC: CPT

## 2017-04-20 RX ORDER — GINSENG 100 MG
CAPSULE ORAL 2 TIMES DAILY
Qty: 14 G | Refills: 0 | Status: SHIPPED | OUTPATIENT
Start: 2017-04-20 | End: 2017-01-01
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
## 2017-04-27 ENCOUNTER — OFFICE VISIT (OUTPATIENT)
Dept: FAMILY MEDICINE CLINIC | Facility: CLINIC | Age: 72
End: 2017-04-27

## 2017-04-27 VITALS
SYSTOLIC BLOOD PRESSURE: 112 MMHG | RESPIRATION RATE: 16 BRPM | HEIGHT: 74 IN | TEMPERATURE: 97.5 F | OXYGEN SATURATION: 99 % | DIASTOLIC BLOOD PRESSURE: 70 MMHG | HEART RATE: 62 BPM

## 2017-04-27 DIAGNOSIS — M51.36 DEGENERATION OF INTERVERTEBRAL DISC OF LUMBAR REGION: Primary | ICD-10-CM

## 2017-04-27 DIAGNOSIS — I50.33 ACUTE ON CHRONIC DIASTOLIC CONGESTIVE HEART FAILURE (HCC): ICD-10-CM

## 2017-04-27 DIAGNOSIS — I10 BENIGN HYPERTENSION: ICD-10-CM

## 2017-04-27 DIAGNOSIS — I48.20 CHRONIC ATRIAL FIBRILLATION (HCC): ICD-10-CM

## 2017-04-27 DIAGNOSIS — S42.401A: ICD-10-CM

## 2017-04-27 DIAGNOSIS — M50.30 DEGENERATION OF INTERVERTEBRAL DISC OF CERVICAL REGION: ICD-10-CM

## 2017-04-27 PROBLEM — E11.649 TYPE 2 DIABETES MELLITUS WITH HYPOGLYCEMIA (HCC): Status: RESOLVED | Noted: 2017-03-21 | Resolved: 2017-04-27

## 2017-04-27 PROCEDURE — 99214 OFFICE O/P EST MOD 30 MIN: CPT | Performed by: INTERNAL MEDICINE

## 2017-04-27 RX ORDER — VENLAFAXINE HYDROCHLORIDE 75 MG/1
75 CAPSULE, EXTENDED RELEASE ORAL NIGHTLY
Qty: 90 CAPSULE | Refills: 3 | Status: SHIPPED | OUTPATIENT
Start: 2017-04-27 | End: 2017-01-01 | Stop reason: SDUPTHER

## 2017-04-27 RX ORDER — LOSARTAN POTASSIUM 25 MG/1
TABLET ORAL
Qty: 30 TABLET | Refills: 3 | Status: SHIPPED | OUTPATIENT
Start: 2017-04-27 | End: 2017-01-01 | Stop reason: HOSPADM

## 2017-04-27 NOTE — PROGRESS NOTES
Subjective   Emil Pulido is a 72 y.o. male.     History of Present Illness   Patient is being seen for a face-to-face for his power chair.  Patient has severe degenerative disease of lumbar's and cervical spines and he also fractured his right humerus.  Patient also has severe congestive heart failure and chronic atrial fibrillation.  Patient cannot move around at all.  He  is unable to get up from a chair and  cannot ambulate at all without his power chair .  He also has tremendous pain radiating down his back from his degenerative arthritis.  He also needs all the accessories for his power chair for him to be able to ambulate and improve his activities of daily living.  He needs all the accessories because his legs jerk without them  It is my opinion patient is in desperate need of the power chair.    Much of this encounter note is an electronic transcription/translation of spoken language to printed text.  The electronic translation of spoken language may permit erroneous, or at times, nonsensical words or phrases to be inadvertently transcribed.  Although I have reviewed the note for such errors, some may still exist.  The following portions of the patient's history were reviewed and updated as appropriate: allergies, current medications, past family history, past medical history, past social history, past surgical history and problem list.    Review of Systems   Constitutional: Negative for fatigue and fever.   HENT: Positive for congestion. Negative for trouble swallowing.    Eyes: Negative for discharge and visual disturbance.   Respiratory: Positive for chest tightness and shortness of breath. Negative for choking.    Cardiovascular: Positive for leg swelling. Negative for chest pain and palpitations.   Gastrointestinal: Negative for abdominal pain and blood in stool.   Endocrine: Negative.    Genitourinary: Negative for genital sores and hematuria.   Musculoskeletal: Positive for back pain, gait  problem, joint swelling and neck pain.   Skin: Negative for color change, pallor, rash and wound.   Allergic/Immunologic: Positive for environmental allergies. Negative for immunocompromised state.   Neurological: Negative for facial asymmetry and speech difficulty.   Psychiatric/Behavioral: Negative for hallucinations and suicidal ideas.       Objective   Physical Exam   Constitutional: He is oriented to person, place, and time. He appears well-developed and well-nourished.   HENT:   Head: Normocephalic.   Eyes: Conjunctivae are normal. Pupils are equal, round, and reactive to light.   Neck: Normal range of motion. Neck supple.   Cardiovascular: Normal rate and regular rhythm.  Exam reveals gallop. Exam reveals no friction rub.    Murmur heard.  Pulmonary/Chest: Effort normal. No respiratory distress. He has no wheezes. He has rales. He exhibits no tenderness.   Abdominal: Soft. Bowel sounds are normal.   Musculoskeletal: He exhibits edema, tenderness and deformity.   Pain to flexion extension of both upper and lower extremities   Neurological: He is alert and oriented to person, place, and time.   Skin: Skin is warm and dry.   Psychiatric: He has a normal mood and affect. His behavior is normal. Judgment and thought content normal.   Nursing note and vitals reviewed.      Assessment/Plan   Problems Addressed this Visit        Cardiovascular and Mediastinum    Acute on chronic diastolic congestive heart failure    Benign hypertension    Relevant Medications    losartan (COZAAR) 25 MG tablet    Chronic atrial fibrillation       Musculoskeletal and Integument    Fracture of distal end of humerus    Degeneration of intervertebral disc of lumbar region - Primary    Degeneration of intervertebral disc of cervical region

## 2017-04-30 ENCOUNTER — APPOINTMENT (OUTPATIENT)
Dept: GENERAL RADIOLOGY | Facility: HOSPITAL | Age: 72
End: 2017-04-30

## 2017-04-30 ENCOUNTER — HOSPITAL ENCOUNTER (EMERGENCY)
Facility: HOSPITAL | Age: 72
Discharge: HOME | End: 2017-04-30
Attending: EMERGENCY MEDICINE | Admitting: EMERGENCY MEDICINE

## 2017-04-30 VITALS
WEIGHT: 260 LBS | RESPIRATION RATE: 20 BRPM | HEART RATE: 86 BPM | HEIGHT: 74 IN | BODY MASS INDEX: 33.37 KG/M2 | SYSTOLIC BLOOD PRESSURE: 93 MMHG | DIASTOLIC BLOOD PRESSURE: 66 MMHG | OXYGEN SATURATION: 98 % | TEMPERATURE: 98 F

## 2017-04-30 DIAGNOSIS — K29.00 ACUTE SUPERFICIAL GASTRITIS WITHOUT HEMORRHAGE: Primary | ICD-10-CM

## 2017-04-30 DIAGNOSIS — T45.511A: ICD-10-CM

## 2017-04-30 DIAGNOSIS — D63.8 ANEMIA OF CHRONIC DISEASE: ICD-10-CM

## 2017-04-30 LAB
ALBUMIN SERPL-MCNC: 3.3 G/DL (ref 3.5–5.2)
ALBUMIN/GLOB SERPL: 0.6 G/DL
ALP SERPL-CCNC: 234 U/L (ref 39–117)
ALT SERPL W P-5'-P-CCNC: 13 U/L (ref 1–41)
ANION GAP SERPL CALCULATED.3IONS-SCNC: 13.7 MMOL/L
AST SERPL-CCNC: 28 U/L (ref 1–40)
BASOPHILS # BLD AUTO: 0.02 10*3/MM3 (ref 0–0.2)
BASOPHILS NFR BLD AUTO: 0.5 % (ref 0–1.5)
BILIRUB SERPL-MCNC: 2.1 MG/DL (ref 0.1–1.2)
BUN BLD-MCNC: 35 MG/DL (ref 8–23)
BUN/CREAT SERPL: 43.2 (ref 7–25)
CALCIUM SPEC-SCNC: 8.7 MG/DL (ref 8.6–10.5)
CHLORIDE SERPL-SCNC: 103 MMOL/L (ref 98–107)
CO2 SERPL-SCNC: 19.3 MMOL/L (ref 22–29)
CREAT BLD-MCNC: 0.81 MG/DL (ref 0.76–1.27)
DEPRECATED RDW RBC AUTO: 69 FL (ref 37–54)
EOSINOPHIL # BLD AUTO: 0.21 10*3/MM3 (ref 0–0.7)
EOSINOPHIL NFR BLD AUTO: 5.6 % (ref 0.3–6.2)
ERYTHROCYTE [DISTWIDTH] IN BLOOD BY AUTOMATED COUNT: 18.4 % (ref 11.5–14.5)
GFR SERPL CREATININE-BSD FRML MDRD: 94 ML/MIN/1.73
GLOBULIN UR ELPH-MCNC: 5.3 GM/DL
GLUCOSE BLD-MCNC: 271 MG/DL (ref 65–99)
HCT VFR BLD AUTO: 25.6 % (ref 40.4–52.2)
HGB BLD-MCNC: 8.5 G/DL (ref 13.7–17.6)
HOLD SPECIMEN: NORMAL
HOLD SPECIMEN: NORMAL
IMM GRANULOCYTES # BLD: 0 10*3/MM3 (ref 0–0.03)
IMM GRANULOCYTES NFR BLD: 0 % (ref 0–0.5)
INR PPP: 4.42 (ref 0.9–1.1)
LIPASE SERPL-CCNC: 21 U/L (ref 13–60)
LYMPHOCYTES # BLD AUTO: 1.05 10*3/MM3 (ref 0.9–4.8)
LYMPHOCYTES NFR BLD AUTO: 27.9 % (ref 19.6–45.3)
MCH RBC QN AUTO: 34.3 PG (ref 27–32.7)
MCHC RBC AUTO-ENTMCNC: 33.2 G/DL (ref 32.6–36.4)
MCV RBC AUTO: 103.2 FL (ref 79.8–96.2)
MONOCYTES # BLD AUTO: 0.48 10*3/MM3 (ref 0.2–1.2)
MONOCYTES NFR BLD AUTO: 12.7 % (ref 5–12)
NEUTROPHILS # BLD AUTO: 2.01 10*3/MM3 (ref 1.9–8.1)
NEUTROPHILS NFR BLD AUTO: 53.3 % (ref 42.7–76)
PLATELET # BLD AUTO: 71 10*3/MM3 (ref 140–500)
PMV BLD AUTO: 11.9 FL (ref 6–12)
POTASSIUM BLD-SCNC: 4.1 MMOL/L (ref 3.5–5.2)
PROT SERPL-MCNC: 8.6 G/DL (ref 6–8.5)
PROTHROMBIN TIME: 40.8 SECONDS (ref 11.7–14.2)
RBC # BLD AUTO: 2.48 10*6/MM3 (ref 4.6–6)
SODIUM BLD-SCNC: 136 MMOL/L (ref 136–145)
TROPONIN T SERPL-MCNC: 0.05 NG/ML (ref 0–0.03)
WBC NRBC COR # BLD: 3.77 10*3/MM3 (ref 4.5–10.7)
WHOLE BLOOD HOLD SPECIMEN: NORMAL
WHOLE BLOOD HOLD SPECIMEN: NORMAL

## 2017-04-30 PROCEDURE — 71010 HC CHEST PA OR AP: CPT

## 2017-04-30 PROCEDURE — 83690 ASSAY OF LIPASE: CPT | Performed by: EMERGENCY MEDICINE

## 2017-04-30 PROCEDURE — 96375 TX/PRO/DX INJ NEW DRUG ADDON: CPT

## 2017-04-30 PROCEDURE — 85025 COMPLETE CBC W/AUTO DIFF WBC: CPT | Performed by: EMERGENCY MEDICINE

## 2017-04-30 PROCEDURE — 84484 ASSAY OF TROPONIN QUANT: CPT | Performed by: EMERGENCY MEDICINE

## 2017-04-30 PROCEDURE — 93010 ELECTROCARDIOGRAM REPORT: CPT | Performed by: INTERNAL MEDICINE

## 2017-04-30 PROCEDURE — 25010000002 ONDANSETRON PER 1 MG: Performed by: EMERGENCY MEDICINE

## 2017-04-30 PROCEDURE — 99284 EMERGENCY DEPT VISIT MOD MDM: CPT

## 2017-04-30 PROCEDURE — 96374 THER/PROPH/DIAG INJ IV PUSH: CPT

## 2017-04-30 PROCEDURE — 25010000002 HYDROMORPHONE PER 4 MG: Performed by: EMERGENCY MEDICINE

## 2017-04-30 PROCEDURE — 80053 COMPREHEN METABOLIC PANEL: CPT | Performed by: EMERGENCY MEDICINE

## 2017-04-30 PROCEDURE — 93005 ELECTROCARDIOGRAM TRACING: CPT | Performed by: EMERGENCY MEDICINE

## 2017-04-30 PROCEDURE — 85610 PROTHROMBIN TIME: CPT | Performed by: EMERGENCY MEDICINE

## 2017-04-30 RX ORDER — HYDROMORPHONE HYDROCHLORIDE 1 MG/ML
0.5 INJECTION, SOLUTION INTRAMUSCULAR; INTRAVENOUS; SUBCUTANEOUS ONCE
Status: COMPLETED | OUTPATIENT
Start: 2017-04-30 | End: 2017-04-30

## 2017-04-30 RX ORDER — WARFARIN SODIUM 5 MG/1
2.5 TABLET ORAL
COMMUNITY
End: 2017-01-01 | Stop reason: HOSPADM

## 2017-04-30 RX ORDER — ONDANSETRON 4 MG/1
4 TABLET, FILM COATED ORAL EVERY 6 HOURS PRN
Qty: 10 TABLET | Refills: 1 | Status: SHIPPED | OUTPATIENT
Start: 2017-04-30 | End: 2017-01-01

## 2017-04-30 RX ORDER — MAGNESIUM HYDROXIDE/ALUMINUM HYDROXICE/SIMETHICONE 120; 1200; 1200 MG/30ML; MG/30ML; MG/30ML
30 SUSPENSION ORAL ONCE
Status: COMPLETED | OUTPATIENT
Start: 2017-04-30 | End: 2017-04-30

## 2017-04-30 RX ORDER — ONDANSETRON 2 MG/ML
4 INJECTION INTRAMUSCULAR; INTRAVENOUS ONCE
Status: COMPLETED | OUTPATIENT
Start: 2017-04-30 | End: 2017-04-30

## 2017-04-30 RX ORDER — ASPIRIN 325 MG
325 TABLET ORAL ONCE
Status: DISCONTINUED | OUTPATIENT
Start: 2017-04-30 | End: 2017-04-30 | Stop reason: HOSPADM

## 2017-04-30 RX ORDER — SODIUM CHLORIDE 0.9 % (FLUSH) 0.9 %
10 SYRINGE (ML) INJECTION AS NEEDED
Status: DISCONTINUED | OUTPATIENT
Start: 2017-04-30 | End: 2017-04-30

## 2017-04-30 RX ORDER — OMEPRAZOLE 20 MG/1
20 CAPSULE, DELAYED RELEASE ORAL DAILY
Qty: 30 CAPSULE | Refills: 0 | Status: SHIPPED | OUTPATIENT
Start: 2017-04-30 | End: 2017-01-01 | Stop reason: HOSPADM

## 2017-04-30 RX ORDER — SODIUM CHLORIDE 0.9 % (FLUSH) 0.9 %
10 SYRINGE (ML) INJECTION AS NEEDED
Status: DISCONTINUED | OUTPATIENT
Start: 2017-04-30 | End: 2017-04-30 | Stop reason: HOSPADM

## 2017-04-30 RX ADMIN — ALUMINUM HYDROXIDE, MAGNESIUM HYDROXIDE, AND SIMETHICONE 30 ML: 200; 200; 20 SUSPENSION ORAL at 15:42

## 2017-04-30 RX ADMIN — HYDROMORPHONE HYDROCHLORIDE 0.5 MG: 1 INJECTION, SOLUTION INTRAMUSCULAR; INTRAVENOUS; SUBCUTANEOUS at 14:16

## 2017-04-30 RX ADMIN — LIDOCAINE HYDROCHLORIDE 15 ML: 20 SOLUTION ORAL; TOPICAL at 15:41

## 2017-04-30 RX ADMIN — ONDANSETRON 4 MG: 2 INJECTION INTRAMUSCULAR; INTRAVENOUS at 14:13

## 2017-05-01 ENCOUNTER — TELEPHONE (OUTPATIENT)
Dept: SOCIAL WORK | Facility: HOSPITAL | Age: 72
End: 2017-05-01

## 2017-05-03 ENCOUNTER — CLINICAL SUPPORT (OUTPATIENT)
Dept: ONCOLOGY | Facility: HOSPITAL | Age: 72
End: 2017-05-03

## 2017-05-03 ENCOUNTER — ANTICOAGULATION VISIT (OUTPATIENT)
Dept: LAB | Facility: HOSPITAL | Age: 72
End: 2017-05-03

## 2017-05-03 DIAGNOSIS — D68.51 FACTOR V LEIDEN MUTATION (HCC): ICD-10-CM

## 2017-05-03 DIAGNOSIS — D50.0 IRON DEFICIENCY ANEMIA DUE TO CHRONIC BLOOD LOSS: ICD-10-CM

## 2017-05-03 DIAGNOSIS — D61.818 PANCYTOPENIA (HCC): ICD-10-CM

## 2017-05-03 LAB
BASOPHILS # BLD AUTO: 0.03 10*3/MM3 (ref 0–0.1)
BASOPHILS NFR BLD AUTO: 0.6 % (ref 0–1.1)
DEPRECATED RDW RBC AUTO: 71.5 FL (ref 37–49)
EOSINOPHIL # BLD AUTO: 0.28 10*3/MM3 (ref 0–0.36)
EOSINOPHIL NFR BLD AUTO: 5.7 % (ref 1–5)
ERYTHROCYTE [DISTWIDTH] IN BLOOD BY AUTOMATED COUNT: 18.5 % (ref 11.7–14.5)
HCT VFR BLD AUTO: 27.6 % (ref 40–49)
HGB BLD-MCNC: 9.2 G/DL (ref 13.5–16.5)
IMM GRANULOCYTES # BLD: 0.04 10*3/MM3 (ref 0–0.03)
IMM GRANULOCYTES NFR BLD: 0.8 % (ref 0–0.5)
INR PPP: 3 (ref 0.9–1.1)
LYMPHOCYTES # BLD AUTO: 1.26 10*3/MM3 (ref 1–3.5)
LYMPHOCYTES NFR BLD AUTO: 25.7 % (ref 20–49)
MCH RBC QN AUTO: 34.7 PG (ref 27–33)
MCHC RBC AUTO-ENTMCNC: 33.3 G/DL (ref 32–35)
MCV RBC AUTO: 104.2 FL (ref 83–97)
MONOCYTES # BLD AUTO: 0.65 10*3/MM3 (ref 0.25–0.8)
MONOCYTES NFR BLD AUTO: 13.3 % (ref 4–12)
NEUTROPHILS # BLD AUTO: 2.64 10*3/MM3 (ref 1.5–7)
NEUTROPHILS NFR BLD AUTO: 53.9 % (ref 39–75)
NRBC BLD MANUAL-RTO: 0 /100 WBC (ref 0–0)
PLATELET # BLD AUTO: 68 10*3/MM3 (ref 150–375)
PMV BLD AUTO: 11.7 FL (ref 8.9–12.1)
PROTHROMBIN TIME: 35.6 SECONDS (ref 11–13.5)
RBC # BLD AUTO: 2.65 10*6/MM3 (ref 4.3–5.5)
WBC NRBC COR # BLD: 4.9 10*3/MM3 (ref 4–10)

## 2017-05-03 PROCEDURE — 36415 COLL VENOUS BLD VENIPUNCTURE: CPT | Performed by: INTERNAL MEDICINE

## 2017-05-03 PROCEDURE — 85610 PROTHROMBIN TIME: CPT | Performed by: INTERNAL MEDICINE

## 2017-05-03 PROCEDURE — 85025 COMPLETE CBC W/AUTO DIFF WBC: CPT | Performed by: INTERNAL MEDICINE

## 2017-05-08 RX ORDER — BLOOD-GLUCOSE METER
KIT MISCELLANEOUS
Qty: 200 EACH | Refills: 0 | Status: SHIPPED | OUTPATIENT
Start: 2017-05-08 | End: 2017-05-10 | Stop reason: SDUPTHER

## 2017-05-09 ENCOUNTER — TELEPHONE (OUTPATIENT)
Dept: FAMILY MEDICINE CLINIC | Facility: CLINIC | Age: 72
End: 2017-05-09

## 2017-05-10 RX ORDER — BLOOD-GLUCOSE METER
KIT MISCELLANEOUS
Qty: 100 EACH | Refills: 0 | Status: SHIPPED | OUTPATIENT
Start: 2017-05-10 | End: 2017-05-12 | Stop reason: SDUPTHER

## 2017-05-12 RX ORDER — BLOOD-GLUCOSE METER
KIT MISCELLANEOUS
Qty: 200 EACH | Refills: 0 | Status: SHIPPED | OUTPATIENT
Start: 2017-05-12 | End: 2017-05-16 | Stop reason: SDUPTHER

## 2017-05-16 RX ORDER — BLOOD-GLUCOSE METER
KIT MISCELLANEOUS
Qty: 200 EACH | Refills: 0 | Status: SHIPPED | OUTPATIENT
Start: 2017-05-16 | End: 2017-05-18 | Stop reason: SDUPTHER

## 2017-05-17 ENCOUNTER — APPOINTMENT (OUTPATIENT)
Dept: LAB | Facility: HOSPITAL | Age: 72
End: 2017-05-17

## 2017-05-17 ENCOUNTER — APPOINTMENT (OUTPATIENT)
Dept: ONCOLOGY | Facility: HOSPITAL | Age: 72
End: 2017-05-17

## 2017-05-18 RX ORDER — BLOOD-GLUCOSE METER
KIT MISCELLANEOUS
Qty: 200 EACH | Refills: 0 | Status: SHIPPED | OUTPATIENT
Start: 2017-05-18 | End: 2017-05-21 | Stop reason: SDUPTHER

## 2017-05-22 RX ORDER — BLOOD-GLUCOSE METER
KIT MISCELLANEOUS
Qty: 100 EACH | Refills: 0 | Status: SHIPPED | OUTPATIENT
Start: 2017-05-22 | End: 2017-01-01 | Stop reason: HOSPADM

## 2017-05-26 ENCOUNTER — APPOINTMENT (OUTPATIENT)
Dept: GENERAL RADIOLOGY | Facility: HOSPITAL | Age: 72
End: 2017-05-26

## 2017-05-26 ENCOUNTER — APPOINTMENT (OUTPATIENT)
Dept: CT IMAGING | Facility: HOSPITAL | Age: 72
End: 2017-05-26

## 2017-05-26 ENCOUNTER — HOSPITAL ENCOUNTER (INPATIENT)
Facility: HOSPITAL | Age: 72
LOS: 7 days | End: 2017-06-02
Attending: EMERGENCY MEDICINE | Admitting: INTERNAL MEDICINE
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
## 2017-05-26 DIAGNOSIS — S20.212A RIB CONTUSION, LEFT, INITIAL ENCOUNTER: ICD-10-CM

## 2017-05-26 DIAGNOSIS — K66.1 HEMOPERITONEUM: ICD-10-CM

## 2017-05-26 DIAGNOSIS — S09.90XA HEAD INJURY, INITIAL ENCOUNTER: ICD-10-CM

## 2017-05-26 DIAGNOSIS — S30.1XXA ABDOMINAL WALL CONTUSION: ICD-10-CM

## 2017-05-26 DIAGNOSIS — S60.222A CONTUSION OF LEFT HAND, INITIAL ENCOUNTER: ICD-10-CM

## 2017-05-26 DIAGNOSIS — S50.12XA CONTUSION OF LEFT FOREARM, INITIAL ENCOUNTER: ICD-10-CM

## 2017-05-26 LAB
ABO GROUP BLD: NORMAL
ALBUMIN SERPL-MCNC: 3.1 G/DL (ref 3.5–5.2)
ALBUMIN/GLOB SERPL: 0.6 G/DL
ALP SERPL-CCNC: 207 U/L (ref 39–117)
ALT SERPL W P-5'-P-CCNC: 14 U/L (ref 1–41)
ANION GAP SERPL CALCULATED.3IONS-SCNC: 9.7 MMOL/L
APTT PPP: 50.4 SECONDS (ref 22.7–35.4)
APTT PPP: 51.9 SECONDS (ref 22.7–35.4)
AST SERPL-CCNC: 25 U/L (ref 1–40)
BASOPHILS # BLD AUTO: 0.04 10*3/MM3 (ref 0–0.2)
BASOPHILS NFR BLD AUTO: 0.8 % (ref 0–1.5)
BILIRUB SERPL-MCNC: 2.4 MG/DL (ref 0.1–1.2)
BLD GP AB SCN SERPL QL: NEGATIVE
BUN BLD-MCNC: 32 MG/DL (ref 8–23)
BUN/CREAT SERPL: 37.2 (ref 7–25)
CALCIUM SPEC-SCNC: 9 MG/DL (ref 8.6–10.5)
CHLORIDE SERPL-SCNC: 98 MMOL/L (ref 98–107)
CO2 SERPL-SCNC: 25.3 MMOL/L (ref 22–29)
CREAT BLD-MCNC: 0.86 MG/DL (ref 0.76–1.27)
DEPRECATED RDW RBC AUTO: 70 FL (ref 37–54)
EOSINOPHIL # BLD AUTO: 0.14 10*3/MM3 (ref 0–0.7)
EOSINOPHIL NFR BLD AUTO: 2.8 % (ref 0.3–6.2)
ERYTHROCYTE [DISTWIDTH] IN BLOOD BY AUTOMATED COUNT: 18.5 % (ref 11.5–14.5)
GFR SERPL CREATININE-BSD FRML MDRD: 88 ML/MIN/1.73
GLOBULIN UR ELPH-MCNC: 4.8 GM/DL
GLUCOSE BLD-MCNC: 228 MG/DL (ref 65–99)
GLUCOSE BLDC GLUCOMTR-MCNC: 195 MG/DL (ref 70–130)
HCT VFR BLD AUTO: 23.8 % (ref 40.4–52.2)
HGB BLD-MCNC: 8 G/DL (ref 13.7–17.6)
IMM GRANULOCYTES # BLD: 0 10*3/MM3 (ref 0–0.03)
IMM GRANULOCYTES NFR BLD: 0 % (ref 0–0.5)
INR PPP: 4.17 (ref 0.9–1.1)
INR PPP: 4.28 (ref 0.9–1.1)
LYMPHOCYTES # BLD AUTO: 0.98 10*3/MM3 (ref 0.9–4.8)
LYMPHOCYTES NFR BLD AUTO: 19.4 % (ref 19.6–45.3)
MCH RBC QN AUTO: 34.8 PG (ref 27–32.7)
MCHC RBC AUTO-ENTMCNC: 33.6 G/DL (ref 32.6–36.4)
MCV RBC AUTO: 103.5 FL (ref 79.8–96.2)
MONOCYTES # BLD AUTO: 0.66 10*3/MM3 (ref 0.2–1.2)
MONOCYTES NFR BLD AUTO: 13.1 % (ref 5–12)
NEUTROPHILS # BLD AUTO: 3.22 10*3/MM3 (ref 1.9–8.1)
NEUTROPHILS NFR BLD AUTO: 63.9 % (ref 42.7–76)
NT-PROBNP SERPL-MCNC: 4205 PG/ML (ref 5–900)
PLATELET # BLD AUTO: 72 10*3/MM3 (ref 140–500)
PMV BLD AUTO: 11.2 FL (ref 6–12)
POTASSIUM BLD-SCNC: 4.2 MMOL/L (ref 3.5–5.2)
PROT SERPL-MCNC: 7.9 G/DL (ref 6–8.5)
PROTHROMBIN TIME: 39.1 SECONDS (ref 11.7–14.2)
PROTHROMBIN TIME: 39.8 SECONDS (ref 11.7–14.2)
RBC # BLD AUTO: 2.3 10*6/MM3 (ref 4.6–6)
RH BLD: POSITIVE
SODIUM BLD-SCNC: 133 MMOL/L (ref 136–145)
WBC NRBC COR # BLD: 5.04 10*3/MM3 (ref 4.5–10.7)

## 2017-05-26 PROCEDURE — 99284 EMERGENCY DEPT VISIT MOD MDM: CPT

## 2017-05-26 PROCEDURE — 83880 ASSAY OF NATRIURETIC PEPTIDE: CPT | Performed by: PHYSICIAN ASSISTANT

## 2017-05-26 PROCEDURE — 25010000002 TDAP 5-2.5-18.5 LF-MCG/0.5 SUSPENSION: Performed by: PHYSICIAN ASSISTANT

## 2017-05-26 PROCEDURE — 85025 COMPLETE CBC W/AUTO DIFF WBC: CPT | Performed by: PHYSICIAN ASSISTANT

## 2017-05-26 PROCEDURE — 85610 PROTHROMBIN TIME: CPT | Performed by: PHYSICIAN ASSISTANT

## 2017-05-26 PROCEDURE — 74177 CT ABD & PELVIS W/CONTRAST: CPT

## 2017-05-26 PROCEDURE — 90471 IMMUNIZATION ADMIN: CPT | Performed by: PHYSICIAN ASSISTANT

## 2017-05-26 PROCEDURE — 71101 X-RAY EXAM UNILAT RIBS/CHEST: CPT

## 2017-05-26 PROCEDURE — 86923 COMPATIBILITY TEST ELECTRIC: CPT

## 2017-05-26 PROCEDURE — 73030 X-RAY EXAM OF SHOULDER: CPT

## 2017-05-26 PROCEDURE — 25010000002 ONDANSETRON PER 1 MG: Performed by: EMERGENCY MEDICINE

## 2017-05-26 PROCEDURE — 99222 1ST HOSP IP/OBS MODERATE 55: CPT | Performed by: SURGERY

## 2017-05-26 PROCEDURE — C9132 KCENTRA, PER I.U.: HCPCS | Performed by: INTERNAL MEDICINE

## 2017-05-26 PROCEDURE — 36415 COLL VENOUS BLD VENIPUNCTURE: CPT

## 2017-05-26 PROCEDURE — 25010000002 PROTHROMBIN COMPLEX CONC HUMAN 1000 UNITS KIT: Performed by: INTERNAL MEDICINE

## 2017-05-26 PROCEDURE — 25010000002 VITAMIN K1 PER 1 MG: Performed by: INTERNAL MEDICINE

## 2017-05-26 PROCEDURE — 85610 PROTHROMBIN TIME: CPT | Performed by: INTERNAL MEDICINE

## 2017-05-26 PROCEDURE — 36430 TRANSFUSION BLD/BLD COMPNT: CPT

## 2017-05-26 PROCEDURE — 82962 GLUCOSE BLOOD TEST: CPT

## 2017-05-26 PROCEDURE — 86901 BLOOD TYPING SEROLOGIC RH(D): CPT | Performed by: PHYSICIAN ASSISTANT

## 2017-05-26 PROCEDURE — P9017 PLASMA 1 DONOR FRZ W/IN 8 HR: HCPCS

## 2017-05-26 PROCEDURE — 0 IOPAMIDOL 61 % SOLUTION: Performed by: EMERGENCY MEDICINE

## 2017-05-26 PROCEDURE — 86850 RBC ANTIBODY SCREEN: CPT | Performed by: PHYSICIAN ASSISTANT

## 2017-05-26 PROCEDURE — 80053 COMPREHEN METABOLIC PANEL: CPT | Performed by: PHYSICIAN ASSISTANT

## 2017-05-26 PROCEDURE — 85730 THROMBOPLASTIN TIME PARTIAL: CPT | Performed by: PHYSICIAN ASSISTANT

## 2017-05-26 PROCEDURE — 73090 X-RAY EXAM OF FOREARM: CPT

## 2017-05-26 PROCEDURE — 25010000002 HYDROMORPHONE PER 4 MG: Performed by: EMERGENCY MEDICINE

## 2017-05-26 PROCEDURE — 86927 PLASMA FRESH FROZEN: CPT

## 2017-05-26 PROCEDURE — 70450 CT HEAD/BRAIN W/O DYE: CPT

## 2017-05-26 PROCEDURE — 86900 BLOOD TYPING SEROLOGIC ABO: CPT | Performed by: PHYSICIAN ASSISTANT

## 2017-05-26 PROCEDURE — 73130 X-RAY EXAM OF HAND: CPT

## 2017-05-26 PROCEDURE — 25010000002 FUROSEMIDE PER 20 MG: Performed by: PHYSICIAN ASSISTANT

## 2017-05-26 PROCEDURE — 90715 TDAP VACCINE 7 YRS/> IM: CPT | Performed by: PHYSICIAN ASSISTANT

## 2017-05-26 RX ORDER — OXYCODONE HYDROCHLORIDE AND ACETAMINOPHEN 5; 325 MG/1; MG/1
1 TABLET ORAL EVERY 4 HOURS PRN
Status: DISCONTINUED | OUTPATIENT
Start: 2017-05-26 | End: 2017-01-01 | Stop reason: HOSPADM

## 2017-05-26 RX ORDER — FENTANYL CITRATE 50 UG/ML
50 INJECTION, SOLUTION INTRAMUSCULAR; INTRAVENOUS
Status: DISCONTINUED | OUTPATIENT
Start: 2017-05-26 | End: 2017-05-29

## 2017-05-26 RX ORDER — SODIUM CHLORIDE 0.9 % (FLUSH) 0.9 %
1-10 SYRINGE (ML) INJECTION AS NEEDED
Status: DISCONTINUED | OUTPATIENT
Start: 2017-05-26 | End: 2017-01-01 | Stop reason: HOSPADM

## 2017-05-26 RX ORDER — ONDANSETRON 4 MG/1
4 TABLET, ORALLY DISINTEGRATING ORAL EVERY 6 HOURS PRN
Status: DISCONTINUED | OUTPATIENT
Start: 2017-05-26 | End: 2017-01-01 | Stop reason: HOSPADM

## 2017-05-26 RX ORDER — HYDROMORPHONE HYDROCHLORIDE 1 MG/ML
0.5 INJECTION, SOLUTION INTRAMUSCULAR; INTRAVENOUS; SUBCUTANEOUS ONCE
Status: DISCONTINUED | OUTPATIENT
Start: 2017-05-26 | End: 2017-05-26

## 2017-05-26 RX ORDER — ONDANSETRON 2 MG/ML
4 INJECTION INTRAMUSCULAR; INTRAVENOUS EVERY 6 HOURS PRN
Status: DISCONTINUED | OUTPATIENT
Start: 2017-05-26 | End: 2017-01-01 | Stop reason: HOSPADM

## 2017-05-26 RX ORDER — ONDANSETRON 4 MG/1
4 TABLET, FILM COATED ORAL EVERY 6 HOURS PRN
Status: DISCONTINUED | OUTPATIENT
Start: 2017-05-26 | End: 2017-01-01 | Stop reason: HOSPADM

## 2017-05-26 RX ORDER — GABAPENTIN 300 MG/1
300 CAPSULE ORAL NIGHTLY
Status: DISCONTINUED | OUTPATIENT
Start: 2017-05-26 | End: 2017-01-01

## 2017-05-26 RX ORDER — SODIUM CHLORIDE 0.9 % (FLUSH) 0.9 %
10 SYRINGE (ML) INJECTION AS NEEDED
Status: DISCONTINUED | OUTPATIENT
Start: 2017-05-26 | End: 2017-01-01 | Stop reason: HOSPADM

## 2017-05-26 RX ORDER — PANTOPRAZOLE SODIUM 40 MG/1
40 TABLET, DELAYED RELEASE ORAL
Status: DISCONTINUED | OUTPATIENT
Start: 2017-05-27 | End: 2017-01-01

## 2017-05-26 RX ORDER — ONDANSETRON 2 MG/ML
4 INJECTION INTRAMUSCULAR; INTRAVENOUS ONCE
Status: COMPLETED | OUTPATIENT
Start: 2017-05-26 | End: 2017-05-26

## 2017-05-26 RX ORDER — FUROSEMIDE 10 MG/ML
40 INJECTION INTRAMUSCULAR; INTRAVENOUS ONCE
Status: DISCONTINUED | OUTPATIENT
Start: 2017-05-26 | End: 2017-05-26

## 2017-05-26 RX ADMIN — PHYTONADIONE 10 MG: 10 INJECTION, EMULSION INTRAMUSCULAR; INTRAVENOUS; SUBCUTANEOUS at 23:35

## 2017-05-26 RX ADMIN — ONDANSETRON 4 MG: 2 INJECTION INTRAMUSCULAR; INTRAVENOUS at 18:04

## 2017-05-26 RX ADMIN — TETANUS TOXOID, REDUCED DIPHTHERIA TOXOID AND ACELLULAR PERTUSSIS VACCINE, ADSORBED 0.5 ML: 5; 2.5; 8; 8; 2.5 SUSPENSION INTRAMUSCULAR at 22:30

## 2017-05-26 RX ADMIN — HYDROMORPHONE HYDROCHLORIDE 1 MG: 1 INJECTION, SOLUTION INTRAMUSCULAR; INTRAVENOUS; SUBCUTANEOUS at 18:04

## 2017-05-26 RX ADMIN — IOPAMIDOL 85 ML: 612 INJECTION, SOLUTION INTRAVENOUS at 19:22

## 2017-05-26 RX ADMIN — PROTHROMBIN, COAGULATION FACTOR VII HUMAN, COAGULATION FACTOR IX HUMAN, COAGULATION FACTOR X HUMAN, PROTEIN C, PROTEIN S HUMAN, AND WATER 3666 UNITS: KIT at 23:16

## 2017-05-26 RX ADMIN — SODIUM CHLORIDE 500 ML: 9 INJECTION, SOLUTION INTRAVENOUS at 20:39

## 2017-05-27 LAB
ABO + RH BLD: NORMAL
ALBUMIN SERPL-MCNC: 3.1 G/DL (ref 3.5–5.2)
ALBUMIN/GLOB SERPL: 0.7 G/DL
ALP SERPL-CCNC: 181 U/L (ref 39–117)
ALT SERPL W P-5'-P-CCNC: 12 U/L (ref 1–41)
ANION GAP SERPL CALCULATED.3IONS-SCNC: 13.2 MMOL/L
ARTERIAL PATENCY WRIST A: NORMAL
AST SERPL-CCNC: 26 U/L (ref 1–40)
ATMOSPHERIC PRESS: 743.6 MMHG
BASE EXCESS BLDA CALC-SCNC: 0.6 MMOL/L (ref 0–2)
BASOPHILS # BLD AUTO: 0.02 10*3/MM3 (ref 0–0.2)
BASOPHILS NFR BLD AUTO: 0.4 % (ref 0–1.5)
BDY SITE: NORMAL
BH BB BLOOD EXPIRATION DATE: NORMAL
BH BB BLOOD TYPE BARCODE: 6200
BH BB DISPENSE STATUS: NORMAL
BH BB PRODUCT CODE: NORMAL
BH BB UNIT NUMBER: NORMAL
BILIRUB SERPL-MCNC: 3.5 MG/DL (ref 0.1–1.2)
BUN BLD-MCNC: 32 MG/DL (ref 8–23)
BUN/CREAT SERPL: 33 (ref 7–25)
CALCIUM SPEC-SCNC: 8.7 MG/DL (ref 8.6–10.5)
CHLORIDE SERPL-SCNC: 102 MMOL/L (ref 98–107)
CK SERPL-CCNC: 283 U/L (ref 20–200)
CO2 SERPL-SCNC: 23.8 MMOL/L (ref 22–29)
CREAT BLD-MCNC: 0.97 MG/DL (ref 0.76–1.27)
D-LACTATE SERPL-SCNC: 1.6 MMOL/L (ref 0.5–2)
DEPRECATED RDW RBC AUTO: 69.8 FL (ref 37–54)
EOSINOPHIL # BLD AUTO: 0.15 10*3/MM3 (ref 0–0.7)
EOSINOPHIL NFR BLD AUTO: 2.8 % (ref 0.3–6.2)
ERYTHROCYTE [DISTWIDTH] IN BLOOD BY AUTOMATED COUNT: 18.5 % (ref 11.5–14.5)
FIBRINOGEN PPP-MCNC: 238 MG/DL (ref 219–464)
GAS FLOW AIRWAY: 2 LPM
GFR SERPL CREATININE-BSD FRML MDRD: 77 ML/MIN/1.73
GLOBULIN UR ELPH-MCNC: 4.4 GM/DL
GLUCOSE BLD-MCNC: 158 MG/DL (ref 65–99)
GLUCOSE BLDC GLUCOMTR-MCNC: 153 MG/DL (ref 70–130)
GLUCOSE BLDC GLUCOMTR-MCNC: 161 MG/DL (ref 70–130)
GLUCOSE BLDC GLUCOMTR-MCNC: 165 MG/DL (ref 70–130)
GLUCOSE BLDC GLUCOMTR-MCNC: 166 MG/DL (ref 70–130)
HCO3 BLDA-SCNC: 25.4 MMOL/L (ref 22–28)
HCT VFR BLD AUTO: 20 % (ref 40.4–52.2)
HCT VFR BLD AUTO: 22.1 % (ref 40.4–52.2)
HCT VFR BLD AUTO: 24 % (ref 40.4–52.2)
HGB BLD-MCNC: 6.4 G/DL (ref 13.7–17.6)
HGB BLD-MCNC: 7.4 G/DL (ref 13.7–17.6)
HGB BLD-MCNC: 7.8 G/DL (ref 13.7–17.6)
IMM GRANULOCYTES # BLD: 0 10*3/MM3 (ref 0–0.03)
IMM GRANULOCYTES NFR BLD: 0 % (ref 0–0.5)
INR PPP: 1.66 (ref 0.9–1.1)
INR PPP: 1.74 (ref 0.9–1.1)
LYMPHOCYTES # BLD AUTO: 0.94 10*3/MM3 (ref 0.9–4.8)
LYMPHOCYTES NFR BLD AUTO: 17.4 % (ref 19.6–45.3)
MAGNESIUM SERPL-MCNC: 1.8 MG/DL (ref 1.6–2.4)
MCH RBC QN AUTO: 33.2 PG (ref 27–32.7)
MCHC RBC AUTO-ENTMCNC: 32 G/DL (ref 32.6–36.4)
MCV RBC AUTO: 103.6 FL (ref 79.8–96.2)
MODALITY: NORMAL
MONOCYTES # BLD AUTO: 0.91 10*3/MM3 (ref 0.2–1.2)
MONOCYTES NFR BLD AUTO: 16.9 % (ref 5–12)
NEUTROPHILS # BLD AUTO: 3.37 10*3/MM3 (ref 1.9–8.1)
NEUTROPHILS NFR BLD AUTO: 62.5 % (ref 42.7–76)
NRBC BLD MANUAL-RTO: 0 /100 WBC (ref 0–0)
NT-PROBNP SERPL-MCNC: 3691 PG/ML (ref 5–900)
PCO2 BLDA: 40.5 MM HG (ref 35–45)
PH BLDA: 7.41 PH UNITS (ref 7.35–7.45)
PHOSPHATE SERPL-MCNC: 3.1 MG/DL (ref 2.5–4.5)
PLATELET # BLD AUTO: 100 10*3/MM3 (ref 140–500)
PMV BLD AUTO: 9.8 FL (ref 6–12)
PO2 BLDA: 89.1 MM HG (ref 80–100)
POTASSIUM BLD-SCNC: 4.1 MMOL/L (ref 3.5–5.2)
PROT SERPL-MCNC: 7.5 G/DL (ref 6–8.5)
PROTHROMBIN TIME: 19 SECONDS (ref 11.7–14.2)
PROTHROMBIN TIME: 19.8 SECONDS (ref 11.7–14.2)
RBC # BLD AUTO: 1.93 10*6/MM3 (ref 4.6–6)
SAO2 % BLDCOA: 96.9 % (ref 92–99)
SODIUM BLD-SCNC: 139 MMOL/L (ref 136–145)
TOTAL RATE: 14 BREATHS/MINUTE
UNIT  ABO: NORMAL
UNIT  RH: NORMAL
WBC NRBC COR # BLD: 5.39 10*3/MM3 (ref 4.5–10.7)

## 2017-05-27 PROCEDURE — 86927 PLASMA FRESH FROZEN: CPT

## 2017-05-27 PROCEDURE — 86900 BLOOD TYPING SEROLOGIC ABO: CPT

## 2017-05-27 PROCEDURE — 85610 PROTHROMBIN TIME: CPT | Performed by: INTERNAL MEDICINE

## 2017-05-27 PROCEDURE — 82803 BLOOD GASES ANY COMBINATION: CPT

## 2017-05-27 PROCEDURE — 83735 ASSAY OF MAGNESIUM: CPT | Performed by: INTERNAL MEDICINE

## 2017-05-27 PROCEDURE — 25010000002 FENTANYL CITRATE (PF) 100 MCG/2ML SOLUTION: Performed by: INTERNAL MEDICINE

## 2017-05-27 PROCEDURE — 84100 ASSAY OF PHOSPHORUS: CPT | Performed by: INTERNAL MEDICINE

## 2017-05-27 PROCEDURE — P9016 RBC LEUKOCYTES REDUCED: HCPCS

## 2017-05-27 PROCEDURE — 83605 ASSAY OF LACTIC ACID: CPT | Performed by: INTERNAL MEDICINE

## 2017-05-27 PROCEDURE — P9017 PLASMA 1 DONOR FRZ W/IN 8 HR: HCPCS

## 2017-05-27 PROCEDURE — 36600 WITHDRAWAL OF ARTERIAL BLOOD: CPT

## 2017-05-27 PROCEDURE — P9035 PLATELET PHERES LEUKOREDUCED: HCPCS

## 2017-05-27 PROCEDURE — 99223 1ST HOSP IP/OBS HIGH 75: CPT | Performed by: INTERNAL MEDICINE

## 2017-05-27 PROCEDURE — 83880 ASSAY OF NATRIURETIC PEPTIDE: CPT | Performed by: INTERNAL MEDICINE

## 2017-05-27 PROCEDURE — 36430 TRANSFUSION BLD/BLD COMPNT: CPT

## 2017-05-27 PROCEDURE — 82962 GLUCOSE BLOOD TEST: CPT

## 2017-05-27 PROCEDURE — 99233 SBSQ HOSP IP/OBS HIGH 50: CPT | Performed by: SURGERY

## 2017-05-27 PROCEDURE — 85014 HEMATOCRIT: CPT | Performed by: INTERNAL MEDICINE

## 2017-05-27 PROCEDURE — 85384 FIBRINOGEN ACTIVITY: CPT | Performed by: INTERNAL MEDICINE

## 2017-05-27 PROCEDURE — 85018 HEMOGLOBIN: CPT | Performed by: INTERNAL MEDICINE

## 2017-05-27 PROCEDURE — 80053 COMPREHEN METABOLIC PANEL: CPT | Performed by: INTERNAL MEDICINE

## 2017-05-27 PROCEDURE — 82550 ASSAY OF CK (CPK): CPT | Performed by: INTERNAL MEDICINE

## 2017-05-27 PROCEDURE — 85025 COMPLETE CBC W/AUTO DIFF WBC: CPT | Performed by: INTERNAL MEDICINE

## 2017-05-27 RX ORDER — ACETAMINOPHEN 325 MG/1
650 TABLET ORAL EVERY 6 HOURS PRN
Status: DISCONTINUED | OUTPATIENT
Start: 2017-05-27 | End: 2017-01-01

## 2017-05-27 RX ORDER — ACETAMINOPHEN 325 MG/1
325 TABLET ORAL EVERY 6 HOURS PRN
Status: DISCONTINUED | OUTPATIENT
Start: 2017-05-27 | End: 2017-01-01

## 2017-05-27 RX ORDER — BUMETANIDE 0.25 MG/ML
1 INJECTION INTRAMUSCULAR; INTRAVENOUS EVERY 4 HOURS
Status: COMPLETED | OUTPATIENT
Start: 2017-05-27 | End: 2017-05-27

## 2017-05-27 RX ADMIN — BUMETANIDE 1 MG: 0.25 INJECTION, SOLUTION INTRAMUSCULAR; INTRAVENOUS at 11:38

## 2017-05-27 RX ADMIN — OXYCODONE HYDROCHLORIDE AND ACETAMINOPHEN 1 TABLET: 5; 325 TABLET ORAL at 01:47

## 2017-05-27 RX ADMIN — OXYCODONE HYDROCHLORIDE AND ACETAMINOPHEN 1 TABLET: 5; 325 TABLET ORAL at 20:53

## 2017-05-27 RX ADMIN — OXYCODONE HYDROCHLORIDE AND ACETAMINOPHEN 1 TABLET: 5; 325 TABLET ORAL at 10:33

## 2017-05-27 RX ADMIN — FENTANYL CITRATE 50 MCG: 50 INJECTION INTRAMUSCULAR; INTRAVENOUS at 08:58

## 2017-05-27 RX ADMIN — FENTANYL CITRATE 50 MCG: 50 INJECTION INTRAMUSCULAR; INTRAVENOUS at 11:25

## 2017-05-27 RX ADMIN — BUMETANIDE 1 MG: 0.25 INJECTION, SOLUTION INTRAMUSCULAR; INTRAVENOUS at 16:22

## 2017-05-27 RX ADMIN — ACETAMINOPHEN 650 MG: 325 TABLET ORAL at 23:35

## 2017-05-27 RX ADMIN — GABAPENTIN 300 MG: 300 CAPSULE ORAL at 20:07

## 2017-05-27 RX ADMIN — PANTOPRAZOLE SODIUM 40 MG: 40 TABLET, DELAYED RELEASE ORAL at 05:35

## 2017-05-27 RX ADMIN — GABAPENTIN 300 MG: 300 CAPSULE ORAL at 01:47

## 2017-05-28 LAB
ABO + RH BLD: NORMAL
ANION GAP SERPL CALCULATED.3IONS-SCNC: 14.6 MMOL/L
BH BB BLOOD EXPIRATION DATE: NORMAL
BH BB BLOOD TYPE BARCODE: 6200
BH BB DISPENSE STATUS: NORMAL
BH BB PRODUCT CODE: NORMAL
BH BB UNIT NUMBER: NORMAL
BUN BLD-MCNC: 40 MG/DL (ref 8–23)
BUN/CREAT SERPL: 36.4 (ref 7–25)
CALCIUM SPEC-SCNC: 8.5 MG/DL (ref 8.6–10.5)
CHLORIDE SERPL-SCNC: 101 MMOL/L (ref 98–107)
CO2 SERPL-SCNC: 22.4 MMOL/L (ref 22–29)
CREAT BLD-MCNC: 1.1 MG/DL (ref 0.76–1.27)
DEPRECATED RDW RBC AUTO: 76.3 FL (ref 37–54)
ERYTHROCYTE [DISTWIDTH] IN BLOOD BY AUTOMATED COUNT: 22.1 % (ref 11.5–14.5)
FIBRINOGEN PPP-MCNC: 269 MG/DL (ref 219–464)
GFR SERPL CREATININE-BSD FRML MDRD: 66 ML/MIN/1.73
GLUCOSE BLD-MCNC: 148 MG/DL (ref 65–99)
GLUCOSE BLDC GLUCOMTR-MCNC: 143 MG/DL (ref 70–130)
GLUCOSE BLDC GLUCOMTR-MCNC: 149 MG/DL (ref 70–130)
GLUCOSE BLDC GLUCOMTR-MCNC: 157 MG/DL (ref 70–130)
GLUCOSE BLDC GLUCOMTR-MCNC: 174 MG/DL (ref 70–130)
GLUCOSE BLDC GLUCOMTR-MCNC: 279 MG/DL (ref 70–130)
HCT VFR BLD AUTO: 27.1 % (ref 40.4–52.2)
HGB BLD-MCNC: 9 G/DL (ref 13.7–17.6)
INR PPP: 1.67 (ref 0.9–1.1)
MCH RBC QN AUTO: 32.4 PG (ref 27–32.7)
MCHC RBC AUTO-ENTMCNC: 33.2 G/DL (ref 32.6–36.4)
MCV RBC AUTO: 97.5 FL (ref 79.8–96.2)
PLATELET # BLD AUTO: 84 10*3/MM3 (ref 140–500)
PMV BLD AUTO: 10.4 FL (ref 6–12)
POTASSIUM BLD-SCNC: 3.9 MMOL/L (ref 3.5–5.2)
PROTHROMBIN TIME: 19.1 SECONDS (ref 11.7–14.2)
RBC # BLD AUTO: 2.78 10*6/MM3 (ref 4.6–6)
SODIUM BLD-SCNC: 138 MMOL/L (ref 136–145)
UNIT  ABO: NORMAL
UNIT  RH: NORMAL
WBC NRBC COR # BLD: 6.04 10*3/MM3 (ref 4.5–10.7)

## 2017-05-28 PROCEDURE — 80048 BASIC METABOLIC PNL TOTAL CA: CPT | Performed by: INTERNAL MEDICINE

## 2017-05-28 PROCEDURE — 25010000002 FENTANYL CITRATE (PF) 100 MCG/2ML SOLUTION: Performed by: INTERNAL MEDICINE

## 2017-05-28 PROCEDURE — 85027 COMPLETE CBC AUTOMATED: CPT | Performed by: INTERNAL MEDICINE

## 2017-05-28 PROCEDURE — 99232 SBSQ HOSP IP/OBS MODERATE 35: CPT | Performed by: INTERNAL MEDICINE

## 2017-05-28 PROCEDURE — 82962 GLUCOSE BLOOD TEST: CPT

## 2017-05-28 PROCEDURE — 99232 SBSQ HOSP IP/OBS MODERATE 35: CPT | Performed by: SURGERY

## 2017-05-28 PROCEDURE — 63710000001 DIPHENHYDRAMINE PER 50 MG: Performed by: INTERNAL MEDICINE

## 2017-05-28 PROCEDURE — 85384 FIBRINOGEN ACTIVITY: CPT | Performed by: INTERNAL MEDICINE

## 2017-05-28 PROCEDURE — 85610 PROTHROMBIN TIME: CPT | Performed by: INTERNAL MEDICINE

## 2017-05-28 RX ORDER — CARVEDILOL 6.25 MG/1
6.25 TABLET ORAL 2 TIMES DAILY WITH MEALS
Status: DISCONTINUED | OUTPATIENT
Start: 2017-05-28 | End: 2017-05-30

## 2017-05-28 RX ORDER — TORSEMIDE 20 MG/1
40 TABLET ORAL 2 TIMES DAILY
Status: DISCONTINUED | OUTPATIENT
Start: 2017-05-28 | End: 2017-05-30

## 2017-05-28 RX ORDER — LOSARTAN POTASSIUM 25 MG/1
25 TABLET ORAL
Status: DISCONTINUED | OUTPATIENT
Start: 2017-05-28 | End: 2017-05-30

## 2017-05-28 RX ORDER — FERROUS GLUCONATE 324(37.5)
324 TABLET ORAL DAILY
Status: DISCONTINUED | OUTPATIENT
Start: 2017-05-28 | End: 2017-05-28 | Stop reason: CLARIF

## 2017-05-28 RX ORDER — FERROUS SULFATE 325(65) MG
325 TABLET ORAL
Status: DISCONTINUED | OUTPATIENT
Start: 2017-05-28 | End: 2017-01-01

## 2017-05-28 RX ORDER — POTASSIUM CHLORIDE 1.5 G/1.77G
20 POWDER, FOR SOLUTION ORAL DAILY
Status: DISCONTINUED | OUTPATIENT
Start: 2017-05-28 | End: 2017-01-01

## 2017-05-28 RX ORDER — LOPERAMIDE HYDROCHLORIDE 2 MG/1
2 CAPSULE ORAL 4 TIMES DAILY PRN
Status: DISCONTINUED | OUTPATIENT
Start: 2017-05-28 | End: 2017-01-01 | Stop reason: HOSPADM

## 2017-05-28 RX ORDER — VENLAFAXINE HYDROCHLORIDE 75 MG/1
75 CAPSULE, EXTENDED RELEASE ORAL NIGHTLY
Status: DISCONTINUED | OUTPATIENT
Start: 2017-05-28 | End: 2017-01-01

## 2017-05-28 RX ORDER — DIPHENHYDRAMINE HCL 25 MG
25 CAPSULE ORAL EVERY 6 HOURS PRN
Status: DISCONTINUED | OUTPATIENT
Start: 2017-05-28 | End: 2017-01-01

## 2017-05-28 RX ADMIN — OXYCODONE HYDROCHLORIDE AND ACETAMINOPHEN 1 TABLET: 5; 325 TABLET ORAL at 06:45

## 2017-05-28 RX ADMIN — TORSEMIDE 40 MG: 20 TABLET ORAL at 18:02

## 2017-05-28 RX ADMIN — GABAPENTIN 300 MG: 300 CAPSULE ORAL at 20:00

## 2017-05-28 RX ADMIN — CARVEDILOL 6.25 MG: 6.25 TABLET, FILM COATED ORAL at 18:02

## 2017-05-28 RX ADMIN — LOSARTAN POTASSIUM 25 MG: 25 TABLET, FILM COATED ORAL at 09:40

## 2017-05-28 RX ADMIN — CARVEDILOL 6.25 MG: 6.25 TABLET, FILM COATED ORAL at 09:41

## 2017-05-28 RX ADMIN — VENLAFAXINE HYDROCHLORIDE 75 MG: 75 CAPSULE, EXTENDED RELEASE ORAL at 20:00

## 2017-05-28 RX ADMIN — DIPHENHYDRAMINE HYDROCHLORIDE 25 MG: 25 CAPSULE ORAL at 15:07

## 2017-05-28 RX ADMIN — FERROUS SULFATE TAB 325 MG (65 MG ELEMENTAL FE) 325 MG: 325 (65 FE) TAB at 15:07

## 2017-05-28 RX ADMIN — POTASSIUM CHLORIDE 20 MEQ: 1.5 POWDER, FOR SOLUTION ORAL at 09:00

## 2017-05-28 RX ADMIN — TORSEMIDE 40 MG: 20 TABLET ORAL at 09:41

## 2017-05-28 RX ADMIN — OXYCODONE HYDROCHLORIDE AND ACETAMINOPHEN 1 TABLET: 5; 325 TABLET ORAL at 11:38

## 2017-05-28 RX ADMIN — PANTOPRAZOLE SODIUM 40 MG: 40 TABLET, DELAYED RELEASE ORAL at 06:30

## 2017-05-28 RX ADMIN — OXYCODONE HYDROCHLORIDE AND ACETAMINOPHEN 1 TABLET: 5; 325 TABLET ORAL at 20:33

## 2017-05-28 RX ADMIN — FENTANYL CITRATE 50 MCG: 50 INJECTION INTRAMUSCULAR; INTRAVENOUS at 21:00

## 2017-05-29 LAB
ANION GAP SERPL CALCULATED.3IONS-SCNC: 12.5 MMOL/L
BUN BLD-MCNC: 45 MG/DL (ref 8–23)
BUN/CREAT SERPL: 36 (ref 7–25)
CALCIUM SPEC-SCNC: 8.6 MG/DL (ref 8.6–10.5)
CHLORIDE SERPL-SCNC: 96 MMOL/L (ref 98–107)
CO2 SERPL-SCNC: 22.5 MMOL/L (ref 22–29)
CREAT BLD-MCNC: 1.25 MG/DL (ref 0.76–1.27)
DEPRECATED RDW RBC AUTO: 77.5 FL (ref 37–54)
ERYTHROCYTE [DISTWIDTH] IN BLOOD BY AUTOMATED COUNT: 21.4 % (ref 11.5–14.5)
GFR SERPL CREATININE-BSD FRML MDRD: 57 ML/MIN/1.73
GLUCOSE BLD-MCNC: 263 MG/DL (ref 65–99)
GLUCOSE BLDC GLUCOMTR-MCNC: 233 MG/DL (ref 70–130)
GLUCOSE BLDC GLUCOMTR-MCNC: 243 MG/DL (ref 70–130)
GLUCOSE BLDC GLUCOMTR-MCNC: 256 MG/DL (ref 70–130)
GLUCOSE BLDC GLUCOMTR-MCNC: 256 MG/DL (ref 70–130)
GLUCOSE BLDC GLUCOMTR-MCNC: 258 MG/DL (ref 70–130)
GLUCOSE BLDC GLUCOMTR-MCNC: 285 MG/DL (ref 70–130)
GLUCOSE BLDC GLUCOMTR-MCNC: 291 MG/DL (ref 70–130)
HCT VFR BLD AUTO: 27.3 % (ref 40.4–52.2)
HGB BLD-MCNC: 9 G/DL (ref 13.7–17.6)
INR PPP: 1.69 (ref 0.9–1.1)
MCH RBC QN AUTO: 32.8 PG (ref 27–32.7)
MCHC RBC AUTO-ENTMCNC: 33 G/DL (ref 32.6–36.4)
MCV RBC AUTO: 99.6 FL (ref 79.8–96.2)
PLATELET # BLD AUTO: 84 10*3/MM3 (ref 140–500)
PMV BLD AUTO: 11 FL (ref 6–12)
POTASSIUM BLD-SCNC: 3.9 MMOL/L (ref 3.5–5.2)
PROTHROMBIN TIME: 19.3 SECONDS (ref 11.7–14.2)
RBC # BLD AUTO: 2.74 10*6/MM3 (ref 4.6–6)
SODIUM BLD-SCNC: 131 MMOL/L (ref 136–145)
WBC NRBC COR # BLD: 5.35 10*3/MM3 (ref 4.5–10.7)

## 2017-05-29 PROCEDURE — 82962 GLUCOSE BLOOD TEST: CPT

## 2017-05-29 PROCEDURE — 85610 PROTHROMBIN TIME: CPT | Performed by: INTERNAL MEDICINE

## 2017-05-29 PROCEDURE — 25010000002 FENTANYL CITRATE (PF) 100 MCG/2ML SOLUTION: Performed by: INTERNAL MEDICINE

## 2017-05-29 PROCEDURE — 63710000001 DIPHENHYDRAMINE PER 50 MG: Performed by: INTERNAL MEDICINE

## 2017-05-29 PROCEDURE — 99232 SBSQ HOSP IP/OBS MODERATE 35: CPT | Performed by: INTERNAL MEDICINE

## 2017-05-29 PROCEDURE — 63710000001 INSULIN ASPART PER 5 UNITS: Performed by: INTERNAL MEDICINE

## 2017-05-29 PROCEDURE — 85027 COMPLETE CBC AUTOMATED: CPT | Performed by: INTERNAL MEDICINE

## 2017-05-29 PROCEDURE — 99232 SBSQ HOSP IP/OBS MODERATE 35: CPT | Performed by: SURGERY

## 2017-05-29 PROCEDURE — 80048 BASIC METABOLIC PNL TOTAL CA: CPT | Performed by: INTERNAL MEDICINE

## 2017-05-29 RX ORDER — MONTELUKAST SODIUM 10 MG/1
10 TABLET ORAL NIGHTLY
Status: DISCONTINUED | OUTPATIENT
Start: 2017-05-29 | End: 2017-01-01

## 2017-05-29 RX ORDER — DEXTROSE MONOHYDRATE 25 G/50ML
25 INJECTION, SOLUTION INTRAVENOUS
Status: DISCONTINUED | OUTPATIENT
Start: 2017-05-29 | End: 2017-01-01

## 2017-05-29 RX ORDER — FLUTICASONE PROPIONATE 50 MCG
2 SPRAY, SUSPENSION (ML) NASAL DAILY
Status: DISCONTINUED | OUTPATIENT
Start: 2017-05-29 | End: 2017-01-01

## 2017-05-29 RX ORDER — NICOTINE POLACRILEX 4 MG
15 LOZENGE BUCCAL
Status: DISCONTINUED | OUTPATIENT
Start: 2017-05-29 | End: 2017-01-01

## 2017-05-29 RX ORDER — CETIRIZINE HYDROCHLORIDE 10 MG/1
10 TABLET ORAL DAILY
Status: DISCONTINUED | OUTPATIENT
Start: 2017-05-29 | End: 2017-01-01

## 2017-05-29 RX ADMIN — INSULIN ASPART 8 UNITS: 100 INJECTION, SOLUTION INTRAVENOUS; SUBCUTANEOUS at 08:36

## 2017-05-29 RX ADMIN — TORSEMIDE 40 MG: 20 TABLET ORAL at 08:36

## 2017-05-29 RX ADMIN — OXYCODONE HYDROCHLORIDE AND ACETAMINOPHEN 1 TABLET: 5; 325 TABLET ORAL at 12:53

## 2017-05-29 RX ADMIN — LOSARTAN POTASSIUM 25 MG: 25 TABLET, FILM COATED ORAL at 08:36

## 2017-05-29 RX ADMIN — INSULIN ASPART 6 UNITS: 100 INJECTION, SOLUTION INTRAVENOUS; SUBCUTANEOUS at 22:08

## 2017-05-29 RX ADMIN — DIPHENHYDRAMINE HYDROCHLORIDE 25 MG: 25 CAPSULE ORAL at 00:41

## 2017-05-29 RX ADMIN — CETIRIZINE HYDROCHLORIDE 10 MG: 10 TABLET, FILM COATED ORAL at 18:05

## 2017-05-29 RX ADMIN — OXYCODONE HYDROCHLORIDE AND ACETAMINOPHEN 1 TABLET: 5; 325 TABLET ORAL at 00:41

## 2017-05-29 RX ADMIN — MONTELUKAST 10 MG: 10 TABLET, FILM COATED ORAL at 21:10

## 2017-05-29 RX ADMIN — FERROUS SULFATE TAB 325 MG (65 MG ELEMENTAL FE) 325 MG: 325 (65 FE) TAB at 08:36

## 2017-05-29 RX ADMIN — OXYCODONE HYDROCHLORIDE AND ACETAMINOPHEN 1 TABLET: 5; 325 TABLET ORAL at 22:08

## 2017-05-29 RX ADMIN — PANTOPRAZOLE SODIUM 40 MG: 40 TABLET, DELAYED RELEASE ORAL at 06:00

## 2017-05-29 RX ADMIN — FLUTICASONE PROPIONATE 2 SPRAY: 50 SPRAY, METERED NASAL at 18:05

## 2017-05-29 RX ADMIN — VENLAFAXINE HYDROCHLORIDE 75 MG: 75 CAPSULE, EXTENDED RELEASE ORAL at 21:10

## 2017-05-29 RX ADMIN — GABAPENTIN 300 MG: 300 CAPSULE ORAL at 21:10

## 2017-05-29 RX ADMIN — POTASSIUM CHLORIDE 20 MEQ: 1.5 POWDER, FOR SOLUTION ORAL at 08:35

## 2017-05-29 RX ADMIN — FENTANYL CITRATE 50 MCG: 50 INJECTION INTRAMUSCULAR; INTRAVENOUS at 02:16

## 2017-05-29 RX ADMIN — CARVEDILOL 6.25 MG: 6.25 TABLET, FILM COATED ORAL at 08:35

## 2017-05-30 ENCOUNTER — APPOINTMENT (OUTPATIENT)
Dept: CT IMAGING | Facility: HOSPITAL | Age: 72
End: 2017-05-30

## 2017-05-30 LAB
ABO GROUP BLD: NORMAL
ANION GAP SERPL CALCULATED.3IONS-SCNC: 13.6 MMOL/L
ARTERIAL PATENCY WRIST A: ABNORMAL
ATMOSPHERIC PRESS: 752.6 MMHG
BASE EXCESS BLDA CALC-SCNC: -2.5 MMOL/L (ref 0–2)
BDY SITE: ABNORMAL
BLD GP AB SCN SERPL QL: NEGATIVE
BUN BLD-MCNC: 52 MG/DL (ref 8–23)
BUN/CREAT SERPL: 34.7 (ref 7–25)
CALCIUM SPEC-SCNC: 8.2 MG/DL (ref 8.6–10.5)
CHLORIDE SERPL-SCNC: 91 MMOL/L (ref 98–107)
CO2 SERPL-SCNC: 21.4 MMOL/L (ref 22–29)
CREAT BLD-MCNC: 1.5 MG/DL (ref 0.76–1.27)
D-LACTATE SERPL-SCNC: 1.1 MMOL/L (ref 0.5–2)
DEPRECATED RDW RBC AUTO: 71.5 FL (ref 37–54)
ERYTHROCYTE [DISTWIDTH] IN BLOOD BY AUTOMATED COUNT: 20.4 % (ref 11.5–14.5)
GFR SERPL CREATININE-BSD FRML MDRD: 46 ML/MIN/1.73
GLUCOSE BLD-MCNC: 141 MG/DL (ref 65–99)
GLUCOSE BLDC GLUCOMTR-MCNC: 128 MG/DL (ref 70–130)
GLUCOSE BLDC GLUCOMTR-MCNC: 150 MG/DL (ref 70–130)
GLUCOSE BLDC GLUCOMTR-MCNC: 164 MG/DL (ref 70–130)
GLUCOSE BLDC GLUCOMTR-MCNC: 170 MG/DL (ref 70–130)
HCO3 BLDA-SCNC: 22.4 MMOL/L (ref 22–28)
HCT VFR BLD AUTO: 26 % (ref 40.4–52.2)
HCT VFR BLD AUTO: 26.3 % (ref 40.4–52.2)
HCT VFR BLD AUTO: 26.8 % (ref 40.4–52.2)
HGB BLD-MCNC: 8.7 G/DL (ref 13.7–17.6)
HGB BLD-MCNC: 8.8 G/DL (ref 13.7–17.6)
HGB BLD-MCNC: 9 G/DL (ref 13.7–17.6)
INR PPP: 1.72 (ref 0.9–1.1)
MCH RBC QN AUTO: 32.7 PG (ref 27–32.7)
MCHC RBC AUTO-ENTMCNC: 33.5 G/DL (ref 32.6–36.4)
MCV RBC AUTO: 97.8 FL (ref 79.8–96.2)
MODALITY: ABNORMAL
NT-PROBNP SERPL-MCNC: 3610 PG/ML (ref 0–900)
PCO2 BLDA: 37.7 MM HG (ref 35–45)
PH BLDA: 7.38 PH UNITS (ref 7.35–7.45)
PLATELET # BLD AUTO: 77 10*3/MM3 (ref 140–500)
PMV BLD AUTO: 10.5 FL (ref 6–12)
PO2 BLDA: 76.9 MM HG (ref 80–100)
POTASSIUM BLD-SCNC: 4.1 MMOL/L (ref 3.5–5.2)
PROCALCITONIN SERPL-MCNC: 0.16 NG/ML (ref 0.1–0.25)
PROTHROMBIN TIME: 19.6 SECONDS (ref 11.7–14.2)
RBC # BLD AUTO: 2.69 10*6/MM3 (ref 4.6–6)
RH BLD: POSITIVE
SAO2 % BLDCOA: 95 % (ref 92–99)
SET MECH RESP RATE: 18
SODIUM BLD-SCNC: 126 MMOL/L (ref 136–145)
TOTAL RATE: 18 BREATHS/MINUTE
WBC NRBC COR # BLD: 5.23 10*3/MM3 (ref 4.5–10.7)

## 2017-05-30 PROCEDURE — 36430 TRANSFUSION BLD/BLD COMPNT: CPT

## 2017-05-30 PROCEDURE — 63710000001 DIPHENHYDRAMINE PER 50 MG: Performed by: INTERNAL MEDICINE

## 2017-05-30 PROCEDURE — 85014 HEMATOCRIT: CPT | Performed by: INTERNAL MEDICINE

## 2017-05-30 PROCEDURE — 85014 HEMATOCRIT: CPT | Performed by: NURSE PRACTITIONER

## 2017-05-30 PROCEDURE — 85027 COMPLETE CBC AUTOMATED: CPT | Performed by: INTERNAL MEDICINE

## 2017-05-30 PROCEDURE — 82962 GLUCOSE BLOOD TEST: CPT

## 2017-05-30 PROCEDURE — 36600 WITHDRAWAL OF ARTERIAL BLOOD: CPT

## 2017-05-30 PROCEDURE — 86900 BLOOD TYPING SEROLOGIC ABO: CPT | Performed by: INTERNAL MEDICINE

## 2017-05-30 PROCEDURE — 82803 BLOOD GASES ANY COMBINATION: CPT

## 2017-05-30 PROCEDURE — 86850 RBC ANTIBODY SCREEN: CPT | Performed by: INTERNAL MEDICINE

## 2017-05-30 PROCEDURE — P9035 PLATELET PHERES LEUKOREDUCED: HCPCS

## 2017-05-30 PROCEDURE — 74176 CT ABD & PELVIS W/O CONTRAST: CPT

## 2017-05-30 PROCEDURE — 94799 UNLISTED PULMONARY SVC/PX: CPT

## 2017-05-30 PROCEDURE — 85610 PROTHROMBIN TIME: CPT | Performed by: INTERNAL MEDICINE

## 2017-05-30 PROCEDURE — 80048 BASIC METABOLIC PNL TOTAL CA: CPT | Performed by: NURSE PRACTITIONER

## 2017-05-30 PROCEDURE — 63710000001 INSULIN ASPART PER 5 UNITS: Performed by: INTERNAL MEDICINE

## 2017-05-30 PROCEDURE — 99231 SBSQ HOSP IP/OBS SF/LOW 25: CPT | Performed by: SURGERY

## 2017-05-30 PROCEDURE — 86923 COMPATIBILITY TEST ELECTRIC: CPT

## 2017-05-30 PROCEDURE — 84145 PROCALCITONIN (PCT): CPT | Performed by: INTERNAL MEDICINE

## 2017-05-30 PROCEDURE — 83880 ASSAY OF NATRIURETIC PEPTIDE: CPT | Performed by: INTERNAL MEDICINE

## 2017-05-30 PROCEDURE — 85018 HEMOGLOBIN: CPT | Performed by: NURSE PRACTITIONER

## 2017-05-30 PROCEDURE — 83605 ASSAY OF LACTIC ACID: CPT | Performed by: INTERNAL MEDICINE

## 2017-05-30 PROCEDURE — 85018 HEMOGLOBIN: CPT | Performed by: INTERNAL MEDICINE

## 2017-05-30 PROCEDURE — 86901 BLOOD TYPING SEROLOGIC RH(D): CPT | Performed by: INTERNAL MEDICINE

## 2017-05-30 PROCEDURE — 99232 SBSQ HOSP IP/OBS MODERATE 35: CPT | Performed by: INTERNAL MEDICINE

## 2017-05-30 RX ORDER — SODIUM CHLORIDE 9 MG/ML
125 INJECTION, SOLUTION INTRAVENOUS CONTINUOUS
Status: DISCONTINUED | OUTPATIENT
Start: 2017-05-30 | End: 2017-01-01

## 2017-05-30 RX ORDER — ACETAMINOPHEN 325 MG/1
650 TABLET ORAL ONCE
Status: COMPLETED | OUTPATIENT
Start: 2017-05-30 | End: 2017-05-30

## 2017-05-30 RX ORDER — DIPHENHYDRAMINE HCL 25 MG
25 CAPSULE ORAL ONCE
Status: COMPLETED | OUTPATIENT
Start: 2017-05-30 | End: 2017-05-30

## 2017-05-30 RX ORDER — TAMSULOSIN HYDROCHLORIDE 0.4 MG/1
0.4 CAPSULE ORAL DAILY
Status: DISCONTINUED | OUTPATIENT
Start: 2017-05-30 | End: 2017-01-01

## 2017-05-30 RX ADMIN — FERROUS SULFATE TAB 325 MG (65 MG ELEMENTAL FE) 325 MG: 325 (65 FE) TAB at 08:05

## 2017-05-30 RX ADMIN — SODIUM CHLORIDE 250 ML: 9 INJECTION, SOLUTION INTRAVENOUS at 12:06

## 2017-05-30 RX ADMIN — SODIUM CHLORIDE 125 ML/HR: 9 INJECTION, SOLUTION INTRAVENOUS at 23:09

## 2017-05-30 RX ADMIN — TAMSULOSIN HYDROCHLORIDE 0.4 MG: 0.4 CAPSULE ORAL at 17:51

## 2017-05-30 RX ADMIN — POTASSIUM CHLORIDE 20 MEQ: 1.5 POWDER, FOR SOLUTION ORAL at 08:05

## 2017-05-30 RX ADMIN — PANTOPRAZOLE SODIUM 40 MG: 40 TABLET, DELAYED RELEASE ORAL at 05:38

## 2017-05-30 RX ADMIN — INSULIN ASPART 2 UNITS: 100 INJECTION, SOLUTION INTRAVENOUS; SUBCUTANEOUS at 17:54

## 2017-05-30 RX ADMIN — CETIRIZINE HYDROCHLORIDE 10 MG: 10 TABLET, FILM COATED ORAL at 08:05

## 2017-05-30 RX ADMIN — INSULIN ASPART 2 UNITS: 100 INJECTION, SOLUTION INTRAVENOUS; SUBCUTANEOUS at 08:05

## 2017-05-30 RX ADMIN — ACETAMINOPHEN 650 MG: 325 TABLET ORAL at 17:59

## 2017-05-30 RX ADMIN — FLUTICASONE PROPIONATE 2 SPRAY: 50 SPRAY, METERED NASAL at 08:05

## 2017-05-30 RX ADMIN — DIPHENHYDRAMINE HYDROCHLORIDE 25 MG: 25 CAPSULE ORAL at 17:59
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
## 2017-06-01 NOTE — PLAN OF CARE
Problem: Patient Care Overview (Adult)  Goal: Plan of Care Review  Outcome: Ongoing (interventions implemented as appropriate)    06/01/17 0304   Coping/Psychosocial Response Interventions   Plan Of Care Reviewed With patient   Patient Care Overview   Progress no change   Outcome Evaluation   Outcome Summary/Follow up Plan patient received from ICU for palliative care, resting comfortably upon arrival, family at bedside with patient       Goal: Adult Individualization and Mutuality  Outcome: Ongoing (interventions implemented as appropriate)  Goal: Discharge Needs Assessment  Outcome: Ongoing (interventions implemented as appropriate)    Problem: Skin Integrity Impairment, Risk/Actual (Adult)  Goal: Skin Integrity/Wound Healing  Outcome: Ongoing (interventions implemented as appropriate)    Problem: Fall Risk (Adult)  Goal: Absence of Falls  Outcome: Ongoing (interventions implemented as appropriate)    Problem: Pain, Acute (Adult)  Goal: Acceptable Pain Control/Comfort Level  Outcome: Ongoing (interventions implemented as appropriate)    Problem: Cardiac Output, Decreased (Adult)  Goal: Adequate Cardiac Output/Effective Tissue Perfusion  Outcome: Ongoing (interventions implemented as appropriate)

## 2017-06-01 NOTE — CONSULTS
Meeting set with spouse for 11:00 am tomorrow 6/2/17.  Thank you for this referral.  Robyn Nieves RN  [facility]  [phone]

## 2017-06-01 NOTE — PROGRESS NOTES
Discharge Planning Assessment  [facility]     Patient Name: Emil Pulido  MRN: [id number]  Today's Date: 6/1/2017    Admit Date: 5/26/2017          Discharge Needs Assessment     None            Discharge Plan       06/01/17 1612    Case Management/Social Work Plan    Additional Comments Family all at bedside. CCP will follow tomorrow after [facility] meets with the family. TOM Barakat RN, CCP.        Discharge Placement     No information found        Expected Discharge Date and Time     Expected Discharge Date Expected Discharge Time    Jun 1, 2017               Demographic Summary     None            Functional Status     None            Psychosocial     None            Abuse/Neglect     None            Legal     None            Substance Abuse     None            Patient Forms     None          Padmini Barakat, RN

## 2017-06-01 NOTE — PROGRESS NOTES
Discharge Planning Assessment  [facility]     Patient Name: Emil Pulido  MRN: [id number]  Today's Date: 6/1/2017    Admit Date: 5/26/2017          Discharge Needs Assessment     NoneThe patient [place] [facility] [place]/15 @ 01:15. The patient is palliative. Dr. Camacho placed [facility] consult in system. Robyn/[facility] when set up an appointment with the family for tomorrow. TOM Barakat, RN, CCP.                 Discharge Plan     None        Discharge Placement     No information found        Expected Discharge Date and Time     Expected Discharge Date Expected Discharge Time    Jun 1, 2017               Demographic Summary     None            Functional Status     None            Psychosocial     None            Abuse/Neglect     None            Legal     None            Substance Abuse     None            Patient Forms     None          Padmini Barakat, RN

## 2017-06-02 PROBLEM — Z51.5 ADMISSION FOR HOSPICE CARE: Status: ACTIVE | Noted: 2017-01-01

## 2017-06-02 NOTE — PROGRESS NOTES
Discharge Planning Assessment  [facility]     Patient Name: Emil Pulido  MRN: [id number]  Today's Date: 6/2/2017    Admit Date: 6/2/2017          Discharge Needs Assessment       06/02/17 1416    Living Environment    Lives With spouse    Living Arrangements house    Home Accessibility no concerns    Stair Railings at Home none    Type of Financial/Environmental Concern none    Transportation Available car;family or friend will provide    Living Environment    Provides Primary Care For no one, unable/limited ability to care for self    Quality Of Family Relationships supportive    Able to Return to Prior Living Arrangements no    Discharge Needs Assessment    Concerns To Be Addressed grief and loss concerns    Readmission Within The Last 30 Days no previous admission in last 30 days    Anticipated Changes Related to Illness inability to care for self    Equipment Currently Used at Home hospital bed;lift device;power chair, (recliner lift)    Discharge Facility/Level Of Care Needs --   [facility] scattered bed    Discharge Disposition swing bed            Discharge Plan       06/02/17 1419    Case Management/Social Work Plan    Plan [facility] scattered bed, palliatiave bed    Patient/Family In Agreement With Plan yes    Additional Comments Family at bedside and was admitted to a [facility] scattered bed today. The [facility] team will follow. Introduce myself to the family and left business card for any concerns of needs that may arise. TOM Barakat RN, CCP.       06/02/17 1414    Final Note    Final Note Admitted to a [facility] scattered bed on 6/2/17. The [facility] team will follow. TOM Barakat RN, CCP        Discharge Placement     No information found                Demographic Summary       06/02/17 1416    Referral Information    Admission Type inpatient    Arrived From home healthcare service    Referral Source admission list    Reason For Consult palliative care    Contact Information    Permission Granted to  Share Information With             Functional Status       06/02/17 1418    Functional Status Current    Ambulation 4-->completely dependent    Transferring 4-->completely dependent    Toileting 4-->completely dependent    Bathing 4-->completely dependent    Dressing 4-->completely dependent    Eating 2-->assistive person    Communication 2-->difficulty speaking (not related to language barrier)    Swallowing (if score 2 or more for any item, consult Rehab Services) 2-->difficulty swallowing liquids/foods    Change in Functional Status Since Onset of Current Illness/Injury yes    Functional Status Prior    Ambulation 1-->assistive equipment    Transferring 3-->assistive equipment and person    Toileting 3-->assistive equipment and person    Bathing 3-->assistive equipment and person    Dressing 2-->assistive person    Eating 0-->independent    Communication 0-->understands/communicates without difficulty    Swallowing 0-->swallows foods/liquids without difficulty    IADL    Medications completely dependent    Meal Preparation completely dependent    Housekeeping completely dependent    Laundry completely dependent    Shopping completely dependent    Oral Care completely dependent    Cognitive/Perceptual/Developmental    Current Mental Status/Cognitive Functioning unable to assess    Recent Changes in Mental Status/Cognitive Functioning unable to assess    Employment/Financial    Source Of Income social security            Psychosocial     None            Abuse/Neglect     None            Legal     None            Substance Abuse     None            Patient Forms     None          Padmini Barakat RN

## 2017-06-02 NOTE — PROGRESS NOTES
Discharge Planning Assessment  [facility]     Patient Name: Emil Pulido  MRN: [id number]  Today's Date: 6/2/2017    Admit Date: 5/26/2017          Discharge Needs Assessment     None            Discharge Plan       06/02/17 1414    Final Note    Final Note Admitted to a [facility] bed on 6/2/17. [facility] team will follow. TOM Barakat RN, CCP        Discharge Placement     No information found        Expected Discharge Date and Time     Expected Discharge Date Expected Discharge Time    Jun 1, 2017               Demographic Summary     None            Functional Status     None            Psychosocial     None            Abuse/Neglect     None            Legal     None            Substance Abuse     None            Patient Forms     None          Padmini Barakat, RN

## 2017-06-02 NOTE — PLAN OF CARE
Problem: Patient Care Overview (Adult)  Goal: Plan of Care Review  Outcome: Ongoing (interventions implemented as appropriate)    06/02/17 0635   Coping/Psychosocial Response Interventions   Plan Of Care Reviewed With patient;spouse;family   Patient Care Overview   Progress declining   Outcome Evaluation   Outcome Summary/Follow up Plan Pt confused and agitated/irritable at times. Pt is edematous allover, severely in scrotum. C/O pain to penis and appeared very uncomfortable in middle of the night . Catheter adjusted, flowing well. Medicated x2 so far. Spouse at bedside. To meet with [facility] today to switch to [facility].         Problem: Skin Integrity Impairment, Risk/Actual (Adult)  Goal: Skin Integrity/Wound Healing  Outcome: Ongoing (interventions implemented as appropriate)    Problem: Fall Risk (Adult)  Goal: Absence of Falls  Outcome: Ongoing (interventions implemented as appropriate)    Problem: Dying Patient, Actively (Adult)  Goal: Identify Related Risk Factors and Signs and Symptoms  Outcome: Outcome(s) achieved Date Met:  06/02/17  Goal: Comfort/Pain Control  Outcome: Ongoing (interventions implemented as appropriate)  Goal: Dying Process, Peace and Dignity  Outcome: Ongoing (interventions implemented as appropriate)
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
## 2017-06-03 NOTE — PLAN OF CARE
Problem: Patient Care Overview (Adult)  Goal: Plan of Care Review  Outcome: Ongoing (interventions implemented as appropriate)    06/03/17 3174   Coping/Psychosocial Response Interventions   Plan Of Care Reviewed With patient;spouse;daughter   Patient Care Overview   Progress progress toward functional goals as expected   Outcome Evaluation   Outcome Summary/Follow up Plan Maintained palliative care protocol, pt. medicated for activity and per family request, rested with his eyes closed most of the day, placed ice pack under scrotum for comfort, Wife and daughter at bedside. Will continue to monitor for pain and symptom management.         Problem: Dying Patient, Actively (Adult)  Goal: Comfort/Pain Control  Outcome: Ongoing (interventions implemented as appropriate)  Goal: Dying Process, Peace and Dignity  Outcome: Ongoing (interventions implemented as appropriate)    Problem: Fall Risk (Adult)  Goal: Absence of Falls  Outcome: Ongoing (interventions implemented as appropriate)    Problem: Skin Integrity Impairment, Risk/Actual (Adult)  Goal: Skin Integrity/Wound Healing  Outcome: Ongoing (interventions implemented as appropriate)

## 2017-06-03 NOTE — PLAN OF CARE
Problem: Patient Care Overview (Adult)  Goal: Plan of Care Review  Outcome: Ongoing (interventions implemented as appropriate)    06/02/17 2036   Coping/Psychosocial Response Interventions   Plan Of Care Reviewed With patient;spouse;daughter   Patient Care Overview   Progress declining   Outcome Evaluation   Outcome Summary/Follow up Plan ptn was made a [facility] scattered bed today, medicated approximately Q 5 hours with dilaudid and robinol, dressings changed, continue to monitor for comfort per palliative protocol        Goal: Adult Individualization and Mutuality  Outcome: Ongoing (interventions implemented as appropriate)  Goal: Discharge Needs Assessment  Outcome: Ongoing (interventions implemented as appropriate)    Problem: Dying Patient, Actively (Adult)  Goal: Comfort/Pain Control  Outcome: Ongoing (interventions implemented as appropriate)  Goal: Dying Process, Peace and Dignity  Outcome: Ongoing (interventions implemented as appropriate)    Problem: Fall Risk (Adult)  Goal: Absence of Falls  Outcome: Ongoing (interventions implemented as appropriate)    Problem: Skin Integrity Impairment, Risk/Actual (Adult)  Goal: Skin Integrity/Wound Healing  Outcome: Ongoing (interventions implemented as appropriate)    Problem: Pressure Ulcer (Adult)  Goal: Signs and Symptoms of Listed Potential Problems Will be Absent or Manageable (Pressure Ulcer)  Outcome: Ongoing (interventions implemented as appropriate)

## 2017-06-03 NOTE — PLAN OF CARE
Problem: Patient Care Overview (Adult)  Goal: Plan of Care Review  Outcome: Ongoing (interventions implemented as appropriate)    06/03/17 042   Coping/Psychosocial Response Interventions   Plan Of Care Reviewed With patient;spouse   Patient Care Overview   Progress declining   Outcome Evaluation   Outcome Summary/Follow up Plan comfort measures only, treating pain per pallative protocol. Patient's family seems to be coming to a better understanding of the pallative process and are becoming more receptive to the available treatments. Patient being treated for pain with Dilaudid with good response. Will contintue to montior         Problem: Dying Patient, Actively (Adult)  Goal: Comfort/Pain Control  Outcome: Ongoing (interventions implemented as appropriate)  Goal: Dying Process, Peace and Dignity  Outcome: Ongoing (interventions implemented as appropriate)    Problem: Fall Risk (Adult)  Goal: Absence of Falls  Outcome: Ongoing (interventions implemented as appropriate)    Problem: Skin Integrity Impairment, Risk/Actual (Adult)  Goal: Skin Integrity/Wound Healing  Outcome: Ongoing (interventions implemented as appropriate)    Problem: Pressure Ulcer (Adult)  Goal: Signs and Symptoms of Listed Potential Problems Will be Absent or Manageable (Pressure Ulcer)  Outcome: Ongoing (interventions implemented as appropriate)
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
## 2017-06-04 NOTE — PLAN OF CARE
Problem: Patient Care Overview (Adult)  Goal: Plan of Care Review  Outcome: Ongoing (interventions implemented as appropriate)    06/04/17 0447   Coping/Psychosocial Response Interventions   Plan Of Care Reviewed With patient;spouse   Outcome Evaluation   Outcome Summary/Follow up Plan Patient has rested comfortably this shift, complaing of pain only when repositioned. Wife at bedside, pain med offered but not given at her request. Pt scrotum is severly edematous and weeping. Sling and elevation for comfort       Goal: Adult Individualization and Mutuality  Outcome: Ongoing (interventions implemented as appropriate)  Goal: Discharge Needs Assessment  Outcome: Ongoing (interventions implemented as appropriate)    Problem: Dying Patient, Actively (Adult)  Goal: Comfort/Pain Control  Outcome: Ongoing (interventions implemented as appropriate)  Goal: Dying Process, Peace and Dignity  Outcome: Ongoing (interventions implemented as appropriate)    Problem: Fall Risk (Adult)  Goal: Absence of Falls  Outcome: Ongoing (interventions implemented as appropriate)    Problem: Skin Integrity Impairment, Risk/Actual (Adult)  Goal: Skin Integrity/Wound Healing  Outcome: Ongoing (interventions implemented as appropriate)    Problem: Pressure Ulcer (Adult)  Goal: Signs and Symptoms of Listed Potential Problems Will be Absent or Manageable (Pressure Ulcer)  Outcome: Ongoing (interventions implemented as appropriate)

## 2017-06-04 NOTE — PROGRESS NOTES
[facility] Visit Report    Emil Pulido  [id number]  6/4/2017    Admission R/T [facility] Dx: YES      Reason for [facility] Admission: Hemopertoneum      Symptom  Management: Pain management      Nursing/Medication Recommendations: No recommendations at present      Psychosocial Issues and Recommendations: Provide support to patient and family      Spiritual Concerns and Recommendations: None at present      [facility] Discharge Plans:  None at present, continue to monitor for decline, receiving IV medications for comfort and is GIP appropriate at this time, will have discussion with [facility] for [facility], home with [facility] or Nursing facility with [facility] following if patient continues to improve and remains stable.      Review of Visit:Close monitoring for safety/falls, management of pain/congestion, comfort care. Patient lying in bed, awake and alert, family report patient eating and drinking, VSS and appears to have improved. Scrotal area continues to be swollen with sling in place. Spouse has gone home for awhile and scattered bed team will discuss with her plans for possible transfer if patient is not declining. Will continue to monitor on a day to day basis.  Spoke to staff MAGGIE Antoine regarding care needs, patient has received IV Dilaudid 1mg and IV Robinul 0.4mg x 1 dose since midnight. Will continue to see daily to assess needs, monitor status and offer support.      Priti Maldonado RN  [facility] Visit Nurse
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
## 2017-06-04 NOTE — PLAN OF CARE
Problem: Patient Care Overview (Adult)  Goal: Plan of Care Review  Outcome: Ongoing (interventions implemented as appropriate)    06/04/17 8950   Coping/Psychosocial Response Interventions   Plan Of Care Reviewed With patient;spouse;family   Patient Care Overview   Progress progress toward functional goals as expected   Outcome Evaluation   Outcome Summary/Follow up Plan Pt. moved to bed with low airloss mattress for comfort, Medicated for comfort per family request, Scrotum remains edematous and weeping, placed in sling and elevated for comfort. Will continue to monitor for comfort.         Problem: Dying Patient, Actively (Adult)  Goal: Comfort/Pain Control  Outcome: Ongoing (interventions implemented as appropriate)  Goal: Dying Process, Peace and Dignity  Outcome: Ongoing (interventions implemented as appropriate)    Problem: Fall Risk (Adult)  Goal: Absence of Falls  Outcome: Ongoing (interventions implemented as appropriate)    Problem: Skin Integrity Impairment, Risk/Actual (Adult)  Goal: Skin Integrity/Wound Healing  Outcome: Ongoing (interventions implemented as appropriate)

## 2017-06-05 NOTE — PLAN OF CARE
Problem: Patient Care Overview (Adult)  Goal: Plan of Care Review  Outcome: Ongoing (interventions implemented as appropriate)    06/05/17 1050   Coping/Psychosocial Response Interventions   Plan Of Care Reviewed With patient   Patient Care Overview   Progress no change   Outcome Evaluation   Outcome Summary/Follow up Plan pt requested pain mediation x 1 dose. frequent repositioning.       Goal: Adult Individualization and Mutuality  Outcome: Ongoing (interventions implemented as appropriate)  Goal: Discharge Needs Assessment  Outcome: Ongoing (interventions implemented as appropriate)    Problem: Dying Patient, Actively (Adult)  Goal: Comfort/Pain Control  Outcome: Ongoing (interventions implemented as appropriate)  Goal: Dying Process, Peace and Dignity  Outcome: Ongoing (interventions implemented as appropriate)    Problem: Fall Risk (Adult)  Goal: Absence of Falls  Outcome: Ongoing (interventions implemented as appropriate)    Problem: Skin Integrity Impairment, Risk/Actual (Adult)  Goal: Skin Integrity/Wound Healing  Outcome: Ongoing (interventions implemented as appropriate)    Problem: Pressure Ulcer (Adult)  Goal: Signs and Symptoms of Listed Potential Problems Will be Absent or Manageable (Pressure Ulcer)  Outcome: Ongoing (interventions implemented as appropriate)

## 2017-06-05 NOTE — PROGRESS NOTES
[facility]  Phone: [phone]  Juanjose Gan MD    Subjective:  LOS: 3    Resting comfortably.  Daughter at bedside.    Objective   Vital Signs past 24hrs  BP range: BP: (114-122)/(61) 114/61  Pulse range: Heart Rate:  [87-95] 95  Resp rate range: Resp:  [16-18] 16  Temp range: Temp (24hrs), Av °F (36.7 °C), Min:97.3 °F (36.3 °C), Max:98.6 °F (37 °C)    O2 Device: room air   Oxygen range:SpO2:  [94 %-95 %] 94 %   (!) 304 lb (138 kg); Body mass index is 39.03 kg/(m^2).    Intake/Output Summary (Last 24 hours) at 17 1522  Last data filed at 17 0512   Gross per 24 hour   Intake                0 ml   Output             1850 ml   Net            -1850 ml       Physical Exam   Cardiovascular: Normal rate and regular rhythm.    No murmur heard.  Pulmonary/Chest: He has no wheezes. He has no rales.   Abdominal: Soft. Bowel sounds are normal. There is no tenderness.   Musculoskeletal: He exhibits edema.   Neurological: He is alert.   Skin:   Severe scrotal edema   Psychiatric:   confused   Nursing note and vitals reviewed.    Results Review:    I have reviewed the laboratory and imaging data since the last note by [facility] physician.  My annotations are noted in assessment and plan.    Medication Review:  I have reviewed the current MAR.  My annotations are noted in assessment and plan.       Plan   PCCM Problems  Acute metabolic encephalopathy  Intraperitoneal hemorrhage status post fall from motorized scooter  Acute blood loss anemia  Thrombocytopenia  Hypotension with shock requiring pressor  History of antiphospholipid syndrome on chronic Coumadin at home   History of systolic heart failure and volume overload  History of DVT  History of previous stroke  Pulmonary hypertension  Chronic cough  Acute kidney injury  Hyponatremia  Urinary retention with bladder outlet obstruction: Keeping Obando catheter may need cystoscopy  Terminal restlessness    Plan of Treatment  We have tried elevation and  lubrication to decrease irritation from scrotal edema.  If continued discomfort may need input of urology to help.  Discussed with daughter.    Continue palliative care medications.        Juanjose Gan MD  06/05/17  3:22 PM    Part of this note may be an electronic transcription/translation of spoken language to printed text using the Dragon Dictation System.

## 2017-06-05 NOTE — PLAN OF CARE
Problem: Patient Care Overview (Adult)  Goal: Plan of Care Review  Outcome: Ongoing (interventions implemented as appropriate)  Continue symptom management and comfort care.    06/04/17 1827 06/04/17 2340 06/05/17 0434   Coping/Psychosocial Response Interventions   Plan Of Care Reviewed With --  patient;spouse --    Patient Care Overview   Progress progress toward functional goals as expected --  --    Outcome Evaluation   Outcome Summary/Follow up Plan --  --  Pt clearly in pain during scrotal care. I asked if pain medication was needed prior to administering care and wife refused. While the spouse was out of the room I discussed with patient that if he needed pain medication all he had to do was ask a nurse. Spouse is questioning palliative status. She is very involved in his care       Goal: Adult Individualization and Mutuality  Outcome: Ongoing (interventions implemented as appropriate)  Goal: Discharge Needs Assessment  Outcome: Ongoing (interventions implemented as appropriate)    Problem: Dying Patient, Actively (Adult)  Goal: Comfort/Pain Control  Outcome: Ongoing (interventions implemented as appropriate)  Goal: Dying Process, Peace and Dignity  Outcome: Ongoing (interventions implemented as appropriate)    Problem: Fall Risk (Adult)  Goal: Absence of Falls  Outcome: Ongoing (interventions implemented as appropriate)    Problem: Skin Integrity Impairment, Risk/Actual (Adult)  Goal: Skin Integrity/Wound Healing  Outcome: Ongoing (interventions implemented as appropriate)    Problem: Pressure Ulcer (Adult)  Goal: Signs and Symptoms of Listed Potential Problems Will be Absent or Manageable (Pressure Ulcer)  Outcome: Ongoing (interventions implemented as appropriate)

## 2017-06-05 NOTE — PROGRESS NOTES
[facility] Visit Report    Emil Pulido  [id number]  2017    Admission R/T [facility] Dx: yes    Reason for [facility] Admission: Hemoperitoneum    Symptom  Management: Pain Control    Nursing/Medication Recommendations:    Psychosocial Issues and Recommendations: Support to family. Spouse reports that daughter is interested in grief counseling. [facility] team will follow-[facility] with her. Spouse has been patient's hands-on caregiver for several years and is very protective of patient.  arrangements are in place with Lindsay Edwards in [facility].     Spiritual Concerns and Recommendations:    [facility] Discharge Plans: Incomplete. Patient new to [facility]. [facility] will visit daily and assess for discharge needs.    Review of Visit (Include All Collaboration- including names of hospital and family involved during admission/visit): Patient new to [facility]. No plans for discharge today. Patient did not awaken during visit. Spouse states that he will wake up, usually when pain medication wears off;  and yesterday he spoke with friends visiting. VS: T 97.3; HR 95; RR 16; /61; 02 94% RA. Today, at time of visit, patient had only received IV Dilaudid 1 mg x 1. Yesterday he received IV Robinul x 3 and IV Dilaudid x 3. Spouse provided social history of patient. Patient was a  but went on disability in  due to diabetic complications. He and spouse have been  49 years and have two children and grandchildren. EPIC notes indicate that patient has pain due to scrotal edema. However, patient appeared comfortable at time of visit. [facility] will continue daily visits to assess needs, provide emotional support and assist with discharge planning.         Frantz Vidal, [facility] Clinical

## 2017-06-06 NOTE — PROGRESS NOTES
[facility] Visit Report    Emil Pulido  [id number]  6/6/2017    Admission R/T [facility] Dx: yes    Reason for [facility] Admission: Hemoperitoneum    Symptom  Management: Pain Control    Nursing/Medication Recommendations:    Psychosocial Issues and Recommendations: Emotional support to family    Spiritual Concerns and Recommendations:    [facility] Discharge Plans:  Incomplete. Spouse is aware that if patient stabilizes discharge planning will be needed. She is aware of the IPU option.      Review of Visit (Include All Collaboration- including names of hospital and family involved during admission/visit):   Met with patient and spouse at bedside. Patient was resting comfortably at time of visit but does have pain issues due to scrotal edema. Patient is on Effexor-XR. Patient has also received IV Robinul .4 x 2  and IV Dilaudid 1 mg x 4 since yesterday.  VS: T 99; HR 95; RR 16; /62. Spouse states that patient is taking sips and small bites of food but his appetite has decreased significantly. No plans for discharge at this time but if patient stabilizes discharge planning will be needed. Spouse is aware of the [facility]PU. [facility] will continue daily visits to assess needs and provide emotional support.    Frantz Vidal LCSW   [facility] Clinical

## 2017-06-06 NOTE — PLAN OF CARE
Problem: Patient Care Overview (Adult)  Goal: Plan of Care Review  Outcome: Ongoing (interventions implemented as appropriate)    06/05/17 1851 06/05/17 2012 06/06/17 0513   Coping/Psychosocial Response Interventions   Plan Of Care Reviewed With --  patient;spouse --    Patient Care Overview   Progress no change --  --    Outcome Evaluation   Outcome Summary/Follow up Plan --  --  Maintained comfort measures per palliative care protocol. Patient medicated x1 PRN for pain. Wife stayed at bedside all night. Patient is too drowsy to take his PRN pyridium. Urology was consulted and to see patient today. Will continue to monitor vital signs and comfort.       Goal: Adult Individualization and Mutuality  Outcome: Ongoing (interventions implemented as appropriate)  Goal: Discharge Needs Assessment  Outcome: Ongoing (interventions implemented as appropriate)    Problem: Dying Patient, Actively (Adult)  Goal: Comfort/Pain Control  Outcome: Ongoing (interventions implemented as appropriate)  Goal: Dying Process, Peace and Dignity  Outcome: Ongoing (interventions implemented as appropriate)    Problem: Fall Risk (Adult)  Goal: Absence of Falls  Outcome: Ongoing (interventions implemented as appropriate)    Problem: Skin Integrity Impairment, Risk/Actual (Adult)  Goal: Skin Integrity/Wound Healing  Outcome: Ongoing (interventions implemented as appropriate)    Problem: Pressure Ulcer (Adult)  Goal: Signs and Symptoms of Listed Potential Problems Will be Absent or Manageable (Pressure Ulcer)  Outcome: Ongoing (interventions implemented as appropriate)

## 2017-06-06 NOTE — PLAN OF CARE
Problem: Patient Care Overview (Adult)  Goal: Plan of Care Review  Outcome: Ongoing (interventions implemented as appropriate)    06/06/17 1707   Coping/Psychosocial Response Interventions   Plan Of Care Reviewed With patient;spouse;family   Patient Care Overview   Progress progress toward functional goals as expected   Outcome Evaluation   Outcome Summary/Follow up Plan Pt. rested well today with wife at the bedside, wife allowed pain mediation x2 today, patient seems to breathing harder today education wife of change still reluctant to medicate, gave MOM to aide in BM. Will continue to monitor.         Problem: Dying Patient, Actively (Adult)  Goal: Comfort/Pain Control  Outcome: Ongoing (interventions implemented as appropriate)  Goal: Dying Process, Peace and Dignity  Outcome: Ongoing (interventions implemented as appropriate)    Problem: Fall Risk (Adult)  Goal: Absence of Falls  Outcome: Ongoing (interventions implemented as appropriate)    Problem: Skin Integrity Impairment, Risk/Actual (Adult)  Goal: Skin Integrity/Wound Healing  Outcome: Ongoing (interventions implemented as appropriate)

## 2017-06-06 NOTE — PROGRESS NOTES
[facility]  Phone: [phone]  Juanjose Gan MD    Subjective:  LOS: 4    Resting comfortably.  Wife at bedside    Objective   Vital Signs past 24hrs  BP range: BP: (118-124)/(57-62) 118/62  Pulse range: Heart Rate:  [90-95] 95  Resp rate range: Resp:  [16-18] 16  Temp range: Temp (24hrs), Av.2 °F (37.3 °C), Min:99 °F (37.2 °C), Max:99.4 °F (37.4 °C)    O2 Device: room air   Oxygen range:SpO2:  [92 %-94 %] 92 %   (!) 304 lb (138 kg); Body mass index is 39.03 kg/(m^2).    Intake/Output Summary (Last 24 hours) at 17 1513  Last data filed at 17 1300   Gross per 24 hour   Intake              420 ml   Output             1700 ml   Net            -1280 ml       Physical Exam   Cardiovascular: Normal rate and regular rhythm.    No murmur heard.  Pulmonary/Chest: He has no wheezes. He has no rales.   Abdominal: Soft. Bowel sounds are normal. There is no tenderness.   Musculoskeletal: He exhibits edema.   Neurological: He is alert.   Skin:   Severe scrotal edema   Psychiatric:   confused   Nursing note and vitals reviewed.    Results Review:    I have reviewed the laboratory and imaging data since the last note by [facility] physician.  My annotations are noted in assessment and plan.    Medication Review:  I have reviewed the current MAR.  My annotations are noted in assessment and plan.       Plan   PCCM Problems  Acute metabolic encephalopathy  Intraperitoneal hemorrhage status post fall from motorized scooter  Acute blood loss anemia  Thrombocytopenia  Hypotension with shock requiring pressor  History of antiphospholipid syndrome on chronic Coumadin at home   History of systolic heart failure and volume overload  History of DVT  History of previous stroke  Pulmonary hypertension  Chronic cough  Acute kidney injury  Hyponatremia  Urinary retention with bladder outlet obstruction: Keeping Obando catheter may need cystoscopy  Terminal restlessness    Plan of Treatment  We have tried elevation and  lubrication to decrease irritation from scrotal edema.    Await urology consult  Discussed with wife    Continue palliative care medications.        Juanjose Gan MD  06/06/17  3:13 PM    Part of this note may be an electronic transcription/translation of spoken language to printed text using the Dragon Dictation System.

## 2017-06-07 NOTE — PROGRESS NOTES
[facility]  Phone: [phone]  Juanjose Gan MD    Subjective:  LOS: 5    Resting comfortably.      Objective   Vital Signs past 24hrs  BP range: BP: (121-138)/(64-66) 138/66  Pulse range: Heart Rate:  [94-97] 94  Resp rate range: Resp:  [16-28] 16  Temp range: Temp (24hrs), Av.4 °F (36.9 °C), Min:97.5 °F (36.4 °C), Max:99.3 °F (37.4 °C)    O2 Device: room air   Oxygen range:SpO2:  [97 %-98 %] 97 %   (!) 304 lb (138 kg); Body mass index is 39.03 kg/(m^2).    Intake/Output Summary (Last 24 hours) at 17 1620  Last data filed at 17 0543   Gross per 24 hour   Intake                0 ml   Output             1700 ml   Net            -1700 ml       Physical Exam   Cardiovascular: Normal rate and regular rhythm.    No murmur heard.  Pulmonary/Chest: He has no wheezes. He has no rales.   Abdominal: Soft. Bowel sounds are normal. There is no tenderness.   Musculoskeletal: He exhibits edema.   Neurological: He is alert.   Skin:   Severe scrotal edema   Psychiatric:   confused   Nursing note and vitals reviewed.    Results Review:    I have reviewed the laboratory and imaging data since the last note by [facility] physician.  My annotations are noted in assessment and plan.    Medication Review:  I have reviewed the current MAR.  My annotations are noted in assessment and plan.       Plan   PCCM Problems  Acute metabolic encephalopathy  Intraperitoneal hemorrhage status post fall from motorized scooter  Acute blood loss anemia  Thrombocytopenia  Hypotension with shock requiring pressor  History of antiphospholipid syndrome on chronic Coumadin at home   History of systolic heart failure and volume overload  History of DVT  History of previous stroke  Pulmonary hypertension  Chronic cough  Acute kidney injury  Hyponatremia  Urinary retention with bladder outlet obstruction: Keeping Obando catheter may need cystoscopy  Terminal restlessness    Plan of Treatment  We have tried elevation and lubrication to  decrease irritation from scrotal edema.    Neurology input noted and appreciated      Continue palliative care medications.        Juanjose Gan MD  06/07/17  4:20 PM    Part of this note may be an electronic transcription/translation of spoken language to printed text using the Dragon Dictation System.

## 2017-06-07 NOTE — CONSULTS
FIRST UROLOGY CONSULT      Patient Identification:  NAME:  Emil Pulido  Age:  72 y.o.   Sex:  male   :  1945   MRN:  [id number]       Chief complaint: scrotal swelling    History of present illness:  71yo CM with recent fall on his scotter and had intraperitoneal bleed. Multiple health issues and plans are for palliative care. Asked to see for scrotal swelling. Unable to void so he has a kamara. Scrotum is swollena dn I understand this may be chronic. Currently at bedrest.      Past medical history:  Past Medical History:   Diagnosis Date   • Anemia    • Antiphospholipid antibody with hypercoagulable state    • Arthritis    • Atrial fibrillation    • Back pain    • Cancer    • Carotid artery disease    • Cellulitis 2015    Left leg   • CHF (congestive heart failure)    • Deep vein thrombosis of lower extremity    • Depression    • Diabetes mellitus     Type 2   • Eye abnormalities     pt has bleeding behind eyes   • Factor 5 Leiden mutation, heterozygous    • Hematoma     LARGE THIGH HEMATOMA   • History of transfusion    • Hx of being hospitalized     From 01/10/2016 through 2016 for acute GI blood loss while on Coumadin.   • Hypertension    • Hypertensive heart disease    • Immobile, syndrome paraplegic    • Neuropathy     Chronic pain syndrome with chronic immobility.   • Peripheral vascular disease    • Sick sinus syndrome    • Sleep apnea    • Stroke 2015    Cerebrovascular accident       Past surgical history:  Past Surgical History:   Procedure Laterality Date   • BACK SURGERY     • COLONOSCOPY     • COLONOSCOPY N/A 10/4/2016    Procedure: COLONOSCOPY to cecum endo clip x2;  Surgeon: Leoncio Strong MD;  Location: [facility] ENDOSCOPY;  Service:    • COLONOSCOPY N/A 2016    Procedure: COLONOSCOPY into cecum with Resolution clip x 2 and epi 1:20,000 injection;  Surgeon: Leoncio Strong MD;  Location: [facility] ENDOSCOPY;  Service:    • EYE SURGERY     • FRACTURE SURGERY       left arm   • JOINT REPLACEMENT      elbow replacement, right rotater cuff surgery   • OTHER SURGICAL HISTORY      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 14 g 0 6/1/2017   • carvedilol (COREG) 6.25 MG tablet TAKE ONE TABLET BY MOUTH TWICE A DAY WITH MEALS 180 tablet 0 6/1/2017   • Cholecalciferol (VITAMIN D3) 3000 UNITS tablet Take  by mouth Daily.   6/1/2017   • Ferrous Gluconate 325 (36 FE) MG tablet Take 324 mg by mouth Daily.   6/1/2017   • FREESTYLE LITE test strip USE TO TEST BLOOD SUGAR THREE TIMES A  each 0 6/1/2017   • gabapentin (NEURONTIN) 300 MG capsule Take 300 mg by mouth Every Night.   6/1/2017   • insulin lispro protamine-insulin lispro (humaLOG 50-50) (50-50) 100 UNIT/ML suspension injection Inject 30 Units under the skin 2 (Two) Times a Day With Meals. 40 units at bedtime 10 mL 1 6/1/2017   • loperamide (IMODIUM) 2 MG capsule Take 2 mg by mouth 4 (Four) Times a Day As Needed for Diarrhea.   6/1/2017   • losartan (COZAAR) 25 MG tablet 1/2 po qday 30 tablet 3 6/1/2017   • magnesium hydroxide (MILK OF MAGNESIA) 400 MG/5ML suspension Take  by mouth Daily As Needed for Constipation.   6/1/2017   • omeprazole (priLOSEC) 20 MG capsule Take 1 capsule by mouth Daily. 30 capsule 0 6/1/2017   • ondansetron (ZOFRAN) 4 MG tablet Take 1 tablet by mouth Every 6 (Six) Hours As Needed for Nausea. 10 tablet 1 6/1/2017   • oxyCODONE-acetaminophen (PERCOCET) 5-325 MG per tablet Take 1-2 tablets by mouth Every 6 (Six) Hours As Needed for Severe Pain (7-10). 30 tablet 0 6/1/2017   • potassium chloride (K-DUR,KLOR-CON) 20 MEQ CR tablet Take 1 tablet by mouth 2 (Two) Times a Day for 30 days. 60 tablet 3 Taking    • torsemide (DEMADEX) 20 MG tablet Take 2 tablets by mouth 2 (Two) Times a Day. (Patient taking differently: Take 40 mg by mouth 2 (Two) Times a Day. Take one tablet BID) 60 tablet 1 6/1/2017   • venlafaxine XR (EFFEXOR-XR) 75 MG 24 hr capsule Take 1 capsule by mouth Every Night. 90 capsule 3 6/1/2017   • vitamin C (ASCORBIC ACID) 500 MG tablet Take 500 mg by mouth daily.   6/1/2017   • warfarin (COUMADIN) 5 MG tablet Take 2.5 mg by mouth Daily. Monday, Tuesday, Thursday, Friday, saturday 6/1/2017   • Warfarin Sodium (WARFARIN - NO SCHEDULED DOSES) Daily. Take one 5mg tablet on Wednesday and Sunday. take 2.5 mg tablet on Monday, Tuesday, Thursday, Friday, Saturday 6/1/2017        Hospital medications:    castor oil-balsam peru  Topical Q12H   fluconazole 150 mg Oral Q24H   miconazole  Topical Q12H   venlafaxine XR 75 mg Oral Nightly        •  acetaminophen **OR** acetaminophen **OR** acetaminophen  •  diphenoxylate-atropine  •  Glycerin-Hypromellose-  •  glycopyrrolate **OR** glycopyrrolate **OR** glycopyrrolate **OR** glycopyrrolate  •  HYDROmorphone **OR** HYDROmorphone  •  HYDROmorphone **OR** HYDROmorphone  •  HYDROmorphone **OR** HYDROmorphone  •  loperamide  •  LORazepam **OR** LORazepam **OR** LORazepam **OR** LORazepam **OR** LORazepam  •  LORazepam **OR** LORazepam **OR** LORazepam **OR** LORazepam **OR** LORazepam  •  LORazepam **OR** LORazepam **OR** LORazepam **OR** LORazepam **OR** LORazepam  •  magnesium hydroxide  •  ondansetron **OR** ondansetron ODT **OR** ondansetron  •  phenazopyridine  •  sodium chloride  •  Insert peripheral IV **AND** sodium chloride    Family history:  Family History   Problem Relation Age of Onset   • No Known Problems Mother    • Heart disease Father    • No Known Problems Sister    • No Known Problems Brother    • No Known Problems Maternal Aunt    • Hyperlipidemia Maternal Uncle    • Cancer Paternal Aunt    • No Known Problems Paternal Uncle    • No Known  "Problems Maternal Grandmother    • No Known Problems Maternal Grandfather    • No Known Problems Paternal Grandmother    • No Known Problems Paternal Grandfather    • Diabetes Cousin        Social history:  Social History   Substance Use Topics   • Smoking status: Former Smoker     Packs/day: 3.00     Years: 21.00     Types: Cigarettes   • Smokeless tobacco: Not on file      Comment: quit at age  37   • Alcohol use Yes      Comment: \"every now and then\"       Review of systems:    Negative 12-system ROS except for the following:  Unable to answer my questions      Objective:  TMax 24 hours:   Temp (24hrs), Av.4 °F (36.9 °C), Min:97.5 °F (36.4 °C), Max:99.3 °F (37.4 °C)      Vitals Ranges:   Temp:  [97.5 °F (36.4 °C)-99.3 °F (37.4 °C)] 97.5 °F (36.4 °C)  Heart Rate:  [94-97] 94  Resp:  [16-28] 16  BP: (121-138)/(64-66) 138/66    Intake/Output Last 3 shifts:  I/O last 3 completed shifts:  In: 420 [P.O.:420]  Out: 3400 [Urine:3400]     Physical Exam:       General Appearance:    Awake, no acute distress   Head:    Normocephalic, without obvious abnormality, atraumatic   Eyes:          PERRL, conjunctivae and corneas clear   Ears:    Normal external inspection   Throat:   oral mucosa moist   Neck:   Supple, no LAD, trachea midline   Back:     No CVA tenderness   Lungs:     Respirations unlabored    Heart:    RRR,   Abdomen:     Soft, NDNT, no masses, no guarding   :    Testes descended bilaterally, hard to palpate.  Penis with kamara and discharge around catheter.  No scrotal or penile rashes noted but severe diffuse scrotal edema with no hydrocoele   Extremities:   Mild edema   Skin:   No bleeding, bruising or rashes   Neuro/Psych:   Orientation intact, mood/affect pleasant, no focal findings       Results review:   I reviewed the patient's new clinical results.    Data review:  Lab Results (last 24 hours)     ** No results found for the last 24 hours. **           Imaging:  Imaging Results (last 24 hours)     ** " No results found for the last 24 hours. **             Assessment:     Active Problems:    Admission for hospice care    Scrotal Edema- likely this has been chronic but exacerbated by fall, poor nutrition and lymphatic obstruction.  Penile Urethral Discharge- due to chronic kamara- doubt he can void.    Plan:     Elevate scrotum as planned.  Diflucan.  No plans for cysto.    Otto Serna MD  06/07/17  8:21 AM

## 2017-06-07 NOTE — PLAN OF CARE
Problem: Patient Care Overview (Adult)  Goal: Plan of Care Review  Outcome: Ongoing (interventions implemented as appropriate)    06/06/17 1834 06/06/17 2009 06/07/17 0332   Coping/Psychosocial Response Interventions   Plan Of Care Reviewed With --  patient;spouse --    Patient Care Overview   Progress progress toward functional goals as expected --  --    Outcome Evaluation   Outcome Summary/Follow up Plan --  --  Patient slept all night. Wife stayed at bedside all night. Mantained comfort measures per palliative care protocol. Wife refused PRN meds for patient. She stated that she's gonna call me if he needs PRN meds. Will continue to monitor vital signs and comfort.       Goal: Adult Individualization and Mutuality  Outcome: Ongoing (interventions implemented as appropriate)  Goal: Discharge Needs Assessment  Outcome: Ongoing (interventions implemented as appropriate)    Problem: Dying Patient, Actively (Adult)  Goal: Comfort/Pain Control  Outcome: Ongoing (interventions implemented as appropriate)  Goal: Dying Process, Peace and Dignity  Outcome: Ongoing (interventions implemented as appropriate)    Problem: Fall Risk (Adult)  Goal: Absence of Falls  Outcome: Ongoing (interventions implemented as appropriate)    Problem: Skin Integrity Impairment, Risk/Actual (Adult)  Goal: Skin Integrity/Wound Healing  Outcome: Ongoing (interventions implemented as appropriate)    Problem: Pressure Ulcer (Adult)  Goal: Signs and Symptoms of Listed Potential Problems Will be Absent or Manageable (Pressure Ulcer)  Outcome: Ongoing (interventions implemented as appropriate)

## 2017-06-07 NOTE — PROGRESS NOTES
[facility] Visit Report    Emil Pulido  [id number]  6/7/2017    Admission R/T [facility] Dx: yes    Reason for [facility] Admission:    Symptom  Management: Pain Control    Nursing/Medication Recommendations:    Psychosocial Issues and Recommendations:    Spiritual Concerns and Recommendations:    [facility] Discharge Plans:  incomplete; patient remains [facility] and [facility] will round daily      Review of Visit (Include All Collaboration- including names of hospital and family involved during admission/visit):  [facility] RN not initially available; per EPIC since midnight pt has received IV Dilaudid 1 mg, IV Robinul 0.4 x1; VS: T97.5, P94, R16, /66, O2=97%RA;    Pt gson present with pt, pt sleeping, did not respond to knock or voice; gson said his gmother went home, will be back tonight, [facility] inquired and gson feels pt is comfortable, no concerns; [facility] closed visit.    Per overnight report spouse refusing PRN meds overnight, will notify staff when pt needs something; MD has not rounded yet today.      Jonas Yuen, B[facility]

## 2017-06-08 NOTE — PLAN OF CARE
Problem: Patient Care Overview (Adult)  Goal: Plan of Care Review  Outcome: Ongoing (interventions implemented as appropriate)    06/08/17 7877   Coping/Psychosocial Response Interventions   Plan Of Care Reviewed With patient   Patient Care Overview   Progress no change   Outcome Evaluation   Outcome Summary/Follow up Plan Pt slept most of day, pt's wife requesting a limited amount of PRN meds& turning Q4H; she lets staff know when pt needs PRN meds & turns, IJ drsg changed but pt & wife refused full bath today continue to edu, no BM yet but MOM admin       Goal: Adult Individualization and Mutuality  Outcome: Ongoing (interventions implemented as appropriate)  Goal: Discharge Needs Assessment  Outcome: Ongoing (interventions implemented as appropriate)    Problem: Patient Care Overview (Adult)  Goal: Plan of Care Review  Outcome: Ongoing (interventions implemented as appropriate)  Goal: Adult Individualization and Mutuality  Outcome: Ongoing (interventions implemented as appropriate)  Goal: Discharge Needs Assessment  Outcome: Ongoing (interventions implemented as appropriate)    Problem: Dying Patient, Actively (Adult)  Goal: Comfort/Pain Control  Outcome: Ongoing (interventions implemented as appropriate)  Goal: Dying Process, Peace and Dignity  Outcome: Ongoing (interventions implemented as appropriate)    Problem: Fall Risk (Adult)  Goal: Absence of Falls  Outcome: Ongoing (interventions implemented as appropriate)    Problem: Skin Integrity Impairment, Risk/Actual (Adult)  Goal: Skin Integrity/Wound Healing  Outcome: Ongoing (interventions implemented as appropriate)    Problem: Pressure Ulcer Risk (Neal Scale) (Adult,Obstetrics,Pediatric)  Goal: Identify Related Risk Factors and Signs and Symptoms  Outcome: Ongoing (interventions implemented as appropriate)  Goal: Skin Integrity  Outcome: Ongoing (interventions implemented as appropriate)

## 2017-06-08 NOTE — PROGRESS NOTES
"[facility] Visit Report    Emil Pulido  [id number]  6/8/2017    Admission R/T [facility] Dx: yes    Reason for [facility] Admission: Hemoperitoneum    Symptom  Management: Pain Control    Nursing/Medication Recommendations:    Psychosocial Issues and Recommendations: No concerns at this time. Spouse is usually at bedside and is very protective of patient.    Spiritual Concerns and Recommendations:    [facility] Discharge Plans:  No plans to discharge at this time. Patient continues to require IV medications for comfort/pain control. Spouse is aware of the [facility].      Review of Visit (Include All Collaboration- including names of hospital and family involved during admission/visit): Met with patient and spouse at bedside. Patient was awake and participated in conversation. Spouse was preparing to shave him. He stated \"I want to feel better\" when asked how he was doing. Patient states he is eating and drinking. Patient has received IV Robinul .4 x 3 and IV Dilaudid 1 mg yesterday and 2 mg x 2 today. [facility] will continue daily visits to assess needs, assist with discharge planning and provide emotional support.       Frantz Vidal [facility] Clinical     "

## 2017-06-08 NOTE — PLAN OF CARE
Problem: Patient Care Overview (Adult)  Goal: Plan of Care Review  Outcome: Ongoing (interventions implemented as appropriate)    06/07/17 3414   Coping/Psychosocial Response Interventions   Plan Of Care Reviewed With patient;spouse   Patient Care Overview   Progress no change   Outcome Evaluation   Outcome Summary/Follow up Plan Wife at bedside today, assisted in pt care. Pt was bathed this shift. F/c stat lock not placed due to pt's wishes, instead tube is held in place with kerlex. Medicated with robinul and dilaudid x2. Dilaudid increased to 2mg. Wife does not want pt medicated until she asks the RN to do so to avoid pt sleeping too much. No BM today, though pt tried on bedpan. Pt was SOA this evening, 2L placed, pt removed after short time. Will continue to provide comfort care.       Goal: Adult Individualization and Mutuality  Outcome: Ongoing (interventions implemented as appropriate)  Goal: Discharge Needs Assessment  Outcome: Ongoing (interventions implemented as appropriate)    Problem: Dying Patient, Actively (Adult)  Goal: Comfort/Pain Control  Outcome: Ongoing (interventions implemented as appropriate)  Goal: Dying Process, Peace and Dignity  Outcome: Ongoing (interventions implemented as appropriate)    Problem: Fall Risk (Adult)  Goal: Absence of Falls  Outcome: Ongoing (interventions implemented as appropriate)    Problem: Skin Integrity Impairment, Risk/Actual (Adult)  Goal: Skin Integrity/Wound Healing  Outcome: Ongoing (interventions implemented as appropriate)    Problem: Pressure Ulcer Risk (Neal Scale) (Adult,Obstetrics,Pediatric)  Goal: Identify Related Risk Factors and Signs and Symptoms  Outcome: Ongoing (interventions implemented as appropriate)  Goal: Skin Integrity  Outcome: Ongoing (interventions implemented as appropriate)

## 2017-06-08 NOTE — PLAN OF CARE
Problem: Patient Care Overview (Adult)  Goal: Plan of Care Review  Outcome: Ongoing (interventions implemented as appropriate)    06/07/17 2158 06/08/17 0325   Coping/Psychosocial Response Interventions   Plan Of Care Reviewed With patient;spouse --    Patient Care Overview   Progress no change --    Outcome Evaluation   Outcome Summary/Follow up Plan --  Maintained comfort measures per palliatve care protocol. Patient's wife refused PRN meds. She stated that she will let me know when patient needs to be medicated. Patient slept all night. Will continue to monitor vital signs and comfort.       Goal: Adult Individualization and Mutuality  Outcome: Ongoing (interventions implemented as appropriate)  Goal: Discharge Needs Assessment  Outcome: Ongoing (interventions implemented as appropriate)    Problem: Dying Patient, Actively (Adult)  Goal: Comfort/Pain Control  Outcome: Ongoing (interventions implemented as appropriate)  Goal: Dying Process, Peace and Dignity  Outcome: Ongoing (interventions implemented as appropriate)    Problem: Fall Risk (Adult)  Goal: Absence of Falls  Outcome: Ongoing (interventions implemented as appropriate)    Problem: Skin Integrity Impairment, Risk/Actual (Adult)  Goal: Skin Integrity/Wound Healing  Outcome: Ongoing (interventions implemented as appropriate)    Problem: Pressure Ulcer Risk (Neal Scale) (Adult,Obstetrics,Pediatric)  Goal: Identify Related Risk Factors and Signs and Symptoms  Outcome: Ongoing (interventions implemented as appropriate)  Goal: Skin Integrity  Outcome: Ongoing (interventions implemented as appropriate)
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
## 2017-06-09 NOTE — PLAN OF CARE
Problem: Patient Care Overview (Adult)  Goal: Plan of Care Review  Outcome: Ongoing (interventions implemented as appropriate)    06/09/17 0529   Coping/Psychosocial Response Interventions   Plan Of Care Reviewed With patient;spouse   Patient Care Overview   Progress no change   Outcome Evaluation   Outcome Summary/Follow up Plan Spouse at bedside, very hands on with pt care and assistive to staff. PRN meds x1 per wife request. Pt appeares to have slept well through NOC. Continue to monitor and tx per POC and MD orders.        Goal: Adult Individualization and Mutuality  Outcome: Outcome(s) achieved Date Met:  06/09/17  Goal: Discharge Needs Assessment  Outcome: Outcome(s) achieved Date Met:  06/09/17    Problem: Dying Patient, Actively (Adult)  Goal: Comfort/Pain Control  Outcome: Ongoing (interventions implemented as appropriate)  Goal: Dying Process, Peace and Dignity  Outcome: Ongoing (interventions implemented as appropriate)    Problem: Skin Integrity Impairment, Risk/Actual (Adult)  Goal: Skin Integrity/Wound Healing  Outcome: Ongoing (interventions implemented as appropriate)    Problem: Pressure Ulcer Risk (Neal Scale) (Adult,Obstetrics,Pediatric)  Goal: Identify Related Risk Factors and Signs and Symptoms  Outcome: Outcome(s) achieved Date Met:  06/09/17  Goal: Skin Integrity  Outcome: Ongoing (interventions implemented as appropriate)
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
## 2017-06-09 NOTE — PROGRESS NOTES
"[facility] Visit Report    Emil Pulido  [id number]  6/9/2017    Admission R/T [facility] Dx: yes    Reason for [facility] Admission:  Hemoperitoneum    Symptom  Management: Pain Control and congestions    Nursing/Medication Recommendations:  No recommendations at this time.    Psychosocial Issues and Recommendations:  Continue to offer/provide support to patient and family.    Spiritual Concerns and Recommendations:    [facility] Discharge Plans:  incomplete; patient remains [facility] and [facility] will round daily      Review of Visit (Include All Collaboration- including names of hospital and family involved during admission/visit):  Collaborated with [facility] staff, MAGIGE Castañeda and SOPHIA Martinez.  No issues or concerns reported and no plans for discharge/transfer at this time.  Noted pt has received PO MOM for the last several days.  Maddy reports last BM 6/2, he has had no results from MOM, pt is not eating much, maybe a few bites.  He remains on PO Diflucan and in last 24 hrs he has received IV Dilaudid 2mg x2 and IV Robinul 0.4mg x2.  Pt is awake and alert, Juliet (wife) is at bedside feeding pt his lunch tray.  He voiced to her he does not want anything further.  He did eat a few bites of apples.  Inquired if pt was comfortable, he states \"it could be better, but it could be a lot worse\".  Juliet states \"we're doing fine\", she denies any questions/concerns/needs at this time.  Encouraged her to contact [facility] 24/7 or notified [facility] staff with any questions/concerns/needs arise.  Will continue to visit daily to assess needs, offer support, and facilitate discharge/transfer as appropriate.    Jacquie Nye RN    "

## 2017-06-09 NOTE — PLAN OF CARE
Problem: Patient Care Overview (Adult)  Goal: Plan of Care Review  Outcome: Ongoing (interventions implemented as appropriate)    06/09/17 0622   Coping/Psychosocial Response Interventions   Plan Of Care Reviewed With patient   Patient Care Overview   Progress no change   Outcome Evaluation   Outcome Summary/Follow up Plan Pt's wife remains at bedside & reports that neither she or pt need anything, PRN meds admin once, pt slept throughout day, pt only c/o constipation suppository admin         Problem: Dying Patient, Actively (Adult)  Goal: Comfort/Pain Control  Outcome: Ongoing (interventions implemented as appropriate)  Goal: Dying Process, Peace and Dignity  Outcome: Ongoing (interventions implemented as appropriate)    Problem: Skin Integrity Impairment, Risk/Actual (Adult)  Goal: Skin Integrity/Wound Healing  Outcome: Ongoing (interventions implemented as appropriate)    Problem: Pressure Ulcer Risk (Neal Scale) (Adult,Obstetrics,Pediatric)  Goal: Skin Integrity  Outcome: Ongoing (interventions implemented as appropriate)

## 2017-06-10 NOTE — PLAN OF CARE
Problem: Patient Care Overview (Adult)  Goal: Plan of Care Review  Outcome: Ongoing (interventions implemented as appropriate)    06/09/17 1805 06/10/17 0250   Coping/Psychosocial Response Interventions   Plan Of Care Reviewed With --  patient;spouse   Patient Care Overview   Progress --  no change   Outcome Evaluation   Outcome Summary/Follow up Plan Pt's wife remains at bedside & reports that neither she or pt need anything, PRN meds admin once, pt slept throughout day, pt only c/o constipation suppository admin --          Problem: Dying Patient, Actively (Adult)  Goal: Comfort/Pain Control  Outcome: Ongoing (interventions implemented as appropriate)  Goal: Dying Process, Peace and Dignity  Outcome: Ongoing (interventions implemented as appropriate)    Problem: Skin Integrity Impairment, Risk/Actual (Adult)  Goal: Skin Integrity/Wound Healing  Outcome: Ongoing (interventions implemented as appropriate)    Problem: Pressure Ulcer Risk (Neal Scale) (Adult,Obstetrics,Pediatric)  Goal: Skin Integrity  Outcome: Ongoing (interventions implemented as appropriate)

## 2017-06-10 NOTE — PLAN OF CARE
Problem: Patient Care Overview (Adult)  Goal: Plan of Care Review  Outcome: Ongoing (interventions implemented as appropriate)    06/10/17 1706   Coping/Psychosocial Response Interventions   Plan Of Care Reviewed With patient;family   Patient Care Overview   Progress no change   Outcome Evaluation   Outcome Summary/Follow up Plan Pt slept most of the day, but arouses easily. Pt has had no complaints of pain today, eating/drinking small amounts. Family at bsd.         Problem: Dying Patient, Actively (Adult)  Goal: Comfort/Pain Control  Outcome: Ongoing (interventions implemented as appropriate)  Goal: Dying Process, Peace and Dignity  Outcome: Ongoing (interventions implemented as appropriate)    Problem: Fall Risk (Adult)  Goal: Identify Related Risk Factors and Signs and Symptoms  Outcome: Outcome(s) achieved Date Met:  06/10/17  Goal: Absence of Falls  Outcome: Ongoing (interventions implemented as appropriate)    Problem: Pressure Ulcer Risk (Neal Scale) (Adult,Obstetrics,Pediatric)  Goal: Skin Integrity  Outcome: Ongoing (interventions implemented as appropriate)

## 2017-06-11 NOTE — PROGRESS NOTES
[facility] Visit Report    Emil Pulido  [id number]  6/11/2017    Admission [facility]T [facility] Dx: YES      Reason for [facility] Admission: Hemoperitoneum      Symptom  Management: Pain control/congestion      Nursing/Medication Recommendations: No recommendations at this time      Psychosocial Issues and Recommendations: Provide support to patient and family      Spiritual Concerns and Recommendations: None at present      [facility] Discharge Plans:  None at present, patient improving per MD note and may restart some of his cardiac meds, per Nakita Barakat will evaluate on Monday for possible placement if patient remains stable, currently receiving IV medications for comfort and is GIP appropriate.      Review of Visit: Close monitoring for safety/falls, symptom management of pain/congestion, comfort care. Patient lying in bed, awake, alert, has not eaten today yet but expressed some interest in lunch tray and daughter will assist. Daughter reports patient ate a hamburger. Patient continues with scrotal and LE edema and stated pain controlled. Discussed patient status with daughter out of the room and patient appears to be improving. MD note states may restart some cardiac meds per patient's request. Will do labs in the am. Daughter verbalized that they cannot take patient home and if he has to leave [facility] they would like for him to go to the [facility]. We discussed the criteria to be admitted there and patient is receiving IV medications for comfort and will re-evaluate tomorrow for possible transfer there. She voiced that they do not want to send patient to a nursing home.  Emotional support provided. Spoke to staff MAGGIE Reyna regarding patient care needs and patient has received IV Dilaudid 2mg x 1 dose, IV Dilaudid 1mg x 1 dose and IV Robinul 0.2mg x 1 dose since midnight. Will continue to see daily to assess needs, monitor status, offer support and facilitate transfer to [facility].      Priti Maldonado, MAGGIE  [facility]  Visit Nurse

## 2017-06-11 NOTE — PLAN OF CARE
Problem: Patient Care Overview (Adult)  Goal: Plan of Care Review  Outcome: Ongoing (interventions implemented as appropriate)    06/11/17 0331   Coping/Psychosocial Response Interventions   Plan Of Care Reviewed With patient   Patient Care Overview   Progress no change   Outcome Evaluation   Outcome Summary/Follow up Plan Patient rested comfortably this shift with no complaints; medicated x1 with dilaudid and robinul prior to wound care. Will continue to assess for ongoing symptom managment needs         Problem: Dying Patient, Actively (Adult)  Goal: Comfort/Pain Control  Outcome: Ongoing (interventions implemented as appropriate)  Goal: Dying Process, Peace and Dignity  Outcome: Ongoing (interventions implemented as appropriate)    Problem: Fall Risk (Adult)  Goal: Absence of Falls  Outcome: Ongoing (interventions implemented as appropriate)    Problem: Pressure Ulcer Risk (Neal Scale) (Adult,Obstetrics,Pediatric)  Goal: Skin Integrity  Outcome: Ongoing (interventions implemented as appropriate)

## 2017-06-11 NOTE — PLAN OF CARE
Problem: Patient Care Overview (Adult)  Goal: Plan of Care Review  Outcome: Ongoing (interventions implemented as appropriate)    06/11/17 2410   Coping/Psychosocial Response Interventions   Plan Of Care Reviewed With patient   Patient Care Overview   Progress no change   Outcome Evaluation   Outcome Summary/Follow up Plan hospice nurse met with pt and family today, pt still very excoriated and swollen. MD restarted coreg and bumex. will continue to monitor symptoms.         Problem: Dying Patient, Actively (Adult)  Goal: Comfort/Pain Control  Outcome: Ongoing (interventions implemented as appropriate)  Goal: Dying Process, Peace and Dignity  Outcome: Ongoing (interventions implemented as appropriate)    Problem: Fall Risk (Adult)  Goal: Absence of Falls  Outcome: Ongoing (interventions implemented as appropriate)    Problem: Pressure Ulcer Risk (Neal Scale) (Adult,Obstetrics,Pediatric)  Goal: Skin Integrity  Outcome: Ongoing (interventions implemented as appropriate)
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
## 2017-06-12 NOTE — PLAN OF CARE
Problem: Patient Care Overview (Adult)  Goal: Plan of Care Review  Outcome: Ongoing (interventions implemented as appropriate)    06/12/17 2573   Coping/Psychosocial Response Interventions   Plan Of Care Reviewed With patient;spouse   Patient Care Overview   Progress progress toward functional goals as expected   Outcome Evaluation   Outcome Summary/Follow up Plan Pt has rested well today, possible DC tomorrow to [facility] for rehab. Repeat xray of left shoulder/elbow today per wife request. Medicated x1 for comfort. Will continue to monitor per palliative protocol.         Problem: Dying Patient, Actively (Adult)  Goal: Comfort/Pain Control  Outcome: Ongoing (interventions implemented as appropriate)  Goal: Dying Process, Peace and Dignity  Outcome: Ongoing (interventions implemented as appropriate)    Problem: Fall Risk (Adult)  Goal: Absence of Falls  Outcome: Ongoing (interventions implemented as appropriate)    Problem: Pressure Ulcer Risk (Neal Scale) (Adult,Obstetrics,Pediatric)  Goal: Skin Integrity  Outcome: Ongoing (interventions implemented as appropriate)

## 2017-06-12 NOTE — SIGNIFICANT NOTE
"   06/12/17 1532   Rehab Treatment   Treatment Not Performed (Per wife and RN, pt would not be able to tolerate sitting right now and therefore is not appropriate for PT.  WIfe seems to now be considering home with hospice rather than rehab. CCP notified and will talk with wife. PT will follow up 6/13.)   Rehab Evaluation   Evaluation Not Performed other (see comments)  (At baseline pt transfers to w/c with heavy assistance from wife. He is unable to stand, but she helps him \"bounce\" over to the w/c by pulling on his belt. RN expressed concern for protecting pt's edematous scrotum. )   Recommendation   PT - Next Appointment 06/13/17     "

## 2017-06-12 NOTE — PROGRESS NOTES
Discharge Planning Assessment  [facility]     Patient Name: Emil Pulido  MRN: [id number]  Today's Date: 6/12/2017    Admit Date: 6/2/2017          Discharge Needs Assessment     None            Discharge Plan       06/12/17 4177    Case Management/Social Work Plan    Additional Comments Discharge plan is for respite care at [facility] for five days then home with [facility] vs. Inpatient unit downto after Imani/RN/[facility] evaluates tomorrow. TOM Barakat RN, CCP.         Discharge Placement     Facility/Agency Request Status Selected? Address Phone Number Fax Number    [facility] Accepted     [address] [address] [phone]                Demographic Summary     None            Functional Status     None            Psychosocial     None            Abuse/Neglect     None            Legal     None            Substance Abuse     None            Patient Forms     None          Padmini Barakat, RN

## 2017-06-12 NOTE — SIGNIFICANT NOTE
06/12/17 1540   Rehab Treatment   Discipline occupational therapist   Rehab Evaluation   Evaluation Not Performed other (see comments)  (Discussion with PT, pt and family unsure of d/c plan. Possibly rehab and possibly home with hospice, sounds like pts baseline is fairly dependent on family for assist. Will follow up tomorrow with plan for therpay.)   Recommendation   OT - Next Appointment 06/13/17
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
## 2017-06-12 NOTE — PROGRESS NOTES
Discharge Planning Assessment  [facility]     Patient Name: Emil Pulido  MRN: [id number]  Today's Date: 6/12/2017    Admit Date: 6/2/2017          Discharge Needs Assessment     None            Discharge Plan       06/12/17 1112    Case Management/Social Work Plan    Additional Comments Spoke with the patient's spouse and she states she would like for him to go to [facility], [facility] bed. Called Jackie with [facility] and she will evalute for a skilled bed and the discharge being tomorrow. After he has been at [facility] then the discharge plan will be home with [facility] and the patient will need DME at that time. TOM Barakat RN, CCP.       06/12/17 1048    Case Management/Social Work Plan    Additional Comments Per MD the patient will need to be transferred to a SNU for physical therapy. TOM Barakat RN, CCP.         Discharge Placement     Facility/Agency Request Status Selected? Address Phone Number Fax Number    [facility] Pending - Request Sent     [address] [facility] [place] [address] [phone]                Demographic Summary     None            Functional Status     None            Psychosocial     None            Abuse/Neglect     None            Legal     None            Substance Abuse     None            Patient Forms     None          Padmini Barakat RN

## 2017-06-12 NOTE — PROGRESS NOTES
[facility] Visit Report    Emil Pulido  [id number]  6/12/2017    Admission R/T [facility] Dx: yes    Reason for [facility] Admission:    Symptom  Management: Pain Control    Nursing/Medication Recommendations:    Psychosocial Issues and Recommendations:    Spiritual Concerns and Recommendations:    [facility] Discharge Plans:  incomplete; patient remains [facility] and [facility] will round daily      Review of Visit (Include All Collaboration- including names of hospital and family involved during admission/visit):patient resting in bed with side rials up call light in reach. famly outside the room at this time. patient awake alert and oriented. denies pain at this time. patient recieved robinul 0.2mg iv x1, robinul 0.4mg iv x1 and dilaudid 1mg iv x2 for symptom management. patient cont to recieve coreg and bumex. plan to discharge to [facility] for skilled rehab in am. explained to family that this is outside the [facility] plan of care. family verbalzied understanding. will cont to visit daily to assess needs and offer support.      Imani Waterman RN

## 2017-06-12 NOTE — SIGNIFICANT NOTE
06/12/17 1333   Rehab Treatment   Discipline physical therapist   Rehab Evaluation   Evaluation Not Performed patient unavailable for evaluation  (on bed pan. Will follow up later today)   Recommendation   PT - Next Appointment 06/12/17

## 2017-06-12 NOTE — DISCHARGE PLACEMENT REQUEST
"Laura Stauffer (72 y.o. Male)     Date of Birth Social Security Number Address Home Phone MRN    1945  [id number] [id number] [phone] [id number]    [employer] Mar[employer] Status          Unknown        Admission Date Admission Type Admitting Provider Attending Provider Department, Room/Bed    6/2/17 Elective Esterle, MD Niurka Thomas, Bran ABRAMS MD [address], P487/1    Discharge Date Discharge Disposition Discharge Destination                      Attending Provider: Bran Bah MD     Allergies:  Morphine, Morphine And Related    Isolation:  None   Infection:  None   Code Status:  Comfort [place]    Ht:  74\" (188 cm)   Wt:  304 lb (138 kg)    Admission Cmt:  None   Principal Problem:  None                Active Insurance as of 6/2/2017     Primary Coverage     Payor Plan Insurance Group Employer/Plan Group    [facility]      Payor Plan Address Payor Plan Phone Number Effective From Effective To    [address] [phone] 6/2/2017     [place] [address]       Subscriber Name Subscriber Birth Date Member ID       LAURA STAUFFER 1945 [id number]                 Emergency Contacts      (Rel.) Home Phone Work Phone Mobile Phone    AshokJuliet (Spouse) [phone] -- --              "

## 2017-06-13 NOTE — SIGNIFICANT NOTE
06/13/17 1101   Rehab Treatment   Discipline physical therapist   Rehab Evaluation   Evaluation Not Performed (PLans for respite care then [facility]. Per CCP, PT will sign off.)
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
## 2017-06-13 NOTE — PROGRESS NOTES
\[facility]\"" Visit Report    Emil Pulido  [id number]  6/13/2017    Admission R/T [facility] Dx: yes    Reason for [facility] Admission: Hemoperitoneum    Symptom  Management: Pain Control/Congestion    Nursing/Medication Recommendations:    Psychosocial Issues and Recommendations: Patient's spouse is very protective of him    Spiritual Concerns and Recommendations:    \[facility]\"" Discharge Plans:  [facility] is on [facility] waiting list. When transitioned to SL medications, home with \[facility]\"" following      Review of Visit (Include All Collaboration- including names of hospital and family involved during admission/visit): Met with patient and spouse at bedside x 2. Patient's color has changed to yellow since I saw him last week. He remains alert and oriented although very lethargic. Patient has urine output but it is orange in color. I discussed discharge options with spouse and patient. Spouse would like for patient to go to the [facility] rather than for respite care and then home at this time. The goal is for patient to return home when stabilized on oral or SL medications. Patient has pain from the scrotal edema. VS: T 97.6; HR 84; RR 18; /53. Patient has received IV Robinul x 3 and IV dilaudid 1 mg x 3 today. Primary physician is requesting a urology consult to reassess patient's kamara regarding discomfort. Primary physician has also ordered a vascular consult to assess potential benefit from an inferior vena cava filter.  [facility] is on [facility] waiting list. \[facility]\"" will continue daily visits to assess needs and provide emotional support.      Frantz Vidal, [facility]   \[facility]\"" Clinical

## 2017-06-13 NOTE — PROGRESS NOTES
LOS: 11 days   Patient Care Team:  Marcus Avery MD as PCP - General (Internal Medicine)  Marcus Avery MD as PCP - Family Medicine  Ilya Ambrocio MD as Consulting Physician (Hematology and Oncology)  Willy Bell MD as Referring Physician (Internal Medicine)    Chief Complaint:  Catheter irritation.    Subjective     Interval History:     Patient Complaints: Infection at catheter site.  Patient Denies:    History taken from: patient family    Review of Systems:    Review of systems could not be obtained due to  patient confusion.    Objective     Vital Signs  Temp:  [97.6 °F (36.4 °C)] 97.6 °F (36.4 °C)  Heart Rate:  [84-87] 84  Resp:  [18] 18  BP: (112)/(53) 112/53    Physical Exam:   Erythema, purulence at meatus, balanitis.     Results Review:     None    Medication Review:     Assessment/Plan     Active Problems:    Admission for hospice care      Balanitis. Cath in place draining well.  Diflucan 100mg po QD x 5 days.    Plan for disposition:    Mark Franco MD  06/13/17  5:39 PM      Time:

## 2017-06-13 NOTE — PLAN OF CARE
Problem: Patient Care Overview (Adult)  Goal: Plan of Care Review  Outcome: Ongoing (interventions implemented as appropriate)  Continue symptom management and comfort care.    06/12/17 1731 06/12/17 2247 06/13/17 0343   Coping/Psychosocial Response Interventions   Plan Of Care Reviewed With --  patient --    Patient Care Overview   Progress progress toward functional goals as expected --  --    Outcome Evaluation   Outcome Summary/Follow up Plan --  --  Medicated once prior to skin care. Wife has numerous concerns about potential DC. Patient rested well overnight.         Problem: Dying Patient, Actively (Adult)  Goal: Comfort/Pain Control  Outcome: Ongoing (interventions implemented as appropriate)  Goal: Dying Process, Peace and Dignity  Outcome: Ongoing (interventions implemented as appropriate)    Problem: Fall Risk (Adult)  Goal: Absence of Falls  Outcome: Ongoing (interventions implemented as appropriate)    Problem: Pressure Ulcer Risk (Neal Scale) (Adult,Obstetrics,Pediatric)  Goal: Skin Integrity  Outcome: Ongoing (interventions implemented as appropriate)

## 2017-06-13 NOTE — PLAN OF CARE
Problem: Patient Care Overview (Adult)  Goal: Plan of Care Review  Outcome: Ongoing (interventions implemented as appropriate)    Problem: Dying Patient, Actively (Adult)  Goal: Comfort/Pain Control  Outcome: Ongoing (interventions implemented as appropriate)  Goal: Dying Process, Peace and Dignity  Outcome: Ongoing (interventions implemented as appropriate)    Problem: Fall Risk (Adult)  Goal: Absence of Falls  Outcome: Ongoing (interventions implemented as appropriate)    Problem: Pressure Ulcer Risk (Neal Scale) (Adult,Obstetrics,Pediatric)  Goal: Skin Integrity  Outcome: Ongoing (interventions implemented as appropriate)

## 2017-06-13 NOTE — SIGNIFICANT NOTE
06/13/17 1244   Rehab Treatment   Discipline occupational therapist   Rehab Evaluation   Evaluation Not Performed other (see comments)  (Per pt note pt to go with [facility], therapies to sign off at this time.)
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
## 2017-06-14 NOTE — PROGRESS NOTES
Discharge Planning Assessment  [facility]     Patient Name: Emil Pulido  MRN: [id number]  Today's Date: 6/14/2017    Admit Date: 6/2/2017          Discharge Needs Assessment     None            Discharge Plan       06/14/17 1250    Final Note    Final Note The patient is being transferred to the inpatient unit of [facility] for palliative/comfort care, by [facility]. TOM Barakat RN CCP        Discharge Placement     Facility/Agency Request Status Selected? Address Phone Number Fax Number    [facility] INP UNIT Accepted    [address] [address] [phone]    [facility] Accepted     [address] [id number] [phone]        Expected Discharge Date and Time     Expected Discharge Date Expected Discharge Time    Jun 14, 2017               Demographic Summary     None            Functional Status     None            Psychosocial     None            Abuse/Neglect     None            Legal     None            Substance Abuse     None            Patient Forms     None          Padmini Barakat, RN

## 2017-06-14 NOTE — PLAN OF CARE
Problem: Patient Care Overview (Adult)  Goal: Plan of Care Review  Outcome: Ongoing (interventions implemented as appropriate)  Continue symptom management and comfort care.    06/12/17 1731 06/13/17 2249 06/14/17 0400   Coping/Psychosocial Response Interventions   Plan Of Care Reviewed With --  patient;spouse --    Patient Care Overview   Progress progress toward functional goals as expected --  --    Outcome Evaluation   Outcome Summary/Follow up Plan --  --  Possible discharge in AM. Pt rested well overnight. No S&S of pain or anxiety. Wife at bedside.         Problem: Dying Patient, Actively (Adult)  Goal: Comfort/Pain Control  Outcome: Ongoing (interventions implemented as appropriate)  Goal: Dying Process, Peace and Dignity  Outcome: Ongoing (interventions implemented as appropriate)    Problem: Fall Risk (Adult)  Goal: Absence of Falls  Outcome: Ongoing (interventions implemented as appropriate)
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
## 2017-06-14 NOTE — NURSING NOTE
[facility] She is calling back with an ambulance time for transport.

## 2017-06-14 NOTE — NURSING NOTE
Patient's wife refused to let me change Right IJ dressing, she stated if he is to discharge later there isn't a point. She was agreeable to let us do catheter care and clean his chest and right arm near the central line.
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
## 2017-06-21 NOTE — PROGRESS NOTES
Discharge Planning Assessment  [facility]     Patient Name: Emil Pulido  MRN: [id number]  Today's Date: 6/12/2017    Admit Date: 6/2/2017          Discharge Needs Assessment     None            Discharge Plan       06/12/17 1048    Case Management/Social Work Plan    Additional Comments Per MD the patient will need to be transferred to a SNU for physical therapy. TOM Barakat RN, CCP.         Discharge Placement     No information found                Demographic Summary     None            Functional Status     None            Psychosocial     None            Abuse/Neglect     None            Legal     None            Substance Abuse     None            Patient Forms     None          Padmini Barakat, RN     yes no

## 2017-12-26 PROBLEM — R73.9 HYPERGLYCEMIA: Status: ACTIVE | Noted: 2017-01-01

## 2017-12-26 PROBLEM — N49.2 CELLULITIS, SCROTUM: Status: ACTIVE | Noted: 2017-01-01

## 2017-12-26 PROBLEM — J90 PLEURAL EFFUSION ON LEFT: Status: ACTIVE | Noted: 2017-01-01

## 2017-12-27 PROBLEM — Z51.5 ADMISSION FOR HOSPICE CARE: Status: RESOLVED | Noted: 2017-01-01 | Resolved: 2017-01-01

## 2017-12-27 PROBLEM — G82.20 PARAPLEGIA (HCC): Status: ACTIVE | Noted: 2017-01-01

## 2017-12-27 PROBLEM — E87.1 HYPONATREMIA: Status: ACTIVE | Noted: 2017-01-01

## 2017-12-27 PROBLEM — K66.1 INTRAPERITONEAL HEMORRHAGE: Status: RESOLVED | Noted: 2017-05-26 | Resolved: 2017-01-01

## 2017-12-27 PROBLEM — E87.6 HYPOKALEMIA: Status: RESOLVED | Noted: 2017-03-19 | Resolved: 2017-01-01

## 2017-12-28 PROBLEM — A41.9 SEPSIS (HCC): Status: ACTIVE | Noted: 2017-01-01

## 2018-01-01 ENCOUNTER — APPOINTMENT (OUTPATIENT)
Dept: GENERAL RADIOLOGY | Facility: HOSPITAL | Age: 73
End: 2018-01-01

## 2018-01-01 ENCOUNTER — APPOINTMENT (OUTPATIENT)
Dept: ULTRASOUND IMAGING | Facility: HOSPITAL | Age: 73
End: 2018-01-01

## 2018-01-01 ENCOUNTER — APPOINTMENT (OUTPATIENT)
Dept: ULTRASOUND IMAGING | Facility: HOSPITAL | Age: 73
End: 2018-01-01
Attending: HOSPITALIST

## 2018-01-01 VITALS
TEMPERATURE: 98.6 F | BODY MASS INDEX: 22.69 KG/M2 | OXYGEN SATURATION: 96 % | HEART RATE: 96 BPM | HEIGHT: 74 IN | WEIGHT: 176.8 LBS | DIASTOLIC BLOOD PRESSURE: 42 MMHG | RESPIRATION RATE: 15 BRPM | SYSTOLIC BLOOD PRESSURE: 100 MMHG

## 2018-01-01 LAB
ALBUMIN SERPL-MCNC: 1.7 G/DL (ref 3.5–5.2)
ALBUMIN SERPL-MCNC: 2.1 G/DL (ref 3.5–5.2)
ALBUMIN SERPL-MCNC: 2.2 G/DL (ref 3.5–5.2)
ALBUMIN/GLOB SERPL: 0.3 G/DL
ALBUMIN/GLOB SERPL: 0.3 G/DL
ALBUMIN/GLOB SERPL: 0.4 G/DL
ALP SERPL-CCNC: 214 U/L (ref 39–117)
ALP SERPL-CCNC: 221 U/L (ref 39–117)
ALP SERPL-CCNC: 233 U/L (ref 39–117)
ALT SERPL W P-5'-P-CCNC: 7 U/L (ref 1–41)
ALT SERPL W P-5'-P-CCNC: 7 U/L (ref 1–41)
ALT SERPL W P-5'-P-CCNC: 8 U/L (ref 1–41)
AMMONIA BLD-SCNC: 68 UMOL/L (ref 16–60)
ANION GAP SERPL CALCULATED.3IONS-SCNC: 10.3 MMOL/L
ANION GAP SERPL CALCULATED.3IONS-SCNC: 12.2 MMOL/L
ANION GAP SERPL CALCULATED.3IONS-SCNC: 13 MMOL/L
ANION GAP SERPL CALCULATED.3IONS-SCNC: 13.3 MMOL/L
ANION GAP SERPL CALCULATED.3IONS-SCNC: 3.6 MMOL/L
ANION GAP SERPL CALCULATED.3IONS-SCNC: 4.7 MMOL/L
ANION GAP SERPL CALCULATED.3IONS-SCNC: 5.2 MMOL/L
ANION GAP SERPL CALCULATED.3IONS-SCNC: 6.2 MMOL/L
ANION GAP SERPL CALCULATED.3IONS-SCNC: 7.4 MMOL/L
ANION GAP SERPL CALCULATED.3IONS-SCNC: 7.6 MMOL/L
ANION GAP SERPL CALCULATED.3IONS-SCNC: 8.3 MMOL/L
ANION GAP SERPL CALCULATED.3IONS-SCNC: 9.2 MMOL/L
ANION GAP SERPL CALCULATED.3IONS-SCNC: 9.3 MMOL/L
ANION GAP SERPL CALCULATED.3IONS-SCNC: 9.8 MMOL/L
AST SERPL-CCNC: 29 U/L (ref 1–40)
AST SERPL-CCNC: 29 U/L (ref 1–40)
AST SERPL-CCNC: 32 U/L (ref 1–40)
BACTERIA FLD CULT: NORMAL
BACTERIA SPEC ANAEROBE CULT: NORMAL
BASOPHILS # BLD AUTO: 0.02 10*3/MM3 (ref 0–0.2)
BASOPHILS # BLD AUTO: 0.03 10*3/MM3 (ref 0–0.2)
BASOPHILS # BLD AUTO: 0.04 10*3/MM3 (ref 0–0.2)
BASOPHILS # BLD AUTO: 0.05 10*3/MM3 (ref 0–0.2)
BASOPHILS # BLD AUTO: 0.06 10*3/MM3 (ref 0–0.2)
BASOPHILS # BLD AUTO: 0.06 10*3/MM3 (ref 0–0.2)
BASOPHILS # BLD AUTO: 0.07 10*3/MM3 (ref 0–0.2)
BASOPHILS # BLD AUTO: 0.09 10*3/MM3 (ref 0–0.2)
BASOPHILS # BLD AUTO: 0.11 10*3/MM3 (ref 0–0.2)
BASOPHILS NFR BLD AUTO: 0.3 % (ref 0–1.5)
BASOPHILS NFR BLD AUTO: 0.4 % (ref 0–1.5)
BASOPHILS NFR BLD AUTO: 0.5 % (ref 0–1.5)
BASOPHILS NFR BLD AUTO: 0.5 % (ref 0–1.5)
BASOPHILS NFR BLD AUTO: 0.6 % (ref 0–1.5)
BASOPHILS NFR BLD AUTO: 0.7 % (ref 0–1.5)
BASOPHILS NFR BLD AUTO: 0.7 % (ref 0–1.5)
BASOPHILS NFR BLD AUTO: 0.9 % (ref 0–1.5)
BASOPHILS NFR BLD AUTO: 1 % (ref 0–1.5)
BASOPHILS NFR BLD AUTO: 1.1 % (ref 0–1.5)
BASOPHILS NFR BLD AUTO: 1.2 % (ref 0–1.5)
BASOPHILS NFR BLD AUTO: 1.5 % (ref 0–1.5)
BH CV ECHO MEAS - ACS: 2 CM
BH CV ECHO MEAS - AI DEC SLOPE: 393 CM/SEC^2
BH CV ECHO MEAS - AI MAX PG: 62.7 MMHG
BH CV ECHO MEAS - AI MAX VEL: 396 CM/SEC
BH CV ECHO MEAS - AI P1/2T: 295.1 MSEC
BH CV ECHO MEAS - AO MAX PG (FULL): 7.9 MMHG
BH CV ECHO MEAS - AO MAX PG: 10.6 MMHG
BH CV ECHO MEAS - AO MEAN PG (FULL): 4 MMHG
BH CV ECHO MEAS - AO MEAN PG: 5 MMHG
BH CV ECHO MEAS - AO ROOT AREA (BSA CORRECTED): 1.8
BH CV ECHO MEAS - AO ROOT AREA: 11.9 CM^2
BH CV ECHO MEAS - AO ROOT DIAM: 3.9 CM
BH CV ECHO MEAS - AO V2 MAX: 163 CM/SEC
BH CV ECHO MEAS - AO V2 MEAN: 108 CM/SEC
BH CV ECHO MEAS - AO V2 VTI: 29.5 CM
BH CV ECHO MEAS - AVA(I,A): 2.7 CM^2
BH CV ECHO MEAS - AVA(I,D): 2.7 CM^2
BH CV ECHO MEAS - AVA(V,A): 2.5 CM^2
BH CV ECHO MEAS - AVA(V,D): 2.5 CM^2
BH CV ECHO MEAS - BSA(HAYCOCK): 2.2 M^2
BH CV ECHO MEAS - BSA: 2.2 M^2
BH CV ECHO MEAS - BZI_BMI: 26.3 KILOGRAMS/M^2
BH CV ECHO MEAS - BZI_METRIC_HEIGHT: 188 CM
BH CV ECHO MEAS - BZI_METRIC_WEIGHT: 93 KG
BH CV ECHO MEAS - CONTRAST EF (2CH): 48.3 ML/M^2
BH CV ECHO MEAS - CONTRAST EF 4CH: 50 ML/M^2
BH CV ECHO MEAS - EDV(CUBED): 157.5 ML
BH CV ECHO MEAS - EDV(MOD-SP2): 151 ML
BH CV ECHO MEAS - EDV(MOD-SP4): 126 ML
BH CV ECHO MEAS - EDV(TEICH): 141.3 ML
BH CV ECHO MEAS - EF(CUBED): 56.2 %
BH CV ECHO MEAS - EF(MOD-SP2): 48.3 %
BH CV ECHO MEAS - EF(MOD-SP4): 50 %
BH CV ECHO MEAS - EF(TEICH): 47.5 %
BH CV ECHO MEAS - ESV(CUBED): 68.9 ML
BH CV ECHO MEAS - ESV(MOD-SP2): 78 ML
BH CV ECHO MEAS - ESV(MOD-SP4): 63 ML
BH CV ECHO MEAS - ESV(TEICH): 74.2 ML
BH CV ECHO MEAS - FS: 24.1 %
BH CV ECHO MEAS - IVS/LVPW: 1
BH CV ECHO MEAS - IVSD: 0.9 CM
BH CV ECHO MEAS - LAT PEAK E' VEL: 16 CM/SEC
BH CV ECHO MEAS - LV DIASTOLIC VOL/BSA (35-75): 57.4 ML/M^2
BH CV ECHO MEAS - LV MASS(C)D: 180.1 GRAMS
BH CV ECHO MEAS - LV MASS(C)DI: 82 GRAMS/M^2
BH CV ECHO MEAS - LV MAX PG: 2.7 MMHG
BH CV ECHO MEAS - LV MEAN PG: 1 MMHG
BH CV ECHO MEAS - LV SYSTOLIC VOL/BSA (12-30): 28.7 ML/M^2
BH CV ECHO MEAS - LV V1 MAX: 82 CM/SEC
BH CV ECHO MEAS - LV V1 MEAN: 53.6 CM/SEC
BH CV ECHO MEAS - LV V1 VTI: 16.3 CM
BH CV ECHO MEAS - LVIDD: 5.4 CM
BH CV ECHO MEAS - LVIDS: 4.1 CM
BH CV ECHO MEAS - LVLD AP2: 8.7 CM
BH CV ECHO MEAS - LVLD AP4: 8.5 CM
BH CV ECHO MEAS - LVLS AP2: 8 CM
BH CV ECHO MEAS - LVLS AP4: 8 CM
BH CV ECHO MEAS - LVOT AREA (M): 4.9 CM^2
BH CV ECHO MEAS - LVOT AREA: 4.9 CM^2
BH CV ECHO MEAS - LVOT DIAM: 2.5 CM
BH CV ECHO MEAS - LVPWD: 0.9 CM
BH CV ECHO MEAS - MED PEAK E' VEL: 9 CM/SEC
BH CV ECHO MEAS - MV DEC SLOPE: 588 CM/SEC^2
BH CV ECHO MEAS - MV DEC TIME: 0.16 SEC
BH CV ECHO MEAS - MV E MAX VEL: 120 CM/SEC
BH CV ECHO MEAS - MV MAX PG: 6.9 MMHG
BH CV ECHO MEAS - MV MEAN PG: 3 MMHG
BH CV ECHO MEAS - MV P1/2T MAX VEL: 129 CM/SEC
BH CV ECHO MEAS - MV P1/2T: 64.3 MSEC
BH CV ECHO MEAS - MV V2 MAX: 131 CM/SEC
BH CV ECHO MEAS - MV V2 MEAN: 71.6 CM/SEC
BH CV ECHO MEAS - MV V2 VTI: 22.1 CM
BH CV ECHO MEAS - MVA P1/2T LCG: 1.7 CM^2
BH CV ECHO MEAS - MVA(P1/2T): 3.4 CM^2
BH CV ECHO MEAS - MVA(VTI): 3.6 CM^2
BH CV ECHO MEAS - PA ACC TIME: 0.14 SEC
BH CV ECHO MEAS - PA MAX PG (FULL): 3.7 MMHG
BH CV ECHO MEAS - PA MAX PG: 5.7 MMHG
BH CV ECHO MEAS - PA PR(ACCEL): 15.6 MMHG
BH CV ECHO MEAS - PA V2 MAX: 119 CM/SEC
BH CV ECHO MEAS - PULM DIAS VEL: 73.1 CM/SEC
BH CV ECHO MEAS - PULM S/D: 0.35
BH CV ECHO MEAS - PULM SYS VEL: 25.7 CM/SEC
BH CV ECHO MEAS - PVA(V,A): 3.2 CM^2
BH CV ECHO MEAS - PVA(V,D): 3.2 CM^2
BH CV ECHO MEAS - QP/QS: 0.7
BH CV ECHO MEAS - RAP SYSTOLE: 8 MMHG
BH CV ECHO MEAS - RV MAX PG: 2 MMHG
BH CV ECHO MEAS - RV MEAN PG: 1 MMHG
BH CV ECHO MEAS - RV V1 MAX: 70.9 CM/SEC
BH CV ECHO MEAS - RV V1 MEAN: 43.5 CM/SEC
BH CV ECHO MEAS - RV V1 VTI: 10.5 CM
BH CV ECHO MEAS - RVOT AREA: 5.3 CM^2
BH CV ECHO MEAS - RVOT DIAM: 2.6 CM
BH CV ECHO MEAS - RVSP: 34 MMHG
BH CV ECHO MEAS - SI(AO): 160.5 ML/M^2
BH CV ECHO MEAS - SI(CUBED): 40.3 ML/M^2
BH CV ECHO MEAS - SI(LVOT): 36.4 ML/M^2
BH CV ECHO MEAS - SI(MOD-SP2): 33.2 ML/M^2
BH CV ECHO MEAS - SI(MOD-SP4): 28.7 ML/M^2
BH CV ECHO MEAS - SI(TEICH): 30.6 ML/M^2
BH CV ECHO MEAS - SV(AO): 352.4 ML
BH CV ECHO MEAS - SV(CUBED): 88.5 ML
BH CV ECHO MEAS - SV(LVOT): 80 ML
BH CV ECHO MEAS - SV(MOD-SP2): 73 ML
BH CV ECHO MEAS - SV(MOD-SP4): 63 ML
BH CV ECHO MEAS - SV(RVOT): 55.7 ML
BH CV ECHO MEAS - SV(TEICH): 67.1 ML
BH CV ECHO MEAS - TAPSE (>1.6): 1.9 CM2
BH CV ECHO MEAS - TR MAX VEL: 255 CM/SEC
BH CV VAS BP RIGHT ARM: NORMAL MMHG
BH CV XLRA - RV BASE: 5 CM
BH CV XLRA - TDI S': 10 CM/SEC
BILIRUB SERPL-MCNC: 3.3 MG/DL (ref 0.1–1.2)
BILIRUB SERPL-MCNC: 3.8 MG/DL (ref 0.1–1.2)
BILIRUB SERPL-MCNC: 3.9 MG/DL (ref 0.1–1.2)
BUN BLD-MCNC: 20 MG/DL (ref 8–23)
BUN BLD-MCNC: 21 MG/DL (ref 8–23)
BUN BLD-MCNC: 23 MG/DL (ref 8–23)
BUN BLD-MCNC: 25 MG/DL (ref 8–23)
BUN BLD-MCNC: 27 MG/DL (ref 8–23)
BUN BLD-MCNC: 27 MG/DL (ref 8–23)
BUN BLD-MCNC: 28 MG/DL (ref 8–23)
BUN BLD-MCNC: 32 MG/DL (ref 8–23)
BUN BLD-MCNC: 37 MG/DL (ref 8–23)
BUN BLD-MCNC: 40 MG/DL (ref 8–23)
BUN BLD-MCNC: 41 MG/DL (ref 8–23)
BUN BLD-MCNC: 42 MG/DL (ref 8–23)
BUN BLD-MCNC: 43 MG/DL (ref 8–23)
BUN BLD-MCNC: 43 MG/DL (ref 8–23)
BUN/CREAT SERPL: 24.7 (ref 7–25)
BUN/CREAT SERPL: 25.6 (ref 7–25)
BUN/CREAT SERPL: 26.7 (ref 7–25)
BUN/CREAT SERPL: 28.4 (ref 7–25)
BUN/CREAT SERPL: 29 (ref 7–25)
BUN/CREAT SERPL: 30.5 (ref 7–25)
BUN/CREAT SERPL: 31.8 (ref 7–25)
BUN/CREAT SERPL: 33.3 (ref 7–25)
BUN/CREAT SERPL: 39.4 (ref 7–25)
BUN/CREAT SERPL: 41 (ref 7–25)
BUN/CREAT SERPL: 41 (ref 7–25)
BUN/CREAT SERPL: 41.2 (ref 7–25)
BUN/CREAT SERPL: 41.3 (ref 7–25)
BUN/CREAT SERPL: 46.7 (ref 7–25)
CALCIUM SPEC-SCNC: 7.7 MG/DL (ref 8.6–10.5)
CALCIUM SPEC-SCNC: 7.8 MG/DL (ref 8.6–10.5)
CALCIUM SPEC-SCNC: 7.9 MG/DL (ref 8.6–10.5)
CALCIUM SPEC-SCNC: 8 MG/DL (ref 8.6–10.5)
CALCIUM SPEC-SCNC: 8.1 MG/DL (ref 8.6–10.5)
CALCIUM SPEC-SCNC: 8.2 MG/DL (ref 8.6–10.5)
CALCIUM SPEC-SCNC: 8.3 MG/DL (ref 8.6–10.5)
CALCIUM SPEC-SCNC: 8.4 MG/DL (ref 8.6–10.5)
CHLORIDE SERPL-SCNC: 88 MMOL/L (ref 98–107)
CHLORIDE SERPL-SCNC: 91 MMOL/L (ref 98–107)
CHLORIDE SERPL-SCNC: 92 MMOL/L (ref 98–107)
CHLORIDE SERPL-SCNC: 93 MMOL/L (ref 98–107)
CHLORIDE SERPL-SCNC: 94 MMOL/L (ref 98–107)
CHLORIDE SERPL-SCNC: 97 MMOL/L (ref 98–107)
CHLORIDE SERPL-SCNC: 97 MMOL/L (ref 98–107)
CO2 SERPL-SCNC: 28 MMOL/L (ref 22–29)
CO2 SERPL-SCNC: 28.7 MMOL/L (ref 22–29)
CO2 SERPL-SCNC: 28.8 MMOL/L (ref 22–29)
CO2 SERPL-SCNC: 29.7 MMOL/L (ref 22–29)
CO2 SERPL-SCNC: 29.8 MMOL/L (ref 22–29)
CO2 SERPL-SCNC: 30.2 MMOL/L (ref 22–29)
CO2 SERPL-SCNC: 30.6 MMOL/L (ref 22–29)
CO2 SERPL-SCNC: 30.7 MMOL/L (ref 22–29)
CO2 SERPL-SCNC: 31.7 MMOL/L (ref 22–29)
CO2 SERPL-SCNC: 32.4 MMOL/L (ref 22–29)
CO2 SERPL-SCNC: 33.3 MMOL/L (ref 22–29)
CO2 SERPL-SCNC: 33.8 MMOL/L (ref 22–29)
CO2 SERPL-SCNC: 34.4 MMOL/L (ref 22–29)
CO2 SERPL-SCNC: 34.8 MMOL/L (ref 22–29)
CREAT BLD-MCNC: 0.81 MG/DL (ref 0.76–1.27)
CREAT BLD-MCNC: 0.82 MG/DL (ref 0.76–1.27)
CREAT BLD-MCNC: 0.84 MG/DL (ref 0.76–1.27)
CREAT BLD-MCNC: 0.85 MG/DL (ref 0.76–1.27)
CREAT BLD-MCNC: 0.86 MG/DL (ref 0.76–1.27)
CREAT BLD-MCNC: 0.88 MG/DL (ref 0.76–1.27)
CREAT BLD-MCNC: 0.9 MG/DL (ref 0.76–1.27)
CREAT BLD-MCNC: 0.93 MG/DL (ref 0.76–1.27)
CREAT BLD-MCNC: 0.94 MG/DL (ref 0.76–1.27)
CREAT BLD-MCNC: 0.97 MG/DL (ref 0.76–1.27)
CREAT BLD-MCNC: 1 MG/DL (ref 0.76–1.27)
CREAT BLD-MCNC: 1.04 MG/DL (ref 0.76–1.27)
CREAT BLD-MCNC: 1.05 MG/DL (ref 0.76–1.27)
CREAT BLD-MCNC: 1.05 MG/DL (ref 0.76–1.27)
DEPRECATED RDW RBC AUTO: 65.6 FL (ref 37–54)
DEPRECATED RDW RBC AUTO: 65.8 FL (ref 37–54)
DEPRECATED RDW RBC AUTO: 66 FL (ref 37–54)
DEPRECATED RDW RBC AUTO: 66.3 FL (ref 37–54)
DEPRECATED RDW RBC AUTO: 66.4 FL (ref 37–54)
DEPRECATED RDW RBC AUTO: 67.9 FL (ref 37–54)
DEPRECATED RDW RBC AUTO: 68 FL (ref 37–54)
DEPRECATED RDW RBC AUTO: 68.3 FL (ref 37–54)
DEPRECATED RDW RBC AUTO: 68.3 FL (ref 37–54)
DEPRECATED RDW RBC AUTO: 68.5 FL (ref 37–54)
DEPRECATED RDW RBC AUTO: 68.7 FL (ref 37–54)
DEPRECATED RDW RBC AUTO: 69 FL (ref 37–54)
DEPRECATED RDW RBC AUTO: 69.3 FL (ref 37–54)
DEPRECATED RDW RBC AUTO: 69.7 FL (ref 37–54)
E/E' RATIO: 11
EOSINOPHIL # BLD AUTO: 0.11 10*3/MM3 (ref 0–0.7)
EOSINOPHIL # BLD AUTO: 0.12 10*3/MM3 (ref 0–0.7)
EOSINOPHIL # BLD AUTO: 0.16 10*3/MM3 (ref 0–0.7)
EOSINOPHIL # BLD AUTO: 0.17 10*3/MM3 (ref 0–0.7)
EOSINOPHIL # BLD AUTO: 0.2 10*3/MM3 (ref 0–0.7)
EOSINOPHIL # BLD AUTO: 0.24 10*3/MM3 (ref 0–0.7)
EOSINOPHIL # BLD AUTO: 0.29 10*3/MM3 (ref 0–0.7)
EOSINOPHIL # BLD AUTO: 0.33 10*3/MM3 (ref 0–0.7)
EOSINOPHIL # BLD AUTO: 0.35 10*3/MM3 (ref 0–0.7)
EOSINOPHIL # BLD AUTO: 0.41 10*3/MM3 (ref 0–0.7)
EOSINOPHIL # BLD AUTO: 0.41 10*3/MM3 (ref 0–0.7)
EOSINOPHIL # BLD AUTO: 0.45 10*3/MM3 (ref 0–0.7)
EOSINOPHIL # BLD AUTO: 0.46 10*3/MM3 (ref 0–0.7)
EOSINOPHIL # BLD AUTO: 0.56 10*3/MM3 (ref 0–0.7)
EOSINOPHIL NFR BLD AUTO: 1.3 % (ref 0.3–6.2)
EOSINOPHIL NFR BLD AUTO: 1.3 % (ref 0.3–6.2)
EOSINOPHIL NFR BLD AUTO: 1.7 % (ref 0.3–6.2)
EOSINOPHIL NFR BLD AUTO: 2 % (ref 0.3–6.2)
EOSINOPHIL NFR BLD AUTO: 2.7 % (ref 0.3–6.2)
EOSINOPHIL NFR BLD AUTO: 2.9 % (ref 0.3–6.2)
EOSINOPHIL NFR BLD AUTO: 3.9 % (ref 0.3–6.2)
EOSINOPHIL NFR BLD AUTO: 4.3 % (ref 0.3–6.2)
EOSINOPHIL NFR BLD AUTO: 4.4 % (ref 0.3–6.2)
EOSINOPHIL NFR BLD AUTO: 5.3 % (ref 0.3–6.2)
EOSINOPHIL NFR BLD AUTO: 5.5 % (ref 0.3–6.2)
EOSINOPHIL NFR BLD AUTO: 5.7 % (ref 0.3–6.2)
EOSINOPHIL NFR BLD AUTO: 6.4 % (ref 0.3–6.2)
EOSINOPHIL NFR BLD AUTO: 7.3 % (ref 0.3–6.2)
ERYTHROCYTE [DISTWIDTH] IN BLOOD BY AUTOMATED COUNT: 17 % (ref 11.5–14.5)
ERYTHROCYTE [DISTWIDTH] IN BLOOD BY AUTOMATED COUNT: 17 % (ref 11.5–14.5)
ERYTHROCYTE [DISTWIDTH] IN BLOOD BY AUTOMATED COUNT: 17.1 % (ref 11.5–14.5)
ERYTHROCYTE [DISTWIDTH] IN BLOOD BY AUTOMATED COUNT: 17.3 % (ref 11.5–14.5)
ERYTHROCYTE [DISTWIDTH] IN BLOOD BY AUTOMATED COUNT: 17.4 % (ref 11.5–14.5)
ERYTHROCYTE [DISTWIDTH] IN BLOOD BY AUTOMATED COUNT: 17.5 % (ref 11.5–14.5)
ERYTHROCYTE [DISTWIDTH] IN BLOOD BY AUTOMATED COUNT: 17.6 % (ref 11.5–14.5)
ERYTHROCYTE [DISTWIDTH] IN BLOOD BY AUTOMATED COUNT: 17.9 % (ref 11.5–14.5)
GFR SERPL CREATININE-BSD FRML MDRD: 70 ML/MIN/1.73
GFR SERPL CREATININE-BSD FRML MDRD: 70 ML/MIN/1.73
GFR SERPL CREATININE-BSD FRML MDRD: 71 ML/MIN/1.73
GFR SERPL CREATININE-BSD FRML MDRD: 74 ML/MIN/1.73
GFR SERPL CREATININE-BSD FRML MDRD: 77 ML/MIN/1.73
GFR SERPL CREATININE-BSD FRML MDRD: 79 ML/MIN/1.73
GFR SERPL CREATININE-BSD FRML MDRD: 80 ML/MIN/1.73
GFR SERPL CREATININE-BSD FRML MDRD: 83 ML/MIN/1.73
GFR SERPL CREATININE-BSD FRML MDRD: 86 ML/MIN/1.73
GFR SERPL CREATININE-BSD FRML MDRD: 88 ML/MIN/1.73
GFR SERPL CREATININE-BSD FRML MDRD: 89 ML/MIN/1.73
GFR SERPL CREATININE-BSD FRML MDRD: 90 ML/MIN/1.73
GFR SERPL CREATININE-BSD FRML MDRD: 93 ML/MIN/1.73
GFR SERPL CREATININE-BSD FRML MDRD: 94 ML/MIN/1.73
GLOBULIN UR ELPH-MCNC: 5.6 GM/DL
GLOBULIN UR ELPH-MCNC: 5.6 GM/DL
GLOBULIN UR ELPH-MCNC: 6.4 GM/DL
GLUCOSE BLD-MCNC: 100 MG/DL (ref 65–99)
GLUCOSE BLD-MCNC: 107 MG/DL (ref 65–99)
GLUCOSE BLD-MCNC: 176 MG/DL (ref 65–99)
GLUCOSE BLD-MCNC: 180 MG/DL (ref 65–99)
GLUCOSE BLD-MCNC: 194 MG/DL (ref 65–99)
GLUCOSE BLD-MCNC: 199 MG/DL (ref 65–99)
GLUCOSE BLD-MCNC: 213 MG/DL (ref 65–99)
GLUCOSE BLD-MCNC: 217 MG/DL (ref 65–99)
GLUCOSE BLD-MCNC: 224 MG/DL (ref 65–99)
GLUCOSE BLD-MCNC: 232 MG/DL (ref 65–99)
GLUCOSE BLD-MCNC: 270 MG/DL (ref 65–99)
GLUCOSE BLD-MCNC: 61 MG/DL (ref 65–99)
GLUCOSE BLD-MCNC: 88 MG/DL (ref 65–99)
GLUCOSE BLD-MCNC: 96 MG/DL (ref 65–99)
GLUCOSE BLDC GLUCOMTR-MCNC: 103 MG/DL (ref 70–130)
GLUCOSE BLDC GLUCOMTR-MCNC: 107 MG/DL (ref 70–130)
GLUCOSE BLDC GLUCOMTR-MCNC: 124 MG/DL (ref 70–130)
GLUCOSE BLDC GLUCOMTR-MCNC: 125 MG/DL (ref 70–130)
GLUCOSE BLDC GLUCOMTR-MCNC: 126 MG/DL (ref 70–130)
GLUCOSE BLDC GLUCOMTR-MCNC: 131 MG/DL (ref 70–130)
GLUCOSE BLDC GLUCOMTR-MCNC: 134 MG/DL (ref 70–130)
GLUCOSE BLDC GLUCOMTR-MCNC: 135 MG/DL (ref 70–130)
GLUCOSE BLDC GLUCOMTR-MCNC: 136 MG/DL (ref 70–130)
GLUCOSE BLDC GLUCOMTR-MCNC: 137 MG/DL (ref 70–130)
GLUCOSE BLDC GLUCOMTR-MCNC: 143 MG/DL (ref 70–130)
GLUCOSE BLDC GLUCOMTR-MCNC: 162 MG/DL (ref 70–130)
GLUCOSE BLDC GLUCOMTR-MCNC: 165 MG/DL (ref 70–130)
GLUCOSE BLDC GLUCOMTR-MCNC: 175 MG/DL (ref 70–130)
GLUCOSE BLDC GLUCOMTR-MCNC: 181 MG/DL (ref 70–130)
GLUCOSE BLDC GLUCOMTR-MCNC: 186 MG/DL (ref 70–130)
GLUCOSE BLDC GLUCOMTR-MCNC: 190 MG/DL (ref 70–130)
GLUCOSE BLDC GLUCOMTR-MCNC: 195 MG/DL (ref 70–130)
GLUCOSE BLDC GLUCOMTR-MCNC: 198 MG/DL (ref 70–130)
GLUCOSE BLDC GLUCOMTR-MCNC: 198 MG/DL (ref 70–130)
GLUCOSE BLDC GLUCOMTR-MCNC: 202 MG/DL (ref 70–130)
GLUCOSE BLDC GLUCOMTR-MCNC: 203 MG/DL (ref 70–130)
GLUCOSE BLDC GLUCOMTR-MCNC: 204 MG/DL (ref 70–130)
GLUCOSE BLDC GLUCOMTR-MCNC: 206 MG/DL (ref 70–130)
GLUCOSE BLDC GLUCOMTR-MCNC: 208 MG/DL (ref 70–130)
GLUCOSE BLDC GLUCOMTR-MCNC: 211 MG/DL (ref 70–130)
GLUCOSE BLDC GLUCOMTR-MCNC: 212 MG/DL (ref 70–130)
GLUCOSE BLDC GLUCOMTR-MCNC: 213 MG/DL (ref 70–130)
GLUCOSE BLDC GLUCOMTR-MCNC: 215 MG/DL (ref 70–130)
GLUCOSE BLDC GLUCOMTR-MCNC: 215 MG/DL (ref 70–130)
GLUCOSE BLDC GLUCOMTR-MCNC: 224 MG/DL (ref 70–130)
GLUCOSE BLDC GLUCOMTR-MCNC: 226 MG/DL (ref 70–130)
GLUCOSE BLDC GLUCOMTR-MCNC: 227 MG/DL (ref 70–130)
GLUCOSE BLDC GLUCOMTR-MCNC: 231 MG/DL (ref 70–130)
GLUCOSE BLDC GLUCOMTR-MCNC: 231 MG/DL (ref 70–130)
GLUCOSE BLDC GLUCOMTR-MCNC: 232 MG/DL (ref 70–130)
GLUCOSE BLDC GLUCOMTR-MCNC: 233 MG/DL (ref 70–130)
GLUCOSE BLDC GLUCOMTR-MCNC: 240 MG/DL (ref 70–130)
GLUCOSE BLDC GLUCOMTR-MCNC: 250 MG/DL (ref 70–130)
GLUCOSE BLDC GLUCOMTR-MCNC: 255 MG/DL (ref 70–130)
GLUCOSE BLDC GLUCOMTR-MCNC: 299 MG/DL (ref 70–130)
GLUCOSE BLDC GLUCOMTR-MCNC: 327 MG/DL (ref 70–130)
GLUCOSE BLDC GLUCOMTR-MCNC: 342 MG/DL (ref 70–130)
GLUCOSE BLDC GLUCOMTR-MCNC: 59 MG/DL (ref 70–130)
GLUCOSE BLDC GLUCOMTR-MCNC: 59 MG/DL (ref 70–130)
GLUCOSE BLDC GLUCOMTR-MCNC: 64 MG/DL (ref 70–130)
GLUCOSE BLDC GLUCOMTR-MCNC: 64 MG/DL (ref 70–130)
GLUCOSE BLDC GLUCOMTR-MCNC: 68 MG/DL (ref 70–130)
GLUCOSE BLDC GLUCOMTR-MCNC: 77 MG/DL (ref 70–130)
GLUCOSE BLDC GLUCOMTR-MCNC: 90 MG/DL (ref 70–130)
GLUCOSE BLDC GLUCOMTR-MCNC: 90 MG/DL (ref 70–130)
GLUCOSE BLDC GLUCOMTR-MCNC: 94 MG/DL (ref 70–130)
GLUCOSE BLDC GLUCOMTR-MCNC: 95 MG/DL (ref 70–130)
GLUCOSE BLDC GLUCOMTR-MCNC: 97 MG/DL (ref 70–130)
GRAM STN SPEC: NORMAL
GRAM STN SPEC: NORMAL
HCT VFR BLD AUTO: 26.3 % (ref 40.4–52.2)
HCT VFR BLD AUTO: 27.1 % (ref 40.4–52.2)
HCT VFR BLD AUTO: 27.9 % (ref 40.4–52.2)
HCT VFR BLD AUTO: 28.1 % (ref 40.4–52.2)
HCT VFR BLD AUTO: 28.2 % (ref 40.4–52.2)
HCT VFR BLD AUTO: 28.5 % (ref 40.4–52.2)
HCT VFR BLD AUTO: 28.8 % (ref 40.4–52.2)
HCT VFR BLD AUTO: 29 % (ref 40.4–52.2)
HCT VFR BLD AUTO: 29.2 % (ref 40.4–52.2)
HCT VFR BLD AUTO: 29.8 % (ref 40.4–52.2)
HCT VFR BLD AUTO: 30.1 % (ref 40.4–52.2)
HCT VFR BLD AUTO: 30.8 % (ref 40.4–52.2)
HCT VFR BLD AUTO: 31.9 % (ref 40.4–52.2)
HCT VFR BLD AUTO: 32.5 % (ref 40.4–52.2)
HGB BLD-MCNC: 10.4 G/DL (ref 13.7–17.6)
HGB BLD-MCNC: 10.4 G/DL (ref 13.7–17.6)
HGB BLD-MCNC: 8.6 G/DL (ref 13.7–17.6)
HGB BLD-MCNC: 8.8 G/DL (ref 13.7–17.6)
HGB BLD-MCNC: 9 G/DL (ref 13.7–17.6)
HGB BLD-MCNC: 9.1 G/DL (ref 13.7–17.6)
HGB BLD-MCNC: 9.2 G/DL (ref 13.7–17.6)
HGB BLD-MCNC: 9.3 G/DL (ref 13.7–17.6)
HGB BLD-MCNC: 9.4 G/DL (ref 13.7–17.6)
HGB BLD-MCNC: 9.4 G/DL (ref 13.7–17.6)
HGB BLD-MCNC: 9.6 G/DL (ref 13.7–17.6)
HGB BLD-MCNC: 9.8 G/DL (ref 13.7–17.6)
IMM GRANULOCYTES # BLD: 0 10*3/MM3 (ref 0–0.03)
IMM GRANULOCYTES # BLD: 0.02 10*3/MM3 (ref 0–0.03)
IMM GRANULOCYTES # BLD: 0.03 10*3/MM3 (ref 0–0.03)
IMM GRANULOCYTES # BLD: 0.04 10*3/MM3 (ref 0–0.03)
IMM GRANULOCYTES # BLD: 0.04 10*3/MM3 (ref 0–0.03)
IMM GRANULOCYTES NFR BLD: 0 % (ref 0–0.5)
IMM GRANULOCYTES NFR BLD: 0.2 % (ref 0–0.5)
IMM GRANULOCYTES NFR BLD: 0.2 % (ref 0–0.5)
IMM GRANULOCYTES NFR BLD: 0.3 % (ref 0–0.5)
IMM GRANULOCYTES NFR BLD: 0.4 % (ref 0–0.5)
INR PPP: 1.52 (ref 0.9–1.1)
INR PPP: 1.57 (ref 0.9–1.1)
INR PPP: 1.72 (ref 0.9–1.1)
LEFT ATRIUM VOLUME INDEX: 52 ML/M2
LEFT ATRIUM VOLUME: 107 CM3
LV EF 2D ECHO EST: 50 %
LYMPHOCYTES # BLD AUTO: 1.16 10*3/MM3 (ref 0.9–4.8)
LYMPHOCYTES # BLD AUTO: 1.2 10*3/MM3 (ref 0.9–4.8)
LYMPHOCYTES # BLD AUTO: 1.21 10*3/MM3 (ref 0.9–4.8)
LYMPHOCYTES # BLD AUTO: 1.32 10*3/MM3 (ref 0.9–4.8)
LYMPHOCYTES # BLD AUTO: 1.38 10*3/MM3 (ref 0.9–4.8)
LYMPHOCYTES # BLD AUTO: 1.38 10*3/MM3 (ref 0.9–4.8)
LYMPHOCYTES # BLD AUTO: 1.44 10*3/MM3 (ref 0.9–4.8)
LYMPHOCYTES # BLD AUTO: 1.47 10*3/MM3 (ref 0.9–4.8)
LYMPHOCYTES # BLD AUTO: 1.53 10*3/MM3 (ref 0.9–4.8)
LYMPHOCYTES # BLD AUTO: 1.55 10*3/MM3 (ref 0.9–4.8)
LYMPHOCYTES # BLD AUTO: 1.76 10*3/MM3 (ref 0.9–4.8)
LYMPHOCYTES # BLD AUTO: 2.69 10*3/MM3 (ref 0.9–4.8)
LYMPHOCYTES NFR BLD AUTO: 13.9 % (ref 19.6–45.3)
LYMPHOCYTES NFR BLD AUTO: 14.3 % (ref 19.6–45.3)
LYMPHOCYTES NFR BLD AUTO: 14.3 % (ref 19.6–45.3)
LYMPHOCYTES NFR BLD AUTO: 14.6 % (ref 19.6–45.3)
LYMPHOCYTES NFR BLD AUTO: 15.7 % (ref 19.6–45.3)
LYMPHOCYTES NFR BLD AUTO: 16.1 % (ref 19.6–45.3)
LYMPHOCYTES NFR BLD AUTO: 16.2 % (ref 19.6–45.3)
LYMPHOCYTES NFR BLD AUTO: 17.5 % (ref 19.6–45.3)
LYMPHOCYTES NFR BLD AUTO: 19.8 % (ref 19.6–45.3)
LYMPHOCYTES NFR BLD AUTO: 20.6 % (ref 19.6–45.3)
LYMPHOCYTES NFR BLD AUTO: 20.9 % (ref 19.6–45.3)
LYMPHOCYTES NFR BLD AUTO: 21.3 % (ref 19.6–45.3)
LYMPHOCYTES NFR BLD AUTO: 23.1 % (ref 19.6–45.3)
LYMPHOCYTES NFR BLD AUTO: 26.4 % (ref 19.6–45.3)
MAGNESIUM SERPL-MCNC: 1.4 MG/DL (ref 1.6–2.4)
MAGNESIUM SERPL-MCNC: 1.6 MG/DL (ref 1.6–2.4)
MCH RBC QN AUTO: 34.3 PG (ref 27–32.7)
MCH RBC QN AUTO: 34.5 PG (ref 27–32.7)
MCH RBC QN AUTO: 34.5 PG (ref 27–32.7)
MCH RBC QN AUTO: 34.6 PG (ref 27–32.7)
MCH RBC QN AUTO: 34.6 PG (ref 27–32.7)
MCH RBC QN AUTO: 34.7 PG (ref 27–32.7)
MCH RBC QN AUTO: 34.7 PG (ref 27–32.7)
MCH RBC QN AUTO: 34.8 PG (ref 27–32.7)
MCH RBC QN AUTO: 34.9 PG (ref 27–32.7)
MCH RBC QN AUTO: 35.1 PG (ref 27–32.7)
MCH RBC QN AUTO: 35.4 PG (ref 27–32.7)
MCH RBC QN AUTO: 35.5 PG (ref 27–32.7)
MCHC RBC AUTO-ENTMCNC: 31.8 G/DL (ref 32.6–36.4)
MCHC RBC AUTO-ENTMCNC: 31.9 G/DL (ref 32.6–36.4)
MCHC RBC AUTO-ENTMCNC: 31.9 G/DL (ref 32.6–36.4)
MCHC RBC AUTO-ENTMCNC: 32 G/DL (ref 32.6–36.4)
MCHC RBC AUTO-ENTMCNC: 32 G/DL (ref 32.6–36.4)
MCHC RBC AUTO-ENTMCNC: 32.2 G/DL (ref 32.6–36.4)
MCHC RBC AUTO-ENTMCNC: 32.4 G/DL (ref 32.6–36.4)
MCHC RBC AUTO-ENTMCNC: 32.5 G/DL (ref 32.6–36.4)
MCHC RBC AUTO-ENTMCNC: 32.6 G/DL (ref 32.6–36.4)
MCHC RBC AUTO-ENTMCNC: 32.7 G/DL (ref 32.6–36.4)
MCHC RBC AUTO-ENTMCNC: 32.9 G/DL (ref 32.6–36.4)
MCHC RBC AUTO-ENTMCNC: 33.3 G/DL (ref 32.6–36.4)
MCV RBC AUTO: 104.8 FL (ref 79.8–96.2)
MCV RBC AUTO: 105 FL (ref 79.8–96.2)
MCV RBC AUTO: 106.5 FL (ref 79.8–96.2)
MCV RBC AUTO: 106.7 FL (ref 79.8–96.2)
MCV RBC AUTO: 107 FL (ref 79.8–96.2)
MCV RBC AUTO: 107.4 FL (ref 79.8–96.2)
MCV RBC AUTO: 107.6 FL (ref 79.8–96.2)
MCV RBC AUTO: 108 FL (ref 79.8–96.2)
MCV RBC AUTO: 108 FL (ref 79.8–96.2)
MCV RBC AUTO: 108.1 FL (ref 79.8–96.2)
MCV RBC AUTO: 108.5 FL (ref 79.8–96.2)
MCV RBC AUTO: 108.7 FL (ref 79.8–96.2)
MCV RBC AUTO: 108.7 FL (ref 79.8–96.2)
MCV RBC AUTO: 108.8 FL (ref 79.8–96.2)
MONOCYTES # BLD AUTO: 0.49 10*3/MM3 (ref 0.2–1.2)
MONOCYTES # BLD AUTO: 0.54 10*3/MM3 (ref 0.2–1.2)
MONOCYTES # BLD AUTO: 0.56 10*3/MM3 (ref 0.2–1.2)
MONOCYTES # BLD AUTO: 0.56 10*3/MM3 (ref 0.2–1.2)
MONOCYTES # BLD AUTO: 0.57 10*3/MM3 (ref 0.2–1.2)
MONOCYTES # BLD AUTO: 0.57 10*3/MM3 (ref 0.2–1.2)
MONOCYTES # BLD AUTO: 0.58 10*3/MM3 (ref 0.2–1.2)
MONOCYTES # BLD AUTO: 0.59 10*3/MM3 (ref 0.2–1.2)
MONOCYTES # BLD AUTO: 0.64 10*3/MM3 (ref 0.2–1.2)
MONOCYTES # BLD AUTO: 0.71 10*3/MM3 (ref 0.2–1.2)
MONOCYTES # BLD AUTO: 0.74 10*3/MM3 (ref 0.2–1.2)
MONOCYTES # BLD AUTO: 0.8 10*3/MM3 (ref 0.2–1.2)
MONOCYTES # BLD AUTO: 0.91 10*3/MM3 (ref 0.2–1.2)
MONOCYTES # BLD AUTO: 1.07 10*3/MM3 (ref 0.2–1.2)
MONOCYTES NFR BLD AUTO: 10.1 % (ref 5–12)
MONOCYTES NFR BLD AUTO: 11.1 % (ref 5–12)
MONOCYTES NFR BLD AUTO: 6.6 % (ref 5–12)
MONOCYTES NFR BLD AUTO: 6.6 % (ref 5–12)
MONOCYTES NFR BLD AUTO: 7.3 % (ref 5–12)
MONOCYTES NFR BLD AUTO: 7.5 % (ref 5–12)
MONOCYTES NFR BLD AUTO: 7.5 % (ref 5–12)
MONOCYTES NFR BLD AUTO: 7.6 % (ref 5–12)
MONOCYTES NFR BLD AUTO: 7.8 % (ref 5–12)
MONOCYTES NFR BLD AUTO: 8.1 % (ref 5–12)
MONOCYTES NFR BLD AUTO: 8.2 % (ref 5–12)
MONOCYTES NFR BLD AUTO: 8.4 % (ref 5–12)
MONOCYTES NFR BLD AUTO: 8.9 % (ref 5–12)
MONOCYTES NFR BLD AUTO: 9.5 % (ref 5–12)
MYCOBACTERIUM SPEC CULT: NORMAL
NEUTROPHILS # BLD AUTO: 4.19 10*3/MM3 (ref 1.9–8.1)
NEUTROPHILS # BLD AUTO: 4.55 10*3/MM3 (ref 1.9–8.1)
NEUTROPHILS # BLD AUTO: 4.58 10*3/MM3 (ref 1.9–8.1)
NEUTROPHILS # BLD AUTO: 4.94 10*3/MM3 (ref 1.9–8.1)
NEUTROPHILS # BLD AUTO: 5.02 10*3/MM3 (ref 1.9–8.1)
NEUTROPHILS # BLD AUTO: 5.09 10*3/MM3 (ref 1.9–8.1)
NEUTROPHILS # BLD AUTO: 5.3 10*3/MM3 (ref 1.9–8.1)
NEUTROPHILS # BLD AUTO: 5.5 10*3/MM3 (ref 1.9–8.1)
NEUTROPHILS # BLD AUTO: 5.93 10*3/MM3 (ref 1.9–8.1)
NEUTROPHILS # BLD AUTO: 6.24 10*3/MM3 (ref 1.9–8.1)
NEUTROPHILS # BLD AUTO: 6.25 10*3/MM3 (ref 1.9–8.1)
NEUTROPHILS # BLD AUTO: 6.46 10*3/MM3 (ref 1.9–8.1)
NEUTROPHILS # BLD AUTO: 6.58 10*3/MM3 (ref 1.9–8.1)
NEUTROPHILS # BLD AUTO: 6.93 10*3/MM3 (ref 1.9–8.1)
NEUTROPHILS NFR BLD AUTO: 59.6 % (ref 42.7–76)
NEUTROPHILS NFR BLD AUTO: 61.2 % (ref 42.7–76)
NEUTROPHILS NFR BLD AUTO: 63.5 % (ref 42.7–76)
NEUTROPHILS NFR BLD AUTO: 63.5 % (ref 42.7–76)
NEUTROPHILS NFR BLD AUTO: 67.6 % (ref 42.7–76)
NEUTROPHILS NFR BLD AUTO: 67.6 % (ref 42.7–76)
NEUTROPHILS NFR BLD AUTO: 69.2 % (ref 42.7–76)
NEUTROPHILS NFR BLD AUTO: 71 % (ref 42.7–76)
NEUTROPHILS NFR BLD AUTO: 72.1 % (ref 42.7–76)
NEUTROPHILS NFR BLD AUTO: 72.1 % (ref 42.7–76)
NEUTROPHILS NFR BLD AUTO: 72.2 % (ref 42.7–76)
NEUTROPHILS NFR BLD AUTO: 73 % (ref 42.7–76)
NEUTROPHILS NFR BLD AUTO: 74 % (ref 42.7–76)
NEUTROPHILS NFR BLD AUTO: 75.1 % (ref 42.7–76)
NIGHT BLUE STAIN TISS: NORMAL
NT-PROBNP SERPL-MCNC: 5770 PG/ML (ref 5–900)
PLATELET # BLD AUTO: 112 10*3/MM3 (ref 140–500)
PLATELET # BLD AUTO: 119 10*3/MM3 (ref 140–500)
PLATELET # BLD AUTO: 120 10*3/MM3 (ref 140–500)
PLATELET # BLD AUTO: 123 10*3/MM3 (ref 140–500)
PLATELET # BLD AUTO: 124 10*3/MM3 (ref 140–500)
PLATELET # BLD AUTO: 126 10*3/MM3 (ref 140–500)
PLATELET # BLD AUTO: 128 10*3/MM3 (ref 140–500)
PLATELET # BLD AUTO: 130 10*3/MM3 (ref 140–500)
PLATELET # BLD AUTO: 130 10*3/MM3 (ref 140–500)
PLATELET # BLD AUTO: 132 10*3/MM3 (ref 140–500)
PLATELET # BLD AUTO: 135 10*3/MM3 (ref 140–500)
PLATELET # BLD AUTO: 136 10*3/MM3 (ref 140–500)
PLATELET # BLD AUTO: 141 10*3/MM3 (ref 140–500)
PLATELET # BLD AUTO: 148 10*3/MM3 (ref 140–500)
PMV BLD AUTO: 10 FL (ref 6–12)
PMV BLD AUTO: 10 FL (ref 6–12)
PMV BLD AUTO: 10.1 FL (ref 6–12)
PMV BLD AUTO: 10.2 FL (ref 6–12)
PMV BLD AUTO: 10.3 FL (ref 6–12)
PMV BLD AUTO: 10.4 FL (ref 6–12)
PMV BLD AUTO: 9.9 FL (ref 6–12)
POTASSIUM BLD-SCNC: 2.7 MMOL/L (ref 3.5–5.2)
POTASSIUM BLD-SCNC: 2.8 MMOL/L (ref 3.5–5.2)
POTASSIUM BLD-SCNC: 3 MMOL/L (ref 3.5–5.2)
POTASSIUM BLD-SCNC: 3.6 MMOL/L (ref 3.5–5.2)
POTASSIUM BLD-SCNC: 3.6 MMOL/L (ref 3.5–5.2)
POTASSIUM BLD-SCNC: 3.9 MMOL/L (ref 3.5–5.2)
POTASSIUM BLD-SCNC: 4 MMOL/L (ref 3.5–5.2)
POTASSIUM BLD-SCNC: 4 MMOL/L (ref 3.5–5.2)
POTASSIUM BLD-SCNC: 4.1 MMOL/L (ref 3.5–5.2)
POTASSIUM BLD-SCNC: 4.2 MMOL/L (ref 3.5–5.2)
POTASSIUM BLD-SCNC: 4.3 MMOL/L (ref 3.5–5.2)
POTASSIUM BLD-SCNC: 4.5 MMOL/L (ref 3.5–5.2)
PROT SERPL-MCNC: 7.3 G/DL (ref 6–8.5)
PROT SERPL-MCNC: 7.8 G/DL (ref 6–8.5)
PROT SERPL-MCNC: 8.5 G/DL (ref 6–8.5)
PROTHROMBIN TIME: 17.7 SECONDS (ref 11.7–14.2)
PROTHROMBIN TIME: 18.2 SECONDS (ref 11.7–14.2)
PROTHROMBIN TIME: 19.6 SECONDS (ref 11.7–14.2)
RBC # BLD AUTO: 2.51 10*6/MM3 (ref 4.6–6)
RBC # BLD AUTO: 2.51 10*6/MM3 (ref 4.6–6)
RBC # BLD AUTO: 2.6 10*6/MM3 (ref 4.6–6)
RBC # BLD AUTO: 2.6 10*6/MM3 (ref 4.6–6)
RBC # BLD AUTO: 2.62 10*6/MM3 (ref 4.6–6)
RBC # BLD AUTO: 2.64 10*6/MM3 (ref 4.6–6)
RBC # BLD AUTO: 2.7 10*6/MM3 (ref 4.6–6)
RBC # BLD AUTO: 2.7 10*6/MM3 (ref 4.6–6)
RBC # BLD AUTO: 2.77 10*6/MM3 (ref 4.6–6)
RBC # BLD AUTO: 2.77 10*6/MM3 (ref 4.6–6)
RBC # BLD AUTO: 2.78 10*6/MM3 (ref 4.6–6)
RBC # BLD AUTO: 2.83 10*6/MM3 (ref 4.6–6)
RBC # BLD AUTO: 2.98 10*6/MM3 (ref 4.6–6)
RBC # BLD AUTO: 2.99 10*6/MM3 (ref 4.6–6)
SODIUM BLD-SCNC: 129 MMOL/L (ref 136–145)
SODIUM BLD-SCNC: 130 MMOL/L (ref 136–145)
SODIUM BLD-SCNC: 131 MMOL/L (ref 136–145)
SODIUM BLD-SCNC: 132 MMOL/L (ref 136–145)
SODIUM BLD-SCNC: 133 MMOL/L (ref 136–145)
SODIUM BLD-SCNC: 134 MMOL/L (ref 136–145)
SODIUM BLD-SCNC: 134 MMOL/L (ref 136–145)
SODIUM BLD-SCNC: 135 MMOL/L (ref 136–145)
VANCOMYCIN SERPL-MCNC: 11.2 MCG/ML (ref 5–40)
VANCOMYCIN SERPL-MCNC: 14.8 MCG/ML (ref 5–40)
VANCOMYCIN SERPL-MCNC: 16.7 MCG/ML (ref 5–40)
VANCOMYCIN SERPL-MCNC: 18.7 MCG/ML (ref 5–40)
VANCOMYCIN TROUGH SERPL-MCNC: 23 MCG/ML (ref 5–20)
WBC NRBC COR # BLD: 10.2 10*3/MM3 (ref 4.5–10.7)
WBC NRBC COR # BLD: 6.6 10*3/MM3 (ref 4.5–10.7)
WBC NRBC COR # BLD: 7.15 10*3/MM3 (ref 4.5–10.7)
WBC NRBC COR # BLD: 7.2 10*3/MM3 (ref 4.5–10.7)
WBC NRBC COR # BLD: 7.43 10*3/MM3 (ref 4.5–10.7)
WBC NRBC COR # BLD: 7.46 10*3/MM3 (ref 4.5–10.7)
WBC NRBC COR # BLD: 7.52 10*3/MM3 (ref 4.5–10.7)
WBC NRBC COR # BLD: 7.62 10*3/MM3 (ref 4.5–10.7)
WBC NRBC COR # BLD: 7.63 10*3/MM3 (ref 4.5–10.7)
WBC NRBC COR # BLD: 8.23 10*3/MM3 (ref 4.5–10.7)
WBC NRBC COR # BLD: 8.44 10*3/MM3 (ref 4.5–10.7)
WBC NRBC COR # BLD: 8.61 10*3/MM3 (ref 4.5–10.7)
WBC NRBC COR # BLD: 9.02 10*3/MM3 (ref 4.5–10.7)
WBC NRBC COR # BLD: 9.62 10*3/MM3 (ref 4.5–10.7)

## 2018-01-01 PROCEDURE — 82962 GLUCOSE BLOOD TEST: CPT

## 2018-01-01 PROCEDURE — 83735 ASSAY OF MAGNESIUM: CPT | Performed by: HOSPITALIST

## 2018-01-01 PROCEDURE — 25010000002 VANCOMYCIN 10 G RECONSTITUTED SOLUTION: Performed by: HOSPITALIST

## 2018-01-01 PROCEDURE — 99231 SBSQ HOSP IP/OBS SF/LOW 25: CPT | Performed by: NURSE PRACTITIONER

## 2018-01-01 PROCEDURE — 25010000002 CEFTRIAXONE IN SWFI 1 GRAM/10ML IV PUSH SYRINGE (SIMPLE): Performed by: INTERNAL MEDICINE

## 2018-01-01 PROCEDURE — 80048 BASIC METABOLIC PNL TOTAL CA: CPT | Performed by: INTERNAL MEDICINE

## 2018-01-01 PROCEDURE — 80202 ASSAY OF VANCOMYCIN: CPT | Performed by: INTERNAL MEDICINE

## 2018-01-01 PROCEDURE — 63710000001 INSULIN ASPART PER 5 UNITS: Performed by: HOSPITALIST

## 2018-01-01 PROCEDURE — 80053 COMPREHEN METABOLIC PANEL: CPT | Performed by: INTERNAL MEDICINE

## 2018-01-01 PROCEDURE — 85610 PROTHROMBIN TIME: CPT | Performed by: HOSPITALIST

## 2018-01-01 PROCEDURE — 76870 US EXAM SCROTUM: CPT

## 2018-01-01 PROCEDURE — 85025 COMPLETE CBC W/AUTO DIFF WBC: CPT | Performed by: INTERNAL MEDICINE

## 2018-01-01 PROCEDURE — 76942 ECHO GUIDE FOR BIOPSY: CPT

## 2018-01-01 PROCEDURE — 25010000002 CEFTRIAXONE PER 250 MG: Performed by: INTERNAL MEDICINE

## 2018-01-01 PROCEDURE — 25010000002 VANCOMYCIN 750 MG RECONSTITUTED SOLUTION: Performed by: INTERNAL MEDICINE

## 2018-01-01 PROCEDURE — 71045 X-RAY EXAM CHEST 1 VIEW: CPT

## 2018-01-01 PROCEDURE — 25010000002 VANCOMYCIN: Performed by: HOSPITALIST

## 2018-01-01 PROCEDURE — 80202 ASSAY OF VANCOMYCIN: CPT | Performed by: HOSPITALIST

## 2018-01-01 PROCEDURE — 85610 PROTHROMBIN TIME: CPT | Performed by: INTERNAL MEDICINE

## 2018-01-01 PROCEDURE — 84132 ASSAY OF SERUM POTASSIUM: CPT | Performed by: HOSPITALIST

## 2018-01-01 PROCEDURE — 83880 ASSAY OF NATRIURETIC PEPTIDE: CPT | Performed by: INTERNAL MEDICINE

## 2018-01-01 PROCEDURE — 25010000002 VANCOMYCIN 10 G RECONSTITUTED SOLUTION: Performed by: INTERNAL MEDICINE

## 2018-01-01 PROCEDURE — 63710000001 INSULIN DETEMER PER 5 UNITS: Performed by: HOSPITALIST

## 2018-01-01 PROCEDURE — 85025 COMPLETE CBC W/AUTO DIFF WBC: CPT | Performed by: HOSPITALIST

## 2018-01-01 PROCEDURE — 80053 COMPREHEN METABOLIC PANEL: CPT | Performed by: HOSPITALIST

## 2018-01-01 PROCEDURE — 25010000002 ONDANSETRON PER 1 MG: Performed by: HOSPITALIST

## 2018-01-01 PROCEDURE — 82140 ASSAY OF AMMONIA: CPT | Performed by: INTERNAL MEDICINE

## 2018-01-01 PROCEDURE — 99221 1ST HOSP IP/OBS SF/LOW 40: CPT | Performed by: NURSE PRACTITIONER

## 2018-01-01 PROCEDURE — 25010000002 MAGNESIUM SULFATE IN D5W 1G/100ML (PREMIX) 1-5 GM/100ML-% SOLUTION: Performed by: HOSPITALIST

## 2018-01-01 PROCEDURE — 93306 TTE W/DOPPLER COMPLETE: CPT

## 2018-01-01 PROCEDURE — 25010000002 PIPERACILLIN SOD-TAZOBACTAM PER 1 G: Performed by: HOSPITALIST

## 2018-01-01 RX ORDER — DIPHENOXYLATE HYDROCHLORIDE AND ATROPINE SULFATE 2.5; .025 MG/1; MG/1
1 TABLET ORAL
Status: CANCELLED | OUTPATIENT
Start: 2018-01-01

## 2018-01-01 RX ORDER — POTASSIUM CHLORIDE 1.5 G/1.77G
40 POWDER, FOR SOLUTION ORAL AS NEEDED
Status: DISCONTINUED | OUTPATIENT
Start: 2018-01-01 | End: 2018-01-01 | Stop reason: HOSPADM

## 2018-01-01 RX ORDER — FLUCONAZOLE 200 MG/1
200 TABLET ORAL EVERY 24 HOURS
Status: DISPENSED | OUTPATIENT
Start: 2018-01-01 | End: 2018-01-01

## 2018-01-01 RX ORDER — ACETAMINOPHEN 325 MG/1
650 TABLET ORAL EVERY 4 HOURS PRN
Status: CANCELLED | OUTPATIENT
Start: 2018-01-01

## 2018-01-01 RX ORDER — LORAZEPAM 2 MG/ML
0.5 INJECTION INTRAMUSCULAR
Status: CANCELLED | OUTPATIENT
Start: 2018-01-01 | End: 2018-01-01

## 2018-01-01 RX ORDER — LORAZEPAM 2 MG/ML
2 INJECTION INTRAMUSCULAR
Status: CANCELLED | OUTPATIENT
Start: 2018-01-01 | End: 2018-01-01

## 2018-01-01 RX ORDER — BUMETANIDE 2 MG/1
2 TABLET ORAL 3 TIMES DAILY
Status: DISCONTINUED | OUTPATIENT
Start: 2018-01-01 | End: 2018-01-01

## 2018-01-01 RX ORDER — BUMETANIDE 0.25 MG/ML
2 INJECTION INTRAMUSCULAR; INTRAVENOUS EVERY 8 HOURS
Status: DISCONTINUED | OUTPATIENT
Start: 2018-01-01 | End: 2018-01-01

## 2018-01-01 RX ORDER — GABAPENTIN 300 MG/1
300 CAPSULE ORAL EVERY 12 HOURS SCHEDULED
Status: DISCONTINUED | OUTPATIENT
Start: 2018-01-01 | End: 2018-01-01 | Stop reason: HOSPADM

## 2018-01-01 RX ORDER — POTASSIUM CHLORIDE 750 MG/1
40 CAPSULE, EXTENDED RELEASE ORAL AS NEEDED
Status: DISCONTINUED | OUTPATIENT
Start: 2018-01-01 | End: 2018-01-01 | Stop reason: HOSPADM

## 2018-01-01 RX ORDER — LACTULOSE 10 G/15ML
20 SOLUTION ORAL 2 TIMES DAILY PRN
Status: DISCONTINUED | OUTPATIENT
Start: 2018-01-01 | End: 2018-01-01 | Stop reason: HOSPADM

## 2018-01-01 RX ORDER — FLUCONAZOLE 200 MG/1
200 TABLET ORAL EVERY 24 HOURS
Status: DISCONTINUED | OUTPATIENT
Start: 2018-01-01 | End: 2018-01-01

## 2018-01-01 RX ORDER — NYSTATIN 100000 [USP'U]/G
POWDER TOPICAL EVERY 12 HOURS SCHEDULED
Qty: 45 G | Refills: 0 | Status: SHIPPED | OUTPATIENT
Start: 2018-01-01

## 2018-01-01 RX ORDER — POTASSIUM CHLORIDE 750 MG/1
20 CAPSULE, EXTENDED RELEASE ORAL 2 TIMES DAILY WITH MEALS
Status: DISCONTINUED | OUTPATIENT
Start: 2018-01-01 | End: 2018-01-01 | Stop reason: HOSPADM

## 2018-01-01 RX ORDER — BUMETANIDE 2 MG/1
2 TABLET ORAL
Status: DISCONTINUED | OUTPATIENT
Start: 2018-01-01 | End: 2018-01-01 | Stop reason: HOSPADM

## 2018-01-01 RX ORDER — LORAZEPAM 2 MG/ML
1 CONCENTRATE ORAL
Status: CANCELLED | OUTPATIENT
Start: 2018-01-01 | End: 2018-01-01

## 2018-01-01 RX ORDER — LORAZEPAM 2 MG/ML
2 CONCENTRATE ORAL
Status: CANCELLED | OUTPATIENT
Start: 2018-01-01 | End: 2018-01-01

## 2018-01-01 RX ORDER — LORAZEPAM 1 MG/1
2 TABLET ORAL
Status: CANCELLED | OUTPATIENT
Start: 2018-01-01 | End: 2018-01-01

## 2018-01-01 RX ORDER — VENLAFAXINE HYDROCHLORIDE 37.5 MG/1
75 CAPSULE, EXTENDED RELEASE ORAL DAILY
Qty: 30 CAPSULE | Refills: 0 | Status: SHIPPED | OUTPATIENT
Start: 2018-01-01

## 2018-01-01 RX ORDER — POTASSIUM CHLORIDE 750 MG/1
20 CAPSULE, EXTENDED RELEASE ORAL 2 TIMES DAILY WITH MEALS
Status: DISCONTINUED | OUTPATIENT
Start: 2018-01-01 | End: 2018-01-01

## 2018-01-01 RX ORDER — FLUCONAZOLE 200 MG/1
200 TABLET ORAL EVERY 24 HOURS
Status: COMPLETED | OUTPATIENT
Start: 2018-01-01 | End: 2018-01-01

## 2018-01-01 RX ORDER — BUMETANIDE 2 MG/1
2 TABLET ORAL 3 TIMES DAILY
Qty: 90 TABLET | Refills: 0 | Status: SHIPPED | OUTPATIENT
Start: 2018-01-01 | End: 2018-01-01 | Stop reason: HOSPADM

## 2018-01-01 RX ORDER — BUMETANIDE 0.25 MG/ML
1 INJECTION INTRAMUSCULAR; INTRAVENOUS EVERY 8 HOURS
Status: DISCONTINUED | OUTPATIENT
Start: 2018-01-01 | End: 2018-01-01

## 2018-01-01 RX ORDER — LORAZEPAM 2 MG/ML
1 INJECTION INTRAMUSCULAR
Status: CANCELLED | OUTPATIENT
Start: 2018-01-01 | End: 2018-01-01

## 2018-01-01 RX ORDER — LORAZEPAM 2 MG/ML
0.5 CONCENTRATE ORAL
Status: CANCELLED | OUTPATIENT
Start: 2018-01-01 | End: 2018-01-01

## 2018-01-01 RX ORDER — MAGNESIUM SULFATE 1 G/100ML
2 INJECTION INTRAVENOUS ONCE
Status: COMPLETED | OUTPATIENT
Start: 2018-01-01 | End: 2018-01-01

## 2018-01-01 RX ORDER — POTASSIUM CHLORIDE 7.45 MG/ML
10 INJECTION INTRAVENOUS
Status: DISCONTINUED | OUTPATIENT
Start: 2018-01-01 | End: 2018-01-01 | Stop reason: HOSPADM

## 2018-01-01 RX ORDER — MIRTAZAPINE 15 MG/1
15 TABLET, FILM COATED ORAL DAILY
Qty: 30 TABLET | Refills: 0 | Status: SHIPPED | OUTPATIENT
Start: 2018-01-01

## 2018-01-01 RX ORDER — BUMETANIDE 2 MG/1
2 TABLET ORAL
Qty: 60 TABLET | Refills: 0 | Status: SHIPPED | OUTPATIENT
Start: 2018-01-01

## 2018-01-01 RX ORDER — LORAZEPAM 0.5 MG/1
0.5 TABLET ORAL
Status: CANCELLED | OUTPATIENT
Start: 2018-01-01 | End: 2018-01-01

## 2018-01-01 RX ORDER — NYSTATIN 100000 [USP'U]/G
POWDER TOPICAL EVERY 12 HOURS SCHEDULED
Status: DISCONTINUED | OUTPATIENT
Start: 2018-01-01 | End: 2018-01-01 | Stop reason: HOSPADM

## 2018-01-01 RX ORDER — ACETAMINOPHEN 160 MG/5ML
650 SOLUTION ORAL EVERY 4 HOURS PRN
Status: CANCELLED | OUTPATIENT
Start: 2018-01-01

## 2018-01-01 RX ORDER — LIDOCAINE HYDROCHLORIDE 10 MG/ML
20 INJECTION, SOLUTION INFILTRATION; PERINEURAL ONCE
Status: COMPLETED | OUTPATIENT
Start: 2018-01-01 | End: 2018-01-01

## 2018-01-01 RX ORDER — BISACODYL 10 MG
10 SUPPOSITORY, RECTAL RECTAL DAILY PRN
Status: DISCONTINUED | OUTPATIENT
Start: 2018-01-01 | End: 2018-01-01 | Stop reason: HOSPADM

## 2018-01-01 RX ORDER — LORAZEPAM 1 MG/1
1 TABLET ORAL
Status: CANCELLED | OUTPATIENT
Start: 2018-01-01 | End: 2018-01-01

## 2018-01-01 RX ORDER — ACETAMINOPHEN 650 MG/1
650 SUPPOSITORY RECTAL EVERY 4 HOURS PRN
Status: CANCELLED | OUTPATIENT
Start: 2018-01-01

## 2018-01-01 RX ORDER — HYDROMORPHONE HYDROCHLORIDE 2 MG/1
1 TABLET ORAL
Status: DISCONTINUED | OUTPATIENT
Start: 2018-01-01 | End: 2018-01-01 | Stop reason: HOSPADM

## 2018-01-01 RX ADMIN — NYSTATIN: 100000 POWDER TOPICAL at 09:06

## 2018-01-01 RX ADMIN — POTASSIUM CHLORIDE 40 MEQ: 750 CAPSULE, EXTENDED RELEASE ORAL at 00:39

## 2018-01-01 RX ADMIN — BUMETANIDE 1 MG: 0.25 INJECTION INTRAMUSCULAR; INTRAVENOUS at 23:05

## 2018-01-01 RX ADMIN — VANCOMYCIN HYDROCHLORIDE 1250 MG: 100 INJECTION, POWDER, LYOPHILIZED, FOR SOLUTION INTRAVENOUS at 16:24

## 2018-01-01 RX ADMIN — PANTOPRAZOLE SODIUM 40 MG: 40 TABLET, DELAYED RELEASE ORAL at 08:49

## 2018-01-01 RX ADMIN — FLUCONAZOLE 200 MG: 200 TABLET ORAL at 03:47

## 2018-01-01 RX ADMIN — INSULIN DETEMIR 15 UNITS: 100 INJECTION, SOLUTION SUBCUTANEOUS at 09:33

## 2018-01-01 RX ADMIN — Medication 1 CAPSULE: at 08:14

## 2018-01-01 RX ADMIN — GABAPENTIN 300 MG: 300 CAPSULE ORAL at 08:14

## 2018-01-01 RX ADMIN — DOCUSATE SODIUM 100 MG: 100 CAPSULE, LIQUID FILLED ORAL at 10:34

## 2018-01-01 RX ADMIN — HYDROMORPHONE HYDROCHLORIDE 1 MG: 2 TABLET ORAL at 08:15

## 2018-01-01 RX ADMIN — POTASSIUM CHLORIDE 20 MEQ: 750 CAPSULE, EXTENDED RELEASE ORAL at 09:07

## 2018-01-01 RX ADMIN — Medication 1 CAPSULE: at 08:49

## 2018-01-01 RX ADMIN — INSULIN DETEMIR 15 UNITS: 100 INJECTION, SOLUTION SUBCUTANEOUS at 08:49

## 2018-01-01 RX ADMIN — FLUCONAZOLE 200 MG: 200 TABLET ORAL at 13:26

## 2018-01-01 RX ADMIN — Medication: at 10:32

## 2018-01-01 RX ADMIN — GLYCERIN 1 DROP: .002; .002; .01 SOLUTION/ DROPS OPHTHALMIC at 17:51

## 2018-01-01 RX ADMIN — NYSTATIN 1 APPLICATION: 100000 POWDER TOPICAL at 21:02

## 2018-01-01 RX ADMIN — INSULIN ASPART 6 UNITS: 100 INJECTION, SOLUTION INTRAVENOUS; SUBCUTANEOUS at 22:02

## 2018-01-01 RX ADMIN — SENNOSIDES 17.2 MG: 8.6 TABLET, FILM COATED ORAL at 08:31

## 2018-01-01 RX ADMIN — POTASSIUM CHLORIDE 20 MEQ: 750 CAPSULE, EXTENDED RELEASE ORAL at 10:03

## 2018-01-01 RX ADMIN — POTASSIUM CHLORIDE 20 MEQ: 750 CAPSULE, EXTENDED RELEASE ORAL at 09:27

## 2018-01-01 RX ADMIN — BUMETANIDE 2 MG: 0.25 INJECTION INTRAMUSCULAR; INTRAVENOUS at 09:29

## 2018-01-01 RX ADMIN — POTASSIUM CHLORIDE 20 MEQ: 750 CAPSULE, EXTENDED RELEASE ORAL at 17:24

## 2018-01-01 RX ADMIN — INSULIN ASPART 3 UNITS: 100 INJECTION, SOLUTION INTRAVENOUS; SUBCUTANEOUS at 19:25

## 2018-01-01 RX ADMIN — PANTOPRAZOLE SODIUM 40 MG: 40 TABLET, DELAYED RELEASE ORAL at 06:52

## 2018-01-01 RX ADMIN — SENNOSIDES 17.2 MG: 8.6 TABLET, FILM COATED ORAL at 08:49

## 2018-01-01 RX ADMIN — POTASSIUM CHLORIDE 20 MEQ: 750 CAPSULE, EXTENDED RELEASE ORAL at 17:32

## 2018-01-01 RX ADMIN — NYSTATIN: 100000 POWDER TOPICAL at 21:26

## 2018-01-01 RX ADMIN — INSULIN DETEMIR 15 UNITS: 100 INJECTION, SOLUTION SUBCUTANEOUS at 23:12

## 2018-01-01 RX ADMIN — VENLAFAXINE HYDROCHLORIDE 75 MG: 75 CAPSULE, EXTENDED RELEASE ORAL at 21:02

## 2018-01-01 RX ADMIN — GABAPENTIN 300 MG: 300 CAPSULE ORAL at 21:31

## 2018-01-01 RX ADMIN — INSULIN ASPART 2 UNITS: 100 INJECTION, SOLUTION INTRAVENOUS; SUBCUTANEOUS at 21:25

## 2018-01-01 RX ADMIN — VENLAFAXINE HYDROCHLORIDE 75 MG: 75 CAPSULE, EXTENDED RELEASE ORAL at 23:07

## 2018-01-01 RX ADMIN — NYSTATIN: 100000 POWDER TOPICAL at 10:15

## 2018-01-01 RX ADMIN — DOCUSATE SODIUM 100 MG: 100 CAPSULE, LIQUID FILLED ORAL at 21:02

## 2018-01-01 RX ADMIN — INSULIN ASPART 3 UNITS: 100 INJECTION, SOLUTION INTRAVENOUS; SUBCUTANEOUS at 08:14

## 2018-01-01 RX ADMIN — Medication 1 CAPSULE: at 09:02

## 2018-01-01 RX ADMIN — GABAPENTIN 300 MG: 300 CAPSULE ORAL at 13:10

## 2018-01-01 RX ADMIN — INSULIN DETEMIR 15 UNITS: 100 INJECTION, SOLUTION SUBCUTANEOUS at 09:25

## 2018-01-01 RX ADMIN — SENNOSIDES 17.2 MG: 8.6 TABLET, FILM COATED ORAL at 09:58

## 2018-01-01 RX ADMIN — BISACODYL 10 MG: 10 SUPPOSITORY RECTAL at 16:49

## 2018-01-01 RX ADMIN — TAZOBACTAM SODIUM AND PIPERACILLIN SODIUM 3.38 G: 375; 3 INJECTION, SOLUTION INTRAVENOUS at 05:12

## 2018-01-01 RX ADMIN — VANCOMYCIN HYDROCHLORIDE 500 MG: 500 INJECTION, POWDER, LYOPHILIZED, FOR SOLUTION INTRAVENOUS at 13:11

## 2018-01-01 RX ADMIN — SENNOSIDES 17.2 MG: 8.6 TABLET, FILM COATED ORAL at 23:02

## 2018-01-01 RX ADMIN — HYDROMORPHONE HYDROCHLORIDE 1 MG: 2 TABLET ORAL at 12:16

## 2018-01-01 RX ADMIN — SENNOSIDES 17.2 MG: 8.6 TABLET, FILM COATED ORAL at 10:13

## 2018-01-01 RX ADMIN — BUMETANIDE 1 MG: 0.25 INJECTION INTRAMUSCULAR; INTRAVENOUS at 23:25

## 2018-01-01 RX ADMIN — DOCUSATE SODIUM 100 MG: 100 CAPSULE, LIQUID FILLED ORAL at 09:15

## 2018-01-01 RX ADMIN — Medication 1 CAPSULE: at 10:02

## 2018-01-01 RX ADMIN — SODIUM CHLORIDE 750 MG: 900 INJECTION, SOLUTION INTRAVENOUS at 16:36

## 2018-01-01 RX ADMIN — MIRTAZAPINE 15 MG: 15 TABLET, FILM COATED ORAL at 10:30

## 2018-01-01 RX ADMIN — HYDROMORPHONE HYDROCHLORIDE 1 MG: 2 TABLET ORAL at 01:35

## 2018-01-01 RX ADMIN — MIRTAZAPINE 15 MG: 15 TABLET, FILM COATED ORAL at 09:14

## 2018-01-01 RX ADMIN — GABAPENTIN 300 MG: 300 CAPSULE ORAL at 09:01

## 2018-01-01 RX ADMIN — SENNOSIDES 17.2 MG: 8.6 TABLET, FILM COATED ORAL at 22:13

## 2018-01-01 RX ADMIN — HYDROMORPHONE HYDROCHLORIDE 1 MG: 2 TABLET ORAL at 12:11

## 2018-01-01 RX ADMIN — NYSTATIN: 100000 POWDER TOPICAL at 20:50

## 2018-01-01 RX ADMIN — ACETAMINOPHEN 650 MG: 325 TABLET, FILM COATED ORAL at 02:34

## 2018-01-01 RX ADMIN — DOCUSATE SODIUM 100 MG: 100 CAPSULE, LIQUID FILLED ORAL at 09:29

## 2018-01-01 RX ADMIN — POTASSIUM CHLORIDE 40 MEQ: 750 CAPSULE, EXTENDED RELEASE ORAL at 17:10

## 2018-01-01 RX ADMIN — BUMETANIDE 2 MG: 0.25 INJECTION INTRAMUSCULAR; INTRAVENOUS at 06:45

## 2018-01-01 RX ADMIN — BUMETANIDE 2 MG: 0.25 INJECTION INTRAMUSCULAR; INTRAVENOUS at 09:05

## 2018-01-01 RX ADMIN — DOCUSATE SODIUM 100 MG: 100 CAPSULE, LIQUID FILLED ORAL at 10:33

## 2018-01-01 RX ADMIN — VENLAFAXINE HYDROCHLORIDE 75 MG: 75 CAPSULE, EXTENDED RELEASE ORAL at 20:42

## 2018-01-01 RX ADMIN — HYDROMORPHONE HYDROCHLORIDE 1 MG: 2 TABLET ORAL at 23:28

## 2018-01-01 RX ADMIN — BUMETANIDE 1 MG: 0.25 INJECTION INTRAMUSCULAR; INTRAVENOUS at 23:45

## 2018-01-01 RX ADMIN — INSULIN DETEMIR 15 UNITS: 100 INJECTION, SOLUTION SUBCUTANEOUS at 10:22

## 2018-01-01 RX ADMIN — NYSTATIN: 100000 POWDER TOPICAL at 23:10

## 2018-01-01 RX ADMIN — SENNOSIDES 17.2 MG: 8.6 TABLET, FILM COATED ORAL at 10:03

## 2018-01-01 RX ADMIN — MIRTAZAPINE 15 MG: 15 TABLET, FILM COATED ORAL at 09:58

## 2018-01-01 RX ADMIN — INSULIN DETEMIR 15 UNITS: 100 INJECTION, SOLUTION SUBCUTANEOUS at 21:29

## 2018-01-01 RX ADMIN — HYDROMORPHONE HYDROCHLORIDE 1 MG: 2 TABLET ORAL at 15:52

## 2018-01-01 RX ADMIN — INSULIN DETEMIR 15 UNITS: 100 INJECTION, SOLUTION SUBCUTANEOUS at 20:55

## 2018-01-01 RX ADMIN — NYSTATIN: 100000 POWDER TOPICAL at 20:44

## 2018-01-01 RX ADMIN — CEFTRIAXONE 1 G: 1 INJECTION, POWDER, FOR SOLUTION INTRAMUSCULAR; INTRAVENOUS at 14:07

## 2018-01-01 RX ADMIN — BUMETANIDE 2 MG: 2 TABLET ORAL at 08:15

## 2018-01-01 RX ADMIN — NYSTATIN: 100000 POWDER TOPICAL at 10:34

## 2018-01-01 RX ADMIN — SENNOSIDES 17.2 MG: 8.6 TABLET, FILM COATED ORAL at 09:28

## 2018-01-01 RX ADMIN — NYSTATIN 1 APPLICATION: 100000 POWDER TOPICAL at 08:14

## 2018-01-01 RX ADMIN — SENNOSIDES 17.2 MG: 8.6 TABLET, FILM COATED ORAL at 20:54

## 2018-01-01 RX ADMIN — DOCUSATE SODIUM 100 MG: 100 CAPSULE, LIQUID FILLED ORAL at 08:31

## 2018-01-01 RX ADMIN — HYDROMORPHONE HYDROCHLORIDE 1 MG: 2 TABLET ORAL at 03:52

## 2018-01-01 RX ADMIN — HYDROMORPHONE HYDROCHLORIDE 1 MG: 2 TABLET ORAL at 18:29

## 2018-01-01 RX ADMIN — INSULIN ASPART 2 UNITS: 100 INJECTION, SOLUTION INTRAVENOUS; SUBCUTANEOUS at 09:06

## 2018-01-01 RX ADMIN — DOCUSATE SODIUM 100 MG: 100 CAPSULE, LIQUID FILLED ORAL at 10:30

## 2018-01-01 RX ADMIN — MIRTAZAPINE 15 MG: 15 TABLET, FILM COATED ORAL at 10:07

## 2018-01-01 RX ADMIN — BUMETANIDE 1 MG: 0.25 INJECTION INTRAMUSCULAR; INTRAVENOUS at 11:25

## 2018-01-01 RX ADMIN — GABAPENTIN 300 MG: 300 CAPSULE ORAL at 20:54

## 2018-01-01 RX ADMIN — DOCUSATE SODIUM 100 MG: 100 CAPSULE, LIQUID FILLED ORAL at 20:44

## 2018-01-01 RX ADMIN — INSULIN ASPART 3 UNITS: 100 INJECTION, SOLUTION INTRAVENOUS; SUBCUTANEOUS at 10:22

## 2018-01-01 RX ADMIN — HYDROMORPHONE HYDROCHLORIDE 1 MG: 2 TABLET ORAL at 14:31

## 2018-01-01 RX ADMIN — MIRTAZAPINE 15 MG: 15 TABLET, FILM COATED ORAL at 08:14

## 2018-01-01 RX ADMIN — DOCUSATE SODIUM 100 MG: 100 CAPSULE, LIQUID FILLED ORAL at 21:30

## 2018-01-01 RX ADMIN — PANTOPRAZOLE SODIUM 40 MG: 40 TABLET, DELAYED RELEASE ORAL at 06:40

## 2018-01-01 RX ADMIN — SENNOSIDES 17.2 MG: 8.6 TABLET, FILM COATED ORAL at 09:05

## 2018-01-01 RX ADMIN — HYDROMORPHONE HYDROCHLORIDE 1 MG: 2 TABLET ORAL at 06:39

## 2018-01-01 RX ADMIN — GABAPENTIN 300 MG: 300 CAPSULE ORAL at 09:30

## 2018-01-01 RX ADMIN — HYDROMORPHONE HYDROCHLORIDE 1 MG: 2 TABLET ORAL at 22:15

## 2018-01-01 RX ADMIN — BUMETANIDE 2 MG: 2 TABLET ORAL at 23:02

## 2018-01-01 RX ADMIN — POTASSIUM CHLORIDE 20 MEQ: 750 CAPSULE, EXTENDED RELEASE ORAL at 18:32

## 2018-01-01 RX ADMIN — Medication 1 CAPSULE: at 09:30

## 2018-01-01 RX ADMIN — NYSTATIN: 100000 POWDER TOPICAL at 09:15

## 2018-01-01 RX ADMIN — GABAPENTIN 300 MG: 300 CAPSULE ORAL at 10:12

## 2018-01-01 RX ADMIN — TAZOBACTAM SODIUM AND PIPERACILLIN SODIUM 3.38 G: 375; 3 INJECTION, SOLUTION INTRAVENOUS at 12:56

## 2018-01-01 RX ADMIN — GABAPENTIN 300 MG: 300 CAPSULE ORAL at 23:49

## 2018-01-01 RX ADMIN — POTASSIUM CHLORIDE 20 MEQ: 750 CAPSULE, EXTENDED RELEASE ORAL at 09:58

## 2018-01-01 RX ADMIN — INSULIN ASPART 3 UNITS: 100 INJECTION, SOLUTION INTRAVENOUS; SUBCUTANEOUS at 17:46

## 2018-01-01 RX ADMIN — PANTOPRAZOLE SODIUM 40 MG: 40 TABLET, DELAYED RELEASE ORAL at 06:32

## 2018-01-01 RX ADMIN — SENNOSIDES 17.2 MG: 8.6 TABLET, FILM COATED ORAL at 21:02

## 2018-01-01 RX ADMIN — BUMETANIDE 1 MG: 0.25 INJECTION INTRAMUSCULAR; INTRAVENOUS at 00:22

## 2018-01-01 RX ADMIN — HYDROMORPHONE HYDROCHLORIDE 1 MG: 2 TABLET ORAL at 16:18

## 2018-01-01 RX ADMIN — DOCUSATE SODIUM 100 MG: 100 CAPSULE, LIQUID FILLED ORAL at 09:58

## 2018-01-01 RX ADMIN — GABAPENTIN 300 MG: 300 CAPSULE ORAL at 09:15

## 2018-01-01 RX ADMIN — INSULIN ASPART 2 UNITS: 100 INJECTION, SOLUTION INTRAVENOUS; SUBCUTANEOUS at 18:32

## 2018-01-01 RX ADMIN — MIRTAZAPINE 15 MG: 15 TABLET, FILM COATED ORAL at 09:30

## 2018-01-01 RX ADMIN — SENNOSIDES 17.2 MG: 8.6 TABLET, FILM COATED ORAL at 20:50

## 2018-01-01 RX ADMIN — GABAPENTIN 300 MG: 300 CAPSULE ORAL at 08:45

## 2018-01-01 RX ADMIN — FLUCONAZOLE 200 MG: 200 TABLET ORAL at 21:31

## 2018-01-01 RX ADMIN — BUMETANIDE 2 MG: 2 TABLET ORAL at 15:47

## 2018-01-01 RX ADMIN — HYDROMORPHONE HYDROCHLORIDE 1 MG: 2 TABLET ORAL at 02:14

## 2018-01-01 RX ADMIN — DOCUSATE SODIUM 100 MG: 100 CAPSULE, LIQUID FILLED ORAL at 10:06

## 2018-01-01 RX ADMIN — PANTOPRAZOLE SODIUM 40 MG: 40 TABLET, DELAYED RELEASE ORAL at 09:05

## 2018-01-01 RX ADMIN — BUMETANIDE 2 MG: 0.25 INJECTION INTRAMUSCULAR; INTRAVENOUS at 06:34

## 2018-01-01 RX ADMIN — INSULIN ASPART 3 UNITS: 100 INJECTION, SOLUTION INTRAVENOUS; SUBCUTANEOUS at 07:46

## 2018-01-01 RX ADMIN — VENLAFAXINE HYDROCHLORIDE 75 MG: 75 CAPSULE, EXTENDED RELEASE ORAL at 23:05

## 2018-01-01 RX ADMIN — INSULIN ASPART 3 UNITS: 100 INJECTION, SOLUTION INTRAVENOUS; SUBCUTANEOUS at 20:48

## 2018-01-01 RX ADMIN — DOCUSATE SODIUM 100 MG: 100 CAPSULE, LIQUID FILLED ORAL at 09:31

## 2018-01-01 RX ADMIN — SODIUM CHLORIDE 750 MG: 900 INJECTION, SOLUTION INTRAVENOUS at 16:20

## 2018-01-01 RX ADMIN — BUMETANIDE 2 MG: 2 TABLET ORAL at 23:04

## 2018-01-01 RX ADMIN — HYDROMORPHONE HYDROCHLORIDE 1 MG: 2 TABLET ORAL at 06:52

## 2018-01-01 RX ADMIN — PANTOPRAZOLE SODIUM 40 MG: 40 TABLET, DELAYED RELEASE ORAL at 06:38

## 2018-01-01 RX ADMIN — SENNOSIDES 17.2 MG: 8.6 TABLET, FILM COATED ORAL at 13:25

## 2018-01-01 RX ADMIN — BUMETANIDE 2 MG: 2 TABLET ORAL at 09:29

## 2018-01-01 RX ADMIN — SENNOSIDES 17.2 MG: 8.6 TABLET, FILM COATED ORAL at 20:21

## 2018-01-01 RX ADMIN — GABAPENTIN 300 MG: 300 CAPSULE ORAL at 22:12

## 2018-01-01 RX ADMIN — BUMETANIDE 1 MG: 0.25 INJECTION INTRAMUSCULAR; INTRAVENOUS at 12:06

## 2018-01-01 RX ADMIN — PANTOPRAZOLE SODIUM 40 MG: 40 TABLET, DELAYED RELEASE ORAL at 06:45

## 2018-01-01 RX ADMIN — SENNOSIDES 17.2 MG: 8.6 TABLET, FILM COATED ORAL at 21:52

## 2018-01-01 RX ADMIN — BUMETANIDE 2 MG: 0.25 INJECTION INTRAMUSCULAR; INTRAVENOUS at 15:58

## 2018-01-01 RX ADMIN — SENNOSIDES 17.2 MG: 8.6 TABLET, FILM COATED ORAL at 08:45

## 2018-01-01 RX ADMIN — SODIUM CHLORIDE 750 MG: 900 INJECTION, SOLUTION INTRAVENOUS at 16:03

## 2018-01-01 RX ADMIN — HYDROMORPHONE HYDROCHLORIDE 1 MG: 2 TABLET ORAL at 09:30

## 2018-01-01 RX ADMIN — POTASSIUM CHLORIDE 20 MEQ: 750 CAPSULE, EXTENDED RELEASE ORAL at 17:45

## 2018-01-01 RX ADMIN — MIRTAZAPINE 15 MG: 15 TABLET, FILM COATED ORAL at 08:31

## 2018-01-01 RX ADMIN — MIRTAZAPINE 15 MG: 15 TABLET, FILM COATED ORAL at 10:03

## 2018-01-01 RX ADMIN — VENLAFAXINE HYDROCHLORIDE 75 MG: 75 CAPSULE, EXTENDED RELEASE ORAL at 21:31

## 2018-01-01 RX ADMIN — PANTOPRAZOLE SODIUM 40 MG: 40 TABLET, DELAYED RELEASE ORAL at 06:33

## 2018-01-01 RX ADMIN — BUMETANIDE 1 MG: 0.25 INJECTION INTRAMUSCULAR; INTRAVENOUS at 06:33

## 2018-01-01 RX ADMIN — DOCUSATE SODIUM 100 MG: 100 CAPSULE, LIQUID FILLED ORAL at 20:50

## 2018-01-01 RX ADMIN — Medication 1 CAPSULE: at 10:14

## 2018-01-01 RX ADMIN — LACTULOSE 20 G: 20 SOLUTION ORAL at 11:58

## 2018-01-01 RX ADMIN — POTASSIUM CHLORIDE 20 MEQ: 750 CAPSULE, EXTENDED RELEASE ORAL at 08:31

## 2018-01-01 RX ADMIN — BUMETANIDE 1 MG: 0.25 INJECTION INTRAMUSCULAR; INTRAVENOUS at 23:09

## 2018-01-01 RX ADMIN — NYSTATIN: 100000 POWDER TOPICAL at 21:31

## 2018-01-01 RX ADMIN — POTASSIUM CHLORIDE 40 MEQ: 750 CAPSULE, EXTENDED RELEASE ORAL at 21:08

## 2018-01-01 RX ADMIN — INSULIN DETEMIR 15 UNITS: 100 INJECTION, SOLUTION SUBCUTANEOUS at 09:06

## 2018-01-01 RX ADMIN — NYSTATIN: 100000 POWDER TOPICAL at 08:46

## 2018-01-01 RX ADMIN — HYDROMORPHONE HYDROCHLORIDE 1 MG: 2 TABLET ORAL at 02:47

## 2018-01-01 RX ADMIN — MIRTAZAPINE 15 MG: 15 TABLET, FILM COATED ORAL at 09:02

## 2018-01-01 RX ADMIN — INSULIN ASPART 2 UNITS: 100 INJECTION, SOLUTION INTRAVENOUS; SUBCUTANEOUS at 09:25

## 2018-01-01 RX ADMIN — INSULIN ASPART 4 UNITS: 100 INJECTION, SOLUTION INTRAVENOUS; SUBCUTANEOUS at 12:22

## 2018-01-01 RX ADMIN — Medication 1 CAPSULE: at 10:34

## 2018-01-01 RX ADMIN — DOCUSATE SODIUM 100 MG: 100 CAPSULE, LIQUID FILLED ORAL at 23:04

## 2018-01-01 RX ADMIN — HYDROMORPHONE HYDROCHLORIDE 1 MG: 2 TABLET ORAL at 02:49

## 2018-01-01 RX ADMIN — HYDROMORPHONE HYDROCHLORIDE 1 MG: 2 TABLET ORAL at 17:41

## 2018-01-01 RX ADMIN — NYSTATIN: 100000 POWDER TOPICAL at 10:07

## 2018-01-01 RX ADMIN — VENLAFAXINE HYDROCHLORIDE 75 MG: 75 CAPSULE, EXTENDED RELEASE ORAL at 20:21

## 2018-01-01 RX ADMIN — POTASSIUM CHLORIDE 20 MEQ: 750 CAPSULE, EXTENDED RELEASE ORAL at 09:02

## 2018-01-01 RX ADMIN — NYSTATIN: 100000 POWDER TOPICAL at 09:03

## 2018-01-01 RX ADMIN — BUMETANIDE 2 MG: 2 TABLET ORAL at 18:29

## 2018-01-01 RX ADMIN — NYSTATIN: 100000 POWDER TOPICAL at 22:12

## 2018-01-01 RX ADMIN — Medication 1 CAPSULE: at 13:26

## 2018-01-01 RX ADMIN — BUMETANIDE 1 MG: 0.25 INJECTION INTRAMUSCULAR; INTRAVENOUS at 11:51

## 2018-01-01 RX ADMIN — POTASSIUM CHLORIDE 20 MEQ: 750 CAPSULE, EXTENDED RELEASE ORAL at 18:08

## 2018-01-01 RX ADMIN — SENNOSIDES 17.2 MG: 8.6 TABLET, FILM COATED ORAL at 21:30

## 2018-01-01 RX ADMIN — Medication 1 CAPSULE: at 08:31

## 2018-01-01 RX ADMIN — ACETAMINOPHEN 650 MG: 325 TABLET, FILM COATED ORAL at 00:22

## 2018-01-01 RX ADMIN — POTASSIUM CHLORIDE 20 MEQ: 750 CAPSULE, EXTENDED RELEASE ORAL at 10:30

## 2018-01-01 RX ADMIN — CEFTRIAXONE 1 G: 1 INJECTION, POWDER, FOR SOLUTION INTRAMUSCULAR; INTRAVENOUS at 17:15

## 2018-01-01 RX ADMIN — ONDANSETRON 4 MG: 2 INJECTION INTRAMUSCULAR; INTRAVENOUS at 10:41

## 2018-01-01 RX ADMIN — INSULIN ASPART 3 UNITS: 100 INJECTION, SOLUTION INTRAVENOUS; SUBCUTANEOUS at 09:23

## 2018-01-01 RX ADMIN — PANTOPRAZOLE SODIUM 40 MG: 40 TABLET, DELAYED RELEASE ORAL at 06:39

## 2018-01-01 RX ADMIN — HYDROMORPHONE HYDROCHLORIDE 1 MG: 2 TABLET ORAL at 17:24

## 2018-01-01 RX ADMIN — DOCUSATE SODIUM 100 MG: 100 CAPSULE, LIQUID FILLED ORAL at 21:11

## 2018-01-01 RX ADMIN — INSULIN DETEMIR 15 UNITS: 100 INJECTION, SOLUTION SUBCUTANEOUS at 12:22

## 2018-01-01 RX ADMIN — PANTOPRAZOLE SODIUM 40 MG: 40 TABLET, DELAYED RELEASE ORAL at 06:15

## 2018-01-01 RX ADMIN — GABAPENTIN 300 MG: 300 CAPSULE ORAL at 13:25

## 2018-01-01 RX ADMIN — DOCUSATE SODIUM 100 MG: 100 CAPSULE, LIQUID FILLED ORAL at 21:28

## 2018-01-01 RX ADMIN — Medication 1 CAPSULE: at 09:05

## 2018-01-01 RX ADMIN — CEFTRIAXONE 1 G: 1 INJECTION, POWDER, FOR SOLUTION INTRAMUSCULAR; INTRAVENOUS at 15:29

## 2018-01-01 RX ADMIN — LACTULOSE 20 G: 20 SOLUTION ORAL at 06:06

## 2018-01-01 RX ADMIN — VENLAFAXINE HYDROCHLORIDE 75 MG: 75 CAPSULE, EXTENDED RELEASE ORAL at 21:00

## 2018-01-01 RX ADMIN — BUMETANIDE 2 MG: 0.25 INJECTION INTRAMUSCULAR; INTRAVENOUS at 16:32

## 2018-01-01 RX ADMIN — NYSTATIN: 100000 POWDER TOPICAL at 10:03

## 2018-01-01 RX ADMIN — POTASSIUM CHLORIDE 20 MEQ: 750 CAPSULE, EXTENDED RELEASE ORAL at 18:29

## 2018-01-01 RX ADMIN — DOCUSATE SODIUM 100 MG: 100 CAPSULE, LIQUID FILLED ORAL at 09:05

## 2018-01-01 RX ADMIN — SENNOSIDES 17.2 MG: 8.6 TABLET, FILM COATED ORAL at 09:13

## 2018-01-01 RX ADMIN — BUMETANIDE 1 MG: 0.25 INJECTION INTRAMUSCULAR; INTRAVENOUS at 13:32

## 2018-01-01 RX ADMIN — SENNOSIDES 17.2 MG: 8.6 TABLET, FILM COATED ORAL at 23:04

## 2018-01-01 RX ADMIN — POTASSIUM CHLORIDE 20 MEQ: 750 CAPSULE, EXTENDED RELEASE ORAL at 18:43

## 2018-01-01 RX ADMIN — MIRTAZAPINE 15 MG: 15 TABLET, FILM COATED ORAL at 08:15

## 2018-01-01 RX ADMIN — DOCUSATE SODIUM 100 MG: 100 CAPSULE, LIQUID FILLED ORAL at 20:55

## 2018-01-01 RX ADMIN — POTASSIUM CHLORIDE 20 MEQ: 750 CAPSULE, EXTENDED RELEASE ORAL at 09:13

## 2018-01-01 RX ADMIN — INSULIN ASPART 3 UNITS: 100 INJECTION, SOLUTION INTRAVENOUS; SUBCUTANEOUS at 09:03

## 2018-01-01 RX ADMIN — Medication 1 CAPSULE: at 08:45

## 2018-01-01 RX ADMIN — CEFTRIAXONE 1 G: 1 INJECTION, POWDER, FOR SOLUTION INTRAMUSCULAR; INTRAVENOUS at 15:34

## 2018-01-01 RX ADMIN — VANCOMYCIN HYDROCHLORIDE 1250 MG: 100 INJECTION, POWDER, LYOPHILIZED, FOR SOLUTION INTRAVENOUS at 05:03

## 2018-01-01 RX ADMIN — LACTULOSE 20 G: 20 SOLUTION ORAL at 11:06

## 2018-01-01 RX ADMIN — POTASSIUM CHLORIDE 20 MEQ: 750 CAPSULE, EXTENDED RELEASE ORAL at 10:45

## 2018-01-01 RX ADMIN — SENNOSIDES 17.2 MG: 8.6 TABLET, FILM COATED ORAL at 20:42

## 2018-01-01 RX ADMIN — VENLAFAXINE HYDROCHLORIDE 75 MG: 75 CAPSULE, EXTENDED RELEASE ORAL at 22:11

## 2018-01-01 RX ADMIN — PANTOPRAZOLE SODIUM 40 MG: 40 TABLET, DELAYED RELEASE ORAL at 09:23

## 2018-01-01 RX ADMIN — MIRTAZAPINE 15 MG: 15 TABLET, FILM COATED ORAL at 08:49

## 2018-01-01 RX ADMIN — INSULIN ASPART 3 UNITS: 100 INJECTION, SOLUTION INTRAVENOUS; SUBCUTANEOUS at 13:13

## 2018-01-01 RX ADMIN — GABAPENTIN 300 MG: 300 CAPSULE ORAL at 21:52

## 2018-01-01 RX ADMIN — GABAPENTIN 300 MG: 300 CAPSULE ORAL at 10:34

## 2018-01-01 RX ADMIN — FLUCONAZOLE 200 MG: 200 TABLET ORAL at 11:21

## 2018-01-01 RX ADMIN — INSULIN DETEMIR 15 UNITS: 100 INJECTION, SOLUTION SUBCUTANEOUS at 08:45

## 2018-01-01 RX ADMIN — HYDROMORPHONE HYDROCHLORIDE 1 MG: 2 TABLET ORAL at 20:50

## 2018-01-01 RX ADMIN — NYSTATIN: 100000 POWDER TOPICAL at 13:26

## 2018-01-01 RX ADMIN — SENNOSIDES 17.2 MG: 8.6 TABLET, FILM COATED ORAL at 08:15

## 2018-01-01 RX ADMIN — BUMETANIDE 2 MG: 2 TABLET ORAL at 22:11

## 2018-01-01 RX ADMIN — Medication 1 CAPSULE: at 08:15

## 2018-01-01 RX ADMIN — VENLAFAXINE HYDROCHLORIDE 75 MG: 75 CAPSULE, EXTENDED RELEASE ORAL at 23:04

## 2018-01-01 RX ADMIN — INSULIN DETEMIR 15 UNITS: 100 INJECTION, SOLUTION SUBCUTANEOUS at 09:16

## 2018-01-01 RX ADMIN — HYDROMORPHONE HYDROCHLORIDE 1 MG: 2 TABLET ORAL at 12:56

## 2018-01-01 RX ADMIN — CEFTRIAXONE 1 G: 1 INJECTION, POWDER, FOR SOLUTION INTRAMUSCULAR; INTRAVENOUS at 14:39

## 2018-01-01 RX ADMIN — GABAPENTIN 300 MG: 300 CAPSULE ORAL at 22:11

## 2018-01-01 RX ADMIN — FLUCONAZOLE 200 MG: 200 TABLET ORAL at 10:06

## 2018-01-01 RX ADMIN — GABAPENTIN 300 MG: 300 CAPSULE ORAL at 20:44

## 2018-01-01 RX ADMIN — POTASSIUM CHLORIDE 20 MEQ: 750 CAPSULE, EXTENDED RELEASE ORAL at 08:14

## 2018-01-01 RX ADMIN — VANCOMYCIN HYDROCHLORIDE 1250 MG: 100 INJECTION, POWDER, LYOPHILIZED, FOR SOLUTION INTRAVENOUS at 18:08

## 2018-01-01 RX ADMIN — HYDROMORPHONE HYDROCHLORIDE 1 MG: 2 TABLET ORAL at 22:22

## 2018-01-01 RX ADMIN — DOCUSATE SODIUM 100 MG: 100 CAPSULE, LIQUID FILLED ORAL at 20:21

## 2018-01-01 RX ADMIN — PANTOPRAZOLE SODIUM 40 MG: 40 TABLET, DELAYED RELEASE ORAL at 09:30

## 2018-01-01 RX ADMIN — NYSTATIN: 100000 POWDER TOPICAL at 09:24

## 2018-01-01 RX ADMIN — POTASSIUM CHLORIDE 20 MEQ: 750 CAPSULE, EXTENDED RELEASE ORAL at 18:19

## 2018-01-01 RX ADMIN — HYDROMORPHONE HYDROCHLORIDE 1 MG: 2 TABLET ORAL at 10:05

## 2018-01-01 RX ADMIN — MIRTAZAPINE 15 MG: 15 TABLET, FILM COATED ORAL at 13:25

## 2018-01-01 RX ADMIN — GABAPENTIN 300 MG: 300 CAPSULE ORAL at 10:33

## 2018-01-01 RX ADMIN — GABAPENTIN 300 MG: 300 CAPSULE ORAL at 18:29

## 2018-01-01 RX ADMIN — SENNOSIDES 17.2 MG: 8.6 TABLET, FILM COATED ORAL at 10:07

## 2018-01-01 RX ADMIN — GABAPENTIN 300 MG: 300 CAPSULE ORAL at 09:23

## 2018-01-01 RX ADMIN — VANCOMYCIN HYDROCHLORIDE 2000 MG: 100 INJECTION, POWDER, LYOPHILIZED, FOR SOLUTION INTRAVENOUS at 13:32

## 2018-01-01 RX ADMIN — DOCUSATE SODIUM 100 MG: 100 CAPSULE, LIQUID FILLED ORAL at 21:52

## 2018-01-01 RX ADMIN — NYSTATIN: 100000 POWDER TOPICAL at 03:46

## 2018-01-01 RX ADMIN — GABAPENTIN 300 MG: 300 CAPSULE ORAL at 08:15

## 2018-01-01 RX ADMIN — HYDROMORPHONE HYDROCHLORIDE 1 MG: 2 TABLET ORAL at 20:54

## 2018-01-01 RX ADMIN — VENLAFAXINE HYDROCHLORIDE 75 MG: 75 CAPSULE, EXTENDED RELEASE ORAL at 21:30

## 2018-01-01 RX ADMIN — FLUCONAZOLE 200 MG: 200 TABLET ORAL at 10:02

## 2018-01-01 RX ADMIN — POTASSIUM CHLORIDE 40 MEQ: 750 CAPSULE, EXTENDED RELEASE ORAL at 09:23

## 2018-01-01 RX ADMIN — SODIUM CHLORIDE 750 MG: 900 INJECTION, SOLUTION INTRAVENOUS at 14:35

## 2018-01-01 RX ADMIN — HYDROMORPHONE HYDROCHLORIDE 1 MG: 2 TABLET ORAL at 03:46

## 2018-01-01 RX ADMIN — Medication 1 CAPSULE: at 09:23

## 2018-01-01 RX ADMIN — ACETAMINOPHEN 650 MG: 325 TABLET, FILM COATED ORAL at 06:47

## 2018-01-01 RX ADMIN — SENNOSIDES 17.2 MG: 8.6 TABLET, FILM COATED ORAL at 08:14

## 2018-01-01 RX ADMIN — INSULIN ASPART 4 UNITS: 100 INJECTION, SOLUTION INTRAVENOUS; SUBCUTANEOUS at 12:06

## 2018-01-01 RX ADMIN — MIRTAZAPINE 15 MG: 15 TABLET, FILM COATED ORAL at 10:34

## 2018-01-01 RX ADMIN — HYDROMORPHONE HYDROCHLORIDE 1 MG: 2 TABLET ORAL at 11:40

## 2018-01-01 RX ADMIN — HYDROMORPHONE HYDROCHLORIDE 1 MG: 2 TABLET ORAL at 19:36

## 2018-01-01 RX ADMIN — GABAPENTIN 300 MG: 300 CAPSULE ORAL at 21:11

## 2018-01-01 RX ADMIN — HYDROMORPHONE HYDROCHLORIDE 1 MG: 2 TABLET ORAL at 21:36

## 2018-01-01 RX ADMIN — Medication 10 ML: at 10:07

## 2018-01-01 RX ADMIN — FLUCONAZOLE 200 MG: 200 TABLET ORAL at 10:14

## 2018-01-01 RX ADMIN — Medication 1 CAPSULE: at 10:07

## 2018-01-01 RX ADMIN — BUMETANIDE 1 MG: 0.25 INJECTION INTRAMUSCULAR; INTRAVENOUS at 10:56

## 2018-01-01 RX ADMIN — POTASSIUM CHLORIDE 20 MEQ: 750 CAPSULE, EXTENDED RELEASE ORAL at 19:26

## 2018-01-01 RX ADMIN — GABAPENTIN 300 MG: 300 CAPSULE ORAL at 21:25

## 2018-01-01 RX ADMIN — GABAPENTIN 300 MG: 300 CAPSULE ORAL at 08:31

## 2018-01-01 RX ADMIN — HYDROMORPHONE HYDROCHLORIDE 1 MG: 2 TABLET ORAL at 23:04

## 2018-01-01 RX ADMIN — HYDROMORPHONE HYDROCHLORIDE 1 MG: 2 TABLET ORAL at 10:38

## 2018-01-01 RX ADMIN — POTASSIUM CHLORIDE 20 MEQ: 750 CAPSULE, EXTENDED RELEASE ORAL at 09:28

## 2018-01-01 RX ADMIN — GABAPENTIN 300 MG: 300 CAPSULE ORAL at 10:06

## 2018-01-01 RX ADMIN — SENNOSIDES 17.2 MG: 8.6 TABLET, FILM COATED ORAL at 21:11

## 2018-01-01 RX ADMIN — GABAPENTIN 300 MG: 300 CAPSULE ORAL at 09:05

## 2018-01-01 RX ADMIN — BUMETANIDE 1 MG: 0.25 INJECTION INTRAMUSCULAR; INTRAVENOUS at 13:15

## 2018-01-01 RX ADMIN — VENLAFAXINE HYDROCHLORIDE 75 MG: 75 CAPSULE, EXTENDED RELEASE ORAL at 20:50

## 2018-01-01 RX ADMIN — NYSTATIN 1 APPLICATION: 100000 POWDER TOPICAL at 21:30

## 2018-01-01 RX ADMIN — INSULIN ASPART 3 UNITS: 100 INJECTION, SOLUTION INTRAVENOUS; SUBCUTANEOUS at 12:41

## 2018-01-01 RX ADMIN — INSULIN ASPART 2 UNITS: 100 INJECTION, SOLUTION INTRAVENOUS; SUBCUTANEOUS at 17:51

## 2018-01-01 RX ADMIN — FLUCONAZOLE 200 MG: 200 TABLET ORAL at 04:27

## 2018-01-01 RX ADMIN — HYDROMORPHONE HYDROCHLORIDE 1 MG: 2 TABLET ORAL at 01:19

## 2018-01-01 RX ADMIN — BUMETANIDE 1 MG: 0.25 INJECTION INTRAMUSCULAR; INTRAVENOUS at 12:56

## 2018-01-01 RX ADMIN — POTASSIUM CHLORIDE 20 MEQ: 750 CAPSULE, EXTENDED RELEASE ORAL at 13:26

## 2018-01-01 RX ADMIN — SENNOSIDES 17.2 MG: 8.6 TABLET, FILM COATED ORAL at 10:31

## 2018-01-01 RX ADMIN — PANTOPRAZOLE SODIUM 40 MG: 40 TABLET, DELAYED RELEASE ORAL at 06:19

## 2018-01-01 RX ADMIN — BUMETANIDE 2 MG: 0.25 INJECTION INTRAMUSCULAR; INTRAVENOUS at 16:42

## 2018-01-01 RX ADMIN — DOCUSATE SODIUM 100 MG: 100 CAPSULE, LIQUID FILLED ORAL at 08:14

## 2018-01-01 RX ADMIN — DOCUSATE SODIUM 100 MG: 100 CAPSULE, LIQUID FILLED ORAL at 08:49

## 2018-01-01 RX ADMIN — DOCUSATE SODIUM 100 MG: 100 CAPSULE, LIQUID FILLED ORAL at 22:15

## 2018-01-01 RX ADMIN — POTASSIUM CHLORIDE 20 MEQ: 750 CAPSULE, EXTENDED RELEASE ORAL at 10:07

## 2018-01-01 RX ADMIN — BUMETANIDE 1 MG: 0.25 INJECTION INTRAMUSCULAR; INTRAVENOUS at 06:38

## 2018-01-01 RX ADMIN — NYSTATIN: 100000 POWDER TOPICAL at 09:31

## 2018-01-01 RX ADMIN — INSULIN DETEMIR 15 UNITS: 100 INJECTION, SOLUTION SUBCUTANEOUS at 10:09

## 2018-01-01 RX ADMIN — GABAPENTIN 300 MG: 300 CAPSULE ORAL at 10:30

## 2018-01-01 RX ADMIN — GABAPENTIN 300 MG: 300 CAPSULE ORAL at 12:36

## 2018-01-01 RX ADMIN — GABAPENTIN 300 MG: 300 CAPSULE ORAL at 20:50

## 2018-01-01 RX ADMIN — LIDOCAINE HYDROCHLORIDE 19 ML: 10 INJECTION, SOLUTION INFILTRATION; PERINEURAL at 09:05

## 2018-01-01 RX ADMIN — BUMETANIDE 2 MG: 0.25 INJECTION INTRAMUSCULAR; INTRAVENOUS at 16:20

## 2018-01-01 RX ADMIN — INSULIN ASPART 3 UNITS: 100 INJECTION, SOLUTION INTRAVENOUS; SUBCUTANEOUS at 17:44

## 2018-01-01 RX ADMIN — SENNOSIDES 17.2 MG: 8.6 TABLET, FILM COATED ORAL at 09:02

## 2018-01-01 RX ADMIN — GABAPENTIN 300 MG: 300 CAPSULE ORAL at 20:21

## 2018-01-01 RX ADMIN — POTASSIUM CHLORIDE 40 MEQ: 750 CAPSULE, EXTENDED RELEASE ORAL at 15:29

## 2018-01-01 RX ADMIN — HYDROMORPHONE HYDROCHLORIDE 1 MG: 2 TABLET ORAL at 12:36

## 2018-01-01 RX ADMIN — VENLAFAXINE HYDROCHLORIDE 75 MG: 75 CAPSULE, EXTENDED RELEASE ORAL at 21:26

## 2018-01-01 RX ADMIN — GABAPENTIN 300 MG: 300 CAPSULE ORAL at 23:05

## 2018-01-01 RX ADMIN — VENLAFAXINE HYDROCHLORIDE 75 MG: 75 CAPSULE, EXTENDED RELEASE ORAL at 22:13

## 2018-01-01 RX ADMIN — MIRTAZAPINE 15 MG: 15 TABLET, FILM COATED ORAL at 09:23

## 2018-01-01 RX ADMIN — NYSTATIN: 100000 POWDER TOPICAL at 11:45

## 2018-01-01 RX ADMIN — BUMETANIDE 1 MG: 0.25 INJECTION INTRAMUSCULAR; INTRAVENOUS at 11:37

## 2018-01-01 RX ADMIN — FLUCONAZOLE 200 MG: 200 TABLET ORAL at 08:45

## 2018-01-01 RX ADMIN — DOCUSATE SODIUM 100 MG: 100 CAPSULE, LIQUID FILLED ORAL at 21:31

## 2018-01-01 RX ADMIN — POTASSIUM CHLORIDE 20 MEQ: 750 CAPSULE, EXTENDED RELEASE ORAL at 08:15

## 2018-01-01 RX ADMIN — CEFTRIAXONE 1 G: 1 INJECTION, POWDER, FOR SOLUTION INTRAMUSCULAR; INTRAVENOUS at 14:33

## 2018-01-01 RX ADMIN — DOCUSATE SODIUM 100 MG: 100 CAPSULE, LIQUID FILLED ORAL at 22:11

## 2018-01-01 RX ADMIN — GABAPENTIN 300 MG: 300 CAPSULE ORAL at 23:04

## 2018-01-01 RX ADMIN — BUMETANIDE 2 MG: 0.25 INJECTION INTRAMUSCULAR; INTRAVENOUS at 23:14

## 2018-01-01 RX ADMIN — Medication 1 CAPSULE: at 09:58

## 2018-01-01 RX ADMIN — GABAPENTIN 300 MG: 300 CAPSULE ORAL at 23:02

## 2018-01-01 RX ADMIN — BUMETANIDE 1 MG: 0.25 INJECTION INTRAMUSCULAR; INTRAVENOUS at 11:58

## 2018-01-01 RX ADMIN — GABAPENTIN 300 MG: 300 CAPSULE ORAL at 09:58

## 2018-01-01 RX ADMIN — NYSTATIN: 100000 POWDER TOPICAL at 22:04

## 2018-01-01 RX ADMIN — SENNOSIDES 17.2 MG: 8.6 TABLET, FILM COATED ORAL at 10:34

## 2018-01-01 RX ADMIN — INSULIN DETEMIR 15 UNITS: 100 INJECTION, SOLUTION SUBCUTANEOUS at 20:42

## 2018-01-01 RX ADMIN — NYSTATIN: 100000 POWDER TOPICAL at 08:16

## 2018-01-01 RX ADMIN — SENNOSIDES 17.2 MG: 8.6 TABLET, FILM COATED ORAL at 09:31

## 2018-01-01 RX ADMIN — VENLAFAXINE HYDROCHLORIDE 75 MG: 75 CAPSULE, EXTENDED RELEASE ORAL at 21:52

## 2018-01-01 RX ADMIN — HYDROMORPHONE HYDROCHLORIDE 1 MG: 2 TABLET ORAL at 11:19

## 2018-01-01 RX ADMIN — MIRTAZAPINE 15 MG: 15 TABLET, FILM COATED ORAL at 08:45

## 2018-01-01 RX ADMIN — Medication 1 CAPSULE: at 09:13

## 2018-01-01 RX ADMIN — MIRTAZAPINE 15 MG: 15 TABLET, FILM COATED ORAL at 10:14

## 2018-01-01 RX ADMIN — INSULIN ASPART 3 UNITS: 100 INJECTION, SOLUTION INTRAVENOUS; SUBCUTANEOUS at 10:12

## 2018-01-01 RX ADMIN — DOCUSATE SODIUM 100 MG: 100 CAPSULE, LIQUID FILLED ORAL at 08:15

## 2018-01-01 RX ADMIN — GABAPENTIN 300 MG: 300 CAPSULE ORAL at 23:08

## 2018-01-01 RX ADMIN — VENLAFAXINE HYDROCHLORIDE 75 MG: 75 CAPSULE, EXTENDED RELEASE ORAL at 23:03

## 2018-01-01 RX ADMIN — INSULIN DETEMIR 15 UNITS: 100 INJECTION, SOLUTION SUBCUTANEOUS at 21:27

## 2018-01-01 RX ADMIN — POTASSIUM CHLORIDE 20 MEQ: 750 CAPSULE, EXTENDED RELEASE ORAL at 17:51

## 2018-01-01 RX ADMIN — GABAPENTIN 300 MG: 300 CAPSULE ORAL at 01:14

## 2018-01-01 RX ADMIN — INSULIN ASPART 3 UNITS: 100 INJECTION, SOLUTION INTRAVENOUS; SUBCUTANEOUS at 11:38

## 2018-01-01 RX ADMIN — BUMETANIDE 1 MG: 0.25 INJECTION INTRAMUSCULAR; INTRAVENOUS at 00:32

## 2018-01-01 RX ADMIN — BUMETANIDE 2 MG: 0.25 INJECTION INTRAMUSCULAR; INTRAVENOUS at 23:36

## 2018-01-01 RX ADMIN — SENNOSIDES 17.2 MG: 8.6 TABLET, FILM COATED ORAL at 21:31

## 2018-01-01 RX ADMIN — BUMETANIDE 2 MG: 0.25 INJECTION INTRAMUSCULAR; INTRAVENOUS at 00:15

## 2018-01-01 RX ADMIN — MAGNESIUM SULFATE HEPTAHYDRATE 2 G: 1 INJECTION, SOLUTION INTRAVENOUS at 09:24

## 2018-01-01 RX ADMIN — NYSTATIN: 100000 POWDER TOPICAL at 10:32

## 2018-01-01 RX ADMIN — INSULIN ASPART 3 UNITS: 100 INJECTION, SOLUTION INTRAVENOUS; SUBCUTANEOUS at 17:05

## 2018-01-01 RX ADMIN — BUMETANIDE 1 MG: 0.25 INJECTION INTRAMUSCULAR; INTRAVENOUS at 16:36

## 2018-01-01 RX ADMIN — NYSTATIN: 100000 POWDER TOPICAL at 23:03

## 2018-01-01 RX ADMIN — POTASSIUM CHLORIDE 20 MEQ: 750 CAPSULE, EXTENDED RELEASE ORAL at 10:14

## 2018-01-01 RX ADMIN — DOCUSATE SODIUM 100 MG: 100 CAPSULE, LIQUID FILLED ORAL at 10:12

## 2018-01-01 RX ADMIN — DOCUSATE SODIUM 100 MG: 100 CAPSULE, LIQUID FILLED ORAL at 08:45

## 2018-01-01 RX ADMIN — GABAPENTIN 300 MG: 300 CAPSULE ORAL at 21:02

## 2018-01-01 RX ADMIN — NYSTATIN: 100000 POWDER TOPICAL at 21:25

## 2018-01-01 RX ADMIN — HYDROMORPHONE HYDROCHLORIDE 1 MG: 2 TABLET ORAL at 16:19

## 2018-01-01 RX ADMIN — SENNOSIDES 17.2 MG: 8.6 TABLET, FILM COATED ORAL at 09:23

## 2018-01-01 RX ADMIN — BUMETANIDE 2 MG: 0.25 INJECTION INTRAMUSCULAR; INTRAVENOUS at 23:13

## 2018-01-01 RX ADMIN — BUMETANIDE 1 MG: 0.25 INJECTION INTRAMUSCULAR; INTRAVENOUS at 23:21

## 2018-01-01 RX ADMIN — NYSTATIN: 100000 POWDER TOPICAL at 10:11

## 2018-01-01 RX ADMIN — BUMETANIDE 2 MG: 2 TABLET ORAL at 09:02

## 2018-01-01 RX ADMIN — NYSTATIN: 100000 POWDER TOPICAL at 21:30

## 2018-01-01 RX ADMIN — POTASSIUM CHLORIDE 20 MEQ: 750 CAPSULE, EXTENDED RELEASE ORAL at 08:45

## 2018-01-01 RX ADMIN — NYSTATIN: 100000 POWDER TOPICAL at 21:11

## 2018-01-01 RX ADMIN — NYSTATIN: 100000 POWDER TOPICAL at 23:09

## 2018-01-01 RX ADMIN — SENNOSIDES 17.2 MG: 8.6 TABLET, FILM COATED ORAL at 21:25

## 2018-01-01 RX ADMIN — CEFTRIAXONE 1 G: 1 INJECTION, POWDER, FOR SOLUTION INTRAMUSCULAR; INTRAVENOUS at 14:54

## 2018-01-01 RX ADMIN — PANTOPRAZOLE SODIUM 40 MG: 40 TABLET, DELAYED RELEASE ORAL at 06:06

## 2018-01-01 RX ADMIN — INSULIN DETEMIR 15 UNITS: 100 INJECTION, SOLUTION SUBCUTANEOUS at 22:03

## 2018-01-01 RX ADMIN — DOCUSATE SODIUM 100 MG: 100 CAPSULE, LIQUID FILLED ORAL at 09:23

## 2018-01-01 RX ADMIN — VANCOMYCIN HYDROCHLORIDE 1250 MG: 100 INJECTION, POWDER, LYOPHILIZED, FOR SOLUTION INTRAVENOUS at 14:56

## 2018-01-01 RX ADMIN — NYSTATIN: 100000 POWDER TOPICAL at 09:27

## 2018-01-01 RX ADMIN — BUMETANIDE 2 MG: 2 TABLET ORAL at 17:51

## 2018-01-01 RX ADMIN — MIRTAZAPINE 15 MG: 15 TABLET, FILM COATED ORAL at 09:05

## 2018-01-01 RX ADMIN — Medication 1 CAPSULE: at 10:31

## 2018-01-01 RX ADMIN — SENNOSIDES 17.2 MG: 8.6 TABLET, FILM COATED ORAL at 22:12

## 2018-01-01 RX ADMIN — HYDROMORPHONE HYDROCHLORIDE 1 MG: 2 TABLET ORAL at 17:31

## 2018-01-01 RX ADMIN — POTASSIUM CHLORIDE 20 MEQ: 750 CAPSULE, EXTENDED RELEASE ORAL at 20:21
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
## 2018-01-01 NOTE — PROGRESS NOTES
"Pharmacokinetic Consult - Vancomycin Dosing (Follow-up Note)    Emil Pulido is a 72 y.o. male who is on day 7 pharmacy to dose vancomycin for SSTI/scrotal cellulitis.  Pharmacy dosing vancomycin per Dr. Zamorano's request.   Other antimicrobials: Zosyn 3.375 gram IV q8h EI  Goal trough: 10-20 mg/L    Relevant clinical data and objective history reviewed:  188 cm (74\")  109 kg (240 lb)  Body mass index is 30.81 kg/(m^2).     He has a past medical history of Anemia; Antiphospholipid antibody with hypercoagulable state; Arthritis; Cancer; Carotid artery disease; Cellulitis (2015); Chronic atrial fibrillation; Chronic pain; Deep vein thrombosis of lower extremity; Depression; Diabetes mellitus; Eye abnormalities; Factor 5 Leiden mutation, heterozygous; Hematoma; History of transfusion; being hospitalized; Hypertension; Hypertensive heart disease; Neuropathy; Paraplegia; Peripheral vascular disease; Sick sinus syndrome; Sleep apnea; and Stroke (2015).    Allergies as of 2017 - [facility] as Reviewed 2017   Allergen Reaction Noted   • Morphine  2016   • Morphine and related Nausea Only 2016     Vital Signs (last 24 hours)        0700  -   0659  0700  -   0857   Most Recent    Temp (°F) 97.8 -  98.7       98.2 (36.8)    Heart Rate 99 -  107       107    Resp 16 -  18       16    /63 -  124/71       123/64    SpO2 (%) (!)87 -  97       (!) 87        Estimated Creatinine Clearance: 86.8 mL/min (by C-G formula based on Cr of 1.04).    Results from last 7 days  Lab Units 18  0735 17  0436 17  0910   CREATININE mg/dL 1.04 1.07 1.09       Results from last 7 days  Lab Units 18  0735 17  0436 17  0910   WBC 10*3/mm3 9.62 10.03 10.22     Baseline culture/source/susceptibility:    >100k e coli susceptible to all tested   BC NG5d   MRSA nares swab negative   Pleural fluid: NG5d     Imagin/29 CT Chest w/o " contrast:  Impression:         Moderately large residual left pleural effusion following thoracentesis producing complete compressive atelectasis of the left lower lobe and portions of the left upper lobe. No definite lung or hilar or mediastinal masses are identified. There are no infiltrates within the aerated portions of the lung. A small posteriorly layering right pleural effusion has increased in size since the preceding CT scan of the chest dated 12/27/2017. Ascites in the upper abdomen is unchanged.     Recent Vancomycin dosing history:   12/26 vancomycin 2250mg given @ 2134 12/27 vancomycin 1500 mg iv q12h @ 1336 and then 12/28 @ 0111                        12/28 1311 Vancomycin trough: 26.9mg/L ( after 3 doses and ~ 12 hrs from last dose)   12/28 vancomycin adjusted to 750 mg iv q12h (last dose on 12/29 2136)              12/30 0910 vancomycin trough: 26.5 mcg/mL, vancomycin on hold                        12/30 2136 random level: 22.9 mcg/mL, ~24hr level                        12/31 0436 random level: 22.7 mcg/mL, ~31hr level   1/1 0735 random level 18.7 mcg/mL (~58h level)    Lab Results   Component Value Date    VANCOTROUGH 26.50 (C) 12/30/2017     Lab Results   Component Value Date    VANCORANDOM 18.70 01/01/2018     Assessment/Plan  1) Vancomycin random level collected ~58h after last dose 18.7 mcg/mL.  Will plan to re-dose when level < 15 mcg/mL.  Random in AM.    2) Will monitor serum creatinine at least every 48 hours per dosing recommendations.    3) Encourage hydration as allowed by MD to help prevent toxic accumulation; monitor for s/sxn of toxicity including increase in SCr and decrease in UOP.    Pharmacy will continue to follow daily while on vancomycin and adjust as needed.     Thanks, Ilya Conroy, PharmD, BCPS

## 2018-01-01 NOTE — PROGRESS NOTES
[facility] Visit Report    Emil Pulido  [id number]  12/31/2017    Admission R/T [facility] Dx: NO    Reason for [facility] Admission: CHF    Symptom  Management: Cellulitis of scrotum    Nursing/Medication Recommendations: No recommendations at this time    Psychosocial Issues and Recommendations: Provide support to patient and family    Spiritual Concerns and Recommendations: None at present    [facility] Discharge Plans:  None at present, continue to monitor and treat cellulitis of the scrotum with IV Zosyn, Vancomycin, IV Bumex, Dilaudid PO for leg pain. [facility]      Review of Visit: Close monitoring for safety/falls, management of cellulitis of the scrotum, comfort care. Patient lying in bed, asleep. Discussed care needs with spouse and she had some concerns that cellulitis does not seem to be improving with current IV antx. Suggested that she voice those concerns to the physician when he rounds tomorrow. She also stated that when he is ready for discharge she wants to take him home and not consider any kind of rehab. Emotional support provided to her at the bedside. Spoke to staff MAGGIE Nagy regarding care needs and will see daily to assess needs, monitor status and offer support.        Priti Maldonado RN  [facility] Visit Nurse

## 2018-01-01 NOTE — CONSULTS
CONSULT NOTE    Infectious Diseases - Memo Bullock MD  [facility]       Patient Identification:  Name: Emil Pulido  Age: 72 y.o.  Sex: male  :  1945  MRN: [id number]             Date of Consultation: 2008        Primary Care Physician: Provider Not In System                               Requesting Physician: Dr. Trenton Zamorano  Reason for Consultation: Scrotal cellulitis with UTI    Impression: 72-year-old male with complicated past medical history has  1-partially treated cellulitis of the scrotum with sluggish response is likely due to overall the scrotal edema and dependent status with possible superimposed candidal dermatitis instead of failure of antibiotic therapy.  2-urinary tract infection due to indwelling Obando catheter and recent instrumentation.  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  Would recommend 10-14 days of antibiotic therapy.  Appropriate postural adjustment to decrease edema of the scrotum.  It is not unreasonable to consider repeating ultrasound of his scrotum to rule out evolving scrotal abscess that required I&D.  Add Diflucan and local nystatin.  Overall prognosis is guarded given his comorbidities few of which cannot be rectified and would contribute to progressive decline.  Thank you Dr. Zamorano for letting me be the part of your patient care please see above impression and recommendation        History of Present Illness:   Patient is a 72-year-old male who has complicated past medical history has neurogenic bladder and urinary retention requiring Obando catheter placement 7 months  ago.  Patient developed fever and redness of his scrotum and swelling resulting in I will to the emergency room. His Obando catheter was attempted to be change which was unsuccessful eventually urology service to place the catheter.  Patient had ultrasound of his scrotum performed which revealed cellulitis of the scrotum and scrotal wall edema with no discrete abscesses or epididymal orchitis torsion.  Patient is appropriately treated with vancomycin and Zosyn.  Urine culture came up positive for Escherichia coli sensitive to various antibiotics.  Causes sluggish response to the redness and swelling of the scrotum.  We requested infectious disease evaluation hence infectious disease service was consulted.      Past Medical History:  Past Medical History:   Diagnosis Date   • Anemia    • Antiphospholipid antibody with hypercoagulable state    • Arthritis    • Cancer    • Carotid artery disease    • Cellulitis 09/2015    Left leg   • Chronic atrial fibrillation    • Chronic pain    • Deep vein thrombosis of lower extremity    • Depression    • Diabetes mellitus     Type 2   • Eye abnormalities     pt has bleeding behind eyes   • Factor 5 Leiden mutation, heterozygous    • Hematoma     LARGE THIGH HEMATOMA   • History of transfusion    • Hx of being hospitalized     From 01/10/2016 through 01/18/2016 for acute GI blood loss while on Coumadin.   • Hypertension    • Hypertensive heart disease    • Neuropathy     Chronic pain syndrome with chronic immobility.   • Paraplegia    • Peripheral vascular disease    • Sick sinus syndrome    • Sleep apnea    • Stroke 08/2015    Cerebrovascular accident     Past Surgical History:  Past Surgical History:   Procedure Laterality Date   • BACK SURGERY     • COLONOSCOPY     • COLONOSCOPY N/A 10/4/2016    Procedure: COLONOSCOPY to cecum endo clip x2;  Surgeon: Leoncio Strong MD;  Location: [facility];  Service:    • COLONOSCOPY N/A 12/12/2016    Procedure: COLONOSCOPY into cecum  with Resolution clip x 2 and epi 1:20,000 injection;  Surgeon: Leoncio Strong MD;  Location: 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      • Cod Liver Oil 1000 MG capsule Take 1 capsule by mouth Daily.      • docusate sodium (COLACE) 100 MG capsule Take 100 mg by mouth Daily.      • ferrous gluconate 324 (37.5 Fe) MG tablet tablet Take 324 mg by mouth Daily.      • gabapentin (NEURONTIN) 300 MG capsule Take 300 mg by mouth 2 (Two) Times a Day As Needed.   6/1/2017   • HYDROmorphone (DILAUDID) 2 MG tablet Take 1 mg by mouth Every 4 (Four) Hours As Needed for Moderate Pain .      • Multiple Vitamins-Minerals (MULTIVITAMIN & MINERAL PO) Take 1 tablet by mouth Daily.      • pantoprazole (PROTONIX) 40 MG EC tablet Take 40 mg by mouth Daily.      • senna (SENOKOT) 8.6 MG tablet tablet Take 2 tablets by mouth Daily.      • venlafaxine XR (EFFEXOR-XR) 75 MG 24 hr capsule Take 75 mg by mouth Daily.      • vitamin C (ASCORBIC ACID) 500 MG tablet Take 1,000 mg by mouth Daily.   6/1/2017   • potassium chloride (K-DUR,KLOR-CON) 20 MEQ CR tablet Take 1 tablet by mouth 2 (Two) Times a Day for 30 days. 60 tablet 3 Taking     Current Meds:     Current Facility-Administered Medications:   •  acetaminophen (TYLENOL) 160 MG/5ML solution 650 mg, 650 mg, Oral, Q4H PRN, Trenton Zamorano MD  •  acetaminophen (TYLENOL) suppository 650 mg, 650 mg, Rectal, Q4H PRN, Trenton Zamorano MD  •  acetaminophen (TYLENOL) tablet 650 mg, 650 mg, Oral, Q4H PRN, Trenton Zamorano MD, 650 mg at  12/30/17 0059  •  bumetanide (BUMEX) injection 1 mg, 1 mg, Intravenous, Q12H, Trenton Zamorano MD, 1 mg at 12/31/17 2328  •  dextrose (D50W) solution 25 g, 25 g, Intravenous, Q15 Min PRN, Trenton Zamorano MD  •  dextrose (GLUTOSE) oral gel 15 g, 15 g, Oral, Q15 Min PRN, Trenton Zamorano MD  •  docusate sodium (COLACE) capsule 100 mg, 100 mg, Oral, BID, Trenton Zamorano MD, 100 mg at 01/01/18 0849  •  gabapentin (NEURONTIN) capsule 300 mg, 300 mg, Oral, BID PRN, Emma Ren MD, 300 mg at 01/01/18 0114  •  glucagon (human recombinant) (GLUCAGEN DIAGNOSTIC) injection 1 mg, 1 mg, Subcutaneous, Q15 Min PRN, Trenton Zamorano MD  •  HYDROmorphone (DILAUDID) tablet 1 mg, 1 mg, Oral, Q4H PRN, Trenton Zamorano MD, 1 mg at 12/31/17 2236  •  insulin aspart (novoLOG) injection 0-7 Units, 0-7 Units, Subcutaneous, 4x Daily With Meals & Nightly, Trenton Zamorano MD, 3 Units at 12/31/17 1249  •  insulin detemir (LEVEMIR) injection 15 Units, 15 Units, Subcutaneous, Q12H, Trenton Zamorano MD, 15 Units at 01/01/18 0849  •  lactobacillus acidophilus (RISAQUAD) capsule 1 capsule, 1 capsule, Oral, Daily, Emma Ren MD, 1 capsule at 01/01/18 0849  •  lactulose (CHRONULAC) 10 GM/15ML solution 20 g, 20 g, Oral, BID PRN, Trenton Zamorano MD  •  mirtazapine (REMERON) tablet 15 mg, 15 mg, Oral, Daily, Trenton Zamorano MD, 15 mg at 01/01/18 0849  •  ondansetron (ZOFRAN) tablet 4 mg, 4 mg, Oral, Q6H PRN **OR** ondansetron ODT (ZOFRAN-ODT) disintegrating tablet 4 mg, 4 mg, Oral, Q6H PRN **OR** ondansetron (ZOFRAN) injection 4 mg, 4 mg, Intravenous, Q6H PRN, Trenton Zamorano MD, 4 mg at 12/27/17 0538  •  pantoprazole (PROTONIX) EC tablet 40 mg, 40 mg, Oral, QAM, Trenton Zamorano MD, 40 mg at 01/01/18 0849  •  Pharmacy to dose vancomycin, , Does not apply, Continuous PRN, Trenton Zamorano MD  •  piperacillin-tazobactam (ZOSYN) 3.375 g in iso-osmotic dextrose 50 ml (premix), 3.375 g, Intravenous, Q8H, Trenton Zamorano MD, 3.375 g at 01/01/18 0512  •  potassium chloride  "(MICRO-K) CR capsule 40 mEq, 40 mEq, Oral, PRN **OR** potassium chloride (KLOR-CON) packet 40 mEq, 40 mEq, Oral, PRN **OR** potassium chloride 10 mEq in 100 mL IVPB, 10 mEq, Intravenous, Q1H PRN, Trenton Zamorano MD  •  senna (SENOKOT) tablet 17.2 mg, 2 tablet, Oral, BID, Trenton Zamorano MD, 17.2 mg at 01/01/18 0849  •  sodium chloride 0.9 % flush 1-10 mL, 1-10 mL, Intravenous, PRN, Trenton Zamorano MD  •  sodium chloride 0.9 % flush 1-10 mL, 1-10 mL, Intravenous, PRN, Trenton Zamorano MD  •  Insert peripheral IV, , , Once **AND** sodium chloride 0.9 % flush 10 mL, 10 mL, Intravenous, PRN, Kyle Davies MD, 10 mL at 12/29/17 0925  •  Vancomycin Pharmacy Intermittent Dosing, , Does not apply, Daily, Trenton Zamorano MD  •  venlafaxine XR (EFFEXOR-XR) 24 hr capsule 75 mg, 75 mg, Oral, Daily, Trenton Zamorano MD, 75 mg at 12/31/17 0928  Allergies:  Allergies   Allergen Reactions   • Morphine    • Morphine And Related Nausea Only     Social History:   Social History   Substance Use Topics   • Smoking status: Former Smoker     Packs/day: 3.00     Years: 21.00     Types: Cigarettes   • Smokeless tobacco: Never Used      Comment: quit at age  37   • Alcohol use Yes      Comment: \"every now and then\"      Family History:  Family History   Problem Relation Age of Onset   • No Known Problems Mother    • Heart disease Father    • No Known Problems Sister    • No Known Problems Brother    • No Known Problems Maternal Aunt    • Hyperlipidemia Maternal Uncle    • Cancer Paternal Aunt    • No Known Problems Paternal Uncle    • No Known Problems Maternal Grandmother    • No Known Problems Maternal Grandfather    • No Known Problems Paternal Grandmother    • No Known Problems Paternal Grandfather    • Diabetes Cousin           Review of Systems  See history of present illness and past medical history.  Constitutional: Positive for appetite change (decreased). Negative for chills and fever.   HENT: Negative.  Negative for sore throat.    Eyes: " "Negative.    Respiratory: Negative.  Negative for cough.    Cardiovascular: Negative.  Negative for chest pain.   Gastrointestinal: Negative.    Genitourinary: Positive for penile pain, penile swelling, scrotal swelling and testicular pain. Negative for dysuria.   Musculoskeletal: Negative.  Negative for back pain.   Skin: Negative.  Negative for rash.     Vitals:   /64 (BP Location: Right arm, Patient Position: Lying)  Pulse 107  Temp 98.2 °F (36.8 °C) (Oral)   Resp 16  Ht 188 cm (74\")  Wt 109 kg (240 lb)  SpO2 (!) 87%  BMI 30.81 kg/m2  I/O:   Intake/Output Summary (Last 24 hours) at 01/01/18 1226  Last data filed at 01/01/18 0515   Gross per 24 hour   Intake              100 ml   Output             1500 ml   Net            -1400 ml     Exam:  General Appearance:    Alert, cooperative, no distress, appears stated age, Emaciated and has effects of chronic illness.  Does not appear to be toxic or septic.     Head:    Normocephalic, without obvious abnormality, atraumatic   Eyes:    PERRL, conjunctiva/corneas clear, EOM's intact, both eyes   Ears:    Normal external ear canals, both ears   Nose:   Nares normal, septum midline, mucosa normal, no drainage    or sinus tenderness   Throat:   Lips, tongue, gums normal; oral mucosa pink and moist   Neck:   Supple, symmetrical, trachea midline, no adenopathy;     thyroid:  no enlargement/tenderness/nodules; no carotid    bruit or JVD   Back:     Symmetric, no curvature, ROM normal, no CVA tenderness   Lungs:     Clear to auscultation bilaterally, respirations unlabored   Chest Wall:    No tenderness or deformity    Heart:    Irregularly irregular and rhythm, S1 and S2 normal, no murmur, rub   or gallop   Abdomen:     Soft, non-tender, bowel sounds active all four quadrants,    examination reveals Obando catheter in place with edema and erythema of the penis along with the scrotum which decreases upon elevation of the genitals.  Decreased warmth and tenderness " compared to when he arrived to the hospital as per patient.     Extremities:   Contractures of the lower extremities noted    Pulses:   Pulses palpable in all extremities; symmetric all extremities   Skin:   Skin color normal, Skin is warm and dry,  no rashes or palpable lesions   Neurologic:   Plegia and contractures of the lower extremities due to previous strokes.       Data Review:    I reviewed the patient's new clinical results.    Results from last 7 days  Lab Units 01/01/18  0735 12/31/17  0436 12/30/17  0910 12/29/17  0600 12/27/17  1113 12/26/17  1831   WBC 10*3/mm3 9.62 10.03 10.22 11.59* 9.31 8.48   HEMOGLOBIN g/dL 9.3* 9.8* 9.6* 9.2* 10.0* 10.0*   PLATELETS 10*3/mm3 124* 116* 115* 114* 119* 113*       Results from last 7 days  Lab Units 01/01/18  0735 12/31/17  0436 12/30/17  0910 12/29/17  0600 12/28/17  1819 12/27/17  1113 12/26/17  1831   SODIUM mmol/L 129* 131* 128* 126* 127* 128* 129*   POTASSIUM mmol/L 3.0* 3.1* 3.7 3.8 4.0 3.5 3.8   CHLORIDE mmol/L 88* 91* 90* 88* 87* 88* 87*   CO2 mmol/L 28.0 27.0 25.8 28.2 28.5 29.7* 30.9*   BUN mg/dL 43* 46* 47* 48* 46* 38* 38*   CREATININE mg/dL 1.04 1.07 1.09 1.00 0.93 0.78 0.79   CALCIUM mg/dL 7.9* 7.8* 7.9* 7.9* 7.8* 8.3* 8.6   GLUCOSE mg/dL 100* 133* 195* 93 170* 412* 576*     Microbiology Results (last 10 days)     Procedure Component Value - Date/Time    Anaerobic Culture - Swab, Pleural Cavity [[id number]]  (Normal) Collected:  12/27/17 0940    Lab Status:  Final result Specimen:  Swab from Pleural Cavity Updated:  01/01/18 1008     Culture No anaerobes isolated at 5 days    Body Fluid Culture - Body Fluid, Pleural Cavity [[id number]]  (Normal) Collected:  12/27/17 0940    Lab Status:  Final result Specimen:  Body Fluid from Pleural Cavity Updated:  01/01/18 0650     BF Culture No growth at 5 days     Gram Stain Result No organisms seen      Rare (1+) WBCs per low power field    AFB Culture - Body Fluid, Pleural Cavity [[id number]]  (Normal) Collected:   12/27/17 0940    Lab Status:  Preliminary result Specimen:  Body Fluid from Pleural Cavity Updated:  01/01/18 1016     AFB Culture No AFB isolated at less than 1 week     AFB Stain No acid fast bacilli seen on direct smear    MRSA Screen Culture - Swab, Nares [[id number]]  (Normal) Collected:  12/27/17 0538    Lab Status:  Final result Specimen:  Swab from Nares Updated:  12/29/17 1019     MRSA SCREEN CX No Methicillin Resistant Staphylococcus aureus isolated    Blood Culture - Blood, [[id number]]  (Normal) Collected:  12/26/17 2050    Lab Status:  Final result Specimen:  Blood from Arm, Right Updated:  12/31/17 2116     Blood Culture No growth at 5 days    Blood Culture - Blood, [[id number]]  (Normal) Collected:  12/26/17 1928    Lab Status:  Final result Specimen:  Blood from Arm, Right Updated:  12/31/17 1931     Blood Culture No growth at 5 days    Urine Culture - Urine, Urine, Clean Catch [[id number]]  (Abnormal)  (Susceptibility) Collected:  12/26/17 1913    Lab Status:  Final result Specimen:  Urine from Urine, Catheter Updated:  12/28/17 0617     Urine Culture --      >100,000 CFU/mL Escherichia coli (A)    Susceptibility      Escherichia coli     ELMER     Ampicillin <=2 ug/ml Susceptible     Ampicillin + Sulbactam <=2 ug/ml Susceptible     Cefazolin <=4 ug/ml Susceptible     Cefepime <=1 ug/ml Susceptible     Ceftriaxone <=1 ug/ml Susceptible     Ciprofloxacin <=0.25 ug/ml Susceptible     Ertapenem <=0.5 ug/ml Susceptible     Gentamicin <=1 ug/ml Susceptible     Levofloxacin <=0.12 ug/ml Susceptible     Nitrofurantoin <=16 ug/ml Susceptible     Piperacillin + Tazobactam <=4 ug/ml Susceptible     Tetracycline <=1 ug/ml Susceptible     Trimethoprim + Sulfamethoxazole <=20 ug/ml Susceptible                            Assessment:  Active Hospital Problems (** Indicates Principal Problem)    Diagnosis Date Noted   • **Cellulitis, scrotum [N49.2] 12/26/2017   • Hyponatremia [E87.1] 12/27/2017   • Paraplegia [G82.20]  12/27/2017   • Pleural effusion on left [J90] 12/26/2017   • History of stroke with residual deficit [I69.30] 03/20/2016   • Chronic diastolic congestive heart failure [I50.32] 03/18/2016   • Type 2 diabetes mellitus treated with insulin [E11.9, Z79.4] 03/18/2016   • Antiphospholipid antibody with hypercoagulable state [D68.61] 03/18/2016   • Factor V Leiden mutation [D68.51] 10/07/2015   • Long term current use of anticoagulant [Z79.01] 10/07/2015   • Chronic pain [G89.29] 04/23/2014   • Benign hypertension [I10] 11/07/2013   • Chronic atrial fibrillation [I48.2] 11/07/2013      Resolved Hospital Problems    Diagnosis Date Noted Date Resolved   No resolved problems to display.         Plan:  See above  Memo lAarcon MD   1/1/2018  12:26 PM    Much of this encounter note is an electronic transcription/translation of spoken language to printed text. The electronic translation of spoken language may permit erroneous, or at times, nonsensical words or phrases to be inadvertently transcribed; Although I have reviewed the note for such errors, some may still exist

## 2018-01-01 NOTE — PLAN OF CARE
Problem: Patient Care Overview (Adult)  Goal: Plan of Care Review  Outcome: Ongoing (interventions implemented as appropriate)   01/01/18 3181   Coping/Psychosocial Response Interventions   Plan Of Care Reviewed With patient;spouse   Patient Care Overview   Progress no change   Outcome Evaluation   Outcome Summary/Follow up Plan Pt. had an x-ray done today and started on Rocephin. He will go to ultrasound for his scrotum/testicles. Pt. complained of pain once today. Will continue to monitor.     Goal: Adult Individualization and Mutuality  Outcome: Ongoing (interventions implemented as appropriate)    Goal: Discharge Needs Assessment  Outcome: Ongoing (interventions implemented as appropriate)      Problem: Infection, Risk/Actual (Adult)  Goal: Infection Prevention/Resolution  Outcome: Ongoing (interventions implemented as appropriate)      Problem: Pain, Acute (Adult)  Goal: Acceptable Pain Control/Comfort Level  Outcome: Ongoing (interventions implemented as appropriate)      Problem: Fall Risk (Adult)  Goal: Absence of Falls  Outcome: Ongoing (interventions implemented as appropriate)      Problem: Skin Integrity Impairment, Risk/Actual (Adult)  Goal: Skin Integrity/Wound Healing  Outcome: Ongoing (interventions implemented as appropriate)

## 2018-01-01 NOTE — PLAN OF CARE
Problem: Patient Care Overview (Adult)  Goal: Plan of Care Review   01/01/18 0605   Coping/Psychosocial Response Interventions   Plan Of Care Reviewed With patient;spouse   Patient Care Overview   Progress no change   Outcome Evaluation   Outcome Summary/Follow up Plan pt slept some during night, pt did c/o pain x1, medicated with PRN PO med with good relief, pt also given gabapentin for leg pain, will cont. to monitor       Problem: Infection, Risk/Actual (Adult)  Goal: Infection Prevention/Resolution  Outcome: Ongoing (interventions implemented as appropriate)      Problem: Pain, Acute (Adult)  Goal: Acceptable Pain Control/Comfort Level  Outcome: Ongoing (interventions implemented as appropriate)      Problem: Fall Risk (Adult)  Goal: Absence of Falls  Outcome: Ongoing (interventions implemented as appropriate)      Problem: Pressure Ulcer (Adult)  Goal: Signs and Symptoms of Listed Potential Problems Will be Absent or Manageable (Pressure Ulcer)  Outcome: Ongoing (interventions implemented as appropriate)      Problem: Skin Integrity Impairment, Risk/Actual (Adult)  Goal: Skin Integrity/Wound Healing  Outcome: Ongoing (interventions implemented as appropriate)

## 2018-01-02 NOTE — PROGRESS NOTES
"  Infectious Diseases Progress Note    Memo Alarcon MD     [facility]  [place]: 7 days  Patient Identification:  Name: Emil Pulido  Age: 72 y.o.  Sex: male  :  1945  MRN: [id number]         Primary Care Physician: Provider Not In System            Subjective: Feeling better than yesterday.  Was surprised by the fact that he had a temperature.  Interval History: The consultation note.    Objective:    Scheduled Meds:  bumetanide 1 mg Intravenous Q12H   ceftriaxone 1 g Intravenous Q24H   docusate sodium 100 mg Oral BID   fluconazole 200 mg Oral Q24H   gabapentin 300 mg Oral Q12H   insulin aspart 0-7 Units Subcutaneous 4x Daily With Meals & Nightly   insulin detemir 15 Units Subcutaneous Q12H   lactobacillus acidophilus 1 capsule Oral Daily   mirtazapine 15 mg Oral Daily   nystatin  Topical Q12H   pantoprazole 40 mg Oral QAM   senna 2 tablet Oral BID   Vancomycin Pharmacy Intermittent Dosing  Does not apply Daily   venlafaxine XR 75 mg Oral Daily     Continuous Infusions:  Pharmacy to dose vancomycin        Vital signs in last 24 hours:  Temp:  [99.3 °F (37.4 °C)-101 °F (38.3 °C)] 101 °F (38.3 °C)  Heart Rate:  [] 109  Resp:  [18-20] 20  BP: (118-121)/(55-56) 118/55    Intake/Output:    Intake/Output Summary (Last 24 hours) at 18 0932  Last data filed at 18 0527   Gross per 24 hour   Intake                0 ml   Output             1850 ml   Net            -1850 ml       Exam:  /55 (BP Location: Left arm, Patient Position: Lying)  Pulse 109  Temp (!) 101 °F (38.3 °C) (Oral)   Resp 20  Ht 188 cm (74\")  Wt 109 kg (240 lb)  SpO2 (!) 87%  BMI 30.81 kg/m2    General Appearance:    Alert, cooperative, no distress, AAOx3                          Head:    Normocephalic, without obvious abnormality, atraumatic                  Abdomen:     Soft and nontender                 Extremities:   Contracted lower extremities.                            Skin:   Decreased swelling of " the penile shaft and erythema of the penis and scrotum.  Obando catheter in place.      Data Review:    I reviewed the patient's new clinical results.    Results from last 7 days  Lab Units 01/02/18  0703 01/01/18  0735 12/31/17  0436 12/30/17  0910 12/29/17  0600 12/27/17  1113 12/26/17  1831   WBC 10*3/mm3 9.02 9.62 10.03 10.22 11.59* 9.31 8.48   HEMOGLOBIN g/dL 9.4* 9.3* 9.8* 9.6* 9.2* 10.0* 10.0*   PLATELETS 10*3/mm3 128* 124* 116* 115* 114* 119* 113*       Results from last 7 days  Lab Units 01/02/18  0703 01/01/18  0735 12/31/17  0436 12/30/17  0910 12/29/17  0600 12/28/17  1819 12/27/17  1113   SODIUM mmol/L 133* 129* 131* 128* 126* 127* 128*   POTASSIUM mmol/L 2.7* 3.0* 3.1* 3.7 3.8 4.0 3.5   CHLORIDE mmol/L 93* 88* 91* 90* 88* 87* 88*   CO2 mmol/L 30.2* 28.0 27.0 25.8 28.2 28.5 29.7*   BUN mg/dL 43* 43* 46* 47* 48* 46* 38*   CREATININE mg/dL 1.05 1.04 1.07 1.09 1.00 0.93 0.78   CALCIUM mg/dL 7.8* 7.9* 7.8* 7.9* 7.9* 7.8* 8.3*   GLUCOSE mg/dL 96 100* 133* 195* 93 170* 412*         Assessment:  Principal Problem:    Cellulitis, scrotum with superimposed candidal balanitis and dermatitis  Active Problems:    Chronic diastolic congestive heart failure    Type 2 diabetes mellitus treated with insulin    Antiphospholipid antibody with hypercoagulable state    History of stroke with residual deficit    Benign hypertension    Factor V Leiden mutation    Chronic pain    Long term current use of anticoagulant    Chronic atrial fibrillation    Pleural effusion on left    Hyponatremia    Paraplegia      Plan/recommendations:  Continue to observe his clinical course on the regimen of vancomycin and Rocephin oral Diflucan and and topical nystatin.  Significance of fever in this context is unclear as patient is clearly not aware of the fever that he had and also feeling better every day.  At this juncture close observation is the key.  If fever persists then need to rule out    -C. difficile infection,    -recurrent  aspiration and drug fever as a possibility.     -Superimposed resistant gram-negative rods UTI needs to be considered.    Discussed with Dr. Lona Alarcon MD  1/2/2018  9:32 AM    Much of this encounter note is an electronic transcription/translation of spoken language to printed text. The electronic translation of spoken language may permit erroneous, or at times, nonsensical words or phrases to be inadvertently transcribed; Although I have reviewed the note for such errors, some may still exist

## 2018-01-02 NOTE — PROGRESS NOTES
"Pharmacokinetic Evaluation - Vancomycin    Emil Pulido is a 72 y.o. male on vancomycin pharmacy to dose.  MRN: [id number]  : 1945    Day of vancomycin therapy: 8  Indication: SSTI/scrotal cellulitis  Consulted by: Dr. Zamorano, Dr. Alarcon following  Goal trough: 10-20mg/L    Current dose: intermittent dosing  Other antimicrobials: ctx 1g q24h    Blood pressure 118/55, pulse 109, temperature (!) 101 °F (38.3 °C), temperature source Oral, resp. rate 20, height 188 cm (74\"), weight 109 kg (240 lb), SpO2 (!) 87 %.    Results from last 7 days  Lab Units 18  0703 18  0735 17  0436   CREATININE mg/dL 1.05 1.04 1.07     Estimated Creatinine Clearance: 86 mL/min (by C-G formula based on Cr of 1.05).    Results from last 7 days  Lab Units 18  0703 18  0735 17  0436   WBC 10*3/mm3 9.02 9.62 10.03   HEMOGLOBIN g/dL 9.4* 9.3* 9.8*   HEMATOCRIT % 28.8* 27.9* 29.9*   PLATELETS 10*3/mm3 128* 124* 116*       Baseline culture/source/susceptibility:    >100k e coli susceptible to all tested   BC NG5d   MRSA nares swab negative   Pleural fluid: NG5d      Imagin/29 CT Chest w/o contrast:       Impression:           Moderately large residual left pleural effusion following thoracentesis producing complete compressive atelectasis of the left lower lobe and portions of the left upper lobe. No definite lung or hilar or mediastinal masses are identified. There are no infiltrates within the aerated portions of the lung. A small posteriorly layering right pleural effusion has increased in size since the preceding CT scan of the chest dated 2017. Ascites in the upper abdomen is unchanged.      Recent Vancomycin dosing history:    vancomycin 2250mg given @  vancomycin 1500 mg iv q12h @ 1336 and then  @ 0111                         1311 Vancomycin trough: 26.9mg/L ( after 3 doses and ~ 12 hrs from last dose)    vancomycin adjusted to 750 mg " iv q12h (last dose on 12/29 2136)              12/30 0910 vancomycin trough: 26.5 mcg/mL, vancomycin on hold                        12/30 2136 random level: 22.9 mcg/mL, ~24hr level                        12/31 0436 random level: 22.7 mcg/mL, ~31hr level                        1/1 0735 random level 18.7 mcg/mL (~58h level)    1/2 @ 0703 random level: 14.8mg/L ( ~82 hr level)      Assessment:  Level is able to redose today; Scr is stable, although taking much longer to eliminate vancomycin than expected. Will plan to give X 1 dose now of 500mg and check random at noon tomorrow. Continue intermittent dosing  Renal function is stable.    Plan:  1) give vancomycin 500 mg iv X 1 today  2) Next random at noon on 1/3.  3) Monitor for uop and scr.    Ilya Guzman [facility]
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
## 2018-01-02 NOTE — CONSULTS
initiated visit with patient and introduced herself as the unit's . Pt's wife was present.  Pt. wasn't very verbal.   asked if pt was a man of guillermo.  Wife answered for him and stated he was [employer]. Wife asked  to keep them in her prayers.   agreed to do so.   explained she would regularly be making rounds and stop in.

## 2018-01-02 NOTE — PLAN OF CARE
Problem: Patient Care Overview (Adult)  Goal: Plan of Care Review  Outcome: Ongoing (interventions implemented as appropriate)   01/02/18 0530   Coping/Psychosocial Response Interventions   Plan Of Care Reviewed With patient;spouse   Patient Care Overview   Progress no change   Outcome Evaluation   Outcome Summary/Follow up Plan pt rested well throughout the night, restarted gabapentin per wifes request, medicated x1 with po dilaudid. pt confused will continue to monitor      Goal: Adult Individualization and Mutuality  Outcome: Ongoing (interventions implemented as appropriate)    Goal: Discharge Needs Assessment  Outcome: Ongoing (interventions implemented as appropriate)      Problem: Infection, Risk/Actual (Adult)  Goal: Infection Prevention/Resolution  Outcome: Ongoing (interventions implemented as appropriate)      Problem: Pain, Acute (Adult)  Goal: Acceptable Pain Control/Comfort Level  Outcome: Ongoing (interventions implemented as appropriate)      Problem: Fall Risk (Adult)  Goal: Absence of Falls  Outcome: Ongoing (interventions implemented as appropriate)      Problem: Pressure Ulcer (Adult)  Goal: Signs and Symptoms of Listed Potential Problems Will be Absent or Manageable (Pressure Ulcer)  Outcome: Ongoing (interventions implemented as appropriate)      Problem: Skin Integrity Impairment, Risk/Actual (Adult)  Goal: Skin Integrity/Wound Healing  Outcome: Ongoing (interventions implemented as appropriate)

## 2018-01-02 NOTE — PLAN OF CARE
Problem: Patient Care Overview (Adult)  Goal: Plan of Care Review  Outcome: Ongoing (interventions implemented as appropriate)   01/02/18 5250   Coping/Psychosocial Response Interventions   Plan Of Care Reviewed With patient;spouse   Patient Care Overview   Progress no change   Outcome Evaluation   Outcome Summary/Follow up Plan Pt rested comfortably throughout the shift. No pain meds given. Confusion continues. Wife at bedside. Will continue to monitor per comfort care.      Goal: Adult Individualization and Mutuality  Outcome: Ongoing (interventions implemented as appropriate)    Goal: Discharge Needs Assessment  Outcome: Ongoing (interventions implemented as appropriate)      Problem: Infection, Risk/Actual (Adult)  Goal: Infection Prevention/Resolution  Outcome: Ongoing (interventions implemented as appropriate)      Problem: Pain, Acute (Adult)  Goal: Acceptable Pain Control/Comfort Level  Outcome: Ongoing (interventions implemented as appropriate)      Problem: Fall Risk (Adult)  Goal: Absence of Falls  Outcome: Ongoing (interventions implemented as appropriate)      Problem: Pressure Ulcer (Adult)  Goal: Signs and Symptoms of Listed Potential Problems Will be Absent or Manageable (Pressure Ulcer)  Outcome: Ongoing (interventions implemented as appropriate)      Problem: Skin Integrity Impairment, Risk/Actual (Adult)  Goal: Skin Integrity/Wound Healing  Outcome: Ongoing (interventions implemented as appropriate)

## 2018-01-03 NOTE — PLAN OF CARE
Problem: Patient Care Overview (Adult)  Goal: Plan of Care Review  Outcome: Ongoing (interventions implemented as appropriate)   01/03/18 0434   Coping/Psychosocial Response Interventions   Plan Of Care Reviewed With patient;family   Patient Care Overview   Progress no change   Outcome Evaluation   Outcome Summary/Follow up Plan Pt rested comfortably throughout the shift. Potassium protocol initiated. IV abx continued. Family at bedside. Will continue to monitor per comfort care.      Goal: Adult Individualization and Mutuality  Outcome: Ongoing (interventions implemented as appropriate)    Goal: Discharge Needs Assessment  Outcome: Ongoing (interventions implemented as appropriate)      Problem: Infection, Risk/Actual (Adult)  Goal: Infection Prevention/Resolution  Outcome: Ongoing (interventions implemented as appropriate)      Problem: Pain, Acute (Adult)  Goal: Acceptable Pain Control/Comfort Level  Outcome: Ongoing (interventions implemented as appropriate)      Problem: Fall Risk (Adult)  Goal: Absence of Falls  Outcome: Ongoing (interventions implemented as appropriate)      Problem: Pressure Ulcer (Adult)  Goal: Signs and Symptoms of Listed Potential Problems Will be Absent or Manageable (Pressure Ulcer)  Outcome: Ongoing (interventions implemented as appropriate)      Problem: Skin Integrity Impairment, Risk/Actual (Adult)  Goal: Skin Integrity/Wound Healing  Outcome: Ongoing (interventions implemented as appropriate)

## 2018-01-03 NOTE — PROGRESS NOTES
"  Infectious Diseases Progress Note    Memo Alarcon MD     [facility]  Los: 8 days  Patient Identification:  Name: Emil Pulido  Age: 72 y.o.  Sex: male  :  1945  MRN: [id number]         Primary Care Physician: Provider Not In System            Subjective: Feeling better than yesterday denies any fever and chills.  Admits that he has decreased pain in his scrotum and penis.  Interval History: The consultation note.    Objective:    Scheduled Meds:    bumetanide 1 mg Intravenous Q12H   ceftriaxone 1 g Intravenous Q24H   docusate sodium 100 mg Oral BID   fluconazole 200 mg Oral Q24H   gabapentin 300 mg Oral Q12H   insulin aspart 0-7 Units Subcutaneous 4x Daily With Meals & Nightly   insulin detemir 15 Units Subcutaneous Q12H   lactobacillus acidophilus 1 capsule Oral Daily   mirtazapine 15 mg Oral Daily   nystatin  Topical Q12H   pantoprazole 40 mg Oral QAM   potassium chloride 20 mEq Oral BID With Meals   senna 2 tablet Oral BID   Vancomycin Pharmacy Intermittent Dosing  Does not apply Daily   venlafaxine XR 75 mg Oral Daily     Continuous Infusions:    Pharmacy to dose vancomycin        Vital signs in last 24 hours:  Temp:  [98.7 °F (37.1 °C)-100.3 °F (37.9 °C)] 100.3 °F (37.9 °C)  Heart Rate:  [] 90  Resp:  [18-20] 20  BP: (123-132)/(55-70) 132/68    Intake/Output:    Intake/Output Summary (Last 24 hours) at 18 1205  Last data filed at 18 0531   Gross per 24 hour   Intake                0 ml   Output             1800 ml   Net            -1800 ml       Exam:  /68  Pulse 90  Temp 100.3 °F (37.9 °C) (Oral)   Resp 20  Ht 188 cm (74\")  Wt 109 kg (240 lb)  SpO2 91%  BMI 30.81 kg/m2    General Appearance:    Alert, cooperative, no distress, AAOx3                          Head:    Normocephalic, without obvious abnormality, atraumatic                  Abdomen:     Soft and nontender                 Extremities:   Contracted lower extremities.                           "  Skin:   Decreased swelling of the penile shaft and I the penis and scrotum for a catheter in place.      Data Review:    I reviewed the patient's new clinical results.    Results from last 7 days  Lab Units 01/03/18  0700 01/02/18  0703 01/01/18  0735 12/31/17  0436 12/30/17  0910 12/29/17  0600   WBC 10*3/mm3 8.44 9.02 9.62 10.03 10.22 11.59*   HEMOGLOBIN g/dL 9.6* 9.4* 9.3* 9.8* 9.6* 9.2*   PLATELETS 10*3/mm3 130* 128* 124* 116* 115* 114*       Results from last 7 days  Lab Units 01/03/18  0700 01/02/18  0703 01/01/18  0735 12/31/17  0436 12/30/17  0910 12/29/17  0600 12/28/17  1819   SODIUM mmol/L 134* 133* 129* 131* 128* 126* 127*   POTASSIUM mmol/L 2.8* 2.7* 3.0* 3.1* 3.7 3.8 4.0   CHLORIDE mmol/L 93* 93* 88* 91* 90* 88* 87*   CO2 mmol/L 28.8 30.2* 28.0 27.0 25.8 28.2 28.5   BUN mg/dL 41* 43* 43* 46* 47* 48* 46*   CREATININE mg/dL 1.00 1.05 1.04 1.07 1.09 1.00 0.93   CALCIUM mg/dL 7.7* 7.8* 7.9* 7.8* 7.9* 7.9* 7.8*   GLUCOSE mg/dL 180* 96 100* 133* 195* 93 170*         Assessment:  Principal Problem:    Cellulitis, scrotum with superimposed candidal balanitis and dermatitis  Active Problems:    Chronic diastolic congestive heart failure    Type 2 diabetes mellitus treated with insulin    Antiphospholipid antibody with hypercoagulable state    History of stroke with residual deficit    Benign hypertension    Factor V Leiden mutation    Chronic pain    Long term current use of anticoagulant    Chronic atrial fibrillation    Pleural effusion on left    Hyponatremia    Paraplegia      Plan/recommendations:Continue present care and observe.  DC vancomycin after 10 days of treatment.  Continue to observe his clinical course on the regimen of vancomycin and Rocephin oral Diflucan and and topical nystatin.  Significance of fever in this context is unclear as patient is clearly not aware of the fever that he had and also feeling better every day.  At this juncture close observation is the key.  If fever persists then  need to rule out    -C. difficile infection,    -recurrent aspiration and drug fever as a possibility.     -Superimposed resistant gram-negative rods UTI needs to be considered.    Discussed with Dr. Lona Alarcon MD  1/3/2018  12:05 PM    Much of this encounter note is an electronic transcription/translation of spoken language to printed text. The electronic translation of spoken language may permit erroneous, or at times, nonsensical words or phrases to be inadvertently transcribed; Although I have reviewed the note for such errors, some may still exist

## 2018-01-03 NOTE — NURSING NOTE
Reviewed and discussed scheduled medications with pt and wife.  Both request medications be given around 2300 as will help pt with sleep

## 2018-01-03 NOTE — PLAN OF CARE
Problem: Patient Care Overview (Adult)  Goal: Plan of Care Review  Outcome: Ongoing (interventions implemented as appropriate)   01/03/18 0449   Coping/Psychosocial Response Interventions   Plan Of Care Reviewed With patient;spouse   Patient Care Overview   Progress no change   Outcome Evaluation   Outcome Summary/Follow up Plan Pain medication requested X1 with effective results. Continues to be confused to time and situation. Wife remains at bedside very involved.        Problem: Infection, Risk/Actual (Adult)  Goal: Infection Prevention/Resolution  Outcome: Ongoing (interventions implemented as appropriate)   01/03/18 0449   Infection, Risk/Actual (Adult)   Infection Prevention/Resolution making progress toward outcome       Problem: Pain, Acute (Adult)  Goal: Acceptable Pain Control/Comfort Level  Outcome: Ongoing (interventions implemented as appropriate)   01/03/18 0449   Pain, Acute (Adult)   Acceptable Pain Control/Comfort Level making progress toward outcome       Problem: Fall Risk (Adult)  Goal: Absence of Falls  Outcome: Ongoing (interventions implemented as appropriate)   01/03/18 0449   Fall Risk (Adult)   Absence of Falls making progress toward outcome       Problem: Pressure Ulcer (Adult)  Goal: Signs and Symptoms of Listed Potential Problems Will be Absent or Manageable (Pressure Ulcer)  Outcome: Ongoing (interventions implemented as appropriate)   01/03/18 0449   Pressure Ulcer   Problems Assessed (Pressure Ulcer) all   Problems Present (Pressure Ulcer) pain;wound complications       Problem: Skin Integrity Impairment, Risk/Actual (Adult)  Goal: Skin Integrity/Wound Healing  Outcome: Ongoing (interventions implemented as appropriate)   01/03/18 0449   Skin Integrity Impairment, Risk/Actual (Adult)   Skin Integrity/Wound Healing making progress toward outcome
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
## 2018-01-03 NOTE — PROGRESS NOTES
"Pharmacokinetic Evaluation - Vancomycin    Emil Pulido is a 72 y.o. male on vancomycin pharmacy to dose.  MRN: [id number]  : 1945    Day of vancomycin therapy: 9  Indication: SSTI/scrotal cellulitis  Consulted by: Dr. Zamorano, Dr. Alarcon following   Goal trough: 10-20mg/L    Current dose: intermittent dosing  Other antimicrobials: CTX 1g q24h    Blood pressure 114/65, pulse 94, temperature 100.3 °F (37.9 °C), temperature source Oral, resp. rate 18, height 188 cm (74\"), weight 109 kg (240 lb), SpO2 93 %.    Results from last 7 days  Lab Units 18  0700 18  0703 18  0735   CREATININE mg/dL 1.00 1.05 1.04     Estimated Creatinine Clearance: 90.3 mL/min (by C-G formula based on Cr of 1).    Results from last 7 days  Lab Units 18  0700 18  0703 18  0735   WBC 10*3/mm3 8.44 9.02 9.62   HEMOGLOBIN g/dL 9.6* 9.4* 9.3*   HEMATOCRIT % 29.8* 28.8* 27.9*   PLATELETS 10*3/mm3 130* 128* 124*     Baseline culture/source/susceptibility:    >100k e coli susceptible to all tested   BC NG5d   MRSA nares swab negative   Pleural fluid: NG5d       Recent Vancomycin dosing history:    vancomycin 2250mg given @  vancomycin 1500 mg iv q12h @ 1336 and then  @ 0111                         1311 Vancomycin trough: 26.9mg/L ( after 3 doses and ~ 12 hrs from last dose)    vancomycin adjusted to 750 mg iv q12h (last dose on 2136)               0910 vancomycin trough: 26.5 mcg/mL, vancomycin on hold                        2136 random level: 22.9 mcg/mL, ~24hr level                         043 random level: 22.7 mcg/mL, ~31hr level                         07 random level 18.7 mcg/mL (~58h level)                                               @ 0703 random level: 14.8mg/L ( ~82 hr level)  Vancomycin 500mg given     @ 1311    1/3 @ 1210 random: 11.2mg/L (~ 23 hrs from last dose)           Assessment:  Level is within goal but " at lower end. Will plan to redose now. Not clear on why the patient to so long to eliminate last dose on the 12/30 and appeared to eliminate the 500mg from yesterday very well. Scr stable. Will plan to give a 1250mg dose today and check a random ~ 24hrs later (on 1/4)  Renal function is stable.    Plan:  1) give vancomycin 1250mg IV X 1  2) Next random at 1400 tomorrow.  3) Monitor for uop and scr..    Ilya Guzman [facility]

## 2018-01-03 NOTE — NURSING NOTE
Spoke with Mrs. Pulido regarding consent for thora. She stated she wants to put off this procedure for one day as she wants to discuss his current situation with the pulmonologist.. Franklyn Sykes his nurse on [facility] and Leonie in  notified of this. No distress, awaiting transport back to room.

## 2018-01-04 NOTE — PROGRESS NOTES
"  Infectious Diseases Progress Note    Memo Alarcon MD     [facility]  Los: 9 days  Patient Identification:  Name: Emil Pulido  Age: 72 y.o.  Sex: male  :  1945  MRN: [id number]         Primary Care Physician: Provider Not In System            Subjective: Feels better decreased pain and discomfort in his penile and scrotal area denies any fever and chills.  Interval History: The consultation note.    Objective:    Scheduled Meds:    bumetanide 1 mg Intravenous Q12H   ceftriaxone 1 g Intravenous Q24H   docusate sodium 100 mg Oral BID   fluconazole 200 mg Oral Q24H   gabapentin 300 mg Oral Q12H   insulin aspart 0-7 Units Subcutaneous 4x Daily With Meals & Nightly   insulin detemir 15 Units Subcutaneous Q12H   lactobacillus acidophilus 1 capsule Oral Daily   magnesium sulfate 2 g Intravenous Once   mirtazapine 15 mg Oral Daily   nystatin  Topical Q12H   pantoprazole 40 mg Oral QAM   potassium chloride 20 mEq Oral BID With Meals   senna 2 tablet Oral BID   Vancomycin Pharmacy Intermittent Dosing  Does not apply Daily   venlafaxine XR 75 mg Oral Daily     Continuous Infusions:    Pharmacy to dose vancomycin        Vital signs in last 24 hours:  Temp:  [97.3 °F (36.3 °C)-99 °F (37.2 °C)] 99 °F (37.2 °C)  Heart Rate:  [] 113  Resp:  [16-18] 18  BP: (114-136)/(54-80) 136/80    Intake/Output:    Intake/Output Summary (Last 24 hours) at 18 0820  Last data filed at 18 0500   Gross per 24 hour   Intake              460 ml   Output             1575 ml   Net            -1115 ml       Exam:  /80 (BP Location: Right arm, Patient Position: Lying)  Pulse 113  Temp 99 °F (37.2 °C) (Oral)   Resp 18  Ht 188 cm (74\")  Wt 109 kg (240 lb)  SpO2 93%  BMI 30.81 kg/m2    General Appearance:    Alert, cooperative, no distress, AAOx3                          Head:    Normocephalic, without obvious abnormality, atraumatic                  Abdomen:     Soft and nontender                 " Extremities:   Contracted lower extremities.                            Skin:   Decrease erythema and swelling of the scrotum and swelling of the penile shaft.  Catheter in place with some purulent drainage around it through the urethral orifice.  Data Review:    I reviewed the patient's new clinical results.    Results from last 7 days  Lab Units 01/04/18 0442 01/03/18  0700 01/02/18  0703 01/01/18  0735 12/31/17  0436 12/30/17  0910 12/29/17  0600   WBC 10*3/mm3 8.61 8.44 9.02 9.62 10.03 10.22 11.59*   HEMOGLOBIN g/dL 10.4* 9.6* 9.4* 9.3* 9.8* 9.6* 9.2*   PLATELETS 10*3/mm3 135* 130* 128* 124* 116* 115* 114*       Results from last 7 days  Lab Units 01/04/18  0442 01/03/18  0700 01/02/18  0703 01/01/18  0735 12/31/17  0436 12/30/17  0910 12/29/17  0600   SODIUM mmol/L 134* 134* 133* 129* 131* 128* 126*   POTASSIUM mmol/L 3.6 2.8* 2.7* 3.0* 3.1* 3.7 3.8   CHLORIDE mmol/L 92* 93* 93* 88* 91* 90* 88*   CO2 mmol/L 28.7 28.8 30.2* 28.0 27.0 25.8 28.2   BUN mg/dL 40* 41* 43* 43* 46* 47* 48*   CREATININE mg/dL 0.97 1.00 1.05 1.04 1.07 1.09 1.00   CALCIUM mg/dL 7.8* 7.7* 7.8* 7.9* 7.8* 7.9* 7.9*   GLUCOSE mg/dL 199* 180* 96 100* 133* 195* 93         Assessment:  Principal Problem:    Cellulitis, scrotum with superimposed candidal balanitis and dermatitis  Active Problems:    Chronic diastolic congestive heart failure    Type 2 diabetes mellitus treated with insulin    Antiphospholipid antibody with hypercoagulable state    History of stroke with residual deficit    Benign hypertension    Factor V Leiden mutation    Chronic pain    Long term current use of anticoagulant    Chronic atrial fibrillation    Pleural effusion on left    Hyponatremia    Paraplegia      Plan/recommendations:  See below.  Continue present care and observe.  DC vancomycin after 10 days of treatment.  Continue to observe his clinical course on the regimen of vancomycin and Rocephin oral Diflucan and and topical nystatin.  Significance of fever in  this context is unclear as patient is clearly not aware of the fever that he had and also feeling better every day.  At this juncture close observation is the key.  If fever persists then need to rule out    -C. difficile infection,    -recurrent aspiration and drug fever as a possibility.     -Superimposed resistant gram-negative rods UTI needs to be considered.    Discussed with Dr. Lona Alarcon MD  1/4/2018  8:20 AM    Much of this encounter note is an electronic transcription/translation of spoken language to printed text. The electronic translation of spoken language may permit erroneous, or at times, nonsensical words or phrases to be inadvertently transcribed; Although I have reviewed the note for such errors, some may still exist

## 2018-01-04 NOTE — PROGRESS NOTES
[facility] Visit Report             ** SEE ADDENDUM AT BOTTOM OF THIS NOTE **    Emil Pulido  [id number]  1/3/2018    Admission R/T [facility] Dx: no    Reason for [facility] Admission: CHF    Symptom  Management: Other : cellulitis    Nursing/Medication Recommendations:    Psychosocial Issues and Recommendations:    Spiritual Concerns and Recommendations:    [facility] Discharge Plans: pt is a [facility] home pt at [facility] for an unrelated stay, when discharged will likely return home with [facility] continuing to follow       Review of Visit (Include All Collaboration- including names of hospital and family involved during admission/visit):  [facility] collab with [facility] MAGGIE Loaiza, pf continues with IV Rocephine and IV Vanc, on Dilaudid PCA 0.5 mg/hr, and PO Dilaudid prn for pain, PO Rocephen; VS: T100.3, P94, R18, SJY140/65, O2=93%@RA;    Pt lethargic, trying to rest, no family present, no concerns, denied pain, thanked [facility] for stopping by.    [facility] to round daily to assess and offer support.  JOIE to [facility], GI.      Jonas Yuen, [facility]     * ADDENDUM **: ABOVE NOTE HAD INCORRECT INFORMATION; PT IS NOT ON PCA DILAUDID - Review should have read: [facility] collab with [facility] MAGGIE Loaiza, pt continues with IV Rocephin and IV Vanc, PO Dilaudid prn for pain, PO Remeron; VS: T100.3, P94, R18, DDZ796/65, O2=93%@RA;    Pt lethargic, trying to rest, no family present, no concerns, denied pain, thanked [facility] for stopping by.    [facility] to round daily to assess and offer support. VM to [facility], SBT.

## 2018-01-04 NOTE — PLAN OF CARE
Problem: Patient Care Overview (Adult)  Goal: Plan of Care Review  Outcome: Ongoing (interventions implemented as appropriate)   01/04/18 8235   Coping/Psychosocial Response Interventions   Plan Of Care Reviewed With patient   Patient Care Overview   Progress no change   Outcome Evaluation   Outcome Summary/Follow up Plan patient resting comfortably. magnesium and potassium replaced. thoracic surgeon consult said no peurix cath, thoracentesis tomorrow      Goal: Adult Individualization and Mutuality  Outcome: Ongoing (interventions implemented as appropriate)    Goal: Discharge Needs Assessment  Outcome: Ongoing (interventions implemented as appropriate)      Problem: Infection, Risk/Actual (Adult)  Goal: Infection Prevention/Resolution  Outcome: Ongoing (interventions implemented as appropriate)      Problem: Fall Risk (Adult)  Goal: Absence of Falls  Outcome: Ongoing (interventions implemented as appropriate)      Problem: Skin Integrity Impairment, Risk/Actual (Adult)  Goal: Skin Integrity/Wound Healing  Outcome: Ongoing (interventions implemented as appropriate)

## 2018-01-04 NOTE — PROGRESS NOTES
"Pharmacokinetic Evaluation - Vancomycin    Emil Pulido is a 72 y.o. male on vancomycin pharmacy to dose.  MRN: [id number]  : 1945    Day of vancomycin therapy: 9 (previous notes were off a day)  Indication: SSTI/scrotal cellulitis  Consulted by: Dr. Zamorano, Dr. Alarcon following for ID  Goal trough: 15-20mg/L    Current dose: intermittent dosing  Other antimicrobials: CTX 1g q24h    Blood pressure 136/80, pulse 113, temperature 99 °F (37.2 °C), temperature source Oral, resp. rate 18, height 188 cm (74\"), weight 109 kg (240 lb), SpO2 93 %.    Results from last 7 days  Lab Units 18  0442 18  0700 18  0703   CREATININE mg/dL 0.97 1.00 1.05     Estimated Creatinine Clearance: 93.1 mL/min (by C-G formula based on Cr of 0.97).    Results from last 7 days  Lab Units 18  0442 18  0700 18  0703   WBC 10*3/mm3 8.61 8.44 9.02   HEMOGLOBIN g/dL 10.4* 9.6* 9.4*   HEMATOCRIT % 31.9* 29.8* 28.8*   PLATELETS 10*3/mm3 135* 130* 128*     Baseline culture/source/susceptibility:    >100k e coli susceptible to all tested   BC NG5d   MRSA nares swab negative   Pleural fluid: NG5d       Recent Vancomycin dosing history:    vancomycin 2250mg given @  vancomycin 1500 mg iv q12h @ 1336 and then  @ 0111                         1311 Vancomycin trough: 26.9mg/L ( after 3 doses and ~ 12 hrs from last dose)    vancomycin adjusted to 750 mg iv q12h (last dose on 2136)                         0910 vancomycin trough: 26.5 mcg/mL, vancomycin on hold                        2136 random level: 22.9 mcg/mL, ~24hr level                         043 random level: 22.7 mcg/mL, ~31hr level                         07 random level 18.7 mcg/mL (~58h level)                         @ 0703 random level: 14.8mg/L ( ~82 hr level)  Vancomycin 500mg given     @ 1311    1/3 @ 1210 random: 11.2mg/L (~ 23 hrs from last dose)  Vancomycin 1250mg " given   1/3@ 1456   1/4 @ 1413 random: 16.7 mg/L ( ~ 24 hrs from last dose)    Assessment:    Last day of vancomycin is tomorrow which is day 10.   Level is therapeutic. Will plan to redose today and start 1250mg q24h dosing. No other level needed given vancomycin duration is short  Renal function is stable.    Plan:    1) start vancomycin 1250 mg every 24 hours. Only 2 doses left  2) monitor UOP and scr    Ilya Guzman [facility]
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
## 2018-01-05 NOTE — PLAN OF CARE
Problem: Patient Care Overview (Adult)  Goal: Plan of Care Review  Outcome: Ongoing (interventions implemented as appropriate)   01/05/18 4988   Coping/Psychosocial Response Interventions   Plan Of Care Reviewed With patient   Patient Care Overview   Progress no change   Outcome Evaluation   Outcome Summary/Follow up Plan Pt. rested well with complaints of pain twice. He had his thoracentesis done today with 1.6 liters taken off. Will continue to monitor.     Goal: Adult Individualization and Mutuality  Outcome: Ongoing (interventions implemented as appropriate)    Goal: Discharge Needs Assessment  Outcome: Ongoing (interventions implemented as appropriate)      Problem: Infection, Risk/Actual (Adult)  Goal: Infection Prevention/Resolution  Outcome: Ongoing (interventions implemented as appropriate)      Problem: Pain, Acute (Adult)  Goal: Acceptable Pain Control/Comfort Level  Outcome: Ongoing (interventions implemented as appropriate)      Problem: Fall Risk (Adult)  Goal: Absence of Falls  Outcome: Ongoing (interventions implemented as appropriate)      Problem: Skin Integrity Impairment, Risk/Actual (Adult)  Goal: Skin Integrity/Wound Healing  Outcome: Ongoing (interventions implemented as appropriate)

## 2018-01-05 NOTE — PROGRESS NOTES
"    Chief Complaint   Patient presents with   • Scrotal Swelling     Consulted for:  Pleural effusion  S/P US thoracentesis today in which 1425 cc of fluid removed.    Subjective:  Symptoms:  Stable.  No shortness of breath.  (Recently returned to room after having ultrasound thoracentesis completed.  Patient does not notice any improvement of symptoms but he has not experienced any shortness of breath with his previous pleural effusions.  He did state after the procedure was completed today, he had acute onset of left-sided pleuritic chest pain which causes shortness breath.  Symptoms have since resolved.  ).    Activity level: Impaired due to weakness.    Pain:  He reports no pain.        Vital Signs:  Temp:  [97.1 °F (36.2 °C)-98.5 °F (36.9 °C)] 98.1 °F (36.7 °C)  Heart Rate:  [] 115  Resp:  [16-18] 16  BP: ()/(61-77) 98/61    Intake & Output (last day)       01/04 0701 - 01/05 0700 01/05 0701 - 01/06 0700    P.O.      Total Intake(mL/kg)      Urine (mL/kg/hr) 2550 (1) 250 (0.4)    Other  1425 (2.5)    Stool 0 (0)     Wound 0 (0)     Total Output 2550 1675    Net -2550 -1675          Unmeasured Stool Occurrence 0 x           Objective:  General Appearance:  In no acute distress and ill-appearing.    Vital signs: (most recent): Blood pressure 98/61, pulse 115, temperature 98.1 °F (36.7 °C), temperature source Oral, resp. rate 16, height 188 cm (74\"), weight 109 kg (240 lb), SpO2 95 %.  No fever.    Lungs:  Normal respiratory rate and normal effort.    Heart: Tachycardia.    Extremities: There is dependent edema.    Neurological: Patient is alert and oriented to person, place and time.              Results Review:     I reviewed the patient's new clinical results.    Imaging Results (last 24 hours)     Procedure Component Value Units Date/Time    US Thoracentesis Left [[id number]] Updated:  01/05/18 0917    Specimen:  Body Fluid           Lab Results:     Lab Results (last 24 hours)     Procedure " Component Value Units Date/Time    Vancomycin, Random [[id number]]  (Normal) Collected:  01/04/18 1413    Specimen:  Blood Updated:  01/04/18 1446     Vancomycin Random 16.70 mcg/mL     POC Glucose Once [[id number]]  (Abnormal) Collected:  01/04/18 1700    Specimen:  Blood Updated:  01/04/18 1702     Glucose 206 (H) mg/dL     Narrative:       Meter: [id number] : [id number] Malika HARRY    Potassium [[id number]]  (Normal) Collected:  01/04/18 2021    Specimen:  Blood Updated:  01/04/18 2050     Potassium 4.5 mmol/L     POC Glucose Once [[id number]]  (Abnormal) Collected:  01/04/18 2045    Specimen:  Blood Updated:  01/04/18 2107     Glucose 204 (H) mg/dL     Narrative:       Meter: [id number] : [id number] Polyhealertad NA    POC Glucose Once [[id number]]  (Normal) Collected:  01/05/18 0611    Specimen:  Blood Updated:  01/05/18 0613     Glucose 90 mg/dL     Narrative:       Meter: [id number] : [id number] Miranda Ade NA    Basic Metabolic Panel [[id number]]  (Abnormal) Collected:  01/05/18 0554    Specimen:  Blood Updated:  01/05/18 0700     Glucose 88 mg/dL      BUN 42 (H) mg/dL      Creatinine 0.90 mg/dL      Sodium 133 (L) mmol/L      Potassium 4.2 mmol/L      Chloride 94 (L) mmol/L      CO2 30.7 (H) mmol/L      Calcium 7.8 (L) mg/dL      eGFR Non African Amer 83 mL/min/1.73      BUN/Creatinine Ratio 46.7 (H)     Anion Gap 8.3 mmol/L     Narrative:       GFR Normal >60  Chronic Kidney Disease <60  Kidney Failure <15    Magnesium [[id number]]  (Normal) Collected:  01/05/18 0554    Specimen:  Blood Updated:  01/05/18 0700     Magnesium 1.6 mg/dL     CBC Auto Differential [[id number]]  (Abnormal) Collected:  01/05/18 0554    Specimen:  Blood Updated:  01/05/18 0721     WBC 7.15 10*3/mm3      RBC 2.60 (L) 10*6/mm3      Hemoglobin 9.2 (L) g/dL      Hematocrit 28.2 (L) %      .5 (H) fL      MCH 35.4 (H) pg      MCHC 32.6 g/dL      RDW 17.6 (H) %      RDW-SD 69.3 (H) fl      MPV 10.2 fL       Platelets 136 (L) 10*3/mm3      Neutrophil % 69.2 %      Lymphocyte % 16.2 (L) %      Monocyte % 8.1 %      Eosinophil % 5.7 %      Basophil % 0.4 %      Immature Grans % 0.4 %      Neutrophils, Absolute 4.94 10*3/mm3      Lymphocytes, Absolute 1.16 10*3/mm3      Monocytes, Absolute 0.58 10*3/mm3      Eosinophils, Absolute 0.41 10*3/mm3      Basophils, Absolute 0.03 10*3/mm3      Immature Grans, Absolute 0.03 10*3/mm3     CBC & Differential [[id number]] Collected:  01/05/18 0554    Specimen:  Blood Updated:  01/05/18 0721    Narrative:       The following orders were created for panel order CBC & Differential.  Procedure                               Abnormality         Status                     ---------                               -----------         ------                     Scan Slide[[id number]]                                                                  CBC Auto Differential[[id number]]        Abnormal            Final result                 Please view results for these tests on the individual orders.    POC Glucose Once [[id number]]  (Normal) Collected:  01/05/18 1122    Specimen:  Blood Updated:  01/05/18 1123     Glucose 90 mg/dL     Narrative:       Meter: [id number] : [id number] Damaso Conroy           Assessment/Plan     Principal Problem:    Cellulitis, scrotum  Active Problems:    Chronic diastolic congestive heart failure    Type 2 diabetes mellitus treated with insulin    Antiphospholipid antibody with hypercoagulable state    History of stroke with residual deficit    Benign hypertension    Factor V Leiden mutation    Chronic pain    Long term current use of anticoagulant    Chronic atrial fibrillation    Pleural effusion on left    Hyponatremia    Paraplegia       Assessment:    Condition: In stable condition.   (Left pleural effusion ).     Plan:   (Patient underwent repeat ultrasound thoracentesis on the left side today in which 1425 mL of fluid was removed.  Recommend to continue  surveillance for reaccumulation.  Recommend treating underlying medical conditions causing the pleural effusion.  Serial ultrasound thoracentesis as needed.  If it appears the pleural effusion continually reaccumulates and is requiring multiple thoracentesis, then a Pleurx catheter may be indicated at that time.   Thank you for allowing us to participate in the care of this patient. [facility] consult service will sign off. Please call or re-consult if we can be of further assistance.   ).       Aylin Mckeon, APRN  [facility]  01/05/18  12:17 PM

## 2018-01-05 NOTE — PROGRESS NOTES
"  Infectious Diseases Progress Note    Memo Alarcon MD     [facility]  Los: 10 days  Patient Identification:  Name: Emil Pulido  Age: 72 y.o.  Sex: male  :  1945  MRN: [id number]         Primary Care Physician: Provider Not In System            Subjective: Feeling better decreasing pain in the scrotal area.  Appetite is improving no more fever and chills.  Interval History: The consultation note.    Objective:    Scheduled Meds:    bumetanide 1 mg Intravenous Q12H   ceftriaxone 1 g Intravenous Q24H   docusate sodium 100 mg Oral BID   fluconazole 200 mg Oral Q24H   gabapentin 300 mg Oral Q12H   insulin aspart 0-7 Units Subcutaneous 4x Daily With Meals & Nightly   insulin detemir 15 Units Subcutaneous Q12H   lactobacillus acidophilus 1 capsule Oral Daily   mirtazapine 15 mg Oral Daily   nystatin  Topical Q12H   pantoprazole 40 mg Oral QAM   potassium chloride 20 mEq Oral BID With Meals   senna 2 tablet Oral BID   vancomycin 1,250 mg Intravenous Q24H   venlafaxine XR 75 mg Oral Daily     Continuous Infusions:    Pharmacy to dose vancomycin        Vital signs in last 24 hours:  Temp:  [97.1 °F (36.2 °C)-98.5 °F (36.9 °C)] 98.1 °F (36.7 °C)  Heart Rate:  [] 115  Resp:  [16-18] 16  BP: ()/(61-77) 98/61    Intake/Output:    Intake/Output Summary (Last 24 hours) at 18 1153  Last data filed at 18 0921   Gross per 24 hour   Intake                0 ml   Output             4225 ml   Net            -4225 ml       Exam:  BP 98/61 (BP Location: Right arm, Patient Position: Lying)  Pulse 115  Temp 98.1 °F (36.7 °C) (Oral)   Resp 16  Ht 188 cm (74\")  Wt 109 kg (240 lb)  SpO2 95%  BMI 30.81 kg/m2    General Appearance:    Alert, cooperative, no distress, AAOx3                          Head:    Normocephalic, without obvious abnormality, atraumatic                  Abdomen:     Soft and nontender                 Extremities:   Contracted lower extremities.                        "     Skin:   Scrotal area is still erythematous but decrease in terms of severity and swelling.  Penile swelling is decreased.  Data Review:    I reviewed the patient's new clinical results.    Results from last 7 days  Lab Units 01/05/18  0554 01/04/18 0442 01/03/18 0700 01/02/18 0703 01/01/18 0735 12/31/17  0436 12/30/17  0910   WBC 10*3/mm3 7.15 8.61 8.44 9.02 9.62 10.03 10.22   HEMOGLOBIN g/dL 9.2* 10.4* 9.6* 9.4* 9.3* 9.8* 9.6*   PLATELETS 10*3/mm3 136* 135* 130* 128* 124* 116* 115*       Results from last 7 days  Lab Units 01/05/18  0554 01/04/18 2021 01/04/18 0442 01/03/18 0700 01/02/18  0703 01/01/18 0735 12/31/17 0436 12/30/17  0910   SODIUM mmol/L 133*  --  134* 134* 133* 129* 131* 128*   POTASSIUM mmol/L 4.2 4.5 3.6 2.8* 2.7* 3.0* 3.1* 3.7   CHLORIDE mmol/L 94*  --  92* 93* 93* 88* 91* 90*   CO2 mmol/L 30.7*  --  28.7 28.8 30.2* 28.0 27.0 25.8   BUN mg/dL 42*  --  40* 41* 43* 43* 46* 47*   CREATININE mg/dL 0.90  --  0.97 1.00 1.05 1.04 1.07 1.09   CALCIUM mg/dL 7.8*  --  7.8* 7.7* 7.8* 7.9* 7.8* 7.9*   GLUCOSE mg/dL 88  --  199* 180* 96 100* 133* 195*         Assessment:  Principal Problem:    Cellulitis, scrotum with superimposed candidal balanitis and dermatitis  Active Problems:    Chronic diastolic congestive heart failure    Type 2 diabetes mellitus treated with insulin    Antiphospholipid antibody with hypercoagulable state    History of stroke with residual deficit    Benign hypertension    Factor V Leiden mutation    Chronic pain    Long term current use of anticoagulant    Chronic atrial fibrillation    Pleural effusion on left    Hyponatremia    Paraplegia      Plan/recommendations:  See below.  Continue present care and observe.  DC vancomycin after 10 days of treatment.  Continue to observe his clinical course on the regimen of vancomycin and Rocephin oral Diflucan and and topical nystatin.  Significance of fever in this context is unclear as patient is clearly not aware of the fever  that he had and also feeling better every day.  At this juncture close observation is the key.  If fever persists then need to rule out    -C. difficile infection,    -recurrent aspiration and drug fever as a possibility.     -Superimposed resistant gram-negative rods UTI needs to be considered.    Memo Alarcon MD  1/5/2018  11:53 AM    Much of this encounter note is an electronic transcription/translation of spoken language to printed text. The electronic translation of spoken language may permit erroneous, or at times, nonsensical words or phrases to be inadvertently transcribed; Although I have reviewed the note for such errors, some may still exist

## 2018-01-05 NOTE — PLAN OF CARE
Problem: Patient Care Overview (Adult)  Goal: Plan of Care Review  Outcome: Ongoing (interventions implemented as appropriate)   01/05/18 0257   Coping/Psychosocial Response Interventions   Plan Of Care Reviewed With patient   Patient Care Overview   Progress no change   Outcome Evaluation   Outcome Summary/Follow up Plan Pt resting comfortably, pain well controlled on PO medication. A and o x 3, confused at times. No acute changes or signs of distress noted. NPO at midnight for thoracentisis. Will continue to monitor. KORINA, ABELN       Problem: Infection, Risk/Actual (Adult)  Goal: Infection Prevention/Resolution  Outcome: Ongoing (interventions implemented as appropriate)   01/05/18 0257   Infection, Risk/Actual (Adult)   Infection Prevention/Resolution making progress toward outcome       Problem: Pain, Acute (Adult)  Goal: Acceptable Pain Control/Comfort Level  Outcome: Ongoing (interventions implemented as appropriate)   01/05/18 0257   Pain, Acute (Adult)   Acceptable Pain Control/Comfort Level making progress toward outcome       Problem: Fall Risk (Adult)  Goal: Absence of Falls  Outcome: Ongoing (interventions implemented as appropriate)   01/05/18 0257   Fall Risk (Adult)   Absence of Falls achieves outcome

## 2018-01-05 NOTE — PROGRESS NOTES
Pharmacokinetic Evaluation - Vancomycin     Emil Pulido is a 72 y.o. male on vancomycin pharmacy to dose.  MRN: [id number]  : 1945     Day of vancomycin therapy: 10  Today is last day of vancomycin per ID (will get one dose today)  Indication: SSTI/scrotal cellulitis  Consulted by: Dr. Zamorano, Dr. Alarcon following for ID  Goal trough: 15-20mg/L     Current dose: 1250mg q24h  Other antimicrobials: CTX 1g q24h     Estimated Creatinine Clearance: 100.4 mL/min (by C-G formula based on Cr of 0.9).    Results from last 7 days  Lab Units 18  0554 18  0442 18  0700   CREATININE mg/dL 0.90 0.97 1.00       Results from last 7 days  Lab Units 18  0554 18  0442 18  0700   WBC 10*3/mm3 7.15 8.61 8.44   HEMOGLOBIN g/dL 9.2* 10.4* 9.6*   HEMATOCRIT % 28.2* 31.9* 29.8*   PLATELETS 10*3/mm3 136* 135* 130*         Baseline culture/source/susceptibility:    >100k e coli susceptible to all tested   BC NG5d   MRSA nares swab negative   Pleural fluid: NG5d       Recent Vancomycin dosing history:    vancomycin 2250mg given @  vancomycin 1500 mg iv q12h @ 1336 and then  @ 0111                         1311 Vancomycin trough: 26.9mg/L ( after 3 doses and ~ 12 hrs from last dose)    vancomycin adjusted to 750 mg iv q12h (last dose on 2136)                         0910 vancomycin trough: 26.5 mcg/mL, vancomycin on hold                        2136 random level: 22.9 mcg/mL, ~24hr level                        436 random level: 22.7 mcg/mL, ~31hr level                         0735 random level 18.7 mcg/mL (~58h level)                         @ 0703 random level: 14.8mg/L ( ~82 hr level)  Vancomycin 500mg given                          @ 1311                         1/3 @ 1210 random: 11.2mg/L (~ 23 hrs from last dose)  Vancomycin 1250mg given                        1/3@ 1456                         @ 1413 random:  16.7 mg/L ( ~ 24 hrs from last dose)    Vancomycin 1250mg q24h started 1/4      Assessment:     Renal function stable. Continue same today. Last dose of vancomycin today (10 days total). Will continue ceftriaxone for now.      Plan:     1) continue Vancomycin 1250mg q24h today. Last dose today. No level needed.     Ilya Guzman [facility]  1/5 @ 2109
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
## 2018-01-06 NOTE — PROGRESS NOTES
"  Infectious Diseases Progress Note    Memo Alarcon MD     [facility]  [place]: 11 days  Patient Identification:  Name: Emil Pulido  Age: 72 y.o.  Sex: male  :  1945  MRN: [id number]         Primary Care Physician: Provider Not In System            Subjective: Doing better denies any specific complaints.  Has no pain in the scrotal area.  Interval History: The consultation note.    Objective:    Scheduled Meds:    bumetanide 1 mg Intravenous Q12H   ceftriaxone 1 g Intravenous Q24H   docusate sodium 100 mg Oral BID   fluconazole 200 mg Oral Q24H   gabapentin 300 mg Oral Q12H   insulin aspart 0-7 Units Subcutaneous 4x Daily With Meals & Nightly   insulin detemir 15 Units Subcutaneous Q12H   lactobacillus acidophilus 1 capsule Oral Daily   mirtazapine 15 mg Oral Daily   nystatin  Topical Q12H   pantoprazole 40 mg Oral QAM   potassium chloride 20 mEq Oral BID With Meals   senna 2 tablet Oral BID   venlafaxine XR 75 mg Oral Daily     Continuous Infusions:       Vital signs in last 24 hours:  Temp:  [98.1 °F (36.7 °C)-98.7 °F (37.1 °C)] 98.7 °F (37.1 °C)  Heart Rate:  [106-110] 106  Resp:  [16-18] 16  BP: (104-126)/(58-77) 104/58    Intake/Output:    Intake/Output Summary (Last 24 hours) at 18 1735  Last data filed at 18 1334   Gross per 24 hour   Intake             1400 ml   Output             1250 ml   Net              150 ml       Exam:  /58 (BP Location: Left arm, Patient Position: Lying)  Pulse 106  Temp 98.7 °F (37.1 °C) (Oral)   Resp 16  Ht 188 cm (74\")  Wt 109 kg (240 lb)  SpO2 92%  BMI 30.81 kg/m2    General Appearance:    Alert, cooperative, no distress, AAOx3                          Head:    Normocephalic, without obvious abnormality, atraumatic                  Abdomen:     Soft and nontender                 Extremities:   Contracted lower extremities.                            Skin:   Scrotal area is less erythematous and much more decrease in size.  Penile " erythema and swelling is also decreased.  Obando catheter in place.  Data Review:    I reviewed the patient's new clinical results.    Results from last 7 days  Lab Units 01/06/18 0659 01/05/18  0554 01/04/18 0442 01/03/18 0700 01/02/18 0703 01/01/18 0735 12/31/17  0436   WBC 10*3/mm3 7.46 7.15 8.61 8.44 9.02 9.62 10.03   HEMOGLOBIN g/dL 9.6* 9.2* 10.4* 9.6* 9.4* 9.3* 9.8*   PLATELETS 10*3/mm3 123* 136* 135* 130* 128* 124* 116*       Results from last 7 days  Lab Units 01/06/18 0659 01/05/18 0554 01/04/18 2021 01/04/18 0442 01/03/18 0700 01/02/18  0703 01/01/18  0735 12/31/17  0436   SODIUM mmol/L 132* 133*  --  134* 134* 133* 129* 131*   POTASSIUM mmol/L 4.0 4.2 4.5 3.6 2.8* 2.7* 3.0* 3.1*   CHLORIDE mmol/L 92* 94*  --  92* 93* 93* 88* 91*   CO2 mmol/L 34.8* 30.7*  --  28.7 28.8 30.2* 28.0 27.0   BUN mg/dL 37* 42*  --  40* 41* 43* 43* 46*   CREATININE mg/dL 0.94 0.90  --  0.97 1.00 1.05 1.04 1.07   CALCIUM mg/dL 8.0* 7.8*  --  7.8* 7.7* 7.8* 7.9* 7.8*   GLUCOSE mg/dL 107* 88  --  199* 180* 96 100* 133*         Assessment:  Principal Problem:    Cellulitis, scrotum with superimposed candidal balanitis and dermatitis  Active Problems:    Chronic diastolic congestive heart failure    Type 2 diabetes mellitus treated with insulin    Antiphospholipid antibody with hypercoagulable state    History of stroke with residual deficit    Benign hypertension    Factor V Leiden mutation    Chronic pain    Long term current use of anticoagulant    Chronic atrial fibrillation    Pleural effusion on left    Hyponatremia    Paraplegia      Plan/recommendations:  See below.    Continue to observe his clinical course on the regimen of Rocephin oral Diflucan and and topical nystatin.  Finish Rocephin and fluconazole on 1/11/2017.  Significance of fever in this context is unclear as patient is clearly not aware of the fever that he had and also feeling better every day.  At this juncture close observation is the jung.  If fever  persists then need to rule out    -C. difficile infection,    -recurrent aspiration and drug fever as a possibility.     -Superimposed resistant gram-negative rods UTI needs to be considered.    Memo Alarcon MD  1/6/2018  5:35 PM    Much of this encounter note is an electronic transcription/translation of spoken language to printed text. The electronic translation of spoken language may permit erroneous, or at times, nonsensical words or phrases to be inadvertently transcribed; Although I have reviewed the note for such errors, some may still exist

## 2018-01-06 NOTE — PLAN OF CARE
Problem: Patient Care Overview (Adult)  Goal: Plan of Care Review  Outcome: Ongoing (interventions implemented as appropriate)   01/06/18 1632   Coping/Psychosocial Response Interventions   Plan Of Care Reviewed With patient;daughter   Patient Care Overview   Progress no change   Outcome Evaluation   Outcome Summary/Follow up Plan Pt rested comfortably throughout the shift with no c/o pain. Daughter at bedside. Will continue to monitor per comfort care.      Goal: Adult Individualization and Mutuality  Outcome: Ongoing (interventions implemented as appropriate)    Goal: Discharge Needs Assessment  Outcome: Ongoing (interventions implemented as appropriate)      Problem: Infection, Risk/Actual (Adult)  Goal: Infection Prevention/Resolution  Outcome: Ongoing (interventions implemented as appropriate)      Problem: Pain, Acute (Adult)  Goal: Acceptable Pain Control/Comfort Level  Outcome: Ongoing (interventions implemented as appropriate)      Problem: Fall Risk (Adult)  Goal: Absence of Falls  Outcome: Ongoing (interventions implemented as appropriate)      Problem: Skin Integrity Impairment, Risk/Actual (Adult)  Goal: Skin Integrity/Wound Healing  Outcome: Ongoing (interventions implemented as appropriate)

## 2018-01-06 NOTE — PLAN OF CARE
"Problem: Patient Care Overview (Adult)  Goal: Plan of Care Review  Outcome: Ongoing (interventions implemented as appropriate)   01/05/18 1708 01/05/18 2154 01/06/18 0455   Coping/Psychosocial Response Interventions   Plan Of Care Reviewed With --  patient;daughter --    Patient Care Overview   Progress no change --  --    Outcome Evaluation   Outcome Summary/Follow up Plan --  --  Pt rested well. Medicated for pain X1. Pt states \"I would rather have my diet pepsi, than have dinner\". Continue to monitor.      Goal: Adult Individualization and Mutuality  Outcome: Ongoing (interventions implemented as appropriate)    Goal: Discharge Needs Assessment  Outcome: Ongoing (interventions implemented as appropriate)      Problem: Infection, Risk/Actual (Adult)  Goal: Infection Prevention/Resolution  Outcome: Ongoing (interventions implemented as appropriate)      Problem: Pain, Acute (Adult)  Goal: Acceptable Pain Control/Comfort Level  Outcome: Ongoing (interventions implemented as appropriate)      Problem: Fall Risk (Adult)  Goal: Absence of Falls  Outcome: Ongoing (interventions implemented as appropriate)      Problem: Skin Integrity Impairment, Risk/Actual (Adult)  Goal: Skin Integrity/Wound Healing  Outcome: Ongoing (interventions implemented as appropriate)        "
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
## 2018-01-07 NOTE — PROGRESS NOTES
[facility] Visit Report    Emil Pulido  [id number]  1/6/2018    Admission R/T [facility] Dx: NO      Reason for [facility] Admission: CHF      Symptom  Management: Cellulitis of the scrotum      Nursing/Medication Recommendations: No recommendations at this time      Psychosocial Issues and Recommendations: Provide support to patient and family      Spiritual Concerns and Recommendations: None at present      [facility] Discharge Plans:  None at present, continue on IV Bumex and IV Rocephin until 1/11/2018 and on oral Diflucan. [facility] when discharged.      Review of Visit: Close monitoring for safety/falls, management of cellulitis of the scrotum, comfort care. Patient lying in bed, awake, alert, answering questions. Denies pain or discomfort. Per MD note redness and swelling to scrotal area decreasing. Emotional support provided to patient and reached out to spouse on 6P that patient is doing better. Discussed care needs with staff and will continue to see daily to assess needs, monitor status and offer support.        Priti Maldonado RN  [facility] Visit Nurse

## 2018-01-07 NOTE — PROGRESS NOTES
"  Infectious Diseases Progress Note    Memo Alarcon MD     [facility]  Los: 12 days  Patient Identification:  Name: Emil Pulido  Age: 72 y.o.  Sex: male  :  1945  MRN: [id number]         Primary Care Physician: Provider Not In System            Subjective: Denies any pain in the scrotal and penile area.  Has been having issues with bowel incontinence.  Denies any diarrhea.    Interval History: The consultation note.    Objective:    Scheduled Meds:    bumetanide 1 mg Intravenous Q12H   ceftriaxone 1 g Intravenous Q24H   docusate sodium 100 mg Oral BID   fluconazole 200 mg Oral Q24H   gabapentin 300 mg Oral Q12H   insulin aspart 0-7 Units Subcutaneous 4x Daily With Meals & Nightly   insulin detemir 15 Units Subcutaneous Q12H   lactobacillus acidophilus 1 capsule Oral Daily   mirtazapine 15 mg Oral Daily   nystatin  Topical Q12H   pantoprazole 40 mg Oral QAM   potassium chloride 20 mEq Oral BID With Meals   senna 2 tablet Oral BID   venlafaxine XR 75 mg Oral Daily     Continuous Infusions:       Vital signs in last 24 hours:  Temp:  [97.4 °F (36.3 °C)] 97.4 °F (36.3 °C)  Heart Rate:  [107-117] 117  Resp:  [18-20] 20  BP: (110-136)/(60-72) 136/60    Intake/Output:    Intake/Output Summary (Last 24 hours) at 18 1756  Last data filed at 18 0908   Gross per 24 hour   Intake             1040 ml   Output             1850 ml   Net             -810 ml       Exam:  /60 (BP Location: Left arm, Patient Position: Lying)  Pulse 117  Temp 97.4 °F (36.3 °C) (Oral)   Resp 20  Ht 188 cm (74\")  Wt 109 kg (240 lb)  SpO2 97%  BMI 30.81 kg/m2    General Appearance:    Alert, cooperative, no distress, AAOx3                          Head:    Normocephalic, without obvious abnormality, atraumatic                  Abdomen:     Soft and nontender                 Extremities:   Contracted lower extremities.                            Skin:   Decrease edema of the scrotum decrease edema of the " penis.  Obando catheter in place.  Data Review:    I reviewed the patient's new clinical results.    Results from last 7 days  Lab Units 01/07/18  0556 01/06/18  0659 01/05/18  0554 01/04/18 0442 01/03/18 0700 01/02/18  0703 01/01/18  0735   WBC 10*3/mm3 10.20 7.46 7.15 8.61 8.44 9.02 9.62   HEMOGLOBIN g/dL 10.4* 9.6* 9.2* 10.4* 9.6* 9.4* 9.3*   PLATELETS 10*3/mm3 148 123* 136* 135* 130* 128* 124*       Results from last 7 days  Lab Units 01/07/18  0556 01/06/18  0659 01/05/18  0554 01/04/18 2021 01/04/18 0442 01/03/18  0700 01/02/18  0703 01/01/18  0735   SODIUM mmol/L 131* 132* 133*  --  134* 134* 133* 129*   POTASSIUM mmol/L 3.9 4.0 4.2 4.5 3.6 2.8* 2.7* 3.0*   CHLORIDE mmol/L 91* 92* 94*  --  92* 93* 93* 88*   CO2 mmol/L 33.8* 34.8* 30.7*  --  28.7 28.8 30.2* 28.0   BUN mg/dL 32* 37* 42*  --  40* 41* 43* 43*   CREATININE mg/dL 1.05 0.94 0.90  --  0.97 1.00 1.05 1.04   CALCIUM mg/dL 8.3* 8.0* 7.8*  --  7.8* 7.7* 7.8* 7.9*   GLUCOSE mg/dL 61* 107* 88  --  199* 180* 96 100*         Assessment:  Principal Problem:    Cellulitis, scrotum with superimposed candidal balanitis and dermatitis  Active Problems:    Chronic diastolic congestive heart failure    Type 2 diabetes mellitus treated with insulin    Antiphospholipid antibody with hypercoagulable state    History of stroke with residual deficit    Benign hypertension    Factor V Leiden mutation    Chronic pain    Long term current use of anticoagulant    Chronic atrial fibrillation    Pleural effusion on left    Hyponatremia    Paraplegia      Plan/recommendations:  See below.    Continue to observe his clinical course on the regimen of Rocephin oral Diflucan and and topical nystatin.  Finish Rocephin and fluconazole on 1/11/2017.  Significance of fever in this context is unclear as patient is clearly not aware of the fever that he had and also feeling better every day.  At this juncture close observation is the key.  If fever persists then need to rule out     -C. difficile infection,    -recurrent aspiration and drug fever as a possibility.     -Superimposed resistant gram-negative rods UTI needs to be considered.    Memo Alarcon MD  1/7/2018  5:56 PM    Much of this encounter note is an electronic transcription/translation of spoken language to printed text. The electronic translation of spoken language may permit erroneous, or at times, nonsensical words or phrases to be inadvertently transcribed; Although I have reviewed the note for such errors, some may still exist

## 2018-01-07 NOTE — PLAN OF CARE
"Problem: Patient Care Overview (Adult)  Goal: Plan of Care Review  Outcome: Ongoing (interventions implemented as appropriate)   01/06/18 2004 01/07/18 0623   Coping/Psychosocial Response Interventions   Plan Of Care Reviewed With patient --    Patient Care Overview   Progress --  declining   Outcome Evaluation   Outcome Summary/Follow up Plan --  Pt very short tempred tonight. Pt states \"If I'm in pain, than the whole world deserves to be in pain\". Medicated for pain X2 (oral dilaudid). Continue to monitor.      Goal: Adult Individualization and Mutuality  Outcome: Ongoing (interventions implemented as appropriate)    Goal: Discharge Needs Assessment  Outcome: Ongoing (interventions implemented as appropriate)      Problem: Infection, Risk/Actual (Adult)  Goal: Infection Prevention/Resolution  Outcome: Ongoing (interventions implemented as appropriate)      Problem: Pain, Acute (Adult)  Goal: Acceptable Pain Control/Comfort Level  Outcome: Ongoing (interventions implemented as appropriate)      Problem: Fall Risk (Adult)  Goal: Absence of Falls  Outcome: Ongoing (interventions implemented as appropriate)      Problem: Skin Integrity Impairment, Risk/Actual (Adult)  Goal: Skin Integrity/Wound Healing  Outcome: Ongoing (interventions implemented as appropriate)        "

## 2018-01-07 NOTE — PROGRESS NOTES
[facility] Visit Report    Emil Pulido  [id number]  1/7/2018    Admission R/T [facility] Dx:   NO    [facility]: CHF    Symptom  Management: Cellulitis of scrotum    Nursing/Medication Recommendations: No recommendations at this time    Psychosocial Issues and Recommendations: Provide support to patient and family    Spiritual Concerns and Recommendations: None at present    [facility] Discharge Plans:  None today, continues on IV Rocephin, IV Bumex and PO Diflucan for scrotal cellulitis, Antibiotics will be completed by 1/11/2018, possible home with [facility] continuing to follow.    Review of Visit: Close monitoring for safety/falls, management of cellulitis of the scrotum using IV Rocephin, oral Diflucan and IV Bumex, oral Dilaudid for pain prn, comfort care. Patient lying in bed, no distress apparent. Emotional support provided and will continue to see daily to assess needs, monitor status and offer support. Breathing easier since thoracenthesis on Friday that removed 1.6 L.        Priti Maldonado RN  [facility] Visit Nurse

## 2018-01-08 NOTE — PROGRESS NOTES
"  Infectious Diseases Progress Note    Memo Alarcon MD     [facility]  Los: 13 days  Patient Identification:  Name: Emil Pulido  Age: 72 y.o.  Sex: male  :  1945  MRN: [id number]         Primary Care Physician: Provider Not In System            Subjective: Complaining of pain and discomfort in the penis and scrotum especially when he moves.   Interval History: The consultation note.    Objective:    Scheduled Meds:    bumetanide 1 mg Intravenous Q12H   ceftriaxone 1 g Intravenous Q24H   docusate sodium 100 mg Oral BID   fluconazole 200 mg Oral Q24H   gabapentin 300 mg Oral Q12H   insulin aspart 0-7 Units Subcutaneous 4x Daily With Meals & Nightly   insulin detemir 15 Units Subcutaneous Q12H   lactobacillus acidophilus 1 capsule Oral Daily   mirtazapine 15 mg Oral Daily   nystatin  Topical Q12H   pantoprazole 40 mg Oral QAM   potassium chloride 20 mEq Oral BID With Meals   senna 2 tablet Oral BID   venlafaxine XR 75 mg Oral Daily     Continuous Infusions:       Vital signs in last 24 hours:  Temp:  [97.7 °F (36.5 °C)-99 °F (37.2 °C)] 99 °F (37.2 °C)  Heart Rate:  [107-110] 107  Resp:  [16-20] 16  BP: (109-126)/(61-76) 109/61    Intake/Output:    Intake/Output Summary (Last 24 hours) at 18 1219  Last data filed at 18 0607   Gross per 24 hour   Intake              870 ml   Output             1600 ml   Net             -730 ml       Exam:  /61 (BP Location: Right arm, Patient Position: Lying)  Pulse 107  Temp 99 °F (37.2 °C) (Oral)   Resp 16  Ht 188 cm (74\")  Wt 109 kg (240 lb)  SpO2 93%  BMI 30.81 kg/m2    General Appearance:    Alert, cooperative, no distress, AAOx3                          Head:    Normocephalic, without obvious abnormality, atraumatic                  Abdomen:     Soft and nontender                 Extremities:   Contracted lower extremities.                            Skin:   Increasing erythema and swelling of the penis and scrotum.  Obando catheter " in place.      Data Review:    I reviewed the patient's new clinical results.    Results from last 7 days  Lab Units 01/08/18  0506 01/07/18  0556 01/06/18  0659 01/05/18  0554 01/04/18  0442 01/03/18 0700 01/02/18  0703   WBC 10*3/mm3 7.62 10.20 7.46 7.15 8.61 8.44 9.02   HEMOGLOBIN g/dL 8.8* 10.4* 9.6* 9.2* 10.4* 9.6* 9.4*   PLATELETS 10*3/mm3 120* 148 123* 136* 135* 130* 128*       Results from last 7 days  Lab Units 01/08/18  0506 01/07/18  0556 01/06/18  0659 01/05/18  0554 01/04/18 2021 01/04/18  0442 01/03/18  0700 01/02/18  0703   SODIUM mmol/L 133* 131* 132* 133*  --  134* 134* 133*   POTASSIUM mmol/L 4.3 3.9 4.0 4.2 4.5 3.6 2.8* 2.7*   CHLORIDE mmol/L 94* 91* 92* 94*  --  92* 93* 93*   CO2 mmol/L 29.8* 33.8* 34.8* 30.7*  --  28.7 28.8 30.2*   BUN mg/dL 28* 32* 37* 42*  --  40* 41* 43*   CREATININE mg/dL 0.84 1.05 0.94 0.90  --  0.97 1.00 1.05   CALCIUM mg/dL 7.9* 8.3* 8.0* 7.8*  --  7.8* 7.7* 7.8*   GLUCOSE mg/dL 176* 61* 107* 88  --  199* 180* 96         Assessment:  Principal Problem:    Cellulitis, scrotum with superimposed candidal balanitis and dermatitis - getting worse  Active Problems:    Chronic diastolic congestive heart failure    Type 2 diabetes mellitus treated with insulin    Antiphospholipid antibody with hypercoagulable state    History of stroke with residual deficit    Benign hypertension    Factor V Leiden mutation    Chronic pain    Long term current use of anticoagulant    Chronic atrial fibrillation    Pleural effusion on left    Hyponatremia    Paraplegia      Plan/recommendations:  See below.    The swelling around the penis and erythema of the scrotum is getting worse in the last 24 hours.  And is occurring despite being on Diflucan and Rocephin.  Patient is now 48 hours post-discontinuation of vancomycin.  I have a suspicion that there is an element of gram-positive infection especially MRSA that needs to be covered.  Given his comorbidities and underlying correctable need for  Obando catheter and ongoing leakage.  The likelihood of perpetual cellulitis is high.  I would recommend DC Rocephin and start vancomycin with continued program of scrotal elevation and periodic assessment.  Memo Alarcon MD  1/8/2018  12:19 PM    Much of this encounter note is an electronic transcription/translation of spoken language to printed text. The electronic translation of spoken language may permit erroneous, or at times, nonsensical words or phrases to be inadvertently transcribed; Although I have reviewed the note for such errors, some may still exist

## 2018-01-08 NOTE — PROGRESS NOTES
"Pharmacokinetic Evaluation - Vancomycin    Emil Pulido is a 72 y.o. male on vancomycin pharmacy to dose.  MRN: [id number]  : 1945    Day of vancomycin therapy: 1 of restart; last dose was on  (had 10 days of treatment then)  Indication: scrotal cellulitis  Consulted by: Dr. Alarcon  Goal trough: 15-20mg/L  Current duration entered: 10 days ()    Other antimicrobials: fluconazole 200mg po q24h X 5 days    Blood pressure 109/61, pulse 107, temperature 99 °F (37.2 °C), temperature source Oral, resp. rate 16, height 188 cm (74\"), weight 109 kg (240 lb), SpO2 93 %.    Results from last 7 days  Lab Units 18  0506 18  0556 18  0659   CREATININE mg/dL 0.84 1.05 0.94     Estimated Creatinine Clearance: 107.5 mL/min (by C-G formula based on Cr of 0.84).    Results from last 7 days  Lab Units 18  0506 18  0556 18  0659   WBC 10*3/mm3 7.62 10.20 7.46   HEMOGLOBIN g/dL 8.8* 10.4* 9.6*   HEMATOCRIT % 27.1* 32.5* 30.1*   PLATELETS 10*3/mm3 120* 148 123*       Baseline culture/source/susceptibility:    >100k e coli susceptible to all tested   BC NG5d   MRSA nares swab negative   Pleural fluid: NG5d      Recent Vancomycin dosing history  though :   ( vancomycin 2250mg given @  vancomycin 1500 mg iv q12h @ 1336 and then  @ 0111                         1311 Vancomycin trough: 26.9mg/L ( after 3 doses and ~ 12 hrs from last dose)    vancomycin adjusted to 750 mg iv q12h (last dose on 2136)                         0910 vancomycin trough: 26.5 mcg/mL, vancomycin on hold                        2136 random level: 22.9 mcg/mL, ~24hr level                        436 random level: 22.7 mcg/mL, ~31hr level                         0735 random level 18.7 mcg/mL (~58h level)                         @ 703 random level: 14.8mg/L ( ~82 hr level)  Vancomycin 500mg given                          @ " 1311                         1/3 @ 1210 random: 11.2mg/L (~ 23 hrs from last dose)  Vancomycin 1250mg given                        1/3@ 1456                        1/4 @ 1413 random: 16.7 mg/L ( ~ 24 hrs from last dose)   Vancomycin 1250mg q24h started 1/4 )        Assessment:    Renal function is stable. Will plan to re-load patient since it has been 3 days since a dose. Will plan to give ~ 20mg/kg loading dose. Renal function has been stable. Will resume with 1250mg q24h since last level would indicate 1250mg q24h might be the required dose for goal levels. Will check a trough after 2 doses to make sure patient is getting appropriate concentrations.     Plan:  1) give vancomycin 2000mg followed by 1250mg q24h starting @ 0600 in am  2) Next trough on 1/10 at 0530 after 2 total doses.  3) Monitor for uop and scr.    Ilya Guzman [facility]

## 2018-01-08 NOTE — PROGRESS NOTES
LOS: 13 days   Patient Care Team:  Provider Not In System as PCP - General  Marcus Avery MD as PCP - Claims Attributed  Ilya Ambrocio MD as Consulting Physician (Hematology and Oncology)  Willy Bell MD as Referring Physician (Internal Medicine)    Chief Complaint   Patient presents with   • Foot Swelling         Subjective   Confused, was saying that he was sliding down & to catch him     Objective     Vital Signs  Temp:  [97.7 °F (36.5 °C)-99 °F (37.2 °C)] 99 °F (37.2 °C)  Heart Rate:  [107-110] 107  Resp:  [16-20] 16  BP: (109-126)/(61-76) 109/61    Physical Exam:  WDWN male in NAD, confused  Lungs clear with BS decreased throughout  Heart RRR  Abd soft, NT, BS+  Scrotum swollen but not as erythematous as it was. No warmth  Ext 2+ edema.  Skin warm & dry       Results Review:     I reviewed the patient's new clinical results.    Medication Review:       LABS    Results from last 7 days  Lab Units 01/08/18  0506 01/07/18  0556 01/06/18  0659   SODIUM mmol/L 133* 131* 132*   POTASSIUM mmol/L 4.3 3.9 4.0   CHLORIDE mmol/L 94* 91* 92*   CO2 mmol/L 29.8* 33.8* 34.8*   BUN mg/dL 28* 32* 37*   CREATININE mg/dL 0.84 1.05 0.94   GLUCOSE mg/dL 176* 61* 107*   CALCIUM mg/dL 7.9* 8.3* 8.0*       Results from last 7 days  Lab Units 01/08/18  0506 01/07/18  0556 01/06/18  0659   WBC 10*3/mm3 7.62 10.20 7.46   HEMOGLOBIN g/dL 8.8* 10.4* 9.6*   HEMATOCRIT % 27.1* 32.5* 30.1*   PLATELETS 10*3/mm3 120* 148 123*       Results from last 7 days  Lab Units 01/04/18  0808   INR  1.72*           Assessment/Plan     Principal Problem:    Cellulitis, scrotum - improved from the last time that I had seen him but apparently worse from 2 days ago.  Active Problems:    Chronic diastolic congestive heart failure    Type 2 diabetes mellitus treated with insulin - he was getting hypoglycemic but that has improved today    Antiphospholipid antibody with hypercoagulable state    Thrombocytopenia    History of stroke with residual  deficit    Benign hypertension    Factor V Leiden mutation    Chronic pain    Long term current use of anticoagulant    Chronic atrial fibrillation    Pleural effusion on left    Hyponatremia    Paraplegia          Emma Ren MD  01/08/18  5:51 PM      Time:

## 2018-01-08 NOTE — PROGRESS NOTES
[facility] Visit Report    Emil Pulido  [id number]  1/8/2018    Admission R/T [facility] Dx: no    Reason for [facility] Admission:    Symptom  Management: Other cellulitis    Nursing/Medication Recommendations:    Psychosocial Issues and Recommendations:    Spiritual Concerns and Recommendations:    [facility] Discharge Plans:  incomplete; patient remains [facility] and [facility] will round daily    Review of Visit (Include All Collaboration- including names of hospital and family involved during admission/visit):  patient sleeping with no family at the bedside. respirations even and unlabored. patient appears comfortable. patient recieving iv rocephine, po diflucan and has had dilaudid iv x2 for pain managment. no plans to discharge a this time will con tot visit daily to assess needs and offer support    Imani Waterman RN

## 2018-01-08 NOTE — PLAN OF CARE
Problem: Patient Care Overview (Adult)  Goal: Plan of Care Review  Outcome: Ongoing (interventions implemented as appropriate)   01/08/18 1726 01/08/18 1733   Coping/Psychosocial Response Interventions   Plan Of Care Reviewed With --  patient   Patient Care Overview   Progress --  no change   Outcome Evaluation   Outcome Summary/Follow up Plan pt has slept most of the day woke up enought to get meds but wouldn't eat , then went back to sleep. will cont to monitor --      Goal: Adult Individualization and Mutuality  Outcome: Ongoing (interventions implemented as appropriate)    Goal: Discharge Needs Assessment  Outcome: Ongoing (interventions implemented as appropriate)      Problem: Infection, Risk/Actual (Adult)  Goal: Infection Prevention/Resolution  Outcome: Ongoing (interventions implemented as appropriate)      Problem: Pain, Acute (Adult)  Goal: Acceptable Pain Control/Comfort Level  Outcome: Ongoing (interventions implemented as appropriate)      Problem: Fall Risk (Adult)  Goal: Absence of Falls  Outcome: Ongoing (interventions implemented as appropriate)      Problem: Skin Integrity Impairment, Risk/Actual (Adult)  Goal: Skin Integrity/Wound Healing  Outcome: Ongoing (interventions implemented as appropriate)

## 2018-01-08 NOTE — PROGRESS NOTES
Discharge Planning Assessment  [facility]     Patient Name: Emil Pulido  MRN: [id number]  Today's Date: 1/8/2018    Admit Date: 12/26/2017          Discharge Needs Assessment     None            Discharge Plan       01/08/18 1527    Case Management/Social Work Plan    Additional Comments Medications being adjusted due to penis erythema and pain. [facility] following for any discharge needs. This hospital stay not covered by [facility]. TOM Barakat RN, CCP.         Discharge Placement     No information found                Demographic Summary     None            Functional Status     None            Psychosocial     None            Abuse/Neglect     None            Legal     None            Substance Abuse     None            Patient Forms     None          Padmini Barakat, RN

## 2018-01-08 NOTE — PLAN OF CARE
"Problem: Patient Care Overview (Adult)  Goal: Plan of Care Review  Outcome: Ongoing (interventions implemented as appropriate)   01/07/18 2135 01/08/18 0518   Coping/Psychosocial Response Interventions   Plan Of Care Reviewed With patient --    Patient Care Overview   Progress --  declining   Outcome Evaluation   Outcome Summary/Follow up Plan --  Pt rested well. Two large BM's tonight. Medicated for pain X1. Pt c/o \"air under my legs\". RN advised pt that his is sleeping on an air mattress because of skin breakdown risk. Continue to monitor.     Goal: Adult Individualization and Mutuality  Outcome: Ongoing (interventions implemented as appropriate)    Goal: Discharge Needs Assessment  Outcome: Ongoing (interventions implemented as appropriate)      Problem: Infection, Risk/Actual (Adult)  Goal: Infection Prevention/Resolution  Outcome: Ongoing (interventions implemented as appropriate)      Problem: Pain, Acute (Adult)  Goal: Acceptable Pain Control/Comfort Level  Outcome: Ongoing (interventions implemented as appropriate)      Problem: Fall Risk (Adult)  Goal: Absence of Falls  Outcome: Ongoing (interventions implemented as appropriate)      Problem: Skin Integrity Impairment, Risk/Actual (Adult)  Goal: Skin Integrity/Wound Healing  Outcome: Ongoing (interventions implemented as appropriate)        "

## 2018-01-08 NOTE — PLAN OF CARE
Problem: Patient Care Overview (Adult)  Goal: Plan of Care Review  Outcome: Ongoing (interventions implemented as appropriate)   01/08/18 9376   Coping/Psychosocial Response Interventions   Plan Of Care Reviewed With patient   Patient Care Overview   Progress no change   Outcome Evaluation   Outcome Summary/Follow up Plan pt has slept most of the day woke up enought to get meds but wouldn't eat , then went back to sleep. will cont to monitor     Goal: Adult Individualization and Mutuality  Outcome: Ongoing (interventions implemented as appropriate)    Goal: Discharge Needs Assessment  Outcome: Ongoing (interventions implemented as appropriate)      Problem: Pain, Acute (Adult)  Goal: Acceptable Pain Control/Comfort Level  Outcome: Ongoing (interventions implemented as appropriate)      Problem: Fall Risk (Adult)  Goal: Absence of Falls  Outcome: Ongoing (interventions implemented as appropriate)      Problem: Skin Integrity Impairment, Risk/Actual (Adult)  Goal: Skin Integrity/Wound Healing  Outcome: Ongoing (interventions implemented as appropriate)

## 2018-01-09 NOTE — PROGRESS NOTES
"Pharmacokinetic Evaluation - Vancomycin    Emil Pulido is a 72 y.o. male on vancomycin pharmacy to dose.  MRN: [id number]  : 1945    Day of vancomycin therapy: 2 of restart; last dose was on  (had 10 days of treatment then)  Indication: scrotal cellulitis  Consulted by: Dr. Alarcon  Goal trough: 15-20mg/L  Current duration entered: 10 days ()     Other antimicrobials: fluconazole 200mg po q24h X 5 days     Blood pressure 126/68, pulse 118, temperature 98 °F (36.7 °C), temperature source Oral, resp. rate 20, height 188 cm (74\"), weight 109 kg (240 lb), SpO2 94 %.    Results from last 7 days  Lab Units 18  0455 18  0506 18  0556   CREATININE mg/dL 0.85 0.84 1.05     Estimated Creatinine Clearance: 106.3 mL/min (by C-G formula based on Cr of 0.85).    Results from last 7 days  Lab Units 18  0455 18  0506 18  0556   WBC 10*3/mm3 8.23 7.62 10.20   HEMOGLOBIN g/dL 9.8* 8.8* 10.4*   HEMATOCRIT % 30.8* 27.1* 32.5*   PLATELETS 10*3/mm3 130* 120* 148     Baseline culture/source/susceptibility:    >100k e coli susceptible to all tested   BC NG5d   MRSA nares swab negative   Pleural fluid: NG5d       Recent Vancomycin dosing history  though :   ( vancomycin 2250mg given @  vancomycin 1500 mg iv q12h @ 1336 and then  @ 0111                         1311 Vancomycin trough: 26.9mg/L ( after 3 doses and ~ 12 hrs from last dose)    vancomycin adjusted to 750 mg iv q12h (last dose on 2136)                         0910 vancomycin trough: 26.5 mcg/mL, vancomycin on hold                        2136 random level: 22.9 mcg/mL, ~24hr level                        436 random level: 22.7 mcg/mL, ~31hr level                         0735 random level 18.7 mcg/mL (~58h level)                         @ 703 random level: 14.8mg/L ( ~82 hr level)  Vancomycin 500mg given                          @ " 1311                         1/3 @ 1210 random: 11.2mg/L (~ 23 hrs from last dose)  Vancomycin 1250mg given                        1/3@ 1456                        1/4 @ 1413 random: 16.7 mg/L ( ~ 24 hrs from last dose)   Vancomycin 1250mg q24h started 1/4 )      Vancomycin dosing history 1/8/18 to present:'  Vancomycin 2000mg loading dose   1/8 @ 1332  Vancomycin 1250mg q24h   1/9 @0503      Assessment:    Renal function is stable. Plan to continue same and check a level tomorrow morning prior to next dose. This is a safe check given last time patient accumulated vancomycin and had high levels  .    Plan:  1) continue vancomycin 1250 mg every 24 hours.  2) Next trough on 1/10 at 0530 after 2 total doses.  3) Monitor for uop and scr.    Ilya Guzman [facility]

## 2018-01-09 NOTE — PROGRESS NOTES
"     LOS: 14 days   Patient Care Team:  Provider Not In System as PCP - General  Marcus Avery MD as PCP - Claims Attributed  Ilya Ambrocio MD as Consulting Physician (Hematology and Oncology)  Willy Bell MD as Referring Physician (Internal Medicine)    Chief Complaint   Patient presents with   • Foot Swelling         Subjective   Lethargic, moaning some. The only thing I understood that he said was \"just shoot me\"       Objective     Vital Signs  Temp:  [97.5 °F (36.4 °C)-98 °F (36.7 °C)] 98 °F (36.7 °C)  Heart Rate:  [101-118] 118  Resp:  [16-20] 20  BP: (115-126)/(60-68) 126/68    Physical Exam:  WDWN male in NAD, confused, poorly responsive  Lungs clear with BS decreased throughout  Heart RRR  Abd soft, NT, BS+  Scrotum swollen but not as erythematous as it was. No warmth  Ext 2+ edema.  Skin warm & dry       Results Review:     I reviewed the patient's new clinical results.    Medication Review:       LABS    Results from last 7 days  Lab Units 01/09/18  0455 01/08/18  0506 01/07/18  0556   SODIUM mmol/L 133* 133* 131*   POTASSIUM mmol/L 4.0 4.3 3.9   CHLORIDE mmol/L 93* 94* 91*   CO2 mmol/L 29.7* 29.8* 33.8*   BUN mg/dL 27* 28* 32*   CREATININE mg/dL 0.85 0.84 1.05   GLUCOSE mg/dL 224* 176* 61*   CALCIUM mg/dL 8.1* 7.9* 8.3*       Results from last 7 days  Lab Units 01/09/18  0455 01/08/18  0506 01/07/18  0556   WBC 10*3/mm3 8.23 7.62 10.20   HEMOGLOBIN g/dL 9.8* 8.8* 10.4*   HEMATOCRIT % 30.8* 27.1* 32.5*   PLATELETS 10*3/mm3 130* 120* 148       Results from last 7 days  Lab Units 01/04/18  0808   INR  1.72*           Assessment/Plan     Principal Problem:    Cellulitis, scrotum - he has some white powder that has been doused all over his scrotum & penis completely obscuring evaluating the color of it.  Active Problems:    Chronic diastolic congestive heart failure    Type 2 diabetes mellitus treated with insulin - he was getting hypoglycemic but that has improved     Antiphospholipid antibody with " hypercoagulable state    Thrombocytopenia    History of stroke with residual deficit    Benign hypertension    Factor V Leiden mutation    Chronic pain    Long term current use of anticoagulant    Chronic atrial fibrillation    Pleural effusion on left - will see About repeating thoracentesis. It sounds like there was still a lot of fluid remaining. He's on IV bumex but still very edematous. He's not eating at all so a lot of this is likely related to hypoalbuminemia. Will try increasing bumex dose. Will also recheck albumin level.     Hyponatremia    Paraplegia          Emma Ren MD  01/09/18  4:06 PM      Time:

## 2018-01-09 NOTE — PLAN OF CARE
Problem: Patient Care Overview (Adult)  Goal: Plan of Care Review  Outcome: Ongoing (interventions implemented as appropriate)   01/09/18 1608   Coping/Psychosocial Response Interventions   Plan Of Care Reviewed With patient   Patient Care Overview   Progress no change   Outcome Evaluation   Outcome Summary/Follow up Plan Pt medicated x1 for pain. BM x2, formed each time. Pt appears comfortable at this time. Will continue to monitor per comfort care.      Goal: Adult Individualization and Mutuality  Outcome: Ongoing (interventions implemented as appropriate)    Goal: Discharge Needs Assessment  Outcome: Ongoing (interventions implemented as appropriate)      Problem: Infection, Risk/Actual (Adult)  Goal: Infection Prevention/Resolution  Outcome: Ongoing (interventions implemented as appropriate)      Problem: Pain, Acute (Adult)  Goal: Acceptable Pain Control/Comfort Level  Outcome: Ongoing (interventions implemented as appropriate)      Problem: Fall Risk (Adult)  Goal: Absence of Falls  Outcome: Ongoing (interventions implemented as appropriate)      Problem: Skin Integrity Impairment, Risk/Actual (Adult)  Goal: Skin Integrity/Wound Healing  Outcome: Ongoing (interventions implemented as appropriate)

## 2018-01-09 NOTE — PLAN OF CARE
Problem: Patient Care Overview (Adult)  Goal: Plan of Care Review  Outcome: Ongoing (interventions implemented as appropriate)   01/09/18 0720   Coping/Psychosocial Response Interventions   Plan Of Care Reviewed With patient   Patient Care Overview   Progress no change   Outcome Evaluation   Outcome Summary/Follow up Plan patient up most of the night complaining of hunger. due to drowsiness he did not eat anything at all yesterday. held insulin due to lack of po intake. reposition frequently. continue to monitor.       Problem: Infection, Risk/Actual (Adult)  Goal: Infection Prevention/Resolution  Outcome: Ongoing (interventions implemented as appropriate)      Problem: Pain, Acute (Adult)  Goal: Acceptable Pain Control/Comfort Level  Outcome: Ongoing (interventions implemented as appropriate)      Problem: Fall Risk (Adult)  Goal: Absence of Falls  Outcome: Ongoing (interventions implemented as appropriate)      Problem: Skin Integrity Impairment, Risk/Actual (Adult)  Goal: Skin Integrity/Wound Healing  Outcome: Ongoing (interventions implemented as appropriate)

## 2018-01-09 NOTE — PROGRESS NOTES
"  Infectious Diseases Progress Note    Memo Alarcon MD     [facility]  Los: 14 days  Patient Identification:  Name: Emil Pulido  Age: 72 y.o.  Sex: male  :  1945  MRN: [id number]         Primary Care Physician: Provider Not In System            Subjective: Complaining of pain and discomfort in the penis and scrotum especially when he moves.   Interval History: The consultation note.    Objective:    Scheduled Meds:    bumetanide 1 mg Intravenous Q12H   docusate sodium 100 mg Oral BID   fluconazole 200 mg Oral Q24H   gabapentin 300 mg Oral Q12H   insulin aspart 0-7 Units Subcutaneous 4x Daily With Meals & Nightly   insulin detemir 15 Units Subcutaneous Q12H   lactobacillus acidophilus 1 capsule Oral Daily   mirtazapine 15 mg Oral Daily   nystatin  Topical Q12H   pantoprazole 40 mg Oral QAM   potassium chloride 20 mEq Oral BID With Meals   senna 2 tablet Oral BID   vancomycin 1,250 mg Intravenous Q24H   venlafaxine XR 75 mg Oral Daily     Continuous Infusions:    Pharmacy to dose vancomycin        Vital signs in last 24 hours:  Temp:  [97.5 °F (36.4 °C)-98 °F (36.7 °C)] 98 °F (36.7 °C)  Heart Rate:  [101-118] 118  Resp:  [16-20] 20  BP: (115-126)/(60-68) 126/68    Intake/Output:    Intake/Output Summary (Last 24 hours) at 18 0919  Last data filed at 18 0503   Gross per 24 hour   Intake              610 ml   Output             4000 ml   Net            -3390 ml       Exam:  /68 (BP Location: Right arm, Patient Position: Lying)  Pulse 118  Temp 98 °F (36.7 °C) (Oral)   Resp 20  Ht 188 cm (74\")  Wt 109 kg (240 lb)  SpO2 94%  BMI 30.81 kg/m2    General Appearance:    Alert, cooperative, no distress, AAOx3                          Head:    Normocephalic, without obvious abnormality, atraumatic                  Abdomen:     Soft and nontender                 Extremities:   Contracted lower extremities.                            Skin:   Increasing erythema and swelling of " the penis and scrotum.  Obando catheter in place.      Data Review:    I reviewed the patient's new clinical results.    Results from last 7 days  Lab Units 01/09/18  0455 01/08/18  0506 01/07/18  0556 01/06/18  0659 01/05/18  0554 01/04/18  0442 01/03/18  0700   WBC 10*3/mm3 8.23 7.62 10.20 7.46 7.15 8.61 8.44   HEMOGLOBIN g/dL 9.8* 8.8* 10.4* 9.6* 9.2* 10.4* 9.6*   PLATELETS 10*3/mm3 130* 120* 148 123* 136* 135* 130*       Results from last 7 days  Lab Units 01/09/18  0455 01/08/18  0506 01/07/18  0556 01/06/18  0659 01/05/18  0554 01/04/18 2021 01/04/18  0442 01/03/18  0700   SODIUM mmol/L 133* 133* 131* 132* 133*  --  134* 134*   POTASSIUM mmol/L 4.0 4.3 3.9 4.0 4.2 4.5 3.6 2.8*   CHLORIDE mmol/L 93* 94* 91* 92* 94*  --  92* 93*   CO2 mmol/L 29.7* 29.8* 33.8* 34.8* 30.7*  --  28.7 28.8   BUN mg/dL 27* 28* 32* 37* 42*  --  40* 41*   CREATININE mg/dL 0.85 0.84 1.05 0.94 0.90  --  0.97 1.00   CALCIUM mg/dL 8.1* 7.9* 8.3* 8.0* 7.8*  --  7.8* 7.7*   GLUCOSE mg/dL 224* 176* 61* 107* 88  --  199* 180*         Assessment:  Principal Problem:    Cellulitis, scrotum with superimposed candidal balanitis and dermatitis - improved today.  Active Problems:    Chronic diastolic congestive heart failure    Type 2 diabetes mellitus treated with insulin    Antiphospholipid antibody with hypercoagulable state    History of stroke with residual deficit    Benign hypertension    Factor V Leiden mutation    Chronic pain    Long term current use of anticoagulant    Chronic atrial fibrillation    Pleural effusion on left    Hyponatremia    Paraplegia      Plan/recommendations:  See below.    The swelling around the penis and erythema of the scrotum is getting worse in the last 24 hours.  And is occurring despite being on Diflucan and Rocephin.  Patient is now 48 hours post-discontinuation of vancomycin.  I have a suspicion that there is an element of gram-positive infection especially MRSA that needs to be covered.  Given his  comorbidities and underlying incorrectable need for Obando catheter and ongoing leakage.  The likelihood of perpetual cellulitis is high.  I would recommend DC Rocephin and start vancomycin with continued program of scrotal elevation and periodic assessment.  Memo Alarcon MD  1/9/2018  9:19 AM    Much of this encounter note is an electronic transcription/translation of spoken language to printed text. The electronic translation of spoken language may permit erroneous, or at times, nonsensical words or phrases to be inadvertently transcribed; Although I have reviewed the note for such errors, some may still exist

## 2018-01-10 NOTE — PROGRESS NOTES
"  Infectious Diseases Progress Note    Memo Alarcon MD     [facility]  Los: 15 days  Patient Identification:  Name: Emil Pulido  Age: 72 y.o.  Sex: male  :  1945  MRN: [id number]         Primary Care Physician: Provider Not In System            Subjective: Complaining of pain and discomfort in the penis and scrotum especially when he moves.   Interval History: The consultation note.    Objective:    Scheduled Meds:    bumetanide 1 mg Intravenous Q8H   docusate sodium 100 mg Oral BID   fluconazole 200 mg Oral Q24H   gabapentin 300 mg Oral Q12H   insulin aspart 0-7 Units Subcutaneous 4x Daily With Meals & Nightly   insulin detemir 15 Units Subcutaneous Q12H   lactobacillus acidophilus 1 capsule Oral Daily   mirtazapine 15 mg Oral Daily   nystatin  Topical Q12H   pantoprazole 40 mg Oral QAM   potassium chloride 20 mEq Oral BID With Meals   senna 2 tablet Oral BID   vancomycin 1,250 mg Intravenous Q24H   venlafaxine XR 75 mg Oral Daily     Continuous Infusions:    Pharmacy to dose vancomycin        Vital signs in last 24 hours:  Temp:  [97.5 °F (36.4 °C)-99.3 °F (37.4 °C)] 99.3 °F (37.4 °C)  Heart Rate:  [] 108  Resp:  [16] 16  BP: (101-116)/(61-63) 116/63    Intake/Output:    Intake/Output Summary (Last 24 hours) at 01/10/18 1202  Last data filed at 18 1824   Gross per 24 hour   Intake              720 ml   Output             1000 ml   Net             -280 ml       Exam:  /63 (BP Location: Left arm, Patient Position: Lying)  Pulse 108  Temp 99.3 °F (37.4 °C) (Oral)   Resp 16  Ht 188 cm (74\")  Wt 92.7 kg (204 lb 6.4 oz)  SpO2 93%  BMI 26.24 kg/m2    General Appearance:    Alert, cooperative, no distress, AAOx3                          Head:    Normocephalic, without obvious abnormality, atraumatic                  Abdomen:     Soft and nontender                 Extremities:   Contracted lower extremities.                            Skin:   Decrease erythema of the " scrotum.  Data Review:    I reviewed the patient's new clinical results.    Results from last 7 days  Lab Units 01/10/18  0554 01/09/18  0455 01/08/18  0506 01/07/18  0556 01/06/18  0659 01/05/18  0554 01/04/18  0442   WBC 10*3/mm3 6.60 8.23 7.62 10.20 7.46 7.15 8.61   HEMOGLOBIN g/dL 9.0* 9.8* 8.8* 10.4* 9.6* 9.2* 10.4*   PLATELETS 10*3/mm3 119* 130* 120* 148 123* 136* 135*       Results from last 7 days  Lab Units 01/10/18  0554 01/09/18  0455 01/08/18  0506 01/07/18  0556 01/06/18  0659 01/05/18  0554 01/04/18 2021 01/04/18  0442   SODIUM mmol/L 135* 133* 133* 131* 132* 133*  --  134*   POTASSIUM mmol/L 4.2 4.0 4.3 3.9 4.0 4.2 4.5 3.6   CHLORIDE mmol/L 97* 93* 94* 91* 92* 94*  --  92*   CO2 mmol/L 30.6* 29.7* 29.8* 33.8* 34.8* 30.7*  --  28.7   BUN mg/dL 23 27* 28* 32* 37* 42*  --  40*   CREATININE mg/dL 0.86 0.85 0.84 1.05 0.94 0.90  --  0.97   CALCIUM mg/dL 7.9* 8.1* 7.9* 8.3* 8.0* 7.8*  --  7.8*   GLUCOSE mg/dL 232* 224* 176* 61* 107* 88  --  199*         Assessment:  Principal Problem:    Cellulitis, scrotum with superimposed candidal balanitis and dermatitis - improved today.  Active Problems:    Chronic diastolic congestive heart failure    Type 2 diabetes mellitus treated with insulin    Antiphospholipid antibody with hypercoagulable state    History of stroke with residual deficit    Benign hypertension    Factor V Leiden mutation    Chronic pain    Long term current use of anticoagulant    Chronic atrial fibrillation    Pleural effusion on left    Hyponatremia    Paraplegia      Plan/recommendations:  See below.    Continue with IV vancomycin.  Jawed Dorian, MD  1/10/2018  12:02 PM    Much of this encounter note is an electronic transcription/translation of spoken language to printed text. The electronic translation of spoken language may permit erroneous, or at times, nonsensical words or phrases to be inadvertently transcribed; Although I have reviewed the note for such errors, some may still exist

## 2018-01-10 NOTE — PROGRESS NOTES
[facility] Visit Report    Emil Pulido  [id number]  1/10/2018    Admission R/T [facility] Dx: no    Reason for [facility] Admission:    Symptom  Management: Other cellulitis    Nursing/Medication Recommendations:    Psychosocial Issues and Recommendations:    Spiritual Concerns and Recommendations:    [facility] Discharge Plans:  incomplete; patient remains [facility] and [facility] will round daily    Review of Visit (Include All Collaboration- including names of hospital and family involved during admission/visit):    patient recieving bath when i arrived. no family present. patient continues to recieve difulcan, bumex and dilaudid. rocephin discontinued and patient restarted on vanc. possiblbly repeat thoracentesis per md note. no plans to discharge at this time. will cont to visit daily to assess needs and offer support    Imani Waterman RN

## 2018-01-10 NOTE — NURSING NOTE
"Patient oriented x3.  Refusing to take insulin. Patient understands role in controlling sugars but states \"I just don't want to be stuck anymore\"  "

## 2018-01-10 NOTE — CONSULTS
initiated visit with patient however, pt. Appears to be asleep.  It had been a week since she last saw him.  It appears the patient health condition has declined since then.   left her card with the day & time she stopped by.

## 2018-01-10 NOTE — PLAN OF CARE
"Problem: Patient Care Overview (Adult)  Goal: Plan of Care Review  Outcome: Ongoing (interventions implemented as appropriate)   01/10/18 0505   Coping/Psychosocial Response Interventions   Plan Of Care Reviewed With patient   Patient Care Overview   Progress no change   Outcome Evaluation   Outcome Summary/Follow up Plan patient requires frequent pain medication. Patient refused insulin last night. he stated that he was tired of being stuck. Patient remains conditional code and continues to receive vancomycin iv for scrotal cellulitis. Nystatin powder applied per order to penile area and scrotum. Patient pain concentrated in legs, having to frequently reposition to make patient feel comfortable. At times patient verbalizes that he \"wishes he could just go home and not do this anymore\". Dr. Ren ordered for patient to have US guided thoracentesis today of left lobe at request of wife/ POA. will continue to monitor patient and provide comfort care as needed.       Problem: Infection, Risk/Actual (Adult)  Goal: Infection Prevention/Resolution  Outcome: Ongoing (interventions implemented as appropriate)      Problem: Pain, Acute (Adult)  Goal: Acceptable Pain Control/Comfort Level  Outcome: Ongoing (interventions implemented as appropriate)      Problem: Fall Risk (Adult)  Goal: Absence of Falls  Outcome: Ongoing (interventions implemented as appropriate)      Problem: Skin Integrity Impairment, Risk/Actual (Adult)  Goal: Skin Integrity/Wound Healing  Outcome: Ongoing (interventions implemented as appropriate)        "

## 2018-01-10 NOTE — PROGRESS NOTES
"Pharmacokinetic Evaluation - Vancomycin    Emil Pulido is a 72 y.o. male on vancomycin pharmacy to dose.  MRN: [id number]  : 1945    Day of vancomycin therapy: 3 of restart; last dose was on  (had 10 days of treatment then)  Indication: scrotal cellulitis  Consulted by: Dr. Alarcon  Goal trough: 15-20mg/L  Current duration entered: 10 days ()      Other antimicrobials: fluconazole 200mg po q24h X 5 days    Blood pressure 116/63, pulse 108, temperature 99.3 °F (37.4 °C), temperature source Oral, resp. rate 16, height 188 cm (74\"), weight 92.7 kg (204 lb 6.4 oz), SpO2 93 %.    Results from last 7 days  Lab Units 01/10/18  0554 18  0455 18  0506   CREATININE mg/dL 0.86 0.85 0.84     Estimated Creatinine Clearance: 104.8 mL/min (by C-G formula based on Cr of 0.86).    Results from last 7 days  Lab Units 01/10/18  0554 18  0455 18  0506   WBC 10*3/mm3 6.60 8.23 7.62   HEMOGLOBIN g/dL 9.0* 9.8* 8.8*   HEMATOCRIT % 28.1* 30.8* 27.1*   PLATELETS 10*3/mm3 119* 130* 120*       Intake/Output Summary (Last 24 hours) at 01/10/18 1336  Last data filed at 18 1824   Gross per 24 hour   Intake              420 ml   Output             1000 ml   Net             -580 ml         Baseline culture/source/susceptibility:    >100k e coli susceptible to all tested   BC NG5d   MRSA nares swab negative   Pleural fluid: NG5d       Recent Vancomycin dosing history  though :   ( vancomycin 2250mg given @  vancomycin 1500 mg iv q12h @ 1336 and then  @ 0111                         1311 Vancomycin trough: 26.9mg/L ( after 3 doses and ~ 12 hrs from last dose)    vancomycin adjusted to 750 mg iv q12h (last dose on 2136)                         0910 vancomycin trough: 26.5 mcg/mL, vancomycin on hold                        2136 random level: 22.9 mcg/mL, ~24hr level                         0436 random level: 22.7 mcg/mL, ~31hr " level                        1/1 0735 random level 18.7 mcg/mL (~58h level)                        1/2 @ 0703 random level: 14.8mg/L ( ~82 hr level)  Vancomycin 500mg given                         1/2 @ 1311                         1/3 @ 1210 random: 11.2mg/L (~ 23 hrs from last dose)  Vancomycin 1250mg given                        1/3@ 1456                        1/4 @ 1413 random: 16.7 mg/L ( ~ 24 hrs from last dose)   Vancomycin 1250mg q24h started 1/4 )        Vancomycin dosing history 1/8/18 to present:'  Vancomycin 2000mg loading dose                        1/8 @ 1332  Vancomycin 1250mg q24h                        1/9 @0503    1/10 Vancomycin trough @0554: 23mg/L ( ~ 24 hrs from last dose after 2 total doses)     Assessment:  Level is supra-therapeutic; drawn before steady state.  Renal function is stable. However patient is holding onto the vancomycin longer than expected. Will plan to reduce the dose to 750mg q24h. And check a level after 2 more doses.    Plan:  1) Decrease to vancomycin 750 mg every 24 hours.  2) Next trough on 1/12 at 1400 after 2 total doses.  3) Monitor for uop and scr.    Ilya Guzman [facility]

## 2018-01-10 NOTE — NURSING NOTE
Spoke with patient wife/ POA about MD considering another thoracentesis. Patient wife is unsure whether or not she wants to proceed with the procedure. She states that her daughter will be in to see the patient today and she would like to hold off until her daughter can talk with the doctors and talk to her.
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
## 2018-01-11 NOTE — PLAN OF CARE
Problem: Patient Care Overview (Adult)  Goal: Plan of Care Review  Outcome: Ongoing (interventions implemented as appropriate)   01/11/18 0162   Coping/Psychosocial Response Interventions   Plan Of Care Reviewed With patient   Patient Care Overview   Progress no change   Outcome Evaluation   Outcome Summary/Follow up Plan Pt pleasant & cooperative during shift, Dilaudid x 2, pulm reconsulted & ordered ECHO, since pt denies SOA plan to hold off on further interventions, increased Bumex       Problem: Pain, Acute (Adult)  Goal: Acceptable Pain Control/Comfort Level  Outcome: Ongoing (interventions implemented as appropriate)      Problem: Skin Integrity Impairment, Risk/Actual (Adult)  Goal: Skin Integrity/Wound Healing  Outcome: Ongoing (interventions implemented as appropriate)

## 2018-01-11 NOTE — PLAN OF CARE
Problem: Patient Care Overview (Adult)  Goal: Plan of Care Review  Outcome: Ongoing (interventions implemented as appropriate)   01/10/18 1140   Coping/Psychosocial Response Interventions   Plan Of Care Reviewed With patient;daughter   Patient Care Overview   Progress no change   Outcome Evaluation   Outcome Summary/Follow up Plan family still refusing thoracentesis at this point. would like pulmonology reconsulted. Unclear as to direction of this admission. vanco continues despite poor iv access. continue to monitor and provide comfort care.

## 2018-01-11 NOTE — PROGRESS NOTES
"  Infectious Diseases Progress Note    Memo Alarcon MD     [facility]  Los: 16 days  Patient Identification:  Name: Emil Pulido  Age: 72 y.o.  Sex: male  :  1945  MRN: [id number]         Primary Care Physician: Provider Not In System            Subjective: Scrotal and penile area does not have as much pain as last couple days ago.  Had an area on the penis that is spontaneously drained.   Interval History: The consultation note.    Objective:    Scheduled Meds:    bumetanide 1 mg Intravenous Q8H   docusate sodium 100 mg Oral BID   gabapentin 300 mg Oral Q12H   insulin aspart 0-7 Units Subcutaneous 4x Daily With Meals & Nightly   insulin detemir 15 Units Subcutaneous Q12H   lactobacillus acidophilus 1 capsule Oral Daily   mirtazapine 15 mg Oral Daily   nystatin  Topical Q12H   pantoprazole 40 mg Oral QAM   potassium chloride 20 mEq Oral BID With Meals   senna 2 tablet Oral BID   vancomycin 750 mg Intravenous Q24H   venlafaxine XR 75 mg Oral Daily     Continuous Infusions:    Pharmacy to dose vancomycin        Vital signs in last 24 hours:  Temp:  [97.8 °F (36.6 °C)-99.1 °F (37.3 °C)] 99.1 °F (37.3 °C)  Heart Rate:  [] 114  Resp:  [16] 16  BP: (103-117)/(53-64) 115/53    Intake/Output:    Intake/Output Summary (Last 24 hours) at 18 1055  Last data filed at 18 0532   Gross per 24 hour   Intake              220 ml   Output             3050 ml   Net            -2830 ml       Exam:  /53 (BP Location: Right arm, Patient Position: Lying)  Pulse 114  Temp 99.1 °F (37.3 °C) (Oral)   Resp 16  Ht 188 cm (74\")  Wt 93.2 kg (205 lb 8 oz)  SpO2 93%  BMI 26.38 kg/m2    General Appearance:    Alert, cooperative, no distress, AAOx3                          Head:    Normocephalic, without obvious abnormality, atraumatic                  Abdomen:     Soft and nontender                 Extremities:   Contracted lower extremities.                            Skin:   Drainage from the " dorsum of the penis near the glans.  Overall erythema of the scrotum is decreased..  Data Review:    I reviewed the patient's new clinical results.    Results from last 7 days  Lab Units 01/11/18  0439 01/10/18  0554 01/09/18  0455 01/08/18  0506 01/07/18  0556 01/06/18  0659 01/05/18  0554   WBC 10*3/mm3 7.43 6.60 8.23 7.62 10.20 7.46 7.15   HEMOGLOBIN g/dL 9.4* 9.0* 9.8* 8.8* 10.4* 9.6* 9.2*   PLATELETS 10*3/mm3 141 119* 130* 120* 148 123* 136*       Results from last 7 days  Lab Units 01/11/18  0439 01/10/18  0554 01/09/18  0455 01/08/18  0506 01/07/18  0556 01/06/18  0659 01/05/18  0554   SODIUM mmol/L 135* 135* 133* 133* 131* 132* 133*   POTASSIUM mmol/L 4.2 4.2 4.0 4.3 3.9 4.0 4.2   CHLORIDE mmol/L 97* 97* 93* 94* 91* 92* 94*   CO2 mmol/L 34.4* 30.6* 29.7* 29.8* 33.8* 34.8* 30.7*   BUN mg/dL 21 23 27* 28* 32* 37* 42*   CREATININE mg/dL 0.82 0.86 0.85 0.84 1.05 0.94 0.90   CALCIUM mg/dL 8.2* 7.9* 8.1* 7.9* 8.3* 8.0* 7.8*   GLUCOSE mg/dL 217* 232* 224* 176* 61* 107* 88         Assessment:  Principal Problem:    Cellulitis, scrotum with superimposed candidal balanitis and dermatitis - improved today.  Active Problems:    Chronic diastolic congestive heart failure    Type 2 diabetes mellitus treated with insulin    Antiphospholipid antibody with hypercoagulable state    History of stroke with residual deficit    Benign hypertension    Factor V Leiden mutation    Chronic pain    Long term current use of anticoagulant    Chronic atrial fibrillation    Pleural effusion on left    Hyponatremia    Paraplegia      Plan/recommendations:  See below.    Continue with IV vancomycin.The total of 2 weeks from the time started this time.  Memo Alarcon MD  1/11/2018  10:55 AM    Much of this encounter note is an electronic transcription/translation of spoken language to printed text. The electronic translation of spoken language may permit erroneous, or at times, nonsensical words or phrases to be inadvertently transcribed;  Although I have reviewed the note for such errors, some may still exist

## 2018-01-11 NOTE — PLAN OF CARE
Problem: Patient Care Overview (Adult)  Goal: Plan of Care Review  Outcome: Ongoing (interventions implemented as appropriate)  Continue symptom management and comfort care   01/10/18 1859 01/10/18 2230 01/11/18 0413   Coping/Psychosocial Response Interventions   Plan Of Care Reviewed With --  patient;daughter --    Patient Care Overview   Progress no change --  --    Outcome Evaluation   Outcome Summary/Follow up Plan --  --  DO NOT GIVE THE PATIENT WATER!! per his request. Pain medication given once. Pt refusing some turns, some meds and Insulin. Very irratable this evening      Goal: Adult Individualization and Mutuality  Outcome: Ongoing (interventions implemented as appropriate)    Goal: Discharge Needs Assessment  Outcome: Ongoing (interventions implemented as appropriate)      Problem: Infection, Risk/Actual (Adult)  Goal: Infection Prevention/Resolution  Outcome: Ongoing (interventions implemented as appropriate)      Problem: Pain, Acute (Adult)  Goal: Acceptable Pain Control/Comfort Level  Outcome: Ongoing (interventions implemented as appropriate)      Problem: Fall Risk (Adult)  Goal: Absence of Falls  Outcome: Ongoing (interventions implemented as appropriate)      Problem: Skin Integrity Impairment, Risk/Actual (Adult)  Goal: Skin Integrity/Wound Healing  Outcome: Ongoing (interventions implemented as appropriate)

## 2018-01-11 NOTE — CONSULTS
initiated visit with pt. However, pt was sleeping.   attempted to provide care to wife however, she declined prayer and visit.
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
## 2018-01-12 NOTE — PLAN OF CARE
Problem: Patient Care Overview (Adult)  Goal: Plan of Care Review  Outcome: Ongoing (interventions implemented as appropriate)   01/12/18 5140   Coping/Psychosocial Response Interventions   Plan Of Care Reviewed With patient;spouse   Patient Care Overview   Progress no change   Outcome Evaluation   Outcome Summary/Follow up Plan Pt choosing to stop having BG checked, receiving insulin, & antx, slept most of the shift, Dilaudid x 1, Gabapentin x 1, ECHO completed       Problem: Pain, Acute (Adult)  Goal: Acceptable Pain Control/Comfort Level  Outcome: Ongoing (interventions implemented as appropriate)      Problem: Skin Integrity Impairment, Risk/Actual (Adult)  Goal: Skin Integrity/Wound Healing  Outcome: Ongoing (interventions implemented as appropriate)

## 2018-01-12 NOTE — PROGRESS NOTES
"  Infectious Diseases Progress Note    Memo Alarcon MD     [facility]  Los: 17 days  Patient Identification:  Name: Emil Pulido  Age: 72 y.o.  Sex: male  :  1945  MRN: [id number]         Primary Care Physician: Provider Not In System            Subjective: Less pain in the scrotal area.  Interval History: The consultation note.    Objective:    Scheduled Meds:    bumetanide 2 mg Intravenous Q8H   docusate sodium 100 mg Oral BID   gabapentin 300 mg Oral Q12H   insulin aspart 0-7 Units Subcutaneous 4x Daily With Meals & Nightly   insulin detemir 15 Units Subcutaneous Q12H   lactobacillus acidophilus 1 capsule Oral Daily   mirtazapine 15 mg Oral Daily   nystatin  Topical Q12H   pantoprazole 40 mg Oral QAM   potassium chloride 20 mEq Oral BID With Meals   senna 2 tablet Oral BID   vancomycin 750 mg Intravenous Q24H   venlafaxine XR 75 mg Oral Daily     Continuous Infusions:    Pharmacy to dose vancomycin        Vital signs in last 24 hours:  Temp:  [98.3 °F (36.8 °C)-98.7 °F (37.1 °C)] 98.5 °F (36.9 °C)  Heart Rate:  [] 100  Resp:  [16] 16  BP: (108-119)/(53-67) 116/53    Intake/Output:    Intake/Output Summary (Last 24 hours) at 18 1242  Last data filed at 18 0815   Gross per 24 hour   Intake              960 ml   Output             3025 ml   Net            -2065 ml       Exam:  /53 (BP Location: Right arm, Patient Position: Lying)  Pulse 100  Temp 98.5 °F (36.9 °C) (Oral)   Resp 16  Ht 188 cm (74\")  Wt 93.2 kg (205 lb 8 oz)  SpO2 95%  BMI 26.38 kg/m2    General Appearance:    Alert, cooperative, no distress, AAOx3                          Head:    Normocephalic, without obvious abnormality, atraumatic                  Abdomen:     Soft and nontender                 Extremities:   Contracted lower extremities.                            Skin:   Slight decrease in erythema of the scrotum and penis noted  Data Review:    I reviewed the patient's new clinical " results.    Results from last 7 days  Lab Units 01/12/18  0602 01/11/18  0439 01/10/18  0554 01/09/18  0455 01/08/18  0506 01/07/18  0556 01/06/18  0659   WBC 10*3/mm3 7.20 7.43 6.60 8.23 7.62 10.20 7.46   HEMOGLOBIN g/dL 9.1* 9.4* 9.0* 9.8* 8.8* 10.4* 9.6*   PLATELETS 10*3/mm3 126* 141 119* 130* 120* 148 123*       Results from last 7 days  Lab Units 01/12/18  0602 01/11/18  0439 01/10/18  0554 01/09/18  0455 01/08/18  0506 01/07/18  0556 01/06/18  0659   SODIUM mmol/L 130* 135* 135* 133* 133* 131* 132*   POTASSIUM mmol/L 4.2 4.2 4.2 4.0 4.3 3.9 4.0   CHLORIDE mmol/L 92* 97* 97* 93* 94* 91* 92*   CO2 mmol/L 33.3* 34.4* 30.6* 29.7* 29.8* 33.8* 34.8*   BUN mg/dL 20 21 23 27* 28* 32* 37*   CREATININE mg/dL 0.81 0.82 0.86 0.85 0.84 1.05 0.94   CALCIUM mg/dL 8.1* 8.2* 7.9* 8.1* 7.9* 8.3* 8.0*   GLUCOSE mg/dL 213* 217* 232* 224* 176* 61* 107*         Assessment:  Principal Problem:    Cellulitis, scrotum with superimposed candidal balanitis and dermatitis - improved today.  Active Problems:    Chronic diastolic congestive heart failure    Type 2 diabetes mellitus treated with insulin    Antiphospholipid antibody with hypercoagulable state    History of stroke with residual deficit    Benign hypertension    Factor V Leiden mutation    Chronic pain    Long term current use of anticoagulant    Chronic atrial fibrillation    Pleural effusion on left    Hyponatremia    Paraplegia      Plan/recommendations:  See below.    Continue with IV vancomycin.The total of 2 weeks from the time started this time.I had a long conversation with patient and his wife and I strongly suggested that despite our best efforts is very difficult to achieve a cure for this cellulitis of scrotum and recurrent UTI given the need for Obando catheter and the edema that he has.  Memo Alarcon MD  1/12/2018  12:42 PM    Much of this encounter note is an electronic transcription/translation of spoken language to printed text. The electronic translation of  spoken language may permit erroneous, or at times, nonsensical words or phrases to be inadvertently transcribed; Although I have reviewed the note for such errors, some may still exist

## 2018-01-12 NOTE — PLAN OF CARE
Problem: Patient Care Overview (Adult)  Goal: Plan of Care Review  Outcome: Ongoing (interventions implemented as appropriate)  Continue symptom management and comfort care   01/11/18 1754 01/11/18 2156 01/12/18 0435   Coping/Psychosocial Response Interventions   Plan Of Care Reviewed With --  patient --    Patient Care Overview   Progress no change --  --    Outcome Evaluation   Outcome Summary/Follow up Plan --  --  Patient very confused at begining of shift with visual halucinations. He is aware that he is confused. Took all of his meds tonight however he did refuse to be stuck anymore and didn't want any insulin. BS was 190.      Goal: Adult Individualization and Mutuality  Outcome: Ongoing (interventions implemented as appropriate)    Goal: Discharge Needs Assessment  Outcome: Ongoing (interventions implemented as appropriate)      Problem: Infection, Risk/Actual (Adult)  Goal: Infection Prevention/Resolution  Outcome: Ongoing (interventions implemented as appropriate)      Problem: Pain, Acute (Adult)  Goal: Acceptable Pain Control/Comfort Level  Outcome: Ongoing (interventions implemented as appropriate)      Problem: Fall Risk (Adult)  Goal: Absence of Falls  Outcome: Ongoing (interventions implemented as appropriate)      Problem: Skin Integrity Impairment, Risk/Actual (Adult)  Goal: Skin Integrity/Wound Healing  Outcome: Ongoing (interventions implemented as appropriate)
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
## 2018-01-13 NOTE — SIGNIFICANT NOTE
Wife refuses tape securement device due to patients sensitive skin. Another securement device has been ordered from [facility] that is tape free.

## 2018-01-13 NOTE — PLAN OF CARE
Problem: Patient Care Overview (Adult)  Goal: Plan of Care Review  Outcome: Ongoing (interventions implemented as appropriate)   01/13/18 0403   Coping/Psychosocial Response Interventions   Plan Of Care Reviewed With patient   Patient Care Overview   Progress no change   Outcome Evaluation   Outcome Summary/Follow up Plan pt rested well throughout the night, no pain meds given, pt had a bowl of oatmeal and cream of wheat. per spouse no more labs or accu checks. will continue to monitor     Goal: Adult Individualization and Mutuality  Outcome: Ongoing (interventions implemented as appropriate)    Goal: Discharge Needs Assessment  Outcome: Ongoing (interventions implemented as appropriate)      Problem: Infection, Risk/Actual (Adult)  Goal: Infection Prevention/Resolution  Outcome: Ongoing (interventions implemented as appropriate)      Problem: Pain, Acute (Adult)  Goal: Acceptable Pain Control/Comfort Level  Outcome: Ongoing (interventions implemented as appropriate)      Problem: Fall Risk (Adult)  Goal: Absence of Falls  Outcome: Ongoing (interventions implemented as appropriate)      Problem: Skin Integrity Impairment, Risk/Actual (Adult)  Goal: Skin Integrity/Wound Healing  Outcome: Ongoing (interventions implemented as appropriate)

## 2018-01-13 NOTE — PROGRESS NOTES
"  Infectious Diseases Progress Note    Memo Alarcon MD     [facility]  Los: 18 days  Patient Identification:  Name: Emil Pulido  Age: 72 y.o.  Sex: male  :  1945  MRN: [id number]         Primary Care Physician: Provider Not In System            Subjective: Less pain in the scrotal area.  Interval History: The consultation note.    Objective:    Scheduled Meds:    bumetanide 2 mg Intravenous Q8H   docusate sodium 100 mg Oral BID   gabapentin 300 mg Oral Q12H   insulin aspart 0-7 Units Subcutaneous 4x Daily With Meals & Nightly   insulin detemir 15 Units Subcutaneous Q12H   lactobacillus acidophilus 1 capsule Oral Daily   mirtazapine 15 mg Oral Daily   nystatin  Topical Q12H   pantoprazole 40 mg Oral QAM   potassium chloride 20 mEq Oral BID With Meals   senna 2 tablet Oral BID   vancomycin 750 mg Intravenous Q24H   venlafaxine XR 75 mg Oral Daily     Continuous Infusions:    Pharmacy to dose vancomycin        Vital signs in last 24 hours:  Temp:  [98.4 °F (36.9 °C)-98.8 °F (37.1 °C)] 98.8 °F (37.1 °C)  Heart Rate:  [100-105] 105  Resp:  [20] 20  BP: (112-115)/(52-57) 112/52    Intake/Output:    Intake/Output Summary (Last 24 hours) at 18 1840  Last data filed at 18 1806   Gross per 24 hour   Intake              790 ml   Output             2900 ml   Net            -2110 ml       Exam:  /52  Pulse 105  Temp 98.8 °F (37.1 °C) (Oral)   Resp 20  Ht 188 cm (74\")  Wt 93.2 kg (205 lb 8 oz)  SpO2 96%  BMI 26.38 kg/m2    General Appearance:    Alert, cooperative, no distress, AAOx3                          Head:    Normocephalic, without obvious abnormality, atraumatic                  Abdomen:     Soft and nontender                 Extremities:   Contracted lower extremities.                            Skin:   Slight decrease in erythema of the scrotum and penis noted  Data Review:    I reviewed the patient's new clinical results.    Results from last 7 days  Lab Units " 01/12/18  0602 01/11/18  0439 01/10/18  0554 01/09/18  0455 01/08/18  0506 01/07/18  0556   WBC 10*3/mm3 7.20 7.43 6.60 8.23 7.62 10.20   HEMOGLOBIN g/dL 9.1* 9.4* 9.0* 9.8* 8.8* 10.4*   PLATELETS 10*3/mm3 126* 141 119* 130* 120* 148       Results from last 7 days  Lab Units 01/12/18  0602 01/11/18  0439 01/10/18  0554 01/09/18  0455 01/08/18  0506 01/07/18  0556   SODIUM mmol/L 130* 135* 135* 133* 133* 131*   POTASSIUM mmol/L 4.2 4.2 4.2 4.0 4.3 3.9   CHLORIDE mmol/L 92* 97* 97* 93* 94* 91*   CO2 mmol/L 33.3* 34.4* 30.6* 29.7* 29.8* 33.8*   BUN mg/dL 20 21 23 27* 28* 32*   CREATININE mg/dL 0.81 0.82 0.86 0.85 0.84 1.05   CALCIUM mg/dL 8.1* 8.2* 7.9* 8.1* 7.9* 8.3*   GLUCOSE mg/dL 213* 217* 232* 224* 176* 61*         Assessment:  Principal Problem:    Cellulitis, scrotum with superimposed candidal balanitis and dermatitis - improved today.  Active Problems:    Chronic diastolic congestive heart failure    Type 2 diabetes mellitus treated with insulin    Antiphospholipid antibody with hypercoagulable state    History of stroke with residual deficit    Benign hypertension    Factor V Leiden mutation    Chronic pain    Long term current use of anticoagulant    Chronic atrial fibrillation    Pleural effusion on left    Hyponatremia    Paraplegia      Plan/recommendations:  See below.    Continue with IV vancomycin.The total of 2 weeks from the time started this time.I had a long conversation with patient and his wife and I strongly suggested that despite our best efforts is very difficult to achieve a cure for this cellulitis of scrotum and recurrent UTI given the need for Obando catheter and the edema that he has.  eMmo Alarcon MD  1/13/2018  6:40 PM    Much of this encounter note is an electronic transcription/translation of spoken language to printed text. The electronic translation of spoken language may permit erroneous, or at times, nonsensical words or phrases to be inadvertently transcribed; Although I have  reviewed the note for such errors, some may still exist

## 2018-01-13 NOTE — PLAN OF CARE
Problem: Patient Care Overview (Adult)  Goal: Plan of Care Review  Outcome: Ongoing (interventions implemented as appropriate)   01/13/18 9069   Coping/Psychosocial Response Interventions   Plan Of Care Reviewed With patient;spouse   Patient Care Overview   Progress no change   Outcome Evaluation   Outcome Summary/Follow up Plan scrotum still red and edematous. medicated with dilaudid po x 2 for pain. iv vanc given. waffle boots on. lactuolse prn given. small smear bm today. resting comfortably with wife at side.        Problem: Infection, Risk/Actual (Adult)  Goal: Infection Prevention/Resolution  Outcome: Ongoing (interventions implemented as appropriate)      Problem: Pain, Acute (Adult)  Goal: Acceptable Pain Control/Comfort Level  Outcome: Ongoing (interventions implemented as appropriate)      Problem: Fall Risk (Adult)  Goal: Absence of Falls  Outcome: Ongoing (interventions implemented as appropriate)      Problem: Skin Integrity Impairment, Risk/Actual (Adult)  Goal: Skin Integrity/Wound Healing  Outcome: Ongoing (interventions implemented as appropriate)
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
## 2018-01-14 NOTE — PLAN OF CARE
Problem: Patient Care Overview (Adult)  Goal: Plan of Care Review  Outcome: Ongoing (interventions implemented as appropriate)   01/14/18 1441   Coping/Psychosocial Response Interventions   Plan Of Care Reviewed With patient   Patient Care Overview   Progress no change   Outcome Evaluation   Outcome Summary/Follow up Plan remains on iv vanc. dilaudid po prn for pain. labs ordered for a,m per Dr Zamorano. resting comfortably       Problem: Infection, Risk/Actual (Adult)  Goal: Infection Prevention/Resolution  Outcome: Ongoing (interventions implemented as appropriate)      Problem: Pain, Acute (Adult)  Goal: Acceptable Pain Control/Comfort Level  Outcome: Ongoing (interventions implemented as appropriate)      Problem: Fall Risk (Adult)  Goal: Absence of Falls  Outcome: Ongoing (interventions implemented as appropriate)      Problem: Skin Integrity Impairment, Risk/Actual (Adult)  Goal: Skin Integrity/Wound Healing  Outcome: Ongoing (interventions implemented as appropriate)

## 2018-01-14 NOTE — PLAN OF CARE
Problem: Patient Care Overview (Adult)  Goal: Plan of Care Review  Outcome: Ongoing (interventions implemented as appropriate)   01/14/18 0415   Coping/Psychosocial Response Interventions   Plan Of Care Reviewed With patient   Patient Care Overview   Progress no change   Outcome Evaluation   Outcome Summary/Follow up Plan pt up most of the night, states he could not sleep. medicated x2 with po pain meds. will continue to monitor for comfort     Goal: Adult Individualization and Mutuality  Outcome: Ongoing (interventions implemented as appropriate)    Goal: Discharge Needs Assessment  Outcome: Ongoing (interventions implemented as appropriate)      Problem: Infection, Risk/Actual (Adult)  Goal: Infection Prevention/Resolution  Outcome: Ongoing (interventions implemented as appropriate)      Problem: Pain, Acute (Adult)  Goal: Acceptable Pain Control/Comfort Level  Outcome: Ongoing (interventions implemented as appropriate)      Problem: Fall Risk (Adult)  Goal: Absence of Falls  Outcome: Ongoing (interventions implemented as appropriate)      Problem: Skin Integrity Impairment, Risk/Actual (Adult)  Goal: Skin Integrity/Wound Healing  Outcome: Ongoing (interventions implemented as appropriate)
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
## 2018-01-14 NOTE — PROGRESS NOTES
[facility] Visit Report    Emil Pulido  [id number]  1/13/2018    Admission R/T [facility] Dx: YES      [facility]      Symptom  Management: Cellulitis of the scrotum      Nursing/Medication Recommendations: No recommendations at this time      Psychosocial Issues and Recommendations: Provide support to patient and family      Spiritual Concerns and Recommendations: None at present      [facility] Dis[facility]:  None today, continuing to treat cellulitis of the scrotum with IV antibiotics and edema with IV Bumex, per daughter patient may be discharged home on Monday on PO antibiotics.      Review of Visit: Close monitoring for safety/falls, management of cellulitis of scrotum, edema, pleural effusions and comfort care. Patient lying in bed, awake but drowsy. Daughter at bedside and reports patient may be discharged on Monday on PO medications. Emotional support provided. Discussed care with staff and will see daily to assess needs, monitor status and offer support. Patient continues on IV Vancomycin, IV Bumex, PO Dilaudid for pain and discomfort.        Priti Maldonado RN  [facility] Visit Nurse

## 2018-01-14 NOTE — PROGRESS NOTES
[facility] Visit Report    Emil Pulido  [id number]  1/14/2018    Admission R/T [facility] Dx: NO      [facility]      Symptom  Management: Cellulitis of scrotum      Nursing/Medication Recommendations: No recommendations at this time      Psychosocial Issues and Recommendations: Provide support to patient and family      Spiritual Concerns and Recommendations: None at present      [facility]:  None today, continues on IV Vancomycin and IV Bumex for cellulitis of the scrotum, daughter mentioned possible discharge Monday but MD notes do not reflect this.       Review of Visit: Close monitoring for safety/falls, management of cellulitis of the scrotum, comfort care. Patient lying in bed, medicated twice since midnight with PO Dilaudid. No discharge plans in place at this time, daughter mentioned possible home Monday but MD notes do not reflect this.  Continues on IV Vancomycin and IV Bumex q 8hrs for treatment. Emotional support provided, no family present during visit and will see daily to assess needs, monitor status and offer support.        Priti Maldonado RN  [facility] Visit Nurse

## 2018-01-15 NOTE — PLAN OF CARE
Problem: Patient Care Overview (Adult)  Goal: Plan of Care Review  Outcome: Ongoing (interventions implemented as appropriate)   01/15/18 0533   Outcome Evaluation   Outcome Summary/Follow up Plan No c/o pain, penis/scrotum red/swollen, rested well     Goal: Adult Individualization and Mutuality  Outcome: Ongoing (interventions implemented as appropriate)    Goal: Discharge Needs Assessment  Outcome: Ongoing (interventions implemented as appropriate)      Problem: Infection, Risk/Actual (Adult)  Goal: Infection Prevention/Resolution  Outcome: Ongoing (interventions implemented as appropriate)      Problem: Pain, Acute (Adult)  Goal: Acceptable Pain Control/Comfort Level  Outcome: Ongoing (interventions implemented as appropriate)      Problem: Skin Integrity Impairment, Risk/Actual (Adult)  Goal: Skin Integrity/Wound Healing  Outcome: Ongoing (interventions implemented as appropriate)

## 2018-01-15 NOTE — PROGRESS NOTES
LOS: 20 days   Patient Care Team:  Provider Not In System as PCP - General  Marcus Avery MD as PCP - Claims Attributed  Ilya Ambrocio MD as Consulting Physician (Hematology and Oncology)  Willy Bell MD as Referring Physician (Internal Medicine)    Chief Complaint   Patient presents with   • Foot Swelling         Subjective   Somnolent but does rouse up.       Objective     Vital Signs  Temp:  [98.1 °F (36.7 °C)-100.1 °F (37.8 °C)] 100.1 °F (37.8 °C)  Heart Rate:  [107-112] 112  Resp:  [16] 16  BP: (111)/(58-62) 111/58    Physical Exam:  WDWN male in NAD, more clear today.  Lungs clear with BS decreased throughout, cesar on left  Heart RRR  Abd soft, NT, BS+  Scrotum swollen but not as erythematous as it was. No warmth  Ext 1+ edema.  Skin warm & dry       Results Review:     I reviewed the patient's new clinical results.    Medication Review:       LABS    Results from last 7 days  Lab Units 01/15/18  0607 01/12/18  0602 01/11/18  0439   SODIUM mmol/L 133* 130* 135*   POTASSIUM mmol/L 4.1 4.2 4.2   CHLORIDE mmol/L 93* 92* 97*   CO2 mmol/L 32.4* 33.3* 34.4*   BUN mg/dL 25* 20 21   CREATININE mg/dL 0.88 0.81 0.82   GLUCOSE mg/dL 194* 213* 217*   CALCIUM mg/dL 8.1* 8.1* 8.2*       Results from last 7 days  Lab Units 01/15/18  0607 01/12/18  0602 01/11/18  0439   WBC 10*3/mm3 7.63 7.20 7.43   HEMOGLOBIN g/dL 8.6* 9.1* 9.4*   HEMATOCRIT % 26.3* 28.5* 29.0*   PLATELETS 10*3/mm3 112* 126* 141       Results from last 7 days  Lab Units 01/10/18  0554   INR  1.57*           Assessment/Plan     Principal Problem:    Cellulitis, scrotum - he has been refusing to take the vancomycin so I have d/c'ed it  Active Problems:    Chronic diastolic congestive heart failure    Type 2 diabetes mellitus treated with insulin - he was getting hypoglycemic but that has improved     Antiphospholipid antibody with hypercoagulable state - off anticoagulants secondary to previous intraperitoneal bleed    Thrombocytopenia    History of  stroke with residual deficit    Benign hypertension    Factor V Leiden mutation    Chronic pain    Chronic atrial fibrillation    Pleural effusion on left     Hyponatremia    Paraplegia    Discussed with pt & wife. Right now he is still getting IV bumex. I will change this to po but I want to make sure he doesn't start re-accumulating the fluid again. Increasing the bumex dose has helped to get some of the fluid off. I discussed the possibility of using SQ bumex at home if the po doses don't work & his wife was comfortable with that. I also discussed that some of the problem with the fluid overload is related to nutrition & the diuretics are not going to help with that. She reports that the remeron has helped some with his appetite.  Plan for now is home on Wed if he remains stable.    Over 40 min total time        Emma Ren MD  01/15/18  3:50 PM      Time:

## 2018-01-15 NOTE — PROGRESS NOTES
"  Infectious Diseases Progress Note    Memo Alarcon MD     [facility]  Los: 19 days  Patient Identification:  Name: Emil Pulido  Age: 72 y.o.  Sex: male  :  1945  MRN: [id number]         Primary Care Physician: Provider Not In System            Subjective: Lethargic denies any complaints   Interval History: The consultation note.    Objective:    Scheduled Meds:    bumetanide 2 mg Intravenous Q8H   docusate sodium 100 mg Oral BID   gabapentin 300 mg Oral Q12H   insulin aspart 0-7 Units Subcutaneous 4x Daily With Meals & Nightly   insulin detemir 15 Units Subcutaneous Q12H   lactobacillus acidophilus 1 capsule Oral Daily   mirtazapine 15 mg Oral Daily   nystatin  Topical Q12H   pantoprazole 40 mg Oral QAM   potassium chloride 20 mEq Oral BID With Meals   senna 2 tablet Oral BID   vancomycin 750 mg Intravenous Q24H   venlafaxine XR 75 mg Oral Daily     Continuous Infusions:    Pharmacy to dose vancomycin        Vital signs in last 24 hours:  Temp:  [96.9 °F (36.1 °C)-98.1 °F (36.7 °C)] 98.1 °F (36.7 °C)  Heart Rate:  [] 107  Resp:  [16-20] 16  BP: (108-122)/(55-62) 111/62    Intake/Output:    Intake/Output Summary (Last 24 hours) at 18  Last data filed at 18 1700   Gross per 24 hour   Intake                0 ml   Output             3225 ml   Net            -3225 ml       Exam:  /62 (BP Location: Right arm, Patient Position: Lying)  Pulse 107  Temp 98.1 °F (36.7 °C) (Oral)   Resp 16  Ht 188 cm (74\")  Wt 90.4 kg (199 lb 4.8 oz)  SpO2 95%  BMI 25.59 kg/m2    General Appearance:    More somnolent and lethargic today.                          Head:    Normocephalic, without obvious abnormality, atraumatic                  Abdomen:     Soft and nontender                 Extremities:   Contracted lower extremities.                            Skin:   Decreased swelling of the scrotal erythema is slightly better swelling of the penis is unchanged.  Obando catheter in " place..  Data Review:    I reviewed the patient's new clinical results.    Results from last 7 days  Lab Units 01/12/18  0602 01/11/18  0439 01/10/18  0554 01/09/18  0455 01/08/18  0506   WBC 10*3/mm3 7.20 7.43 6.60 8.23 7.62   HEMOGLOBIN g/dL 9.1* 9.4* 9.0* 9.8* 8.8*   PLATELETS 10*3/mm3 126* 141 119* 130* 120*       Results from last 7 days  Lab Units 01/12/18  0602 01/11/18  0439 01/10/18  0554 01/09/18  0455 01/08/18  0506   SODIUM mmol/L 130* 135* 135* 133* 133*   POTASSIUM mmol/L 4.2 4.2 4.2 4.0 4.3   CHLORIDE mmol/L 92* 97* 97* 93* 94*   CO2 mmol/L 33.3* 34.4* 30.6* 29.7* 29.8*   BUN mg/dL 20 21 23 27* 28*   CREATININE mg/dL 0.81 0.82 0.86 0.85 0.84   CALCIUM mg/dL 8.1* 8.2* 7.9* 8.1* 7.9*   GLUCOSE mg/dL 213* 217* 232* 224* 176*         Assessment:  Principal Problem:    Cellulitis, scrotum with superimposed candidal balanitis and dermatitis - improved today.  Active Problems:    Chronic diastolic congestive heart failure    Type 2 diabetes mellitus treated with insulin    Antiphospholipid antibody with hypercoagulable state    History of stroke with residual deficit    Benign hypertension    Factor V Leiden mutation    Chronic pain    Long term current use of anticoagulant    Chronic atrial fibrillation    Pleural effusion on left    Hyponatremia    Paraplegia      Plan/recommendations:  See below.    Continue present treatment.  I asked him to the patient that his chance for distant to recur again in the future is much higher.  Strongly suggested to consider emphasis on symptom management going forward.  Memo Alarcon MD  1/14/2018  8:08 PM    Much of this encounter note is an electronic transcription/translation of spoken language to printed text. The electronic translation of spoken language may permit erroneous, or at times, nonsensical words or phrases to be inadvertently transcribed; Although I have reviewed the note for such errors, some may still exist

## 2018-01-15 NOTE — PROGRESS NOTES
[facility] Visit Report    Emil Pulido  [id number]  1/15/2018    Admission R/T [facility] Dx: YES      [facility] Discharge Plans:  None today, continue with IV vancomycin and IV Bumex for treatment of cellulitis of the scrotum and edema, PO Dilaudid for pain, possible plan for home soon with [facility] following.Reason for [facility] Admission: CHF      Symptom  Management: Cellulitis of the scrotum      Nursing/Medication Recommendations: No recommendations at this time      Psychosocial Issues and Recommendations: Provide support to patient and family      Spiritual Concerns and Recommendations: None at present      [facility] Discharge Plans:  None today, continue with IV vancomycin and IV Bumex for treatment of cellulitis of the scrotum and edema, PO Dilaudid for pain, possible plan for home soon with [facility] following.      Review of Visit: Close monitoring for safety/falls, management of cellulitis of the scrotum and edema, also managing pleural effusions with thoracenthesis when needed, comfort care. Patient lying in bed, asleep, did not arouse when name called. Discussed care needs with staff and there are no additional needs at this time. No family present during the visit.        Priti Maldonado RN  [facility] Visit Nurse

## 2018-01-15 NOTE — PROGRESS NOTES
"Pharmacokinetic Evaluation - Vancomycin    Emil Pulido is a 72 y.o. male on vancomycin pharmacy to dose.  MRN: [id number]  : 1945    Day of vancomycin therapy: 8  Indication: scrotal cellultis  Consulted by: Dr. Alarcon  Goal trough: 10-20mg/L    Current dose: 750mg q24h      Blood pressure 111/58, pulse 112, temperature 100.1 °F (37.8 °C), temperature source Oral, resp. rate 16, height 188 cm (74\"), weight 83.5 kg (184 lb 3 oz), SpO2 91 %.    Results from last 7 days  Lab Units 01/15/18  0607 18  0602 18  0439   CREATININE mg/dL 0.88 0.81 0.82     Estimated Creatinine Clearance: 92.3 mL/min (by C-G formula based on Cr of 0.88).    Results from last 7 days  Lab Units 01/15/18  0607 18  0602 18  0439   WBC 10*3/mm3 7.63 7.20 7.43   HEMOGLOBIN g/dL 8.6* 9.1* 9.4*   HEMATOCRIT % 26.3* 28.5* 29.0*   PLATELETS 10*3/mm3 112* 126* 141           Assessment:  Renal function stable. Patient refusing any more abx per nursing. Trough was ordered for today @ 1400 but patient refused. Patient refused level on Friday. Level has not been drawn for several days not clear where patient serum concentration is at. Will leave note to MD to discontinue if appropriate. No changes to orders today.     Plan:  1)continue vancomycin 750mg q24h    Ilya Guzman RPH    "

## 2018-01-16 NOTE — PLAN OF CARE
Problem: Patient Care Overview (Adult)  Goal: Discharge Needs Assessment  Outcome: Ongoing (interventions implemented as appropriate)      Problem: Infection, Risk/Actual (Adult)  Goal: Infection Prevention/Resolution  Outcome: Ongoing (interventions implemented as appropriate)      Problem: Pain, Acute (Adult)  Goal: Acceptable Pain Control/Comfort Level  Outcome: Ongoing (interventions implemented as appropriate)      Problem: Fall Risk (Adult)  Goal: Absence of Falls  Outcome: Ongoing (interventions implemented as appropriate)

## 2018-01-16 NOTE — PROGRESS NOTES
"  Infectious Diseases Progress Note    Memo Alarcon MD     [facility]  Los: 21 days  Patient Identification:  Name: Emil Pulido  Age: 72 y.o.  Sex: male  :  1945  MRN: [id number]         Primary Care Physician: Provider Not In System            Subjective: Lethargic and sleepy opens eyes to his name not interested in conversation.  Interval History: The consultation note.    Objective:    Scheduled Meds:    bumetanide 2 mg Oral TID   docusate sodium 100 mg Oral BID   gabapentin 300 mg Oral Q12H   insulin aspart 0-7 Units Subcutaneous 4x Daily With Meals & Nightly   insulin detemir 15 Units Subcutaneous Q12H   lactobacillus acidophilus 1 capsule Oral Daily   mirtazapine 15 mg Oral Daily   nystatin  Topical Q12H   pantoprazole 40 mg Oral QAM   potassium chloride 20 mEq Oral BID With Meals   senna 2 tablet Oral BID   venlafaxine XR 75 mg Oral Daily     Continuous Infusions:       Vital signs in last 24 hours:  Temp:  [97.9 °F (36.6 °C)-99.4 °F (37.4 °C)] 99.4 °F (37.4 °C)  Heart Rate:  [] 108  Resp:  [20] 20  BP: (111-118)/(47-58) 118/47    Intake/Output:    Intake/Output Summary (Last 24 hours) at 18 1157  Last data filed at 18 0403   Gross per 24 hour   Intake              480 ml   Output             2950 ml   Net            -2470 ml       Exam:  /47 (BP Location: Left arm, Patient Position: Lying)  Pulse 108  Temp 99.4 °F (37.4 °C) (Oral)   Resp 20  Ht 188 cm (74\")  Wt 83.5 kg (184 lb 3 oz)  SpO2 93%  BMI 23.65 kg/m2    General Appearance:    More somnolent and lethargic today.                          Head:    Normocephalic, without obvious abnormality, atraumatic                  Abdomen:     Soft and nontender                 Extremities:   Contracted lower extremities.                            Skin:   Increased swelling and redness of the scrotum and penile area with Obando catheter in place.  Data Review:    I reviewed the patient's new clinical " results.    Results from last 7 days  Lab Units 01/15/18  0607 01/12/18  0602 01/11/18  0439 01/10/18  0554   WBC 10*3/mm3 7.63 7.20 7.43 6.60   HEMOGLOBIN g/dL 8.6* 9.1* 9.4* 9.0*   PLATELETS 10*3/mm3 112* 126* 141 119*       Results from last 7 days  Lab Units 01/15/18  0607 01/12/18  0602 01/11/18  0439 01/10/18  0554   SODIUM mmol/L 133* 130* 135* 135*   POTASSIUM mmol/L 4.1 4.2 4.2 4.2   CHLORIDE mmol/L 93* 92* 97* 97*   CO2 mmol/L 32.4* 33.3* 34.4* 30.6*   BUN mg/dL 25* 20 21 23   CREATININE mg/dL 0.88 0.81 0.82 0.86   CALCIUM mg/dL 8.1* 8.1* 8.2* 7.9*   GLUCOSE mg/dL 194* 213* 217* 232*         Assessment:  Principal Problem:    Cellulitis, scrotum with superimposed candidal balanitis and dermatitis - improved today.  Active Problems:    Chronic diastolic congestive heart failure    Type 2 diabetes mellitus treated with insulin    Antiphospholipid antibody with hypercoagulable state    History of stroke with residual deficit    Benign hypertension    Factor V Leiden mutation    Chronic pain    Long term current use of anticoagulant    Chronic atrial fibrillation    Pleural effusion on left    Hyponatremia    Paraplegia      Plan/recommendations:  See below.  Discussed with Dr. Ren.  Pharmacy charting noted.  This very difficult situation.  The longevity of the success after completion of vancomycin is not guaranteed and patient is liable to have recurrence.  I'm not sure what would be the best pathway going forward given his overall clinical situation.  If he is refusing IV vancomycin and safe monitoring of its administration then I would recommend discontinuation of IV vancomycin and changing it to doxycycline to finish the course of the treatment.  The caveat is that included increased likelihood of C. difficile infection which would further making him miserable.  I think it's the time to discuss very openly about care structure going forward.  Strongly suggest emphysema and symptom management with plans  for no more lab and diagnostic studies.  Memo Alarcon MD  1/16/2018  11:57 AM    Much of this encounter note is an electronic transcription/translation of spoken language to printed text. The electronic translation of spoken language may permit erroneous, or at times, nonsensical words or phrases to be inadvertently transcribed; Although I have reviewed the note for such errors, some may still exist

## 2018-01-16 NOTE — PLAN OF CARE
Problem: Patient Care Overview (Adult)  Goal: Plan of Care Review  Outcome: Ongoing (interventions implemented as appropriate)   01/16/18 1846   Coping/Psychosocial Response Interventions   Plan Of Care Reviewed With patient   Patient Care Overview   Progress declining   Outcome Evaluation   Outcome Summary/Follow up Plan Pt sleeping through shift but easy to arouse. Pt fed and ate 50% of all meals today. Family refused 1800 insulin. VSS. Pt c/o pain but refused pain medication when offered. Will continue to monitor.      Goal: Adult Individualization and Mutuality  Outcome: Ongoing (interventions implemented as appropriate)   01/16/18 1846   Individualization   Patient Specific Preferences Only drinks Diet Pepsi     Goal: Discharge Needs Assessment  Outcome: Ongoing (interventions implemented as appropriate)      Problem: Infection, Risk/Actual (Adult)  Goal: Infection Prevention/Resolution  Outcome: Ongoing (interventions implemented as appropriate)   01/16/18 1846   Infection, Risk/Actual (Adult)   Infection Prevention/Resolution making progress toward outcome       Problem: Pain, Acute (Adult)  Goal: Acceptable Pain Control/Comfort Level  Outcome: Ongoing (interventions implemented as appropriate)   01/16/18 1846   Pain, Acute (Adult)   Acceptable Pain Control/Comfort Level making progress toward outcome

## 2018-01-16 NOTE — PROGRESS NOTES
"  Infectious Diseases Progress Note    Memo Alarcon MD     [facility]  Los: 20 days  Patient Identification:  Name: Emil Pulido  Age: 72 y.o.  Sex: male  :  1945  MRN: [id number]         Primary Care Physician: Provider Not In System            Subjective: Lethargic denies any complaints, he admits that he does not want any more lab work to be stick to check the levels of vancomycin.  I told him that it would not be possible to give vancomycin without monitoring it he verbalized that \"he doesn't care\".   Interval History: The consultation note.    Objective:    Scheduled Meds:    bumetanide 2 mg Oral TID   docusate sodium 100 mg Oral BID   gabapentin 300 mg Oral Q12H   insulin aspart 0-7 Units Subcutaneous 4x Daily With Meals & Nightly   insulin detemir 15 Units Subcutaneous Q12H   lactobacillus acidophilus 1 capsule Oral Daily   mirtazapine 15 mg Oral Daily   nystatin  Topical Q12H   pantoprazole 40 mg Oral QAM   potassium chloride 20 mEq Oral BID With Meals   senna 2 tablet Oral BID   venlafaxine XR 75 mg Oral Daily     Continuous Infusions:       Vital signs in last 24 hours:  Temp:  [97.9 °F (36.6 °C)-100.1 °F (37.8 °C)] 97.9 °F (36.6 °C)  Heart Rate:  [] 96  Resp:  [16-20] 20  BP: (111)/(58) 111/58    Intake/Output:    Intake/Output Summary (Last 24 hours) at 01/15/18 1938  Last data filed at 01/15/18 1812   Gross per 24 hour   Intake              720 ml   Output             1800 ml   Net            -1080 ml       Exam:  /58 (BP Location: Right arm, Patient Position: Lying)  Pulse 96  Temp 97.9 °F (36.6 °C) (Oral)   Resp 20  Ht 188 cm (74\")  Wt 83.5 kg (184 lb 3 oz)  SpO2 93%  BMI 23.65 kg/m2    General Appearance:    More somnolent and lethargic today.                          Head:    Normocephalic, without obvious abnormality, atraumatic                  Abdomen:     Soft and nontender                 Extremities:   Contracted lower extremities.                    "         Skin:   Decreased swelling of the scrotal erythema is slightly better swelling of the penis is unchanged.  Obando catheter in place..  Data Review:    I reviewed the patient's new clinical results.    Results from last 7 days  Lab Units 01/15/18  0607 01/12/18  0602 01/11/18 0439 01/10/18  0554 01/09/18  0455   WBC 10*3/mm3 7.63 7.20 7.43 6.60 8.23   HEMOGLOBIN g/dL 8.6* 9.1* 9.4* 9.0* 9.8*   PLATELETS 10*3/mm3 112* 126* 141 119* 130*       Results from last 7 days  Lab Units 01/15/18  0607 01/12/18  0602 01/11/18  0439 01/10/18  0554 01/09/18  0455   SODIUM mmol/L 133* 130* 135* 135* 133*   POTASSIUM mmol/L 4.1 4.2 4.2 4.2 4.0   CHLORIDE mmol/L 93* 92* 97* 97* 93*   CO2 mmol/L 32.4* 33.3* 34.4* 30.6* 29.7*   BUN mg/dL 25* 20 21 23 27*   CREATININE mg/dL 0.88 0.81 0.82 0.86 0.85   CALCIUM mg/dL 8.1* 8.1* 8.2* 7.9* 8.1*   GLUCOSE mg/dL 194* 213* 217* 232* 224*         Assessment:  Principal Problem:    Cellulitis, scrotum with superimposed candidal balanitis and dermatitis - improved today.  Active Problems:    Chronic diastolic congestive heart failure    Type 2 diabetes mellitus treated with insulin    Antiphospholipid antibody with hypercoagulable state    History of stroke with residual deficit    Benign hypertension    Factor V Leiden mutation    Chronic pain    Long term current use of anticoagulant    Chronic atrial fibrillation    Pleural effusion on left    Hyponatremia    Paraplegia      Plan/recommendations:  See below.  Pharmacy charting noted.  This very difficult situation.  The longevity of the success after completion of vancomycin is not guaranteed and patient is liable to have recurrence.  I'm not sure what would be the best pathway going forward given his overall clinical situation.  If he is refusing IV vancomycin and safe monitoring of its administration then I would recommend discontinuation of IV vancomycin and changing it to doxycycline to finish the course of the treatment.  The  caveat is that included increased likelihood of C. difficile infection which would further making him miserable.  I think it's the time to discuss very openly about care structure going forward.  Strongly suggest emphysema and symptom management with plans for no more lab and diagnostic studies.  Memo Alarcon MD  1/15/2018  7:38 PM    Much of this encounter note is an electronic transcription/translation of spoken language to printed text. The electronic translation of spoken language may permit erroneous, or at times, nonsensical words or phrases to be inadvertently transcribed; Although I have reviewed the note for such errors, some may still exist

## 2018-01-16 NOTE — PLAN OF CARE
Problem: Patient Care Overview (Adult)  Goal: Plan of Care Review  Outcome: Ongoing (interventions implemented as appropriate)   01/16/18 0618   Coping/Psychosocial Response Interventions   Plan Of Care Reviewed With patient   Patient Care Overview   Progress declining   Outcome Evaluation   Outcome Summary/Follow up Plan pt slept all night, only woke up to take meds, pt appears to be declining. will continue to monitor     Goal: Adult Individualization and Mutuality  Outcome: Ongoing (interventions implemented as appropriate)    Goal: Discharge Needs Assessment  Outcome: Ongoing (interventions implemented as appropriate)      Problem: Infection, Risk/Actual (Adult)  Goal: Infection Prevention/Resolution  Outcome: Ongoing (interventions implemented as appropriate)      Problem: Pain, Acute (Adult)  Goal: Acceptable Pain Control/Comfort Level  Outcome: Ongoing (interventions implemented as appropriate)

## 2018-01-17 NOTE — PLAN OF CARE
Problem: Patient Care Overview (Adult)  Goal: Plan of Care Review  Outcome: Ongoing (interventions implemented as appropriate)   01/17/18 0551   Coping/Psychosocial Response Interventions   Plan Of Care Reviewed With family   Patient Care Overview   Progress declining   Outcome Evaluation   Outcome Summary/Follow up Plan pt slept most of the night. Early this morning pt. aroused and had several pepsi's and some small talk. pt . refused pain meds. will continue to monitor for comfort.     Goal: Adult Individualization and Mutuality  Outcome: Ongoing (interventions implemented as appropriate)    Goal: Discharge Needs Assessment  Outcome: Ongoing (interventions implemented as appropriate)      Problem: Infection, Risk/Actual (Adult)  Goal: Infection Prevention/Resolution  Outcome: Ongoing (interventions implemented as appropriate)      Problem: Pain, Acute (Adult)  Goal: Acceptable Pain Control/Comfort Level  Outcome: Ongoing (interventions implemented as appropriate)      Problem: Fall Risk (Adult)  Goal: Absence of Falls  Outcome: Ongoing (interventions implemented as appropriate)

## 2018-01-17 NOTE — PROGRESS NOTES
LOS: 21 days   Patient Care Team:  Provider Not In System as PCP - General  Marcus Avery MD as PCP - Claims Attributed  Ilya Ambrocio MD as Consulting Physician (Hematology and Oncology)  Willy Bell MD as Referring Physician (Internal Medicine)    Chief Complaint   Patient presents with   • Foot Swelling         Subjective   Denies pain or SOA but is continuously moaning. Staff reports he has been extremely difficult to rouse today       Objective     Vital Signs  Temp:  [98.7 °F (37.1 °C)-99.4 °F (37.4 °C)] 98.7 °F (37.1 °C)  Heart Rate:  [108-111] 111  Resp:  [18-20] 18  BP: (118-119)/(47-57) 119/57    Physical Exam:  WDWN male in NAD, more lethargic today, repeatedly moaning  Lungs clear with BS decreased throughout, cesar on left  Heart rate controlled, irreg  Abd soft, NT, BS+  Scrotum swollen but no worse than it has been.  No warmth  Ext trace - 1+ edema on dorsum of feet but no edema in his legs  Skin warm & dry       Results Review:     I reviewed the patient's new clinical results.    Medication Review:       LABS    Results from last 7 days  Lab Units 01/15/18  0607 01/12/18  0602 01/11/18  0439   SODIUM mmol/L 133* 130* 135*   POTASSIUM mmol/L 4.1 4.2 4.2   CHLORIDE mmol/L 93* 92* 97*   CO2 mmol/L 32.4* 33.3* 34.4*   BUN mg/dL 25* 20 21   CREATININE mg/dL 0.88 0.81 0.82   GLUCOSE mg/dL 194* 213* 217*   CALCIUM mg/dL 8.1* 8.1* 8.2*       Results from last 7 days  Lab Units 01/15/18  0607 01/12/18  0602 01/11/18  0439   WBC 10*3/mm3 7.63 7.20 7.43   HEMOGLOBIN g/dL 8.6* 9.1* 9.4*   HEMATOCRIT % 26.3* 28.5* 29.0*   PLATELETS 10*3/mm3 112* 126* 141       Results from last 7 days  Lab Units 01/10/18  0554   INR  1.57*           Assessment/Plan     Principal Problem:    Cellulitis, scrotum - he has been refusing to take the vancomycin so I have d/c'ed it  Active Problems:    Chronic diastolic congestive heart failure - he seems to have a lot less edema, even today after changing to po bumex. He is  more lethargic today & the staff feels that he has been declining. Will repeat his labs in am & see how he is doing. If he is going down, his wife may want to consider placement or even a palliative stay here    Type 2 diabetes mellitus treated with insulin - he was getting hypoglycemic but that has improved     Antiphospholipid antibody with hypercoagulable state - off anticoagulants secondary to previous intraperitoneal bleed    Thrombocytopenia    History of stroke with residual deficit    Benign hypertension    Factor V Leiden mutation    Chronic pain    Chronic atrial fibrillation    Pleural effusion on left     Hyponatremia    Paraplegia            Emma Ren MD  01/16/18  8:48 PM      Time:

## 2018-01-17 NOTE — PROGRESS NOTES
[facility] Visit Report    Emil Pulido  [id number]  1/17/2018    Admission R/T [facility] Dx: no    Reason for [facility] Admission: cellulitis    Symptom  Management: Other cellulitis    Nursing/Medication Recommendations:    Psychosocial Issues and Recommendations:    Spiritual Concerns and Recommendations:    [facility] Discharge Plans:  Home    Review of Visit (Include All Collaboration- including names of hospital and family involved during admission/visit):  Pt spouse came by earlier asking about pt discharge plan, had been told pt may be able to leave today; [facility] MAGGIE Diallo and this P explained MD has not rounded yet, will have to wait on MD visit and d/c order if that is the plan; spouse VU;     Per EPIC, all meds now PO, pt has had PO Dilaudid 1 mg x3, sched and PRN PO Neurontin 300 mg; PO Protonix, PO Bumex, sched PO Remeron; VS: T97.7, P106, R20, /47, O2=98%@RA;    Later, before visiting, Dr Ren was in the DEP office, stated she was going to d/c today; [facility] visited with pt and spouse, pt lethargic/drowsy, pt and spouse aware of discharge this afternoon/evening; no concerns, already has [facility] at home and has everything she needs, P closed visit.    [facility] later notarized 2 copies of EMS DNR (1 for ambulance, one for family to take home in case of need for ambulance at home) [facility], and verified address for ambulance.    [facility] unable to get ambulance until midnight, so after discussion with family, ambulance going to wait and  pt tomorrow 1/18 at 11:00 AM.    P sent Red-E to home team [facility] and VM to [facility].      Jonas Yuen, [facility]

## 2018-01-17 NOTE — PROGRESS NOTES
Continued Stay Note  [facility]     Patient Name: Emil Pulido  MRN: [id number]  Today's Date: 1/17/2018    Admit Date: 12/26/2017          Discharge Plan       01/17/18 1639    Case Management/Social Work Plan    Plan Home with [facility]     Patient/Family In Agreement With Plan yes    Additional Comments CCP attempted to schedule ambulance transportation for this evening; no available ambulance till midnight and tomorrow morning. CCP spoke with Dr. Ren; okay to discharge tomorrow with first available ambulance. CCP scheduled ambulance transportation with [facility] for 1:00 P.M tomorrow 1/18/18. CCP completed ambulance transportation document; attatched face sheet and original DNR form; placed  on top of patient's chart in [facility]. CCP will follow for discharge home transported by [facility]. Misti FELIPE               Discharge Codes     None        Expected Discharge Date and Time     Expected Discharge Date Expected Discharge Time    Jan 17, 2018             MATTEO White

## 2018-01-17 NOTE — PROGRESS NOTES
"  Infectious Diseases Progress Note    Memo Alarcon MD     [facility]  Los: 22 days  Patient Identification:  Name: Emil Pulido  Age: 72 y.o.  Sex: male  :  1945  MRN: [id number]         Primary Care Physician: Provider Not In System            Subjective: lethargic.  Interval History: The consultation note.    Objective:    Scheduled Meds:    bumetanide 2 mg Oral TID   docusate sodium 100 mg Oral BID   gabapentin 300 mg Oral Q12H   insulin aspart 0-7 Units Subcutaneous 4x Daily With Meals & Nightly   insulin detemir 15 Units Subcutaneous Q12H   lactobacillus acidophilus 1 capsule Oral Daily   mirtazapine 15 mg Oral Daily   nystatin  Topical Q12H   pantoprazole 40 mg Oral QAM   potassium chloride 20 mEq Oral BID With Meals   senna 2 tablet Oral BID   venlafaxine XR 75 mg Oral Daily     Continuous Infusions:       Vital signs in last 24 hours:  Temp:  [97.7 °F (36.5 °C)-98.7 °F (37.1 °C)] 97.7 °F (36.5 °C)  Heart Rate:  [101-111] 106  Resp:  [18-20] 20  BP: (119-120)/(47-60) 120/47    Intake/Output:    Intake/Output Summary (Last 24 hours) at 18 1259  Last data filed at 18 0500   Gross per 24 hour   Intake              360 ml   Output             5450 ml   Net            -5090 ml       Exam:  /47 (BP Location: Left arm, Patient Position: Lying)  Pulse 106  Temp 97.7 °F (36.5 °C) (Oral)   Resp 20  Ht 188 cm (74\")  Wt 80.3 kg (177 lb 1.6 oz)  SpO2 98%  BMI 22.74 kg/m2    General Appearance:    More somnolent and lethargic today.                          Head:    Normocephalic, without obvious abnormality, atraumatic                  Abdomen:     Soft and nontender                 Extremities:   Contracted lower extremities.                            Skin:   Increased swelling and redness of the scrotum and penile area with Obando catheter in place.  Data Review:    I reviewed the patient's new clinical results.    Results from last 7 days  Lab Units 18  0809 " 01/15/18  0607 01/12/18  0602 01/11/18  0439   WBC 10*3/mm3 7.52 7.63 7.20 7.43   HEMOGLOBIN g/dL 9.6* 8.6* 9.1* 9.4*   PLATELETS 10*3/mm3 132* 112* 126* 141       Results from last 7 days  Lab Units 01/17/18  0809 01/15/18  0607 01/12/18  0602 01/11/18  0439   SODIUM mmol/L 135* 133* 130* 135*   POTASSIUM mmol/L 3.6 4.1 4.2 4.2   CHLORIDE mmol/L 94* 93* 92* 97*   CO2 mmol/L 31.7* 32.4* 33.3* 34.4*   BUN mg/dL 27* 25* 20 21   CREATININE mg/dL 0.93 0.88 0.81 0.82   CALCIUM mg/dL 8.4* 8.1* 8.1* 8.2*   GLUCOSE mg/dL 270* 194* 213* 217*         Assessment:  Principal Problem:    Cellulitis, scrotum with superimposed candidal balanitis and dermatitis - improved today.  Active Problems:    Chronic diastolic congestive heart failure    Type 2 diabetes mellitus treated with insulin    Antiphospholipid antibody with hypercoagulable state    History of stroke with residual deficit    Benign hypertension    Factor V Leiden mutation    Chronic pain    Long term current use of anticoagulant    Chronic atrial fibrillation    Pleural effusion on left    Hyponatremia    Paraplegia      Plan/recommendations:  He is deteriorating rapidly and hence any treatment  Offered would have only limited success with increased chance of recurrence of infection. Strongly suggest palliative and comfort care.  Will sign off.  Memo Alarcon MD  1/17/2018  12:59 PM    Much of this encounter note is an electronic transcription/translation of spoken language to printed text. The electronic translation of spoken language may permit erroneous, or at times, nonsensical words or phrases to be inadvertently transcribed; Although I have reviewed the note for such errors, some may still exist

## 2018-01-18 NOTE — PLAN OF CARE
Problem: Patient Care Overview (Adult)  Goal: Plan of Care Review  Outcome: Ongoing (interventions implemented as appropriate)   01/18/18 0805   Coping/Psychosocial Response Interventions   Plan Of Care Reviewed With patient   Patient Care Overview   Progress improving   Outcome Evaluation   Outcome Summary/Follow up Plan Pt medicated with Dilaudid x3 per pt request. In between care pt appeared to have slept well. Plan is for pt DC home with [facility] today.       Problem: Infection, Risk/Actual (Adult)  Goal: Infection Prevention/Resolution  Outcome: Ongoing (interventions implemented as appropriate)      Problem: Pressure Ulcer Risk (Neal Scale) (Adult,Obstetrics,Pediatric)  Goal: Identify Related Risk Factors and Signs and Symptoms  Outcome: Outcome(s) achieved Date Met: 01/18/18    Goal: Skin Integrity  Outcome: Outcome(s) achieved Date Met: 01/18/18      Problem: Fall Risk (Adult)  Goal: Absence of Falls  Outcome: Ongoing (interventions implemented as appropriate)

## 2018-01-18 NOTE — PROGRESS NOTES
LOS: 22 days   Patient Care Team:  Provider Not In System as PCP - General  Marcus Avery MD as PCP - Claims Attributed  Ilya Ambrocio MD as Consulting Physician (Hematology and Oncology)  Willy Bell MD as Referring Physician (Internal Medicine)    Chief Complaint   Patient presents with   • Foot Swelling         Subjective   More alert today & reports he's feeling better       Objective     Vital Signs  Temp:  [97.7 °F (36.5 °C)] 97.7 °F (36.5 °C)  Heart Rate:  [101-106] 106  Resp:  [20] 20  BP: (120)/(47-60) 120/47    Physical Exam:  WDWN male in NAD, more alert & interactive today  Lungs clear with BS decreased throughout, cesar on left  Heart rate controlled, irreg  Abd soft, NT, BS+  Scrotum swollen but no worse than it has been.  No warmth  Ext trace - 1+ edema on dorsum of feet but no edema in his legs  Skin warm & dry       Results Review:     I reviewed the patient's new clinical results.    Medication Review:       LABS    Results from last 7 days  Lab Units 01/17/18  0809 01/15/18  0607 01/12/18  0602   SODIUM mmol/L 135* 133* 130*   POTASSIUM mmol/L 3.6 4.1 4.2   CHLORIDE mmol/L 94* 93* 92*   CO2 mmol/L 31.7* 32.4* 33.3*   BUN mg/dL 27* 25* 20   CREATININE mg/dL 0.93 0.88 0.81   GLUCOSE mg/dL 270* 194* 213*   CALCIUM mg/dL 8.4* 8.1* 8.1*       Results from last 7 days  Lab Units 01/17/18  0809 01/15/18  0607 01/12/18  0602   WBC 10*3/mm3 7.52 7.63 7.20   HEMOGLOBIN g/dL 9.6* 8.6* 9.1*   HEMATOCRIT % 29.2* 26.3* 28.5*   PLATELETS 10*3/mm3 132* 112* 126*       Results from last 7 days  Lab Units 01/17/18  0809   INR  1.52*           Assessment/Plan     Principal Problem:    Cellulitis, scrotum - he has been refusing to take the vancomycin so I have d/c'ed it  Active Problems:    Chronic diastolic congestive heart failure - he seems to have a lot less edema, even today after changing to po bumex. He is more lethargic today & the staff feels that he has been declining. Will repeat his labs in am &  see how he is doing. If he is going down, his wife may want to consider placement or even a palliative stay here    Type 2 diabetes mellitus treated with insulin - he was getting hypoglycemic but that has improved     Antiphospholipid antibody with hypercoagulable state - off anticoagulants secondary to previous intraperitoneal bleed    Thrombocytopenia    History of stroke with residual deficit    Benign hypertension    Factor V Leiden mutation    Chronic pain    Chronic atrial fibrillation    Pleural effusion on left     Hyponatremia    Paraplegia    Discussed home meds with pt's wife & with pt. Spoke with [facility] nurse as well  Total time 55 min        Emma Ren MD  01/17/18  9:28 PM      Time:

## 2018-01-18 NOTE — PLAN OF CARE
Problem: Patient Care Overview (Adult)  Goal: Plan of Care Review  Outcome: Ongoing (interventions implemented as appropriate)   01/17/18 2008   Coping/Psychosocial Response Interventions   Plan Of Care Reviewed With patient;spouse   Patient Care Overview   Progress improving   Outcome Evaluation   Outcome Summary/Follow up Plan Pt has been alert and oriented today, napping between care. Takes meds whole with diet pepsi. Complained of right leg pain throughout shift, medicated several times with prn gabapentin and dilaudid po. Pt is going home with [facility], will be d/c'd tomorrow at 1pm via ambulance. Will continue to monitor and provide comfort care.     Goal: Adult Individualization and Mutuality  Outcome: Ongoing (interventions implemented as appropriate)    Goal: Discharge Needs Assessment  Outcome: Ongoing (interventions implemented as appropriate)      Problem: Infection, Risk/Actual (Adult)  Goal: Infection Prevention/Resolution  Outcome: Ongoing (interventions implemented as appropriate)      Problem: Pain, Acute (Adult)  Goal: Acceptable Pain Control/Comfort Level  Outcome: Ongoing (interventions implemented as appropriate)      Problem: Pressure Ulcer Risk (Neal Scale) (Adult,Obstetrics,Pediatric)  Goal: Identify Related Risk Factors and Signs and Symptoms  Outcome: Ongoing (interventions implemented as appropriate)    Goal: Skin Integrity  Outcome: Ongoing (interventions implemented as appropriate)      Problem: Fall Risk (Adult)  Goal: Absence of Falls  Outcome: Ongoing (interventions implemented as appropriate)

## 2018-01-18 NOTE — DISCHARGE SUMMARY
Date of Discharge:  1/25/2018  Date of Admit: 12/26/2017    Discharge Diagnosis:  Principal Problem:    Cellulitis, scrotum  Active Problems:    Chronic diastolic congestive heart failure    Type 2 diabetes mellitus treated with insulin    Antiphospholipid antibody with hypercoagulable state    Thrombocytopenia    History of stroke with residual deficit    Benign hypertension    Factor V Leiden mutation    Chronic pain    Chronic atrial fibrillation    Pleural effusion on left    Hyponatremia    Paraplegia      Procedures Performed         Consults     Date and Time Order Name Status Description    1/4/2018 0941 Inpatient Consult to Thoracic Surgery Completed     1/1/2018 1014 Inpatient Consult to Infectious Diseases Completed     12/27/2017 0014 Inpatient Consult to Urology Completed     12/26/2017 2106 [facility] (on-call MD unless specified) Completed     12/26/2017 2050 Family Medicine Consult Completed             Hospital Course:   72-year-old male with multiple medical problems who presented with increasing pain and swelling as well as erythema of the scrotum.  He has actually been followed by hospice prior to admission but because of the severe pain he was expressing he was brought to the emergency room and subsequently admitted with a diagnosis of cellulitis of the scrotum.  He has poor nutrition and has generalized edema because of this.  On workup in the emergency room a chest x-ray was also done which showed a complete white out of the left lung.  He had a large pleural effusion but denied feeling short of breath and his oxygen saturations were normal on room air.  Please see the medication physical for further details  The patient was seen by thoracic surgery, pulmonary, urology and infectious disease.  He was treated with IV antibiotics with improvement in the cellulitis.  He also underwent a thoracentesis which revealed that the left pleural effusion was transudative.  Thoracic surgery saw him regarding  possibly putting in a Pleurx catheter but they felt that this was not necessary at this point.  Since the patient is asymptomatic with it the pulmonologist felt that there was no reason to keep doing thoracenteses.  The fluid just seemed to reaccumulate quickly after this was done.  He was treated with IV diuretics and after increasing the dose the swelling in his legs and scrotum did improve to the point that he really had no pitting edema at all in his legs.  In fact on the day of discharge his crit and had bumped just slightly so I did decrease the dose on the diuretic.  He is essentially immobile and bedridden and will resume with hospice care.  I did start him on some Remeron and that did seem to help his appetite some but his by mouth intake is still very poor.  He felt that he was doing well enough that he wanted to try and get back home.  He had been discharged last summer from the hospital and was placed under hospice care at that time and it was felt that he would not survive much longer but he has managed to do okay.  His prognosis however is quite poor      Physical Exam:  WDWN male in NAD. Alert & oriented.  Lungs clear with equal BS bilaterally  Heart RRR  Abd soft, NT, BS+  Scrotum swollen but not s much as previously with some erythema but better than it was  Ext no edema.  Skin warm & dry      Discharge Medications   Emil Pulido   Home Medication Instructions [id number]    Printed on:01/25/18 5047   Medication Information                      acetaminophen (TYLENOL) 500 MG tablet  Take 500-1,000 mg by mouth Every 6 (Six) Hours As Needed for Mild Pain .             bumetanide (BUMEX) 2 MG tablet  Take 1 tablet by mouth 2 (Two) Times a Day.             Cod Liver Oil 1000 MG capsule  Take 1 capsule by mouth Daily.             docusate sodium (COLACE) 100 MG capsule  Take 100 mg by mouth Daily.             gabapentin (NEURONTIN) 300 MG capsule  Take 300 mg by mouth 2 (Two) Times a Day As  Needed.             HYDROmorphone (DILAUDID) 2 MG tablet  Take 1 mg by mouth Every 4 (Four) Hours As Needed for Moderate Pain .             insulin detemir (LEVEMIR) 100 UNIT/ML injection  Inject 15 Units under the skin Every 12 (Twelve) Hours.             mirtazapine (REMERON) 15 MG tablet  Take 1 tablet by mouth Daily.             Multiple Vitamins-Minerals (MULTIVITAMIN & MINERAL PO)  Take 1 tablet by mouth Daily.             nystatin (MYCOSTATIN) 432367 UNIT/GM powder  Apply  topically Every 12 (Twelve) Hours.             pantoprazole (PROTONIX) 40 MG EC tablet  Take 40 mg by mouth Daily.             potassium chloride (K-DUR,KLOR-CON) 20 MEQ CR tablet  Take 1 tablet by mouth 2 (Two) Times a Day for 30 days.             senna (SENOKOT) 8.6 MG tablet tablet  Take 2 tablets by mouth Daily.             venlafaxine XR (EFFEXOR-XR) 37.5 MG 24 hr capsule  Take 2 capsules by mouth Daily.             vitamin C (ASCORBIC ACID) 500 MG tablet  Take 1,000 mg by mouth Daily.                   Activity at Discharge:     Follow-up Appointments  Follow-up Information     Follow up with Provider Not In System .    Contact information:    [facility] [address]          Follow up with Marcus Avery MD .    Specialty:  Internal Medicine    Contact information:    [address]  [facility] [address] [phone]          Follow up with [facility] .    Specialty:  Hospice    Contact information:    [address]  [facility] [address] [phone]            DISCHARGE DISPOSITION:     Emma Ren MD  01/25/18  11:16 AM

## 2018-01-18 NOTE — PROGRESS NOTES
Case Management Discharge Note    Final Note: Home with [facility] by [facility] on 1/18/18. TOM Barakat RN, CCP.    Discharge Placement     Facility/Agency Request Status Selected? Address Phone Number Fax Number    [facility] Accepted    Yes [address], [facility] [address] [phone]        [facility]: [facility]    Discharge Codes: 50  Hospice - home

## 2021-06-28 NOTE — PLAN OF CARE
"Problem: Patient Care Overview (Adult)  Goal: Adult Individualization and Mutuality  Outcome: Ongoing (interventions implemented as appropriate)  Pt with very active day -very anxious at times requiring \"immediate\" attention-much emotional support given    Problem: Cardiac: Heart Failure (Adult)  Goal: Signs and Symptoms of Listed Potential Problems Will be Absent or Manageable (Cardiac: Heart Failure)  Outcome: Ongoing (interventions implemented as appropriate)  bumex continues throughout shift-with good urine output    Problem: Fall Risk (Adult)  Goal: Absence of Falls  Outcome: Ongoing (interventions implemented as appropriate)  No falls this shift-pt calls out frequently for needs-alert and oriented x 3-bed alarms activated all shift    Problem: Pressure Ulcer Risk (Neal Scale) (Adult,Obstetrics,Pediatric)  Goal: Skin Integrity  Outcome: Ongoing (interventions implemented as appropriate)  Pt turned every 2 hours -incontinent at times-condom catheter placed and replaced x 4-pt feels like he is wet but is not-pillow placed behind his back and pt placed on his side      "
Problem: Inpatient Physical Therapy  Goal: Bed Mobility Goal LTG- PT  Outcome: Unable to achieve outcome(s) by discharge Date Met:  03/25/17 03/25/17 1713   Bed Mobility PT LTG   Bed Mobility PT LTG, Date Goal Reviewed 03/25/17   Bed Mobility PT LTG, Outcome goal not met   Bed Mobility PT LTG, Reason Goal Not Met discharged from facility       Goal: Transfer Training Goal 1 LTG- PT  Outcome: Unable to achieve outcome(s) by discharge Date Met:  03/25/17 03/25/17 1713   Transfer Training PT LTG   Transfer Training PT LTG, Date Goal Reviewed 03/25/17   Transfer Training PT LTG, Outcome goal not met   Transfer Training PT LTG, Reason Goal Not Met discharged from facility       Goal: Dynamic Sitting Balance Goal LTG- PT  Outcome: Unable to achieve outcome(s) by discharge Date Met:  03/25/17 03/25/17 1713   Dynamic Sitting Balance PT LTG   Dynamic Sitting Balance PT LTG, Date Goal Reviewed 03/25/17   Dynamic Sitting Balance PT LTG, Outcome goal not met   Dynamic Sitting Balance PT LTG, Reason Goal Not Met discharged from facility           
Problem: Patient Care Overview (Adult)  Goal: Adult Individualization and Mutuality  Outcome: Ongoing (interventions implemented as appropriate)  Pt had quiet day-thinks he is going home tomorrow and pt spouse at side is wanting pt to go to rehab facility for a short period at discharge-note left for d/c planner-pt progressing    Problem: Cardiac: Heart Failure (Adult)  Goal: Signs and Symptoms of Listed Potential Problems Will be Absent or Manageable (Cardiac: Heart Failure)  Outcome: Ongoing (interventions implemented as appropriate)  Iv bumex dc'd today and pt started on po demadex-voiding every hour-good output    Problem: Fall Risk (Adult)  Goal: Absence of Falls  Outcome: Ongoing (interventions implemented as appropriate)  No falls this shift--pt calls out for assist appropriately -does not try to get up without assist    Problem: Pressure Ulcer Risk (Neal Scale) (Adult,Obstetrics,Pediatric)  Goal: Skin Integrity  Outcome: Ongoing (interventions implemented as appropriate)  Pt turned every 2 hours-alex care with each time he voids or has a bm-pillows in use      
Problem: Patient Care Overview (Adult)  Goal: Plan of Care Review    03/22/17 0921   Coping/Psychosocial Response Interventions   Plan Of Care Reviewed With patient   Outcome Evaluation   Outcome Summary/Follow up Plan Pt presents w increased B LE and UE weakness. Difficult to determine other limitations due to pt reluctance to participate in sitting EOB. PT will continue to work w pt on strengthening of B UE and LE and encourage pt to participate in sitting EOB to maintain strength and ability to transfer.         Problem: Inpatient Physical Therapy  Goal: Bed Mobility Goal LTG- PT    03/22/17 0921   Bed Mobility PT LTG   Bed Mobility PT LTG, Date Established 03/22/17   Bed Mobility PT LTG, Time to Achieve 1 wk   Bed Mobility PT LTG, Activity Type supine to sit/sit to supine   Bed Mobility PT LTG, [place] Level minimum assist (75% patient effort)   Bed Mobility PT Goal LTG, Assist Device bed rails       Goal: Transfer Training Goal 1 LTG- PT    03/22/17 0921   Transfer Training PT LTG   Transfer Training PT LTG, Date Established 03/22/17   Transfer Training PT LTG, Time to Achieve 1 wk   Transfer Training PT LTG, Activity Type bed to chair /chair to bed   Transfer Training PT LTG, [place] Level moderate assist (50% patient effort);2 person assist required   Transfer Training PT LTG, Assist Device (sliding board if appropriate)       Goal: Dynamic Sitting Balance Goal LTG- PT    03/22/17 0921   Dynamic Sitting Balance PT LTG   Dynamic Sitting Balance PT LTG, Date Established 03/22/17   Dynamic Sitting Balance PT LTG, Time to Achieve 1 wk   Dynamic Sitting Balance PT LTG, [place] Level supervision required           
Problem: Patient Care Overview (Adult)  Goal: Plan of Care Review  Outcome: Ongoing (interventions implemented as appropriate)    03/18/17 0401   Coping/Psychosocial Response Interventions   Plan Of Care Reviewed With patient   Patient Care Overview   Progress no change   Outcome Evaluation   Outcome Summary/Follow up Plan Pt. is a new admit. VSS. New admit orders started and currently on a bumex gtt. Patient stable.         Problem: Cardiac: Heart Failure (Adult)  Goal: Signs and Symptoms of Listed Potential Problems Will be Absent or Manageable (Cardiac: Heart Failure)  Outcome: Ongoing (interventions implemented as appropriate)    03/18/17 0401   Cardiac: Heart Failure   Problems Assessed (Heart Failure) all   Problems Present (Heart Failure) functional decline/self-care deficit;situational response           
Problem: Patient Care Overview (Adult)  Goal: Plan of Care Review  Outcome: Ongoing (interventions implemented as appropriate)    03/18/17 1951   Coping/Psychosocial Response Interventions   Plan Of Care Reviewed With patient   Patient Care Overview   Progress no change   Outcome Evaluation   Outcome Summary/Follow up Plan VSS. No c/o pin or SOA. On bumex gtt. Afib. Gave 5 mg coumadin weekly dose. He used to get it on Wednesdays but now he will get it on Saturdays. Continue to monitor.        Goal: Discharge Needs Assessment  Outcome: Ongoing (interventions implemented as appropriate)    Problem: Cardiac: Heart Failure (Adult)  Goal: Signs and Symptoms of Listed Potential Problems Will be Absent or Manageable (Cardiac: Heart Failure)  Outcome: Ongoing (interventions implemented as appropriate)      
Problem: Patient Care Overview (Adult)  Goal: Plan of Care Review  Outcome: Ongoing (interventions implemented as appropriate)    03/19/17 0248   Coping/Psychosocial Response Interventions   Plan Of Care Reviewed With patient   Patient Care Overview   Progress progress toward functional goals is gradual   Outcome Evaluation   Outcome Summary/Follow up Plan VSS, cont on IV Bumex gtt, cont Afib cont rate, voids per urinal, cont to monitor, safety maintatined           
Problem: Patient Care Overview (Adult)  Goal: Plan of Care Review  Outcome: Ongoing (interventions implemented as appropriate)    03/19/17 2004 03/20/17 0313   Coping/Psychosocial Response Interventions   Plan Of Care Reviewed With patient --    Patient Care Overview   Progress --  no change   Outcome Evaluation   Outcome Summary/Follow up Plan --  Pt diuresing on bumex drip. Monitor labs and vitals. Pt denies pain. Continue to monior.        Goal: Adult Individualization and Mutuality  Outcome: Ongoing (interventions implemented as appropriate)  Goal: Discharge Needs Assessment  Outcome: Ongoing (interventions implemented as appropriate)    Problem: Cardiac: Heart Failure (Adult)  Goal: Signs and Symptoms of Listed Potential Problems Will be Absent or Manageable (Cardiac: Heart Failure)  Outcome: Ongoing (interventions implemented as appropriate)    Problem: Fall Risk (Adult)  Goal: Identify Related Risk Factors and Signs and Symptoms  Outcome: Outcome(s) achieved Date Met:  03/20/17  Goal: Absence of Falls  Outcome: Ongoing (interventions implemented as appropriate)    Problem: Pressure Ulcer Risk (Neal Scale) (Adult,Obstetrics,Pediatric)  Goal: Identify Related Risk Factors and Signs and Symptoms  Outcome: Outcome(s) achieved Date Met:  03/20/17  Goal: Skin Integrity  Outcome: Ongoing (interventions implemented as appropriate)      
Problem: Patient Care Overview (Adult)  Goal: Plan of Care Review  Outcome: Ongoing (interventions implemented as appropriate)    03/19/17 9250   Coping/Psychosocial Response Interventions   Plan Of Care Reviewed With patient   Patient Care Overview   Progress no change   Outcome Evaluation   Outcome Summary/Follow up Plan VSS. Continued on IV Bumex. A FIB. Refused to use lift to get into chair. Turned q2h. Held Coumadin. INR 3.31. Potassium BID. K 3.4. Continue to monitor. Safety maintained.       Goal: Discharge Needs Assessment  Outcome: Ongoing (interventions implemented as appropriate)    Problem: Cardiac: Heart Failure (Adult)  Goal: Signs and Symptoms of Listed Potential Problems Will be Absent or Manageable (Cardiac: Heart Failure)  Outcome: Ongoing (interventions implemented as appropriate)    Problem: Fall Risk (Adult)  Goal: Identify Related Risk Factors and Signs and Symptoms  Outcome: Ongoing (interventions implemented as appropriate)  Goal: Absence of Falls  Outcome: Ongoing (interventions implemented as appropriate)    Problem: Pressure Ulcer Risk (Neal Scale) (Adult,Obstetrics,Pediatric)  Goal: Identify Related Risk Factors and Signs and Symptoms  Outcome: Ongoing (interventions implemented as appropriate)  Goal: Skin Integrity  Outcome: Ongoing (interventions implemented as appropriate)      
Problem: Patient Care Overview (Adult)  Goal: Plan of Care Review  Outcome: Ongoing (interventions implemented as appropriate)    03/21/17 0034 03/21/17 0256   Coping/Psychosocial Response Interventions   Plan Of Care Reviewed With patient --    Patient Care Overview   Progress --  no change   Outcome Evaluation   Outcome Summary/Follow up Plan --  Bumex drip continues. Pt tolerating external catheter. Continue to monitor.        Goal: Adult Individualization and Mutuality  Outcome: Ongoing (interventions implemented as appropriate)  Goal: Discharge Needs Assessment  Outcome: Ongoing (interventions implemented as appropriate)    Problem: Cardiac: Heart Failure (Adult)  Goal: Signs and Symptoms of Listed Potential Problems Will be Absent or Manageable (Cardiac: Heart Failure)  Outcome: Ongoing (interventions implemented as appropriate)    Problem: Fall Risk (Adult)  Goal: Absence of Falls  Outcome: Ongoing (interventions implemented as appropriate)    Problem: Pressure Ulcer Risk (Neal Scale) (Adult,Obstetrics,Pediatric)  Goal: Skin Integrity  Outcome: Ongoing (interventions implemented as appropriate)      
Problem: Patient Care Overview (Adult)  Goal: Plan of Care Review  Outcome: Ongoing (interventions implemented as appropriate)    03/21/17 2004 03/22/17 0410   Coping/Psychosocial Response Interventions   Plan Of Care Reviewed With patient --    Patient Care Overview   Progress --  no change   Outcome Evaluation   Outcome Summary/Follow up Plan --  Bumex drip continues. Pt tolerating external catheter. Continue to monitor.        Goal: Adult Individualization and Mutuality  Outcome: Ongoing (interventions implemented as appropriate)  Goal: Discharge Needs Assessment  Outcome: Ongoing (interventions implemented as appropriate)    Problem: Cardiac: Heart Failure (Adult)  Goal: Signs and Symptoms of Listed Potential Problems Will be Absent or Manageable (Cardiac: Heart Failure)  Outcome: Ongoing (interventions implemented as appropriate)    Problem: Fall Risk (Adult)  Goal: Absence of Falls  Outcome: Ongoing (interventions implemented as appropriate)    Problem: Pressure Ulcer Risk (Neal Scale) (Adult,Obstetrics,Pediatric)  Goal: Skin Integrity  Outcome: Ongoing (interventions implemented as appropriate)      
Problem: Patient Care Overview (Adult)  Goal: Plan of Care Review  Outcome: Ongoing (interventions implemented as appropriate)    03/21/17 8662   Coping/Psychosocial Response Interventions   Plan Of Care Reviewed With patient   Patient Care Overview   Progress no change   Outcome Evaluation   Outcome Summary/Follow up Plan Bumex drip continues. Pt did not tolerate external catheter. Using urinal when asked frequently. Will need [place] Catheter if he continues to have trouble due to strict I&O, according to Dr. Harris. C/o left elbow and general pain. Insulin adjusted. Continue to monitor.        Goal: Discharge Needs Assessment  Outcome: Ongoing (interventions implemented as appropriate)    Problem: Cardiac: Heart Failure (Adult)  Goal: Signs and Symptoms of Listed Potential Problems Will be Absent or Manageable (Cardiac: Heart Failure)  Outcome: Ongoing (interventions implemented as appropriate)    Problem: Fall Risk (Adult)  Goal: Absence of Falls  Outcome: Ongoing (interventions implemented as appropriate)    Problem: Pressure Ulcer Risk (Neal Scale) (Adult,Obstetrics,Pediatric)  Goal: Skin Integrity  Outcome: Ongoing (interventions implemented as appropriate)      
Problem: Patient Care Overview (Adult)  Goal: Plan of Care Review  Outcome: Ongoing (interventions implemented as appropriate)    03/22/17 1936   Coping/Psychosocial Response Interventions   Plan Of Care Reviewed With patient   Patient Care Overview   Progress no change   Outcome Evaluation   Outcome Summary/Follow up Plan Pt diuresing well, able to use urinal well with assistance. C/o elbow pain and general aches. Continue to monitor.        Goal: Discharge Needs Assessment  Outcome: Ongoing (interventions implemented as appropriate)    Problem: Cardiac: Heart Failure (Adult)  Goal: Signs and Symptoms of Listed Potential Problems Will be Absent or Manageable (Cardiac: Heart Failure)  Outcome: Ongoing (interventions implemented as appropriate)    Problem: Fall Risk (Adult)  Goal: Absence of Falls  Outcome: Ongoing (interventions implemented as appropriate)    Problem: Pressure Ulcer Risk (Neal Scale) (Adult,Obstetrics,Pediatric)  Goal: Skin Integrity  Outcome: Ongoing (interventions implemented as appropriate)      
Problem: Patient Care Overview (Adult)  Goal: Plan of Care Review  Outcome: Ongoing (interventions implemented as appropriate)    03/23/17 0153   Coping/Psychosocial Response Interventions   Plan Of Care Reviewed With patient   Outcome Evaluation   Outcome Summary/Follow up Plan vss, no distress noted.        Goal: Adult Individualization and Mutuality  Outcome: Ongoing (interventions implemented as appropriate)    03/23/17 0153   Individualization   Patient Specific Goals decreased soa   Patient Specific Interventions assist pt with urinal [id number]       Goal: Discharge Needs Assessment  Outcome: Ongoing (interventions implemented as appropriate)    Problem: Cardiac: Heart Failure (Adult)  Goal: Signs and Symptoms of Listed Potential Problems Will be Absent or Manageable (Cardiac: Heart Failure)  Outcome: Ongoing (interventions implemented as appropriate)    Problem: Fall Risk (Adult)  Goal: Absence of Falls  Outcome: Ongoing (interventions implemented as appropriate)      
Problem: Patient Care Overview (Adult)  Goal: Plan of Care Review  Outcome: Ongoing (interventions implemented as appropriate)    03/23/17 0826   Coping/Psychosocial Response Interventions   Plan Of Care Reviewed With patient   Outcome Evaluation   Outcome Summary/Follow up Plan Pt with improved insight and participation, willing to seat EOB to perform balance activities and ther ex. PTA secure theraband to address pt's concerns of (B)UE weakness. Limited ROM of all observed joint movements, especially all (L)side.            
Problem: Patient Care Overview (Adult)  Goal: Plan of Care Review  Outcome: Ongoing (interventions implemented as appropriate)    03/24/17 0039   Outcome Evaluation   Outcome Summary/Follow up Plan pt hopeful for dc in am. bss, not c/o pain or discomofort       Goal: Adult Individualization and Mutuality  Outcome: Ongoing (interventions implemented as appropriate)  Goal: Discharge Needs Assessment  Outcome: Ongoing (interventions implemented as appropriate)    Problem: Cardiac: Heart Failure (Adult)  Goal: Signs and Symptoms of Listed Potential Problems Will be Absent or Manageable (Cardiac: Heart Failure)  Outcome: Ongoing (interventions implemented as appropriate)    Problem: Fall Risk (Adult)  Goal: Absence of Falls  Outcome: Ongoing (interventions implemented as appropriate)    Problem: Pressure Ulcer Risk (Neal Scale) (Adult,Obstetrics,Pediatric)  Goal: Skin Integrity  Outcome: Ongoing (interventions implemented as appropriate)      
Problem: Patient Care Overview (Adult)  Goal: Plan of Care Review  Outcome: Ongoing (interventions implemented as appropriate)    03/24/17 0905   Coping/Psychosocial Response Interventions   Plan Of Care Reviewed With patient   Outcome Evaluation   Outcome Summary/Follow up Plan Improved functional independence w/ bed mobility this date. Pt able to tolerate increased theraband strength w/ (B)UE ther ex. Stretching applied to (B)LE this date. PT staff recommends d/c to [facility], for continued strength, balance, and t'yesika training prior to returning home w/ wife.            
PAST SURGICAL HISTORY:  No significant past surgical history

## 2022-03-24 NOTE — PLAN OF CARE
Problem: Patient Care Overview (Adult)  Goal: Plan of Care Review  Outcome: Ongoing (interventions implemented as appropriate)   01/07/18 1631   Coping/Psychosocial Response Interventions   Plan Of Care Reviewed With patient;daughter   Patient Care Overview   Progress no change   Outcome Evaluation   Outcome Summary/Follow up Plan Pt rested comfortably throughout the shift with no c/o pain. IV abx continued. Daughter at bedside. Will continue to monitor per comfort care.      Goal: Adult Individualization and Mutuality  Outcome: Ongoing (interventions implemented as appropriate)    Goal: Discharge Needs Assessment  Outcome: Ongoing (interventions implemented as appropriate)      Problem: Infection, Risk/Actual (Adult)  Goal: Infection Prevention/Resolution  Outcome: Ongoing (interventions implemented as appropriate)      Problem: Pain, Acute (Adult)  Goal: Acceptable Pain Control/Comfort Level  Outcome: Ongoing (interventions implemented as appropriate)      Problem: Fall Risk (Adult)  Goal: Absence of Falls  Outcome: Ongoing (interventions implemented as appropriate)      Problem: Skin Integrity Impairment, Risk/Actual (Adult)  Goal: Skin Integrity/Wound Healing  Outcome: Ongoing (interventions implemented as appropriate)         Impaired gait/Weakness